# Patient Record
Sex: MALE | Race: WHITE | NOT HISPANIC OR LATINO | Employment: OTHER | URBAN - METROPOLITAN AREA
[De-identification: names, ages, dates, MRNs, and addresses within clinical notes are randomized per-mention and may not be internally consistent; named-entity substitution may affect disease eponyms.]

---

## 2017-01-11 ENCOUNTER — HOSPITAL ENCOUNTER (OUTPATIENT)
Dept: LAB | Facility: MEDICAL CENTER | Age: 47
End: 2017-01-11
Attending: INTERNAL MEDICINE
Payer: COMMERCIAL

## 2017-01-11 ENCOUNTER — HOSPITAL ENCOUNTER (OUTPATIENT)
Dept: RADIOLOGY | Facility: MEDICAL CENTER | Age: 47
End: 2017-01-11
Attending: FAMILY MEDICINE
Payer: COMMERCIAL

## 2017-01-11 DIAGNOSIS — Z79.4 TYPE 2 DIABETES MELLITUS WITH HYPERGLYCEMIA, WITH LONG-TERM CURRENT USE OF INSULIN (HCC): ICD-10-CM

## 2017-01-11 DIAGNOSIS — E78.2 MIXED HYPERLIPIDEMIA: ICD-10-CM

## 2017-01-11 DIAGNOSIS — E11.65 TYPE 2 DIABETES MELLITUS WITH HYPERGLYCEMIA, WITH LONG-TERM CURRENT USE OF INSULIN (HCC): ICD-10-CM

## 2017-01-11 DIAGNOSIS — M67.911 BILATERAL SHOULDER TENDINOPATHY: ICD-10-CM

## 2017-01-11 DIAGNOSIS — M67.912 BILATERAL SHOULDER TENDINOPATHY: ICD-10-CM

## 2017-01-11 LAB
CHOLEST SERPL-MCNC: 115 MG/DL (ref 100–199)
HDLC SERPL-MCNC: 26 MG/DL
LDLC SERPL CALC-MCNC: 56 MG/DL
TRIGL SERPL-MCNC: 164 MG/DL (ref 0–149)

## 2017-01-11 PROCEDURE — 73030 X-RAY EXAM OF SHOULDER: CPT | Mod: RT

## 2017-01-11 PROCEDURE — 36415 COLL VENOUS BLD VENIPUNCTURE: CPT

## 2017-01-11 PROCEDURE — 73030 X-RAY EXAM OF SHOULDER: CPT | Mod: LT

## 2017-01-11 PROCEDURE — 80061 LIPID PANEL: CPT

## 2017-01-12 DIAGNOSIS — M67.911 BILATERAL SHOULDER TENDINOPATHY: ICD-10-CM

## 2017-01-12 DIAGNOSIS — M67.912 BILATERAL SHOULDER TENDINOPATHY: ICD-10-CM

## 2017-01-12 PROBLEM — E55.9 VITAMIN D DEFICIENCY: Status: ACTIVE | Noted: 2017-01-12

## 2017-01-19 ENCOUNTER — APPOINTMENT (OUTPATIENT)
Dept: MEDICAL GROUP | Facility: MEDICAL CENTER | Age: 47
End: 2017-01-19
Payer: COMMERCIAL

## 2017-01-20 ENCOUNTER — OFFICE VISIT (OUTPATIENT)
Dept: MEDICAL GROUP | Facility: MEDICAL CENTER | Age: 47
End: 2017-01-20
Payer: COMMERCIAL

## 2017-01-20 VITALS
HEIGHT: 73 IN | SYSTOLIC BLOOD PRESSURE: 124 MMHG | HEART RATE: 64 BPM | DIASTOLIC BLOOD PRESSURE: 76 MMHG | WEIGHT: 229.72 LBS | OXYGEN SATURATION: 97 % | TEMPERATURE: 96.9 F | BODY MASS INDEX: 30.45 KG/M2 | RESPIRATION RATE: 14 BRPM

## 2017-01-20 DIAGNOSIS — E55.9 VITAMIN D DEFICIENCY: ICD-10-CM

## 2017-01-20 DIAGNOSIS — E29.1 HYPOGONADISM IN MALE: ICD-10-CM

## 2017-01-20 DIAGNOSIS — E78.2 MIXED HYPERLIPIDEMIA: ICD-10-CM

## 2017-01-20 DIAGNOSIS — M67.911 BILATERAL SHOULDER TENDINOPATHY: ICD-10-CM

## 2017-01-20 DIAGNOSIS — F32.1 MODERATE SINGLE CURRENT EPISODE OF MAJOR DEPRESSIVE DISORDER (HCC): ICD-10-CM

## 2017-01-20 DIAGNOSIS — M67.912 BILATERAL SHOULDER TENDINOPATHY: ICD-10-CM

## 2017-01-20 PROCEDURE — 99215 OFFICE O/P EST HI 40 MIN: CPT | Performed by: FAMILY MEDICINE

## 2017-01-20 RX ORDER — FLUOXETINE HYDROCHLORIDE 20 MG/1
20 CAPSULE ORAL DAILY
Qty: 90 CAP | Refills: 3 | Status: SHIPPED | OUTPATIENT
Start: 2017-01-20 | End: 2018-10-02

## 2017-01-20 NOTE — MR AVS SNAPSHOT
"        Brett Vicente   2017 7:20 AM   Office Visit   MRN: [id number]    Department:  [place] [address]   Dept Phone:  [phone]    Description:  Male : 1970   Provider:  Frida Baumann M.D.           Reason for Visit     Follow-Up DM, hyperlipidemia, labs       Allergies as of 2017     Allergen Noted Reactions    Fenofibrate 2015       Muscle weakness    Morphine 01/10/2011   Nausea    Pcn [Penicillins] 2014         You were diagnosed with     Moderate single current episode of major depressive disorder (CMS-HCC)   [1078327]       Uncontrolled type 2 diabetes mellitus with complication, with long-term current use of insulin (CMS-[facility])   [6688825]       Mixed hyperlipidemia   [272.2.ICD-9-CM]       Vitamin D deficiency   [8848203]       Bilateral shoulder tendinopathy   [1730320]       Hypogonadism in male   [9501794]         Vital Signs     Blood Pressure Pulse Temperature Respirations Height Weight    124/76 mmHg 64 36.1 °C (96.9 °F) 14 1.854 m (6' 0.99\") 104.2 kg (229 lb 11.5 oz)    Body Mass Index Oxygen Saturation Smoking Status             30.31 kg/m2 97% Never Smoker          Basic Information     Date Of Birth Sex Race Ethnicity Preferred Language    1970 Male White Non- English      Your appointments     2017  2:00 PM   New Patient with Paramjit Escamilla RD   [facility])    [address]  [address]  [facility] [id number] [phone]           It is the patient's responsibility to check with your Insurance for benefit coverage for visit / visits.  24 hours notice is required for all appointment changes or cancellation.  Please arrive 20 min. before your appointment time  Please bring the following with you: 1)Picture Id 2) Insurance card 3) Completed Forms if New Patient  If scheduled for DIABETES VISIT please also brin) Medications 2) Meter 3) Blood glucose logs 4) Any recent labs if you have them  If " scheduled for NUTRITION VISIT please also brin) 2-3 days of detailed food intake logs 2) Blood glucose monitor and blood glucose logs (if you have them)            2017  2:40 PM   Established Patient with Frida Baumann M.D.   [facility])    [address]  [address]  [place] [id number] [phone]           You will be receiving a confirmation call a few days before your appointment from our automated call confirmation system.            2017 10:20 AM   Diabetes Care Visit with Lorena Joy M.D., ENDOCRINOLOGY DIABETES RN   [facility] ([facility])    [address]  [place] [id number] [phone]           You will be receiving a confirmation call a few days before your appointment from our automated call confirmation system.              Problem List              ICD-10-CM Priority Class Noted - Resolved    Gout M10.9   2014 - Present    Hypogonadism in male E29.1   2015 - Present    Diabetes mellitus type 2, uncontrolled, with complications (CMS-HCC) E11.8, E11.65   2015 - Present    Essential hypertension I10   2015 - Present    Hypothyroidism E03.9   2015 - Present    Gastroesophageal reflux disease without esophagitis K21.9   2015 - Present    Hypercalcemia E83.52   2015 - Present    Bilateral shoulder tendinopathy M67.911, M67.912   2015 - Present    Mixed hyperlipidemia E78.2   2016 - Present    Vitamin D deficiency E55.9   2017 - Present    Moderate single current episode of major depressive disorder (CMS-HCC) F32.1   2017 - Present      Health Maintenance        Date Due Completion Dates    IMM HEP B VACCINE (1 of 3 - Primary Series) 1970 ---    IMM DTaP/Tdap/Td Vaccine (1 - Tdap) 1987 ---    DIABETES MONOFILAMENT / LE EXAM 2016, 2015 (Done)    Override on 2015: Done    A1C  SCREENING 6/8/2017 12/8/2016, 6/8/2016, 12/22/2015, 9/19/2015, 6/22/2015, 6/8/2015, 12/1/2014, 6/24/2014, 3/17/2014, 11/13/2013, 7/19/2013, 2/15/2013, 11/16/2012    RETINAL SCREENING 7/11/2017 7/11/2016, 7/11/2016, 6/2/2015, 6/18/2014    URINE ACR / MICROALBUMIN 12/8/2017 12/8/2016, 9/19/2015, 6/22/2015, 6/22/2015, 6/24/2014, 3/17/2014, 11/13/2013, 7/19/2013, 2/15/2013, 11/16/2012    SERUM CREATININE 12/8/2017 12/8/2016, 6/8/2016, 9/19/2015, 6/22/2015, 9/24/2014, 6/24/2014, 3/17/2014, 11/13/2013, 7/19/2013, 2/15/2013, 11/16/2012, 6/12/2009, 6/11/2009, 6/8/2009    FASTING LIPID PROFILE 1/11/2018 1/11/2017, 12/8/2016, 6/8/2016, 9/19/2015, 6/22/2015, 9/24/2014, 6/24/2014, 3/17/2014, 11/13/2013, 7/19/2013, 2/15/2013, 11/16/2012            Current Immunizations     Influenza Vaccine Quad Inj (Pf) 12/7/2016    Influenza Vaccine Quad Inj (Preserved) 12/22/2015 12:05 PM    Pneumococcal polysaccharide vaccine (PPSV-23) 12/7/2016      Below and/or attached are the medications your provider expects you to take. Review all of your home medications and newly ordered medications with your provider and/or pharmacist. Follow medication instructions as directed by your provider and/or pharmacist. Please keep your medication list with you and share with your provider. Update the information when medications are discontinued, doses are changed, or new medications (including over-the-counter products) are added; and carry medication information at all times in the event of emergency situations     Allergies:  FENOFIBRATE - (reactions not documented)     MORPHINE - Nausea     PCN - (reactions not documented)               Medications  Valid as of: January 20, 2017 -  8:01 AM    Generic Name Brand Name Tablet Size Instructions for use    Allopurinol (Tab) ZYLOPRIM 300 MG Take 1 Tab by mouth every day.        Aspirin (Tablet Delayed Response) ECOTRIN 81 MG Take 81 mg by mouth every day.        Atorvastatin Calcium (Tab) LIPITOR 40 MG Take  1 Tab by mouth every day.        Blood Glucose Monitoring Suppl (Misc) Blood Glucose Monitoring Suppl SUPPLIES Test strips order: Test strips for One Touch Ultra 2 meter. Sig: use QID and prn ssx high or low sugar        Blood Glucose Monitoring Suppl (Device) Blood Glucose Monitoring Suppl  Meter: Dispense One Touch Ultra 2 meter. Sig. Use as directed for blood sugar monitoring.        Carvedilol (Tab) COREG 25 MG Take 0.5 Tabs by mouth 2 times a day.        Dapagliflozin Propanediol (Tab) Dapagliflozin Propanediol 5 MG Take 1 Tab by mouth every day. Indications: Type 2 Diabetes        Exenatide (Pen-injector) Exenatide ER 2 MG Inject 2 mg as instructed every 7 days.        Fenofibrate (Tab) TRICOR 145 MG Take 1 Tab by mouth every day.        FLUoxetine HCl (Cap) PROZAC 20 MG Take 1 Cap by mouth every day.        Fluticasone Propionate (Suspension) FLONASE 50 MCG/ACT Spray 2 Sprays in nose every day.        HydroCHLOROthiazide (Cap) MICROZIDE 12.5 MG Take 1 Cap by mouth every day.        Insulin Glargine (Solution Pen-injector) LANTUS 100 UNIT/ML Inject 36-60 Units as instructed every evening.        Insulin Pen Needle (Misc) Insulin Pen Needle 32G X 4 MM For insulin shot once a day        Irbesartan (Tab) AVAPRO 300 MG TAKE 1 TABLET DAILY        Lancets (Misc) Lancets  Lancets order: Lancets for One Touch Ultra 2 meter. Sig: use QID and prn ssx high or low sugar.        Lansoprazole (CAPSULE DELAYED RELEASE) PREVACID 30 MG TAKE 1 CAPSULE TWICE DAILY        Levothyroxine Sodium (Tab) SYNTHROID 75 MCG Take 1 Tab by mouth every day.        Levothyroxine Sodium (Tab) SYNTHROID 75 MCG TAKE 1 TABLET DAILY        Meloxicam (Tab) MOBIC 7.5 MG Take 1 Tab by mouth 2 times a day as needed.        MetFORMIN HCl (TABLET SR 24 HR) GLUCOPHAGE  MG Take 2 Tabs by mouth 2 times a day.        Omega-3 Fatty Acids (Cap) Omega 3 1000 MG Take 4 a day        Omega-3-acid Ethyl Esters (Cap) LOVAZA 1 GM Take 1 Cap by mouth every day.         Spironolactone (Tab) ALDACTONE 25 MG Take 0.5 Tabs by mouth every day. TAKE 0.5 TABS BY MOUTH EVERY DAY.        Testosterone Cypionate (Solution) DEPO-TESTOSTERONE 200 MG/ML 1 mL by Intramuscular route every 14 days.        .                 Medicines prescribed today were sent to:     [facility][id number] - [place], [place] - [address]    [address] [place] [address]    Phone: [phone] Fax: [phone]    Open 24 Hours?: No    [facility] - [place], [place] - [address] AT PORTAL TO REGISTERED [facility] S[address]    [address] [facility] [address]    Phone: [phone] Fax: [phone]    Open 24 Hours?: No      Medication refill instructions:       If your prescription bottle indicates you have medication refills left, it is not necessary to call your provider’s office. Please contact your pharmacy and they will refill your medication.    If your prescription bottle indicates you do not have any refills left, you may request refills at any time through one of the following ways: The online [employer] system (except Urgent Care), by calling your provider’s office, or by asking your pharmacy to contact your provider’s office with a refill request. Medication refills are processed only during regular business hours and may not be available until the next business day. Your provider may request additional information or to have a follow-up visit with you prior to refilling your medication.   *Please Note: Medication refills are assigned a new Rx number when refilled electronically. Your pharmacy may indicate that no refills were authorized even though a new prescription for the same medication is available at the pharmacy. Please request the medicine by name with the pharmacy before contacting your provider for a refill.        Your To Do List     Future Labs/Procedures Complete By Expires    BASIC METABOLIC PANEL  As directed 1/21/2018    HEMOGLOBIN A1C  As directed 1/21/2018     LIPID PROFILE  As directed 1/21/2018    REFERENCE MISC.AMBIENT  As directed 1/21/2018    Comments:    Testo Free & Total by [facility] #[id number].    VITAMIN D,25 HYDROXY  As directed 1/21/2018         MyChart Access Code: Activation code not generated  Current Medicast Status: Active

## 2017-01-20 NOTE — PROGRESS NOTES
"Subjective:   Bravo Vicente is a 46 y.o. male here today for   Chief Complaint   Patient presents with   • Follow-Up     DM, hyperlipidemia, labs        1. Moderate single current episode of major depressive disorder (CMS-HCC)  This is chronic. He has been on Prozac 20 mg daily for many years. He did run out of these pills over the last 5 days has been without periods he states that he is been more \"grumpy\" than normal with his kids. He denies any suicidality, difficulty sleeping, weight changes.     2. Uncontrolled type 2 diabetes mellitus with complication, with long-term current use of insulin (CMS-HCC)  This is chronic. Patient is seeing endocrinology. He is currently on Dapaglifozin, Exenatide, lantus (38-40U qhs), metformin 1000mg BID. We went over his blood sugar Today. His fasting glucose has been mostly 90 to 1:30. There are some aberrant numbers up to 1/91 thing in the morning. His postprandial glucoses are as high as 250. He has an appointment with diabetic educator on 1/23/17    3. Mixed hyperlipidemia  This is chronic. Patient had repeat labs done. His triglycerides went from 1600 down to 164. He is currently on atorvastatin 40 mg daily, fenofibrate 145 mg daily, Lovaza 1 g daily  Results for BRAVO VICENTE (MRN [id number]) as of 1/20/2017 08:06   Ref. Range 1/11/2017 11:32   Cholesterol,Tot Latest Ref Range: 100-199 mg/dL 115   Triglycerides Latest Ref Range: 0-149 mg/dL 164 (H)   HDL Latest Ref Range: >=40 mg/dL 26 (A)   LDL Latest Ref Range: <100 mg/dL 56     4. Vitamin D deficiency  This is a new problem. Patient did start taking vitamin D supplementation 2000 units daily  Results for BRAVO VICENTE (MRN [id number]) as of 1/20/2017 08:06   Ref. Range 12/8/2016 10:02   25-Hydroxy   Vitamin D 25 Latest Ref Range:  ng/mL 11 (L)     5. Bilateral shoulder tendinopathy  This is chronic. X-rays reviewed as below. The pain is worse with external rotation of the shoulders and when he is in push up " position. He has not tried physical therapy nor shoulder injections. He would like to get his shoulder pain under control so he can start exercising regularly    Left shoulder x-ray:  Mild AC joint spurring/joint space narrowing    Right shoulder XRAY  Negative shoulder series.  6. Hypogonadism in male  This is chronic. His testosterone has been low for several years. He did take testosterone injections weekly for several months. He has not done this over the last few years. He is not sure if this helped him.  Testosterone Total, LC-MS/MS, Males   250.0 L     ng/dL 300.0-890.0   REFERENCE INTERVAL: Testosterone Total, LC-MS/MS, Males   Access complete set of age- and/or gender-specific reference   intervals for this test in the [employer] [facility] Directory   ([employer]).   INTERPRETIVE INFORMATION: Testosterone Total, LC-MS/MS, Males   Test developed and characteristics determined by [facility]. See Compliance Statement B: [employer]   Testos Free Adult Male ED/LC-MS/MS   49.7       pg/mL 47.0-244.0   Test developed and characteristics determined by [facility]. See Compliance Statement B: [employer]/CS   Performed by [facility],   [address] [place] [id number] [phone]   [email or web address], Zain Will MD - Lab. Director       Current medicines (including changes today)  Current Outpatient Prescriptions   Medication Sig Dispense Refill   • fluoxetine (PROZAC) 20 MG Cap Take 1 Cap by mouth every day. 90 Cap 3   • meloxicam (MOBIC) 7.5 MG Tab Take 1 Tab by mouth 2 times a day as needed. 60 Tab 1   • atorvastatin (LIPITOR) 40 MG Tab Take 1 Tab by mouth every day. 90 Tab 3   • insulin glargine (LANTUS SOLOSTAR) 100 UNIT/ML Solution Pen-injector injection Inject 36-60 Units as instructed every evening. 45 mL 3   • fenofibrate (TRICOR) 145 MG Tab Take 1 Tab by mouth every day. 90 Tab 3   • Insulin Pen Needle (BD PEN NEEDLE CARLY U/F) 32G X 4 MM Misc For insulin shot once a day 100  Each 3   • Blood Glucose Monitoring Suppl SUPPLIES Misc Test strips order: Test strips for One Touch Ultra 2 meter. Sig: use QID and prn ssx high or low sugar 100 Each 3   • Lancets Misc Lancets order: Lancets for One Touch Ultra 2 meter. Sig: use QID and prn ssx high or low sugar. 100 Each 3   • Blood Glucose Monitoring Suppl Device Meter: Dispense One Touch Ultra 2 meter. Sig. Use as directed for blood sugar monitoring. 1 Device 0   • Exenatide ER 2 MG Pen-injector Inject 2 mg as instructed every 7 days. 8 Each 12   • lansoprazole (PREVACID) 30 MG CAPSULE DELAYED RELEASE TAKE 1 CAPSULE TWICE DAILY 180 Cap 4   • irbesartan (AVAPRO) 300 MG Tab TAKE 1 TABLET DAILY 90 Tab 4   • SYNTHROID 75 MCG Tab TAKE 1 TABLET DAILY 90 Tab 4   • spironolactone (ALDACTONE) 25 MG Tab Take 0.5 Tabs by mouth every day. TAKE 0.5 TABS BY MOUTH EVERY DAY. 45 Tab 4   • Dapagliflozin Propanediol (FARXIGA) 5 MG Tab Take 1 Tab by mouth every day. Indications: Type 2 Diabetes 90 Tab 3   • levothyroxine (SYNTHROID) 75 MCG Tab Take 1 Tab by mouth every day. 90 Tab 3   • metformin ER (GLUCOPHAGE XR) 500 MG TABLET SR 24 HR Take 2 Tabs by mouth 2 times a day. 360 Tab 3   • allopurinol (ZYLOPRIM) 300 MG Tab Take 1 Tab by mouth every day. 90 Tab 1   • fluticasone (FLONASE) 50 MCG/ACT nasal spray Spray 2 Sprays in nose every day. 16 g 0   • hydrochlorothiazide (MICROZIDE) 12.5 MG capsule Take 1 Cap by mouth every day. 90 Cap 1   • carvedilol (COREG) 25 MG Tab Take 0.5 Tabs by mouth 2 times a day. 90 Tab 1   • Omega 3 1000 MG CAPS Take 4 a day 100 Cap 6   • aspirin EC (ECOTRIN) 81 MG TBEC Take 81 mg by mouth every day.     • omega-3 acid ethyl esters (LOVAZA) 1 GM capsule Take 1 Cap by mouth every day. 180 Cap 3   • testosterone cypionate (DEPO-TESTOSTERONE) 200 MG/ML Solution injection 1 mL by Intramuscular route every 14 days. 10 mL 4     No current facility-administered medications for this visit.     He  has a past medical history of Diabetes  "(CMS-HCC); Hypertension; Hyperlipidemia; GERD (gastroesophageal reflux disease); Thyroid disease; and Gout.    ROS   No chest pain, no shortness of breath, no abdominal pain       Objective:     Blood pressure 124/76, pulse 64, temperature 36.1 °C (96.9 °F), resp. rate 14, height 1.854 m (6' 0.99\"), weight 104.2 kg (229 lb 11.5 oz), SpO2 97 %. Body mass index is 30.31 kg/(m^2).   Physical Exam:  Constitutional: Alert, no distress.  Skin: Warm, dry, good turgor, no rashes in visible areas.  Eye: Equal, round and reactive, conjunctiva clear, lids normal.  ENMT: Lips without lesions, good dentition, oropharynx clear.  Neck: Trachea midline, no masses, no thyromegaly. No cervical or supraclavicular lymphadenopathy  Respiratory: Unlabored respiratory effort, lungs clear to auscultation, no wheezes, no ronchi.  Cardiovascular: Normal S1, S2, no murmur, no edema.  Abdomen: Soft, non-tender, no masses, no hepatosplenomegaly.  Psych: Alert and oriented x3, normal affect and mood.  MSK: Tenderness to palpation at the insertion of the biceps tendon. Pain with empty can test      Assessment and Plan:   The following treatment plan was discussed    1. Moderate single current episode of major depressive disorder (CMS-HCC)  Chronic, stable  Continue Prozac 20 mg daily  - fluoxetine (PROZAC) 20 MG Cap; Take 1 Cap by mouth every day.  Dispense: 90 Cap; Refill: 3    2. Uncontrolled type 2 diabetes mellitus with complication, with long-term current use of insulin (CMS-HCC)  Chronic, stable  Continue medication regimen as above  Continue blood sugar log  Follow-up with Dr. Joy   Repeat hemoglobin A1c in 2 months   Dietitian meeting on 1/23/17  - BASIC METABOLIC PANEL; Future  - HEMOGLOBIN A1C; Future    3. Mixed hyperlipidemia  Chronic, improved   Continue medications as above  If triglycerides remain controlled, we may be able to stop his fenofibrate  Nutrition counseling  Start daily exercise  - LIPID PROFILE; Future    4. Vitamin " D deficiency  This is a new problem and not controlled  Start vitamin D supplementation 2000 units daily   Recheck 3 months    - VITAMIN D,25 HYDROXY; Future    5. Bilateral shoulder tendinopathy  Chronic, uncontrolled x-rays reviewed  Start physical therapy  If no improvement, MRI will be ordered and possible meeting with orthopedics    6. Hypogonadism in male  Chronic, stable  Labs reviewed  Recheck labs with next blood draw  We did discuss risks of testosterone supplementation versus benefits  - REFERENCE MISC.AMBIENT; Future    Total 40 minutes face-to-face time spent with patient, with greater than 50% of the total time discussing patient's issues and symptoms as listed above in assessment and plan, as well as managing coordination of care for future evaluation and treatment.      Followup: Return in about 3 months (around 4/20/2017) for DM, Labs, shoulder, HLD, long.

## 2017-01-23 ENCOUNTER — NON-PROVIDER VISIT (OUTPATIENT)
Dept: HEALTH INFORMATION MANAGEMENT | Facility: MEDICAL CENTER | Age: 47
End: 2017-01-23
Payer: COMMERCIAL

## 2017-01-23 PROCEDURE — 97802 MEDICAL NUTRITION INDIV IN: CPT | Performed by: DIETITIAN, REGISTERED

## 2017-01-23 NOTE — MR AVS SNAPSHOT
Brett Vicente   2017 2:00 PM   Appointment   MRN: [id number]    Department:  [facility]   Dept Phone:  [phone]    Description:  Male : 1970   Provider:  Paramjit Escamilla RD           Allergies as of 2017     Allergen Noted Reactions    Fenofibrate 2015       Muscle weakness    Morphine 01/10/2011   Nausea    Pcn [Penicillins] 2014         Vital Signs     Smoking Status                   Never Smoker            Basic Information     Date Of Birth Sex Race Ethnicity Preferred Language    1970 Male White Non- English      Your appointments     Mar 06, 2017  4:00 PM   Follow Up Visit with Paramjit Escamilla RD   [facility])    [address]  [address]  [place] [id number] [phone]           It is the patient's responsibility to check with your Insurance for benefit coverage for visit / visits.  24 hours notice is required for all appointment changes or cancellation.  Please arrive 20 min. before your appointment time  Please bring the following with you: 1)Picture Id 2) Insurance card 3) Completed Forms if New Patient  If scheduled for DIABETES VISIT please also brin) Medications 2) Meter 3) Blood glucose logs 4) Any recent labs if you have them  If scheduled for NUTRITION VISIT please also brin) 2-3 days of detailed food intake logs 2) Blood glucose monitor and blood glucose logs (if you have them)            2017  2:40 PM   Established Patient with Frida Baumann M.D.   [facility])    [address]  [address]  [place] [id number] [phone]           You will be receiving a confirmation call a few days before your appointment from our automated call confirmation system.            2017 10:20 AM   Diabetes Care Visit with Lorena Joy M.D., ENDOCRINOLOGY DIABETES RN   [facility] ([facility])    [address], [address]  [place] [address] [phone]           You will be receiving a confirmation call a few days before your appointment from our automated call confirmation system.              Problem List              ICD-10-CM Priority Class Noted - Resolved    Gout M10.9   8/19/2014 - Present    Hypogonadism in male E29.1   6/25/2015 - Present    Diabetes mellitus type 2, uncontrolled, with complications (CMS-HCC) E11.8, E11.65   6/25/2015 - Present    Essential hypertension I10   6/25/2015 - Present    Hypothyroidism E03.9   6/25/2015 - Present    Gastroesophageal reflux disease without esophagitis K21.9   6/25/2015 - Present    Hypercalcemia E83.52   12/22/2015 - Present    Bilateral shoulder tendinopathy M67.911, M67.912   12/22/2015 - Present    Mixed hyperlipidemia E78.2   12/7/2016 - Present    Vitamin D deficiency E55.9   1/12/2017 - Present    Moderate single current episode of major depressive disorder (CMS-HCC) F32.1   1/20/2017 - Present      Health Maintenance        Date Due Completion Dates    IMM HEP B VACCINE (1 of 3 - Primary Series) 1970 ---    IMM DTaP/Tdap/Td Vaccine (1 - Tdap) 12/21/1987 ---    DIABETES MONOFILAMENT / LE EXAM 12/22/2016 12/22/2015, 6/8/2015 (Done)    Override on 6/8/2015: Done    A1C SCREENING 6/8/2017 12/8/2016, 6/8/2016, 12/22/2015, 9/19/2015, 6/22/2015, 6/8/2015, 12/1/2014, 6/24/2014, 3/17/2014, 11/13/2013, 7/19/2013, 2/15/2013, 11/16/2012    RETINAL SCREENING 7/11/2017 7/11/2016, 7/11/2016, 6/2/2015, 6/18/2014    URINE ACR / MICROALBUMIN 12/8/2017 12/8/2016, 9/19/2015, 6/22/2015, 6/22/2015, 6/24/2014, 3/17/2014, 11/13/2013, 7/19/2013, 2/15/2013, 11/16/2012    SERUM CREATININE 12/8/2017 12/8/2016, 6/8/2016, 9/19/2015, 6/22/2015, 9/24/2014, 6/24/2014, 3/17/2014, 11/13/2013, 7/19/2013, 2/15/2013, 11/16/2012, 6/12/2009, 6/11/2009, 6/8/2009    FASTING LIPID PROFILE 1/11/2018 1/11/2017, 12/8/2016, 6/8/2016, 9/19/2015, 6/22/2015, 9/24/2014, 6/24/2014,  3/17/2014, 11/13/2013, 7/19/2013, 2/15/2013, 11/16/2012            Current Immunizations     Influenza Vaccine Quad Inj (Pf) 12/7/2016    Influenza Vaccine Quad Inj (Preserved) 12/22/2015 12:05 PM    Pneumococcal polysaccharide vaccine (PPSV-23) 12/7/2016      Below and/or attached are the medications your provider expects you to take. Review all of your home medications and newly ordered medications with your provider and/or pharmacist. Follow medication instructions as directed by your provider and/or pharmacist. Please keep your medication list with you and share with your provider. Update the information when medications are discontinued, doses are changed, or new medications (including over-the-counter products) are added; and carry medication information at all times in the event of emergency situations     Allergies:  FENOFIBRATE - (reactions not documented)     MORPHINE - Nausea     PCN - (reactions not documented)               Medications  Valid as of: January 23, 2017 -  3:18 PM    Generic Name Brand Name Tablet Size Instructions for use    Allopurinol (Tab) ZYLOPRIM 300 MG Take 1 Tab by mouth every day.        Aspirin (Tablet Delayed Response) ECOTRIN 81 MG Take 81 mg by mouth every day.        Atorvastatin Calcium (Tab) LIPITOR 40 MG Take 1 Tab by mouth every day.        Blood Glucose Monitoring Suppl (Misc) Blood Glucose Monitoring Suppl SUPPLIES Test strips order: Test strips for One Touch Ultra 2 meter. Sig: use QID and prn ssx high or low sugar        Blood Glucose Monitoring Suppl (Device) Blood Glucose Monitoring Suppl  Meter: Dispense One Touch Ultra 2 meter. Sig. Use as directed for blood sugar monitoring.        Carvedilol (Tab) COREG 25 MG Take 0.5 Tabs by mouth 2 times a day.        Dapagliflozin Propanediol (Tab) Dapagliflozin Propanediol 5 MG Take 1 Tab by mouth every day. Indications: Type 2 Diabetes        Exenatide (Pen-injector) Exenatide ER 2 MG Inject 2 mg as instructed every 7 days.          Fenofibrate (Tab) TRICOR 145 MG Take 1 Tab by mouth every day.        FLUoxetine HCl (Cap) PROZAC 20 MG Take 1 Cap by mouth every day.        Fluticasone Propionate (Suspension) FLONASE 50 MCG/ACT Spray 2 Sprays in nose every day.        HydroCHLOROthiazide (Cap) MICROZIDE 12.5 MG Take 1 Cap by mouth every day.        Insulin Glargine (Solution Pen-injector) LANTUS 100 UNIT/ML Inject 36-60 Units as instructed every evening.        Insulin Pen Needle (Misc) Insulin Pen Needle 32G X 4 MM For insulin shot once a day        Irbesartan (Tab) AVAPRO 300 MG TAKE 1 TABLET DAILY        Lancets (Misc) Lancets  Lancets order: Lancets for One Touch Ultra 2 meter. Sig: use QID and prn ssx high or low sugar.        Lansoprazole (CAPSULE DELAYED RELEASE) PREVACID 30 MG TAKE 1 CAPSULE TWICE DAILY        Levothyroxine Sodium (Tab) SYNTHROID 75 MCG Take 1 Tab by mouth every day.        Levothyroxine Sodium (Tab) SYNTHROID 75 MCG TAKE 1 TABLET DAILY        Meloxicam (Tab) MOBIC 7.5 MG Take 1 Tab by mouth 2 times a day as needed.        MetFORMIN HCl (TABLET SR 24 HR) GLUCOPHAGE  MG Take 2 Tabs by mouth 2 times a day.        Omega-3 Fatty Acids (Cap) Omega 3 1000 MG Take 4 a day        Omega-3-acid Ethyl Esters (Cap) LOVAZA 1 GM Take 1 Cap by mouth every day.        Spironolactone (Tab) ALDACTONE 25 MG Take 0.5 Tabs by mouth every day. TAKE 0.5 TABS BY MOUTH EVERY DAY.        Testosterone Cypionate (Solution) DEPO-TESTOSTERONE 200 MG/ML 1 mL by Intramuscular route every 14 days.        .                 Medicines prescribed today were sent to:     [facility]/PHARMACY #[id number] - [place] - [address]    [address] [place] [address]    Phone: [phone] Fax: [phone]    Open 24 Hours?: No    [facility] - [place] - [address] AT PORTAL TO REGISTERED [facility] SITES    [address] [facility] [address]    Phone: [phone] Fax: [phone]    Open 24 Hours?: No      Medication  refill instructions:       If your prescription bottle indicates you have medication refills left, it is not necessary to call your provider’s office. Please contact your pharmacy and they will refill your medication.    If your prescription bottle indicates you do not have any refills left, you may request refills at any time through one of the following ways: The online [employer] system (except Urgent Care), by calling your provider’s office, or by asking your pharmacy to contact your provider’s office with a refill request. Medication refills are processed only during regular business hours and may not be available until the next business day. Your provider may request additional information or to have a follow-up visit with you prior to refilling your medication.   *Please Note: Medication refills are assigned a new Rx number when refilled electronically. Your pharmacy may indicate that no refills were authorized even though a new prescription for the same medication is available at the pharmacy. Please request the medicine by name with the pharmacy before contacting your provider for a refill.           [employer] Access Code: Activation code not generated  Current [employer] Status: Active

## 2017-01-23 NOTE — PROGRESS NOTES
1/23/2017    Frida Baumann M.D.  46 y.o.   Time in/out: 1412 - 1510     Subjective:  -Wants to control BG's better  -Taking his Lantus consistently now with fasting BG's  mg/dL in the last week  -Does not eat consistently - usually skips lunch d/t not remembering to eat  -No regular activity at this time    Nutrition Diagnosis (PES Statement)    Altered nutrition-related lab values related to endocrine dysfunction as evidenced by HbA1c of 11.5%.    Biochemical data, medical test and procedures  Lab Results   Component Value Date/Time    GLYCOHEMOGLOBIN 11.5* 12/08/2016 10:02 AM     Lab Results   Component Value Date/Time    CHOLESTEROL, 01/11/2017 11:32 AM    LDL 56 01/11/2017 11:32 AM    HDL 26* 01/11/2017 11:32 AM    TRIGLYCERIDES 164* 01/11/2017 11:32 AM         Nutrition Intervention  Meal and Snack  Recommend a general/healthful diet    Comprehensive Nutrition education Instruction or training leading to in-depth nutrition related knowledge about:  Combine carb, protein and fat at each meal, Meal timing and spacing, Menu Planning, Metabolism of carb, protein, fat, Physical activity/exercise, Portion control, Label Reading and Handouts provided regarding topics discussed    Monitoring & Evaluation Plan  Behavioral-Environmental:  Behavior:  Consistent CHO intake throughout the day; set an alarm to remind you to eat lunch daily  Physical activity:  Increase as tolerated    Food / Nutrient Intake:  Macronutrients intake:  Up to 60 grams CHO at meals and 15-20 grams at snacks    Physical Signs / Symptoms:  HbA1c profiles:  Within ADA guidelines      Assessment Notes:  I want to start Brett off with some small goals of being more consistent with his eating and exercise habits.  He has agreed to set an alarm as a reminder to eat something for lunch midday since he is skipping this meal more often than not.  This should help his BG's because he will stop going 8+ hours without eating, thus causing  his liver to breakdown liver glycogen and raising BG's.  We discussed examples of what he can eat and he is going to start doing this more consistently.  We also discussed exercising more consistently as well and to think of exercise as medicine for controlling BG's, which he has seen when he is more active during the spring/summer.  I showed him the plate method to help him focus on balance and portions at dinner, and we will discuss this more at our next appointment in 1 month.

## 2017-02-13 ENCOUNTER — APPOINTMENT (OUTPATIENT)
Dept: PHYSICAL THERAPY | Facility: REHABILITATION | Age: 47
End: 2017-02-13
Payer: COMMERCIAL

## 2017-03-01 ENCOUNTER — HOSPITAL ENCOUNTER (OUTPATIENT)
Dept: LAB | Facility: MEDICAL CENTER | Age: 47
End: 2017-03-01
Attending: FAMILY MEDICINE
Payer: COMMERCIAL

## 2017-03-01 ENCOUNTER — HOSPITAL ENCOUNTER (OUTPATIENT)
Dept: PHYSICAL THERAPY | Facility: MEDICAL CENTER | Age: 47
End: 2017-03-01
Attending: FAMILY MEDICINE
Payer: COMMERCIAL

## 2017-03-01 DIAGNOSIS — E29.1 HYPOGONADISM IN MALE: ICD-10-CM

## 2017-03-01 DIAGNOSIS — E55.9 VITAMIN D DEFICIENCY: ICD-10-CM

## 2017-03-01 DIAGNOSIS — E78.2 MIXED HYPERLIPIDEMIA: ICD-10-CM

## 2017-03-01 LAB
25(OH)D3 SERPL-MCNC: 33 NG/ML (ref 30–100)
ANION GAP SERPL CALC-SCNC: 6 MMOL/L (ref 0–11.9)
BUN SERPL-MCNC: 17 MG/DL (ref 8–22)
CALCIUM SERPL-MCNC: 10.6 MG/DL (ref 8.4–10.2)
CHLORIDE SERPL-SCNC: 107 MMOL/L (ref 96–112)
CHOLEST SERPL-MCNC: 110 MG/DL (ref 100–199)
CO2 SERPL-SCNC: 27 MMOL/L (ref 20–33)
CREAT SERPL-MCNC: 0.98 MG/DL (ref 0.5–1.4)
EST. AVERAGE GLUCOSE BLD GHB EST-MCNC: 151 MG/DL
GFR SERPL CREATININE-BSD FRML MDRD: >60 ML/MIN/1.73 M 2
GLUCOSE SERPL-MCNC: 105 MG/DL (ref 65–99)
HBA1C MFR BLD: 6.9 % (ref 0–5.6)
HDLC SERPL-MCNC: 28 MG/DL
LDLC SERPL CALC-MCNC: 49 MG/DL
POTASSIUM SERPL-SCNC: 3.9 MMOL/L (ref 3.6–5.5)
SODIUM SERPL-SCNC: 140 MMOL/L (ref 135–145)
TRIGL SERPL-MCNC: 163 MG/DL (ref 0–149)

## 2017-03-01 PROCEDURE — 82306 VITAMIN D 25 HYDROXY: CPT

## 2017-03-01 PROCEDURE — 97162 PT EVAL MOD COMPLEX 30 MIN: CPT

## 2017-03-01 PROCEDURE — 80061 LIPID PANEL: CPT

## 2017-03-01 PROCEDURE — 84403 ASSAY OF TOTAL TESTOSTERONE: CPT

## 2017-03-01 PROCEDURE — 97140 MANUAL THERAPY 1/> REGIONS: CPT

## 2017-03-01 PROCEDURE — 83036 HEMOGLOBIN GLYCOSYLATED A1C: CPT

## 2017-03-01 PROCEDURE — 80048 BASIC METABOLIC PNL TOTAL CA: CPT

## 2017-03-01 PROCEDURE — 84402 ASSAY OF FREE TESTOSTERONE: CPT

## 2017-03-01 PROCEDURE — 36415 COLL VENOUS BLD VENIPUNCTURE: CPT

## 2017-03-04 LAB — MISCELLANEOUS LAB RESULT MISCLAB: ABNORMAL

## 2017-03-06 ENCOUNTER — HOSPITAL ENCOUNTER (OUTPATIENT)
Dept: PHYSICAL THERAPY | Facility: MEDICAL CENTER | Age: 47
End: 2017-03-06
Attending: FAMILY MEDICINE
Payer: COMMERCIAL

## 2017-03-06 ENCOUNTER — NON-PROVIDER VISIT (OUTPATIENT)
Dept: HEALTH INFORMATION MANAGEMENT | Facility: MEDICAL CENTER | Age: 47
End: 2017-03-06
Payer: COMMERCIAL

## 2017-03-06 PROCEDURE — 97803 MED NUTRITION INDIV SUBSEQ: CPT | Performed by: DIETITIAN, REGISTERED

## 2017-03-06 PROCEDURE — 97140 MANUAL THERAPY 1/> REGIONS: CPT

## 2017-03-06 NOTE — MR AVS SNAPSHOT
Brett Vicente   3/6/2017 4:00 PM   Appointment   MRN: [id number]    Department:  [facility]   Dept Phone:  [phone]    Description:  Male : 1970   Provider:  Paramjit Escamilla RD           Allergies as of 3/6/2017     Allergen Noted Reactions    Fenofibrate 2015       Muscle weakness    Morphine 01/10/2011   Nausea    Pcn [Penicillins] 2014         Vital Signs     Smoking Status                   Never Smoker            Basic Information     Date Of Birth Sex Race Ethnicity Preferred Language    1970 Male White Non- English      Your appointments     Mar 08, 2017  1:30 PM   PT Follow Up 30 Minutes with Hudson Roe P.T.   [facility] ([facility])    [address]  [place] [address]   [phone]            Mar 13, 2017  1:30 PM   PT Follow Up 30 Minutes with Hudosn Roe P.T.   [facility] ([facility])    [address]  [place] [address]   [phone]            Mar 15, 2017  1:30 PM   PT Follow Up 30 Minutes with Hudson Roe P.T.   [facility] ([facility])    [address]  [place] [address]   [phone]            Mar 20, 2017  1:30 PM   PT Follow Up 30 Minutes with Hudson Roe P.T.   [facility] ([facility])    [address]  [place] [address]   [phone]            Mar 22, 2017  1:30 PM   PT Follow Up 30 Minutes with Hudson Roe P.T.   [facility] ([facility])    [address]  [place] [address]   [phone]            Mar 27, 2017  1:30 PM   PT Follow Up 30 Minutes with Hudson Roe P.T.   [facility] ([facility])    [address]  [place] [address]   [phone]            Mar 29, 2017  1:30 PM   PT  Follow Up 30 Minutes with Hudson Roe P.T.   [facility] ([facility])    [address]  [place] [address] [phone]            Apr 11, 2017  2:40 PM   Established Patient with Frida Baumann M.D.   [facility] (--)    [address]  [address]  [facility] [id number] [phone]           You will be receiving a confirmation call a few days before your appointment from our automated call confirmation system.            Apr 12, 2017 10:20 AM   Diabetes Care Visit with Lorena Joy M.D., ENDOCRINOLOGY DIABETES RN   [facility] ([facility])    [address]  [facility] [address] [phone]           You will be receiving a confirmation call a few days before your appointment from our automated call confirmation system.              Problem List              ICD-10-CM Priority Class Noted - Resolved    Gout M10.9   8/19/2014 - Present    Hypogonadism in male E29.1   6/25/2015 - Present    Diabetes mellitus type 2, uncontrolled, with complications (CMS-HCC) E11.8, E11.65   6/25/2015 - Present    Essential hypertension I10   6/25/2015 - Present    Hypothyroidism E03.9   6/25/2015 - Present    Gastroesophageal reflux disease without esophagitis K21.9   6/25/2015 - Present    Hypercalcemia E83.52   12/22/2015 - Present    Bilateral shoulder tendinopathy M67.911, M67.912   12/22/2015 - Present    Mixed hyperlipidemia E78.2   12/7/2016 - Present    Vitamin D deficiency E55.9   1/12/2017 - Present    Moderate single current episode of major depressive disorder (CMS-HCC) F32.1   1/20/2017 - Present      Health Maintenance        Date Due Completion Dates    IMM HEP B VACCINE (1 of 3 - Primary Series) 1970 ---    IMM DTaP/Tdap/Td Vaccine (1 - Tdap) 12/21/1987 ---    DIABETES MONOFILAMENT / LE EXAM 12/22/2016 12/22/2015, 6/8/2015 (Done)    Override on 6/8/2015: Done     RETINAL SCREENING 7/11/2017 7/11/2016, 7/11/2016, 6/2/2015, 6/18/2014    A1C SCREENING 9/1/2017 3/1/2017, 12/8/2016, 6/8/2016, 12/22/2015, 9/19/2015, 6/22/2015, 6/8/2015, 12/1/2014, 6/24/2014, 3/17/2014, 11/13/2013, 7/19/2013, 2/15/2013, 11/16/2012    URINE ACR / MICROALBUMIN 12/8/2017 12/8/2016, 9/19/2015, 6/22/2015, 6/22/2015, 6/24/2014, 3/17/2014, 11/13/2013, 7/19/2013, 2/15/2013, 11/16/2012    FASTING LIPID PROFILE 3/1/2018 3/1/2017, 1/11/2017, 12/8/2016, 6/8/2016, 9/19/2015, 6/22/2015, 9/24/2014, 6/24/2014, 3/17/2014, 11/13/2013, 7/19/2013, 2/15/2013, 11/16/2012    SERUM CREATININE 3/1/2018 3/1/2017, 12/8/2016, 6/8/2016, 9/19/2015, 6/22/2015, 9/24/2014, 6/24/2014, 3/17/2014, 11/13/2013, 7/19/2013, 2/15/2013, 11/16/2012, 6/12/2009, 6/11/2009, 6/8/2009            Current Immunizations     Influenza Vaccine Quad Inj (Pf) 12/7/2016    Influenza Vaccine Quad Inj (Preserved) 12/22/2015 12:05 PM    Pneumococcal polysaccharide vaccine (PPSV-23) 12/7/2016      Below and/or attached are the medications your provider expects you to take. Review all of your home medications and newly ordered medications with your provider and/or pharmacist. Follow medication instructions as directed by your provider and/or pharmacist. Please keep your medication list with you and share with your provider. Update the information when medications are discontinued, doses are changed, or new medications (including over-the-counter products) are added; and carry medication information at all times in the event of emergency situations     Allergies:  FENOFIBRATE - (reactions not documented)     MORPHINE - Nausea     PCN - (reactions not documented)               Medications  Valid as of: March 06, 2017 -  4:19 PM    Generic Name Brand Name Tablet Size Instructions for use    Allopurinol (Tab) ZYLOPRIM 300 MG Take 1 Tab by mouth every day.        Aspirin (Tablet Delayed Response) ECOTRIN 81 MG Take 81 mg by mouth every day.        Atorvastatin  Calcium (Tab) LIPITOR 40 MG Take 1 Tab by mouth every day.        Blood Glucose Monitoring Suppl (Misc) Blood Glucose Monitoring Suppl SUPPLIES Test strips order: Test strips for One Touch Ultra 2 meter. Sig: use QID and prn ssx high or low sugar        Blood Glucose Monitoring Suppl (Device) Blood Glucose Monitoring Suppl  Meter: Dispense One Touch Ultra 2 meter. Sig. Use as directed for blood sugar monitoring.        Carvedilol (Tab) COREG 25 MG Take 0.5 Tabs by mouth 2 times a day.        Dapagliflozin Propanediol (Tab) Dapagliflozin Propanediol 5 MG Take 1 Tab by mouth every day. Indications: Type 2 Diabetes        Exenatide (Pen-injector) Exenatide ER 2 MG Inject 2 mg as instructed every 7 days.        Fenofibrate (Tab) TRICOR 145 MG Take 1 Tab by mouth every day.        FLUoxetine HCl (Cap) PROZAC 20 MG Take 1 Cap by mouth every day.        Fluticasone Propionate (Suspension) FLONASE 50 MCG/ACT Spray 2 Sprays in nose every day.        HydroCHLOROthiazide (Cap) MICROZIDE 12.5 MG Take 1 Cap by mouth every day.        Insulin Glargine (Solution Pen-injector) LANTUS 100 UNIT/ML Inject 36-60 Units as instructed every evening.        Insulin Pen Needle (Misc) Insulin Pen Needle 32G X 4 MM For insulin shot once a day        Irbesartan (Tab) AVAPRO 300 MG TAKE 1 TABLET DAILY        Lancets (Misc) Lancets  Lancets order: Lancets for One Touch Ultra 2 meter. Sig: use QID and prn ssx high or low sugar.        Lansoprazole (CAPSULE DELAYED RELEASE) PREVACID 30 MG TAKE 1 CAPSULE TWICE DAILY        Levothyroxine Sodium (Tab) SYNTHROID 75 MCG Take 1 Tab by mouth every day.        Levothyroxine Sodium (Tab) SYNTHROID 75 MCG TAKE 1 TABLET DAILY        Meloxicam (Tab) MOBIC 7.5 MG Take 1 Tab by mouth 2 times a day as needed.        MetFORMIN HCl (TABLET SR 24 HR) GLUCOPHAGE  MG Take 2 Tabs by mouth 2 times a day.        Omega-3 Fatty Acids (Cap) Omega 3 1000 MG Take 4 a day        Omega-3-acid Ethyl Esters (Cap) LOVAZA 1  GM Take 1 Cap by mouth every day.        Spironolactone (Tab) ALDACTONE 25 MG Take 0.5 Tabs by mouth every day. TAKE 0.5 TABS BY MOUTH EVERY DAY.        Testosterone Cypionate (Solution) DEPO-TESTOSTERONE 200 MG/ML 1 mL by Intramuscular route every 14 days.        .                 Medicines prescribed today were sent to:     [facility]/PHARMACY #[id number] - [place], [place] - [address]    [address] [place] [address]    Phone: [phone] Fax: [phone]    Open 24 Hours?: No    [facility] [place] - [address] AT PORTAL TO REGISTERED [facility] SITES    [address] [facility] [address]    Phone: [phone] Fax: [phone]    Open 24 Hours?: No      Medication refill instructions:       If your prescription bottle indicates you have medication refills left, it is not necessary to call your provider’s office. Please contact your pharmacy and they will refill your medication.    If your prescription bottle indicates you do not have any refills left, you may request refills at any time through one of the following ways: The online [employer] system (except Urgent Care), by calling your provider’s office, or by asking your pharmacy to contact your provider’s office with a refill request. Medication refills are processed only during regular business hours and may not be available until the next business day. Your provider may request additional information or to have a follow-up visit with you prior to refilling your medication.   *Please Note: Medication refills are assigned a new Rx number when refilled electronically. Your pharmacy may indicate that no refills were authorized even though a new prescription for the same medication is available at the pharmacy. Please request the medicine by name with the pharmacy before contacting your provider for a refill.           [employer] Access Code: Activation code not generated  Current [employer] Status: Active

## 2017-03-07 NOTE — PROGRESS NOTES
"Nutrition Reassess    3/6/2017    Frida Baumann M.D.   46 y.o.   Time in/out:  1602 - 1618    Subjective:  -Was doing very well with mixing CHO and protein at his meals  -States he has \"fallen backward\" with this and has been eating CHO only more often  -Eating something midday now between 0608-0316 daily now  -No regular activity, just starting PT    ReAssesment/Notes:  Brett's most recent BG's of 116-178 mg/dL and most recent HbA1c of 6.9% are indicative of a shift in his habits and taking his Lantus as directed.  Brett is getting better with his eating habits and I want him to work back into eating protein with all of his meals, which he states he can do.  He also will set an alarm on his phone to remind him to eat something midday, since that is still a struggle for him at this time.  I believe that with improved exercise habits he will see further improvements in his BG control, which he states he understands and will try to do once he is through with PT and with warmer weather.  He has multiple MD appointments soon and is going to call me with any questions or if he needs a f/u appointment.    Follow-up: prn      "

## 2017-03-08 ENCOUNTER — HOSPITAL ENCOUNTER (OUTPATIENT)
Dept: PHYSICAL THERAPY | Facility: MEDICAL CENTER | Age: 47
End: 2017-03-08
Attending: FAMILY MEDICINE
Payer: COMMERCIAL

## 2017-03-08 PROCEDURE — 97140 MANUAL THERAPY 1/> REGIONS: CPT

## 2017-03-13 ENCOUNTER — HOSPITAL ENCOUNTER (OUTPATIENT)
Dept: PHYSICAL THERAPY | Facility: MEDICAL CENTER | Age: 47
End: 2017-03-13
Attending: FAMILY MEDICINE
Payer: COMMERCIAL

## 2017-03-13 PROCEDURE — 97014 ELECTRIC STIMULATION THERAPY: CPT

## 2017-03-13 PROCEDURE — 97110 THERAPEUTIC EXERCISES: CPT

## 2017-03-15 ENCOUNTER — HOSPITAL ENCOUNTER (OUTPATIENT)
Dept: PHYSICAL THERAPY | Facility: MEDICAL CENTER | Age: 47
End: 2017-03-15
Attending: FAMILY MEDICINE
Payer: COMMERCIAL

## 2017-03-15 PROCEDURE — 97110 THERAPEUTIC EXERCISES: CPT

## 2017-03-15 PROCEDURE — 97140 MANUAL THERAPY 1/> REGIONS: CPT

## 2017-03-20 ENCOUNTER — HOSPITAL ENCOUNTER (OUTPATIENT)
Dept: PHYSICAL THERAPY | Facility: MEDICAL CENTER | Age: 47
End: 2017-03-20
Attending: FAMILY MEDICINE
Payer: COMMERCIAL

## 2017-03-20 PROCEDURE — 97140 MANUAL THERAPY 1/> REGIONS: CPT

## 2017-03-20 PROCEDURE — 97110 THERAPEUTIC EXERCISES: CPT

## 2017-03-20 PROCEDURE — 97014 ELECTRIC STIMULATION THERAPY: CPT

## 2017-03-22 ENCOUNTER — APPOINTMENT (OUTPATIENT)
Dept: PHYSICAL THERAPY | Facility: MEDICAL CENTER | Age: 47
End: 2017-03-22
Attending: FAMILY MEDICINE
Payer: COMMERCIAL

## 2017-03-27 ENCOUNTER — APPOINTMENT (OUTPATIENT)
Dept: PHYSICAL THERAPY | Facility: MEDICAL CENTER | Age: 47
End: 2017-03-27
Attending: FAMILY MEDICINE
Payer: COMMERCIAL

## 2017-04-04 ENCOUNTER — TELEPHONE (OUTPATIENT)
Dept: ENDOCRINOLOGY | Facility: MEDICAL CENTER | Age: 47
End: 2017-04-04

## 2017-04-04 DIAGNOSIS — E11.65 TYPE 2 DIABETES MELLITUS WITH HYPERGLYCEMIA, WITH LONG-TERM CURRENT USE OF INSULIN (HCC): ICD-10-CM

## 2017-04-04 DIAGNOSIS — Z79.4 TYPE 2 DIABETES MELLITUS WITH HYPERGLYCEMIA, WITH LONG-TERM CURRENT USE OF INSULIN (HCC): ICD-10-CM

## 2017-04-04 NOTE — TELEPHONE ENCOUNTER
Patient stopped by the office he states that his insurance no longer covers Lantus. Per patient he insurance would like him to change to either basaglar or Levemir. Rx needs to be sent to [facility].

## 2017-04-11 ENCOUNTER — OFFICE VISIT (OUTPATIENT)
Dept: MEDICAL GROUP | Facility: LAB | Age: 47
End: 2017-04-11
Payer: COMMERCIAL

## 2017-04-11 VITALS
SYSTOLIC BLOOD PRESSURE: 124 MMHG | RESPIRATION RATE: 16 BRPM | BODY MASS INDEX: 32.22 KG/M2 | HEART RATE: 88 BPM | HEIGHT: 72 IN | DIASTOLIC BLOOD PRESSURE: 76 MMHG | OXYGEN SATURATION: 97 % | TEMPERATURE: 98 F | WEIGHT: 237.88 LBS

## 2017-04-11 DIAGNOSIS — E29.1 HYPOGONADISM IN MALE: ICD-10-CM

## 2017-04-11 DIAGNOSIS — M67.911 BILATERAL SHOULDER TENDINOPATHY: ICD-10-CM

## 2017-04-11 DIAGNOSIS — E78.2 MIXED HYPERLIPIDEMIA: ICD-10-CM

## 2017-04-11 DIAGNOSIS — E55.9 VITAMIN D DEFICIENCY: ICD-10-CM

## 2017-04-11 DIAGNOSIS — M67.912 BILATERAL SHOULDER TENDINOPATHY: ICD-10-CM

## 2017-04-11 PROCEDURE — 20610 DRAIN/INJ JOINT/BURSA W/O US: CPT | Performed by: FAMILY MEDICINE

## 2017-04-11 PROCEDURE — 99214 OFFICE O/P EST MOD 30 MIN: CPT | Mod: 25 | Performed by: FAMILY MEDICINE

## 2017-04-11 NOTE — MR AVS SNAPSHOT
"        Brett Vicente   2017 2:40 PM   Office Visit   MRN: [id number]    Department:  [facility]   Dept Phone:  [phone]    Description:  Male : 1970   Provider:  Frida Baumann M.D.           Reason for Visit     Follow-Up LAB    Diabetes MEDS REFILL      Allergies as of 2017     Allergen Noted Reactions    Fenofibrate 2015       Muscle weakness    Morphine 01/10/2011   Nausea    Pcn [Penicillins] 2014         You were diagnosed with     Uncontrolled type 2 diabetes mellitus with complication, with long-term current use of insulin (CMS-[facility])   [6233041]       Hypogonadism in male   [5864743]       Mixed hyperlipidemia   [272.2.ICD-9-CM]       Vitamin D deficiency   [7174580]         Vital Signs     Blood Pressure Pulse Temperature Respirations Height Weight    124/76 mmHg 88 36.7 °C (98 °F) 16 1.829 m (6' 0.01\") 107.9 kg (237 lb 14 oz)    Body Mass Index Oxygen Saturation Smoking Status             32.25 kg/m2 97% Never Smoker          Basic Information     Date Of Birth Sex Race Ethnicity Preferred Language    1970 Male White Non- English      Your appointments     2017  6:40 AM   Diabetes Care Visit with Lorena Joy M.D., ENDOCRINOLOGY DIABETES RN   [facility]    [address], [address]  [place] [address] [phone]           You will be receiving a confirmation call a few days before your appointment from our automated call confirmation system.            2017  4:00 PM   Established Patient with Frida Baumann M.D.   [facility] - [facility] (--)    [address]  [place] [address] [phone]           You will be receiving a confirmation call a few days before your appointment from our automated call confirmation system.              Problem List              ICD-10-CM Priority Class Noted - Resolved    Gout M10.9   2014 - " Present    Hypogonadism in male E29.1   6/25/2015 - Present    Diabetes mellitus type 2, uncontrolled, with complications (CMS-HCC) E11.8, E11.65   6/25/2015 - Present    Essential hypertension I10   6/25/2015 - Present    Hypothyroidism E03.9   6/25/2015 - Present    Gastroesophageal reflux disease without esophagitis K21.9   6/25/2015 - Present    Hypercalcemia E83.52   12/22/2015 - Present    Bilateral shoulder tendinopathy M67.911, M67.912   12/22/2015 - Present    Mixed hyperlipidemia E78.2   12/7/2016 - Present    Vitamin D deficiency E55.9   1/12/2017 - Present    Moderate single current episode of major depressive disorder (CMS-HCC) F32.1   1/20/2017 - Present      Health Maintenance        Date Due Completion Dates    IMM HEP B VACCINE (1 of 3 - Primary Series) 1970 ---    IMM DTaP/Tdap/Td Vaccine (1 - Tdap) 12/21/1987 ---    RETINAL SCREENING 7/11/2017 7/11/2016, 7/11/2016, 6/2/2015, 6/18/2014    A1C SCREENING 9/1/2017 3/1/2017, 12/8/2016, 6/8/2016, 12/22/2015, 9/19/2015, 6/22/2015, 6/8/2015, 12/1/2014, 6/24/2014, 3/17/2014, 11/13/2013, 7/19/2013, 2/15/2013, 11/16/2012    URINE ACR / MICROALBUMIN 12/8/2017 12/8/2016, 9/19/2015, 6/22/2015, 6/22/2015, 6/24/2014, 3/17/2014, 11/13/2013, 7/19/2013, 2/15/2013, 11/16/2012    FASTING LIPID PROFILE 3/1/2018 3/1/2017, 1/11/2017, 12/8/2016, 6/8/2016, 9/19/2015, 6/22/2015, 9/24/2014, 6/24/2014, 3/17/2014, 11/13/2013, 7/19/2013, 2/15/2013, 11/16/2012    SERUM CREATININE 3/1/2018 3/1/2017, 12/8/2016, 6/8/2016, 9/19/2015, 6/22/2015, 9/24/2014, 6/24/2014, 3/17/2014, 11/13/2013, 7/19/2013, 2/15/2013, 11/16/2012, 6/12/2009, 6/11/2009, 6/8/2009    DIABETES MONOFILAMENT / LE EXAM 4/11/2018 4/11/2017, 12/22/2015, 6/8/2015 (Done)    Override on 6/8/2015: Done            Current Immunizations     Influenza Vaccine Quad Inj (Pf) 12/7/2016    Influenza Vaccine Quad Inj (Preserved) 12/22/2015 12:05 PM    Pneumococcal polysaccharide vaccine (PPSV-23) 12/7/2016      Below  and/or attached are the medications your provider expects you to take. Review all of your home medications and newly ordered medications with your provider and/or pharmacist. Follow medication instructions as directed by your provider and/or pharmacist. Please keep your medication list with you and share with your provider. Update the information when medications are discontinued, doses are changed, or new medications (including over-the-counter products) are added; and carry medication information at all times in the event of emergency situations     Allergies:  FENOFIBRATE - (reactions not documented)     MORPHINE - Nausea     PCN - (reactions not documented)               Medications  Valid as of: April 11, 2017 -  3:30 PM    Generic Name Brand Name Tablet Size Instructions for use    Allopurinol (Tab) ZYLOPRIM 300 MG Take 1 Tab by mouth every day.        Aspirin (Tablet Delayed Response) ECOTRIN 81 MG Take 81 mg by mouth every day.        Atorvastatin Calcium (Tab) LIPITOR 40 MG Take 1 Tab by mouth every day.        Blood Glucose Monitoring Suppl (Misc) Blood Glucose Monitoring Suppl SUPPLIES Test strips order: Test strips for One Touch Ultra 2 meter. Sig: use QID and prn ssx high or low sugar        Blood Glucose Monitoring Suppl (Device) Blood Glucose Monitoring Suppl  Meter: Dispense One Touch Ultra 2 meter. Sig. Use as directed for blood sugar monitoring.        Carvedilol (Tab) COREG 25 MG Take 0.5 Tabs by mouth 2 times a day.        Dapagliflozin Propanediol (Tab) Dapagliflozin Propanediol 5 MG Take 1 Tab by mouth every day. Indications: Type 2 Diabetes        Exenatide (Pen-injector) Exenatide ER 2 MG Inject 2 mg as instructed every 7 days.        Fenofibrate (Tab) TRICOR 145 MG Take 1 Tab by mouth every day.        FLUoxetine HCl (Cap) PROZAC 20 MG Take 1 Cap by mouth every day.        Fluticasone Propionate (Suspension) FLONASE 50 MCG/ACT Spray 2 Sprays in nose every day.        HydroCHLOROthiazide  (Cap) MICROZIDE 12.5 MG Take 1 Cap by mouth every day.        Insulin Glargine (Solution Pen-injector) LANTUS 100 UNIT/ML Up to 60 units per day        Insulin Pen Needle (Misc) Insulin Pen Needle 32G X 4 MM For insulin shot once a day        Irbesartan (Tab) AVAPRO 300 MG TAKE 1 TABLET DAILY        Lancets (Misc) Lancets  Lancets order: Lancets for One Touch Ultra 2 meter. Sig: use QID and prn ssx high or low sugar.        Lansoprazole (CAPSULE DELAYED RELEASE) PREVACID 30 MG TAKE 1 CAPSULE TWICE DAILY        Levothyroxine Sodium (Tab) SYNTHROID 75 MCG Take 1 Tab by mouth every day.        Meloxicam (Tab) MOBIC 7.5 MG Take 1 Tab by mouth 2 times a day as needed.        MetFORMIN HCl (TABLET SR 24 HR) GLUCOPHAGE  MG Take 2 Tabs by mouth 2 times a day.        Omega-3 Fatty Acids (Cap) Omega 3 1000 MG Take 4 a day        Spironolactone (Tab) ALDACTONE 25 MG Take 0.5 Tabs by mouth every day. TAKE 0.5 TABS BY MOUTH EVERY DAY.        .                 Medicines prescribed today were sent to:     [facility] - [place], [place] - [address] AT [address] TO [address]    [address] [address]    Phone: [phone] Fax: [phone]    Open 24 Hours?: No    [id number] - [place], [place] - [address]    [address] [place] [address]    Phone: [phone] Fax: [phone]    Open 24 Hours?: No      Medication refill instructions:       If your prescription bottle indicates you have medication refills left, it is not necessary to call your provider’s office. Please contact your pharmacy and they will refill your medication.    If your prescription bottle indicates you do not have any refills left, you may request refills at any time through one of the following ways: The online [employer] system (except Urgent Care), by calling your provider’s office, or by asking your pharmacy to contact your provider’s office with a refill request. Medication refills are  processed only during regular business hours and may not be available until the next business day. Your provider may request additional information or to have a follow-up visit with you prior to refilling your medication.   *Please Note: Medication refills are assigned a new Rx number when refilled electronically. Your pharmacy may indicate that no refills were authorized even though a new prescription for the same medication is available at the pharmacy. Please request the medicine by name with the pharmacy before contacting your provider for a refill.           [employer] Access Code: Activation code not generated  Current [employer] Status: Active

## 2017-04-12 ENCOUNTER — OFFICE VISIT (OUTPATIENT)
Dept: ENDOCRINOLOGY | Facility: MEDICAL CENTER | Age: 47
End: 2017-04-12
Payer: COMMERCIAL

## 2017-04-12 VITALS
WEIGHT: 235 LBS | HEART RATE: 80 BPM | BODY MASS INDEX: 31.83 KG/M2 | DIASTOLIC BLOOD PRESSURE: 80 MMHG | OXYGEN SATURATION: 92 % | HEIGHT: 72 IN | SYSTOLIC BLOOD PRESSURE: 122 MMHG

## 2017-04-12 DIAGNOSIS — E78.2 MIXED HYPERLIPIDEMIA: ICD-10-CM

## 2017-04-12 DIAGNOSIS — E03.9 ACQUIRED HYPOTHYROIDISM: ICD-10-CM

## 2017-04-12 DIAGNOSIS — Z79.4 TYPE 2 DIABETES MELLITUS WITHOUT COMPLICATION, WITH LONG-TERM CURRENT USE OF INSULIN (HCC): ICD-10-CM

## 2017-04-12 DIAGNOSIS — I10 ESSENTIAL HYPERTENSION: ICD-10-CM

## 2017-04-12 DIAGNOSIS — E11.9 TYPE 2 DIABETES MELLITUS WITHOUT COMPLICATION, WITH LONG-TERM CURRENT USE OF INSULIN (HCC): ICD-10-CM

## 2017-04-12 PROCEDURE — 99214 OFFICE O/P EST MOD 30 MIN: CPT | Performed by: INTERNAL MEDICINE

## 2017-04-12 NOTE — PROGRESS NOTES
Patient with existing type diabetes:  Type 2 diabetes well controlled, here for follow up.      Patient's health status since last visit: patient states no changes in overall health  Issues with diabetes since last visit patient denies problems with his diabetes.   Current Diabetes Medications: Basaglar 40 units daily, Bydureon 2 mg weekly, Farxiga 5 mg daily and Metformin ER 1000 mg bid.     HbA1c: @hba1c@  Lab Results   Component Value Date/Time    GLYCOHEMOGLOBIN 6.9* 03/01/2017 09:34 AM        FSBS  Testing: checking most morning weekly and twice a week after meals.  Denies problems with his blood sugars.  States lowest blood sugar recorded about 106mg/d.     Hypoglycemia: denies  Exercise: sporadic exercise.  States he does walk his dog on occasion.  One of his goals for his health is to increase his activity.   Retinal Exam:current as of 7/2016    Daily Foot Exam: checks daily and denies problems.  Had monofilament exam completed 4/11/17 by PCP    Routine Dental Exams: current  Flu vaccine: current.   Pneumonia vaccine current.

## 2017-04-12 NOTE — PROGRESS NOTES
Endocrinology Clinic Progress Note  PCP: Frida Baumann M.D.    CC: Diabetes     HPI:  Brett Vicente is a 45 y.o. old patient who comes in today for follow up.     Type 2 diabetes: He is currently on Basaglar 40 units at bedtime, Bydureon 2 mg once a week, Farxiga 5 mg daily, metformin 1000 mg twice a day. He reports compliance with medications. He checks blood sugars 1-2 times a day. Fasting blood sugar readings are in the range of 80 to 130. Two-hour postprandial blood sugar readings are mostly below 180. No hypoglycemia. Last eye exam was done in July 2016.    Hyperlipidemia: He is currently on fenofibrate and Lipitor. Recent triglycerides are 163, down from 1622 last year.     Hypertension: Blood pressure is very well controlled. He is on ARB, in addition to other antihypertensive agents.    Acquired hypothyroidism: He is currently on levothyroxine 75 µg daily, recent labs showed normal TSH.    ROS:  Constitutional: Positive for weight gain  Neuro: Negative for numbness or tingling in feet    Past Medical History:  Patient Active Problem List    Diagnosis Date Noted   • Moderate single current episode of major depressive disorder (CMS-HCC) 01/20/2017   • Vitamin D deficiency 01/12/2017   • Mixed hyperlipidemia 12/07/2016   • Hypercalcemia 12/22/2015   • Bilateral shoulder tendinopathy 12/22/2015   • Hypogonadism in male 06/25/2015   • Diabetes mellitus type 2, uncontrolled, with complications (CMS-HCC) 06/25/2015   • Essential hypertension 06/25/2015   • Hypothyroidism 06/25/2015   • Gastroesophageal reflux disease without esophagitis 06/25/2015   • Gout 08/19/2014       Medications:    Current outpatient prescriptions:   •  insulin glargine (BASAGLAR KWIKPEN) 100 UNIT/ML Solution Pen-injector injection, Up to 60 units per day (Patient taking differently: 40 Units. Up to 60 units per day), Disp: 60 mL, Rfl: 3  •  fluoxetine (PROZAC) 20 MG Cap, Take 1 Cap by mouth every day., Disp: 90 Cap, Rfl: 3  •   atorvastatin (LIPITOR) 40 MG Tab, Take 1 Tab by mouth every day., Disp: 90 Tab, Rfl: 3  •  fenofibrate (TRICOR) 145 MG Tab, Take 1 Tab by mouth every day., Disp: 90 Tab, Rfl: 3  •  lansoprazole (PREVACID) 30 MG CAPSULE DELAYED RELEASE, TAKE 1 CAPSULE TWICE DAILY, Disp: 180 Cap, Rfl: 4  •  irbesartan (AVAPRO) 300 MG Tab, TAKE 1 TABLET DAILY, Disp: 90 Tab, Rfl: 4  •  spironolactone (ALDACTONE) 25 MG Tab, Take 0.5 Tabs by mouth every day. TAKE 0.5 TABS BY MOUTH EVERY DAY., Disp: 45 Tab, Rfl: 4  •  Dapagliflozin Propanediol (FARXIGA) 5 MG Tab, Take 1 Tab by mouth every day. Indications: Type 2 Diabetes, Disp: 90 Tab, Rfl: 3  •  levothyroxine (SYNTHROID) 75 MCG Tab, Take 1 Tab by mouth every day., Disp: 90 Tab, Rfl: 3  •  metformin ER (GLUCOPHAGE XR) 500 MG TABLET SR 24 HR, Take 2 Tabs by mouth 2 times a day., Disp: 360 Tab, Rfl: 3  •  allopurinol (ZYLOPRIM) 300 MG Tab, Take 1 Tab by mouth every day., Disp: 90 Tab, Rfl: 1  •  hydrochlorothiazide (MICROZIDE) 12.5 MG capsule, Take 1 Cap by mouth every day., Disp: 90 Cap, Rfl: 1  •  carvedilol (COREG) 25 MG Tab, Take 0.5 Tabs by mouth 2 times a day., Disp: 90 Tab, Rfl: 1  •  Omega 3 1000 MG CAPS, Take 4 a day, Disp: 100 Cap, Rfl: 6  •  aspirin EC (ECOTRIN) 81 MG TBEC, Take 81 mg by mouth every day., Disp: , Rfl:   •  meloxicam (MOBIC) 7.5 MG Tab, Take 1 Tab by mouth 2 times a day as needed., Disp: 60 Tab, Rfl: 1  •  Insulin Pen Needle (BD PEN NEEDLE CARLY U/F) 32G X 4 MM Misc, For insulin shot once a day, Disp: 100 Each, Rfl: 3  •  Blood Glucose Monitoring Suppl SUPPLIES Misc, Test strips order: Test strips for One Touch Ultra 2 meter. Sig: use QID and prn ssx high or low sugar, Disp: 100 Each, Rfl: 3  •  Lancets Misc, Lancets order: Lancets for One Touch Ultra 2 meter. Sig: use QID and prn ssx high or low sugar., Disp: 100 Each, Rfl: 3  •  Blood Glucose Monitoring Suppl Device, Meter: Dispense One Touch Ultra 2 meter. Sig. Use as directed for blood sugar monitoring., Disp:  1 Device, Rfl: 0  •  Exenatide ER 2 MG Pen-injector, Inject 2 mg as instructed every 7 days., Disp: 8 Each, Rfl: 12  •  fluticasone (FLONASE) 50 MCG/ACT nasal spray, Spray 2 Sprays in nose every day., Disp: 16 g, Rfl: 0    Labs:   Results for BRAVO WOLF (MRN [id number]) as of 4/12/2017 06:58   Ref. Range 12/8/2016 10:02 1/11/2017 11:32 3/1/2017 09:34   Sodium Latest Ref Range: 135-145 mmol/L 133 (L)  140   Potassium Latest Ref Range: 3.6-5.5 mmol/L 4.0  3.9   Chloride Latest Ref Range:  mmol/L 98  107   Co2 Latest Ref Range: 20-33 mmol/L 25  27   Anion Gap Latest Ref Range: 0.0-11.9  10.0  6.0   Glucose Latest Ref Range: 65-99 mg/dL 219 (H)  105 (H)   Bun Latest Ref Range: 8-22 mg/dL 19  17   Creatinine Latest Ref Range: 0.50-1.40 mg/dL 0.77  0.98   GFR If  Latest Ref Range: >60 mL/min/1.73 m 2 >60  >60   GFR If Non  Latest Ref Range: >60 mL/min/1.73 m 2 >60  >60   Calcium Latest Ref Range: 8.4-10.2 mg/dL 10.6 (H)  10.6 (H)   AST(SGOT) Latest Ref Range: 12-45 U/L 20     ALT(SGPT) Latest Ref Range: 2-50 U/L 29     Alkaline Phosphatase Latest Ref Range: 30-99 U/L 86     Total Bilirubin Latest Ref Range: 0.1-1.5 mg/dL 1.7 (H)     Albumin Latest Ref Range: 3.2-4.9 g/dL 4.7     Total Protein Latest Ref Range: 6.0-8.2 g/dL 8.0     Globulin Latest Ref Range: 1.9-3.5 g/dL 3.3     A-G Ratio Latest Units: g/dL 1.4     Glycohemoglobin Latest Ref Range: 0.0-5.6 % 11.5 (H)  6.9 (H)   Estim. Avg Glu Latest Units: mg/dL 283  151   Cholesterol,Tot Latest Ref Range: 100-199 mg/dL 355 (H) 115 110   Triglycerides Latest Ref Range: 0-149 mg/dL 1622 (H) 164 (H) 163 (H)   HDL Latest Ref Range: >=40 mg/dL see below 26 (A) 28 (A)   LDL Latest Ref Range: <100 mg/dL see below 56 49   Results for LUCIANO BRAVO MANRIQUE (MRN [id number]) as of 4/12/2017 06:58   Ref. Range 6/8/2016 10:50 12/8/2016 10:02   TSH Latest Ref Range: 0.350-5.500 uIU/mL 1.110 2.880   Free T-4 Latest Ref Range: 0.53-1.43 ng/dL 1.17       Physical Examination:  Vital signs: /80 mmHg  Pulse 80  Ht 1.829 m (6')  Wt 106.595 kg (235 lb)  BMI 31.86 kg/m2  SpO2 92%  General: No apparent distress, cooperative  Eyes: No scleral icterus, no discharge, normal eyelids  Neck: No abnormal masses on inspection   Resp: Normal effort, clear to auscultation bilaterally  CVS: Regular rate and rhythm, S1 S2 normal, no murmur  Extremities: No lower extremity edema  Musculoskeletal: Normal gait  Skin: No rash on visible skin  Psych: Alert and oriented, normal mood and affect    Assessment and Plan:    Type 2 diabetes mellitus with hyperglycemia, with long-term current use of insulin (HCC)  · Recent hemoglobin A1c is 6.9%, improved from 11.5% in December  · Goal hemoglobin A1c less than 7%  · Continue Basaglar, Bydureon, metformin, Farxiga; no changes to medications today  · Advised to check blood sugars at least 1-2 times a day    Mixed hyperlipidemia  · Triglycerides were severely elevated at 1622 in December 2016, recent labs show improvement of triglycerides down to 163  · Continue fenofibrate and Lipitor    Acquired hypothyroidism  · Recent TSH within normal range  · Continue levothyroxine 75 µg daily    Essential hypertension  · Blood pressure is well controlled  · Continue ARB, in addition to other anti-hypertensive agents    Return to clinic in 4-6 months.    Thank you for allowing me to participate in the care of this patient.    Lorena Joy M.D.    CC:   Frida Baumann M.D.    This note was created using voice recognition software (Dragon). The accuracy of the dictation is limited by the abilities of the software. I have reviewed the note prior to signing, however some errors in grammar and context are still possible. If you have any questions related to this note please do not hesitate to contact our office.

## 2017-04-12 NOTE — MR AVS SNAPSHOT
Brett Vicente   2017 6:40 AM   Office Visit   MRN: [id number]    Department:  Endocrinology Med [facility]   Dept Phone:  [phone]    Description:  Male : 1970   Provider:  Lorena Joy M.D.; ENDOCRINOLOGY DIABETES RN           Reason for Visit     Diabetes Mellitus           Allergies as of 2017     Allergen Noted Reactions    Fenofibrate 2015       Muscle weakness    Morphine 01/10/2011   Nausea    Pcn [Penicillins] 2014         You were diagnosed with     Type 2 diabetes mellitus without complication, with long-term current use of insulin (CMS-[facility])   [3932488]       Mixed hyperlipidemia   [272.2.ICD-9-CM]       Acquired hypothyroidism   [4653130]       Essential hypertension   [4217615]         Vital Signs     Blood Pressure Pulse Height Weight Body Mass Index Oxygen Saturation    122/80 mmHg 80 1.829 m (6') 106.595 kg (235 lb) 31.86 kg/m2 92%    Smoking Status                   Never Smoker            Basic Information     Date Of Birth Sex Race Ethnicity Preferred Language    1970 Male White Non- English      Your appointments     2017  4:00 PM   Established Patient with Frida Baumann M.D.   [facility] (--)    [address]  [facility] [address] [phone]           You will be receiving a confirmation call a few days before your appointment from our automated call confirmation system.            Sep 14, 2017  7:20 AM   Diabetes Care Visit with Lorena Joy M.D., ENDOCRINOLOGY DIABETES RN   [facility]    [address]  [place] [address] [phone]           You will be receiving a confirmation call a few days before your appointment from our automated call confirmation system.              Problem List              ICD-10-CM Priority Class Noted - Resolved    Gout M10.9   2014 - Present    Hypogonadism in male E29.1   2015  - Present    Diabetes mellitus type 2, uncontrolled, with complications (CMS-HCC) E11.8, E11.65   6/25/2015 - Present    Essential hypertension I10   6/25/2015 - Present    Hypothyroidism E03.9   6/25/2015 - Present    Gastroesophageal reflux disease without esophagitis K21.9   6/25/2015 - Present    Hypercalcemia E83.52   12/22/2015 - Present    Bilateral shoulder tendinopathy M67.911, M67.912   12/22/2015 - Present    Mixed hyperlipidemia E78.2   12/7/2016 - Present    Vitamin D deficiency E55.9   1/12/2017 - Present    Moderate single current episode of major depressive disorder (CMS-HCC) F32.1   1/20/2017 - Present      Health Maintenance        Date Due Completion Dates    IMM HEP B VACCINE (1 of 3 - Primary Series) 1970 ---    IMM DTaP/Tdap/Td Vaccine (1 - Tdap) 12/21/1987 ---    RETINAL SCREENING 7/11/2017 7/11/2016, 7/11/2016, 6/2/2015, 6/18/2014    A1C SCREENING 9/1/2017 3/1/2017, 12/8/2016, 6/8/2016, 12/22/2015, 9/19/2015, 6/22/2015, 6/8/2015, 12/1/2014, 6/24/2014, 3/17/2014, 11/13/2013, 7/19/2013, 2/15/2013, 11/16/2012    URINE ACR / MICROALBUMIN 12/8/2017 12/8/2016, 9/19/2015, 6/22/2015, 6/22/2015, 6/24/2014, 3/17/2014, 11/13/2013, 7/19/2013, 2/15/2013, 11/16/2012    FASTING LIPID PROFILE 3/1/2018 3/1/2017, 1/11/2017, 12/8/2016, 6/8/2016, 9/19/2015, 6/22/2015, 9/24/2014, 6/24/2014, 3/17/2014, 11/13/2013, 7/19/2013, 2/15/2013, 11/16/2012    SERUM CREATININE 3/1/2018 3/1/2017, 12/8/2016, 6/8/2016, 9/19/2015, 6/22/2015, 9/24/2014, 6/24/2014, 3/17/2014, 11/13/2013, 7/19/2013, 2/15/2013, 11/16/2012, 6/12/2009, 6/11/2009, 6/8/2009    DIABETES MONOFILAMENT / LE EXAM 4/11/2018 4/11/2017, 12/22/2015, 6/8/2015 (Done)    Override on 6/8/2015: Done            Current Immunizations     Influenza Vaccine Quad Inj (Pf) 12/7/2016    Influenza Vaccine Quad Inj (Preserved) 12/22/2015 12:05 PM    Pneumococcal polysaccharide vaccine (PPSV-23) 12/7/2016      Below and/or attached are the medications your provider  expects you to take. Review all of your home medications and newly ordered medications with your provider and/or pharmacist. Follow medication instructions as directed by your provider and/or pharmacist. Please keep your medication list with you and share with your provider. Update the information when medications are discontinued, doses are changed, or new medications (including over-the-counter products) are added; and carry medication information at all times in the event of emergency situations     Allergies:  FENOFIBRATE - (reactions not documented)     MORPHINE - Nausea     PCN - (reactions not documented)               Medications  Valid as of: April 12, 2017 -  6:56 AM    Generic Name Brand Name Tablet Size Instructions for use    Allopurinol (Tab) ZYLOPRIM 300 MG Take 1 Tab by mouth every day.        Aspirin (Tablet Delayed Response) ECOTRIN 81 MG Take 81 mg by mouth every day.        Atorvastatin Calcium (Tab) LIPITOR 40 MG Take 1 Tab by mouth every day.        Blood Glucose Monitoring Suppl (Misc) Blood Glucose Monitoring Suppl SUPPLIES Test strips order: Test strips for One Touch Ultra 2 meter. Sig: use QID and prn ssx high or low sugar        Blood Glucose Monitoring Suppl (Device) Blood Glucose Monitoring Suppl  Meter: Dispense One Touch Ultra 2 meter. Sig. Use as directed for blood sugar monitoring.        Carvedilol (Tab) COREG 25 MG Take 0.5 Tabs by mouth 2 times a day.        Dapagliflozin Propanediol (Tab) Dapagliflozin Propanediol 5 MG Take 1 Tab by mouth every day. Indications: Type 2 Diabetes        Exenatide (Pen-injector) Exenatide ER 2 MG Inject 2 mg as instructed every 7 days.        Fenofibrate (Tab) TRICOR 145 MG Take 1 Tab by mouth every day.        FLUoxetine HCl (Cap) PROZAC 20 MG Take 1 Cap by mouth every day.        Fluticasone Propionate (Suspension) FLONASE 50 MCG/ACT Spray 2 Sprays in nose every day.        HydroCHLOROthiazide (Cap) MICROZIDE 12.5 MG Take 1 Cap by mouth every day.         Insulin Glargine (Solution Pen-injector) LANTUS 100 UNIT/ML Up to 60 units per day        Insulin Pen Needle (Misc) Insulin Pen Needle 32G X 4 MM For insulin shot once a day        Irbesartan (Tab) AVAPRO 300 MG TAKE 1 TABLET DAILY        Lancets (Misc) Lancets  Lancets order: Lancets for One Touch Ultra 2 meter. Sig: use QID and prn ssx high or low sugar.        Lansoprazole (CAPSULE DELAYED RELEASE) PREVACID 30 MG TAKE 1 CAPSULE TWICE DAILY        Levothyroxine Sodium (Tab) SYNTHROID 75 MCG Take 1 Tab by mouth every day.        Meloxicam (Tab) MOBIC 7.5 MG Take 1 Tab by mouth 2 times a day as needed.        MetFORMIN HCl (TABLET SR 24 HR) GLUCOPHAGE  MG Take 2 Tabs by mouth 2 times a day.        Omega-3 Fatty Acids (Cap) Omega 3 1000 MG Take 4 a day        Spironolactone (Tab) ALDACTONE 25 MG Take 0.5 Tabs by mouth every day. TAKE 0.5 TABS BY MOUTH EVERY DAY.        .                 Medicines prescribed today were sent to:     [facility] - [place], [place] - [address] AT PORTAL TO REGISTERED [facility] SITES    [address] [facility] [address]    Phone: [phone] Fax: [phone]    Open 24 Hours?: No    [id number] - [place], [place] - [address]    [address] [place] [address]    Phone: [phone] Fax: [phone]    Open 24 Hours?: No      Medication refill instructions:       If your prescription bottle indicates you have medication refills left, it is not necessary to call your provider’s office. Please contact your pharmacy and they will refill your medication.    If your prescription bottle indicates you do not have any refills left, you may request refills at any time through one of the following ways: The online [employer] system (except Urgent Care), by calling your provider’s office, or by asking your pharmacy to contact your provider’s office with a refill request. Medication refills are processed only during regular business hours and may  not be available until the next business day. Your provider may request additional information or to have a follow-up visit with you prior to refilling your medication.   *Please Note: Medication refills are assigned a new Rx number when refilled electronically. Your pharmacy may indicate that no refills were authorized even though a new prescription for the same medication is available at the pharmacy. Please request the medicine by name with the pharmacy before contacting your provider for a refill.           [employer] Access Code: Activation code not generated  Current [employer] Your Campaign Status: Active

## 2017-04-12 NOTE — PROGRESS NOTES
Subjective:   Brett Vicente is a 46 y.o. male here today for   Chief Complaint   Patient presents with   • Follow-Up     LAB   • Diabetes     MEDS REFILL       1. Uncontrolled type 2 diabetes mellitus with complication, with long-term current use of insulin (CMS-[facility])  This is chronic. Stable. Currently taking Dapaglifozin, Exenatide, lantus (38-40U qhs), metformin 1000mg BID as directed. He is also taking a daily aspirin, statin, and ACE-I/ARB.   Denies side effects or hypoglycemic events from medications.  Last HbA1c is 6.9%  He is monitoring blood sugar regularly at home. Fasting blood sugars are approximately 100-130, patient brings in his office today that we went over  Reports diet is improving but still rich in carbohydrates  He is exercising regularly walking.  Last eye exam: July 2016  Doing regular foot exams and care.  Denies polyuria, polydipsia, blurred vision, early satiety, or neuropathies.    2. Hypogonadism in male  This is chronic. Patient does have a low testosterone level. He has been on testosterone injections in the past. We did discuss symptoms. Currently, he is having increased energy level and muscle strength just working out. His sex drive is high but his performance is low. We did discuss the risks of testosterone supplementation.    3. Mixed hyperlipidemia  This is chronic. Doing well.  Taking atorvastatin 40 mg daily, fenofibrate 145 mg daily, omega-3 daily as prescribed. No myalgias, GI upset, or other side effects from medication. Denies CP or stroke symptoms. Also taking a daily ASA. Last lipid panel 3/1/17 and was reviewed with the patient.     4. Vitamin D deficiency  This is chronic and improved significantly. He is taking 2000 units of vitamin D daily. His last vitamin D level was 33 which came up from 11    5. Bilateral shoulder tendinopathy  This is chronic. Patient sent to 3 weeks of physical therapy, and he does have more physical therapy to do. He states that the physical  therapy is helping. He would like to talk about injections today. He did do x-rays which does show significant arthritis.      Current medicines (including changes today)  Current Outpatient Prescriptions   Medication Sig Dispense Refill   • fluoxetine (PROZAC) 20 MG Cap Take 1 Cap by mouth every day. 90 Cap 3   • atorvastatin (LIPITOR) 40 MG Tab Take 1 Tab by mouth every day. 90 Tab 3   • Exenatide ER 2 MG Pen-injector Inject 2 mg as instructed every 7 days. 8 Each 12   • lansoprazole (PREVACID) 30 MG CAPSULE DELAYED RELEASE TAKE 1 CAPSULE TWICE DAILY 180 Cap 4   • irbesartan (AVAPRO) 300 MG Tab TAKE 1 TABLET DAILY 90 Tab 4   • spironolactone (ALDACTONE) 25 MG Tab Take 0.5 Tabs by mouth every day. TAKE 0.5 TABS BY MOUTH EVERY DAY. 45 Tab 4   • Dapagliflozin Propanediol (FARXIGA) 5 MG Tab Take 1 Tab by mouth every day. Indications: Type 2 Diabetes 90 Tab 3   • levothyroxine (SYNTHROID) 75 MCG Tab Take 1 Tab by mouth every day. 90 Tab 3   • metformin ER (GLUCOPHAGE XR) 500 MG TABLET SR 24 HR Take 2 Tabs by mouth 2 times a day. 360 Tab 3   • allopurinol (ZYLOPRIM) 300 MG Tab Take 1 Tab by mouth every day. 90 Tab 1   • fluticasone (FLONASE) 50 MCG/ACT nasal spray Spray 2 Sprays in nose every day. 16 g 0   • hydrochlorothiazide (MICROZIDE) 12.5 MG capsule Take 1 Cap by mouth every day. 90 Cap 1   • carvedilol (COREG) 25 MG Tab Take 0.5 Tabs by mouth 2 times a day. 90 Tab 1   • Omega 3 1000 MG CAPS Take 4 a day 100 Cap 6   • aspirin EC (ECOTRIN) 81 MG TBEC Take 81 mg by mouth every day.     • insulin glargine (BASAGLAR KWIKPEN) 100 UNIT/ML Solution Pen-injector injection Up to 60 units per day 60 mL 3   • meloxicam (MOBIC) 7.5 MG Tab Take 1 Tab by mouth 2 times a day as needed. 60 Tab 1   • fenofibrate (TRICOR) 145 MG Tab Take 1 Tab by mouth every day. 90 Tab 3   • Insulin Pen Needle (BD PEN NEEDLE CARLY U/F) 32G X 4 MM Misc For insulin shot once a day 100 Each 3   • Blood Glucose Monitoring Suppl SUPPLIES Misc Test  "strips order: Test strips for One Touch Ultra 2 meter. Sig: use QID and prn ssx high or low sugar 100 Each 3   • Lancets Misc Lancets order: Lancets for One Touch Ultra 2 meter. Sig: use QID and prn ssx high or low sugar. 100 Each 3   • Blood Glucose Monitoring Suppl Device Meter: Dispense One Touch Ultra 2 meter. Sig. Use as directed for blood sugar monitoring. 1 Device 0     No current facility-administered medications for this visit.     He  has a past medical history of Diabetes (CMS-HCC); Hypertension; Hyperlipidemia; GERD (gastroesophageal reflux disease); Thyroid disease; and Gout.    ROS   No fevers  No bowel changes  No LE edema       Objective:     Blood pressure 124/76, pulse 88, temperature 36.7 °C (98 °F), resp. rate 16, height 1.829 m (6' 0.01\"), weight 107.9 kg (237 lb 14 oz), SpO2 97 %. Body mass index is 32.25 kg/(m^2).   Physical Exam:  General: No acute distress, WN/WD  HEENT: NC/AT, lids normal without lesions, good dentition  Neck: Trachea midline  Cardiovascular: Regular Rate  Pulmonary: No respiratory distress  Skin: No rashes noted  Neurologic: CN II-XII grossly intact, normal gait  Psych: Alert and oriented x 3, normal insight and judgement  MSK: No shoulder tenderness, range of motion is full. There is crepitus in bilateral shoulder joints       Assessment and Plan:   The following treatment plan was discussed    1. Uncontrolled type 2 diabetes mellitus with complication, with long-term current use of insulin (CMS-HCC)  Chronic, improved significantly  Continue medications as above  We will do a fingerstick hemoglobin A1c at the next visit in 3 months  - Diabetic Monofilament Lower Extremity Exam    2. Hypogonadism in male  Chronic, uncontrolled  We did discuss the risks of testosterone supplementation. At this point, the patient's side effects from low testosterone are minimal and he does have a high risk for cardiovascular disease. I have advised him to hold off on testosterone " supplementation for now. He agrees.    3. Mixed hyperlipidemia  Chronic, stable  Continue medications as above  Will recheck in 6 months    4. Vitamin D deficiency  Chronic, stable continue 2000 units daily    5. Bilateral shoulder tendinopathy  Chronic, stable  Continue physical therapy  Risks and benefits were described of shoulder injections including fat necrosis, infection, pain, increasing blood sugar  PROCEDURE: The shoulder was prepped with chlorhexidine and the skin was anesthetized with topical anesthetic. Using a 22 guage needle the glenhumoral joint is injected with  1cc 1% xylocaine, 1 cc of 0.25 percent bupivacaine, and 2 cc of kenalog 40 under the posterior aspect of the acromion. The area is cleansed and dressed.      Followup: Return in about 3 months (around 7/11/2017) for shoulders, diabetes.       This note was created using voice recognition software. I have made every reasonable attempt to correct errors, however, I do anticipate some grammatical errors.

## 2017-07-03 DIAGNOSIS — E11.65 UNCONTROLLED TYPE 2 DIABETES MELLITUS WITH COMPLICATION, UNSPECIFIED LONG TERM INSULIN USE STATUS: ICD-10-CM

## 2017-07-03 DIAGNOSIS — E11.8 UNCONTROLLED TYPE 2 DIABETES MELLITUS WITH COMPLICATION, UNSPECIFIED LONG TERM INSULIN USE STATUS: ICD-10-CM

## 2017-07-03 DIAGNOSIS — I10 ESSENTIAL HYPERTENSION: ICD-10-CM

## 2017-07-03 DIAGNOSIS — M10.9 GOUT, UNSPECIFIED CAUSE, UNSPECIFIED CHRONICITY, UNSPECIFIED SITE: ICD-10-CM

## 2017-07-03 RX ORDER — CARVEDILOL 25 MG/1
12.5 TABLET ORAL 2 TIMES DAILY
Qty: 90 TAB | Refills: 3 | Status: SHIPPED | OUTPATIENT
Start: 2017-07-03 | End: 2018-10-02 | Stop reason: SDUPTHER

## 2017-07-03 RX ORDER — ALLOPURINOL 300 MG/1
300 TABLET ORAL DAILY
Qty: 90 TAB | Refills: 1 | Status: SHIPPED | OUTPATIENT
Start: 2017-07-03 | End: 2018-01-18 | Stop reason: SDUPTHER

## 2017-07-03 RX ORDER — METFORMIN HYDROCHLORIDE 500 MG/1
1000 TABLET, EXTENDED RELEASE ORAL 2 TIMES DAILY
Qty: 360 TAB | Refills: 3 | Status: SHIPPED | OUTPATIENT
Start: 2017-07-03 | End: 2018-10-02 | Stop reason: SDUPTHER

## 2017-07-03 NOTE — TELEPHONE ENCOUNTER
----- Message from Your Healthcare Team sent at 7/3/2017 10:42 AM PDT -----  Regarding: Prescription Question  Contact: [phone]  Hi Dr Baumann and team,  Please have the following prescriptions sent to [facility] on [address] [facility] (today if possible, as my family is leaving for vacation tommorrow early morning). All are same dosages as before and the previous scripts ..  1. Allpurinol 300mg (90 day supply x4)  2. Metformin 1000mg 2xdaily (90 day supply ×4)  3. Carvedilol 25 mg. 1/2 pill 2x daily.. (90 day supply x4)  Thank you

## 2017-09-14 ENCOUNTER — OFFICE VISIT (OUTPATIENT)
Dept: ENDOCRINOLOGY | Facility: MEDICAL CENTER | Age: 47
End: 2017-09-14
Payer: COMMERCIAL

## 2017-09-14 VITALS
OXYGEN SATURATION: 90 % | DIASTOLIC BLOOD PRESSURE: 76 MMHG | WEIGHT: 239.2 LBS | BODY MASS INDEX: 32.4 KG/M2 | SYSTOLIC BLOOD PRESSURE: 108 MMHG | HEIGHT: 72 IN | HEART RATE: 89 BPM

## 2017-09-14 DIAGNOSIS — I10 ESSENTIAL HYPERTENSION: ICD-10-CM

## 2017-09-14 DIAGNOSIS — E11.65 UNCONTROLLED TYPE 2 DIABETES MELLITUS WITH HYPERGLYCEMIA, WITHOUT LONG-TERM CURRENT USE OF INSULIN (HCC): ICD-10-CM

## 2017-09-14 DIAGNOSIS — E03.9 ACQUIRED HYPOTHYROIDISM: ICD-10-CM

## 2017-09-14 DIAGNOSIS — E78.2 MIXED HYPERLIPIDEMIA: ICD-10-CM

## 2017-09-14 LAB
HBA1C MFR BLD: 7 % (ref ?–5.8)
INT CON NEG: NORMAL
INT CON POS: NORMAL

## 2017-09-14 PROCEDURE — 99214 OFFICE O/P EST MOD 30 MIN: CPT | Performed by: INTERNAL MEDICINE

## 2017-09-14 PROCEDURE — 83036 HEMOGLOBIN GLYCOSYLATED A1C: CPT | Performed by: INTERNAL MEDICINE

## 2017-09-14 NOTE — PROGRESS NOTES
Endocrinology Clinic Progress Note  PCP: Frida Baumann M.D.    CC: Diabetes     HPI:  Brett Vicente is a 45 y.o. old patient who comes in today for follow up.     Type 2 diabetes: He is currently on Basaglar 42 units at bedtime, Bydureon 2 mg once a week, Farxiga 5 mg daily, metformin 1000 mg twice a day. He reports compliance with medications. He checks blood sugars 1-2 times a day. Fasting blood sugar readings are in the range of 80 to 140. Bedtime blood sugar readings are variable. No hypoglycemia. He is up-to-date with eye exam.    Hyperlipidemia: He is currently on fenofibrate and Lipitor.     Hypertension: Blood pressure is very well controlled. He is on ARB, in addition to other antihypertensive agents.    Acquired hypothyroidism: He is currently on levothyroxine 75 µg daily.    ROS:  Constitutional: Positive for weight gain  Neuro: Negative for numbness or tingling in feet    Past Medical History:  Patient Active Problem List    Diagnosis Date Noted   • Moderate single current episode of major depressive disorder (CMS-HCC) 01/20/2017   • Vitamin D deficiency 01/12/2017   • Mixed hyperlipidemia 12/07/2016   • Hypercalcemia 12/22/2015   • Bilateral shoulder tendinopathy 12/22/2015   • Hypogonadism in male 06/25/2015   • Diabetes mellitus type 2, uncontrolled, with complications (CMS-HCC) 06/25/2015   • Essential hypertension 06/25/2015   • Hypothyroidism 06/25/2015   • Gastroesophageal reflux disease without esophagitis 06/25/2015   • Gout 08/19/2014       Medications:    Current Outpatient Prescriptions:   •  Exenatide ER 2 MG Pen-injector, Inject 2 mg as instructed every 7 days., Disp: 8 Each, Rfl: 12  •  allopurinol (ZYLOPRIM) 300 MG Tab, Take 1 Tab by mouth every day., Disp: 90 Tab, Rfl: 1  •  metformin ER (GLUCOPHAGE XR) 500 MG TABLET SR 24 HR, Take 2 Tabs by mouth 2 times a day., Disp: 360 Tab, Rfl: 3  •  carvedilol (COREG) 25 MG Tab, Take 0.5 Tabs by mouth 2 times a day., Disp: 90 Tab, Rfl:  3  •  insulin glargine (BASAGLAR KWIKPEN) 100 UNIT/ML Solution Pen-injector injection, Up to 60 units per day (Patient taking differently: 42 Units. Up to 60 units per day), Disp: 60 mL, Rfl: 3  •  fluoxetine (PROZAC) 20 MG Cap, Take 1 Cap by mouth every day., Disp: 90 Cap, Rfl: 3  •  atorvastatin (LIPITOR) 40 MG Tab, Take 1 Tab by mouth every day., Disp: 90 Tab, Rfl: 3  •  fenofibrate (TRICOR) 145 MG Tab, Take 1 Tab by mouth every day., Disp: 90 Tab, Rfl: 3  •  lansoprazole (PREVACID) 30 MG CAPSULE DELAYED RELEASE, TAKE 1 CAPSULE TWICE DAILY, Disp: 180 Cap, Rfl: 4  •  irbesartan (AVAPRO) 300 MG Tab, TAKE 1 TABLET DAILY, Disp: 90 Tab, Rfl: 4  •  spironolactone (ALDACTONE) 25 MG Tab, Take 0.5 Tabs by mouth every day. TAKE 0.5 TABS BY MOUTH EVERY DAY., Disp: 45 Tab, Rfl: 4  •  Dapagliflozin Propanediol (FARXIGA) 5 MG Tab, Take 1 Tab by mouth every day. Indications: Type 2 Diabetes, Disp: 90 Tab, Rfl: 3  •  levothyroxine (SYNTHROID) 75 MCG Tab, Take 1 Tab by mouth every day., Disp: 90 Tab, Rfl: 3  •  hydrochlorothiazide (MICROZIDE) 12.5 MG capsule, Take 1 Cap by mouth every day., Disp: 90 Cap, Rfl: 1  •  Omega 3 1000 MG CAPS, Take 4 a day, Disp: 100 Cap, Rfl: 6  •  aspirin EC (ECOTRIN) 81 MG TBEC, Take 81 mg by mouth every day., Disp: , Rfl:   •  meloxicam (MOBIC) 7.5 MG Tab, Take 1 Tab by mouth 2 times a day as needed., Disp: 60 Tab, Rfl: 1  •  Insulin Pen Needle (BD PEN NEEDLE CARLY U/F) 32G X 4 MM Misc, For insulin shot once a day, Disp: 100 Each, Rfl: 3  •  Blood Glucose Monitoring Suppl SUPPLIES Misc, Test strips order: Test strips for One Touch Ultra 2 meter. Sig: use QID and prn ssx high or low sugar, Disp: 100 Each, Rfl: 3  •  Lancets Misc, Lancets order: Lancets for One Touch Ultra 2 meter. Sig: use QID and prn ssx high or low sugar., Disp: 100 Each, Rfl: 3  •  Blood Glucose Monitoring Suppl Device, Meter: Dispense One Touch Ultra 2 meter. Sig. Use as directed for blood sugar monitoring., Disp: 1 Device, Rfl:  0  •  fluticasone (FLONASE) 50 MCG/ACT nasal spray, Spray 2 Sprays in nose every day., Disp: 16 g, Rfl: 0    Physical Examination:  Vital signs: /76   Pulse 89   Ht 1.829 m (6')   Wt 108.5 kg (239 lb 3.2 oz)   SpO2 90%   BMI 32.44 kg/m²   General: No apparent distress, cooperative  Eyes: No scleral icterus, no discharge, normal eyelids  Neck: No abnormal masses on inspection   Resp: Normal effort, clear to auscultation bilaterally  CVS: Regular rate and rhythm, S1 S2 normal, no murmur  Extremities: No lower extremity edema  Musculoskeletal: Normal gait  Skin: No rash on visible skin  Psych: Alert and oriented, normal mood and affect    Assessment and Plan:    Type 2 diabetes mellitus with hyperglycemia, with long-term current use of insulin (HCC)  · Hemoglobin A1c today in the clinic is 7%  · Goal hemoglobin A1c less than 7%  · Continue Basaglar, Bydureon, metformin, Farxiga; no changes to medications today    Mixed hyperlipidemia  · LDL 49, triglycerides 163  · Continue fenofibrate and Lipitor    Acquired hypothyroidism  · Continue levothyroxine 75 µg daily    Essential hypertension  · Blood pressure is well controlled  · Continue ARB, in addition to other anti-hypertensive agents    Return to clinic in 4 months.    Thank you for allowing me to participate in the care of this patient.    Lorena Joy M.D.    CC:   Frida Baumann M.D.    This note was created using voice recognition software (Dragon). The accuracy of the dictation is limited by the abilities of the software. I have reviewed the note prior to signing, however some errors in grammar and context are still possible. If you have any questions related to this note please do not hesitate to contact our office.

## 2017-09-14 NOTE — PROGRESS NOTES
Patient with existing type diabetes:  Type 2 diabetes here for follow up     Patient's health status since last visit: good  Issues with diabetes since last visit unknown.   Current Diabetes Medications: Metformin XR 1000 mg bid, Bydureon 2mg weekly, Farxiga 5 mg daily and Basaglar 42 units at hs.     HbA1c: @hba1c@  Lab Results   Component Value Date/Time    HBA1C 7 2017 07:18 AM        FSBS  Testin-2 times per day    Hypoglycemia: rare.   Exercise: walking dog    Retinal Exam:current.     Daily Foot Exam: yes    Routine Dental Exams:   Flu vaccine: due.   Pneumonia vaccine current.

## 2017-10-03 RX ORDER — IRBESARTAN 300 MG/1
TABLET ORAL
Qty: 90 TAB | Refills: 2 | Status: SHIPPED | OUTPATIENT
Start: 2017-10-03 | End: 2018-01-18 | Stop reason: SDUPTHER

## 2017-12-20 DIAGNOSIS — Z79.4 TYPE 2 DIABETES MELLITUS WITH HYPERGLYCEMIA, WITH LONG-TERM CURRENT USE OF INSULIN (HCC): ICD-10-CM

## 2017-12-20 DIAGNOSIS — E11.65 TYPE 2 DIABETES MELLITUS WITH HYPERGLYCEMIA, WITH LONG-TERM CURRENT USE OF INSULIN (HCC): ICD-10-CM

## 2017-12-20 RX ORDER — DAPAGLIFLOZIN 5 MG/1
1 TABLET, FILM COATED ORAL DAILY
Qty: 90 TAB | Refills: 3 | Status: SHIPPED | OUTPATIENT
Start: 2017-12-20 | End: 2018-05-18

## 2018-01-02 DIAGNOSIS — E78.2 MIXED HYPERLIPIDEMIA: ICD-10-CM

## 2018-01-03 RX ORDER — ATORVASTATIN CALCIUM 40 MG/1
40 TABLET, FILM COATED ORAL DAILY
Qty: 90 TAB | Refills: 2 | OUTPATIENT
Start: 2018-01-03 | End: 2018-01-18 | Stop reason: SDUPTHER

## 2018-01-18 ENCOUNTER — OFFICE VISIT (OUTPATIENT)
Dept: ENDOCRINOLOGY | Facility: MEDICAL CENTER | Age: 48
End: 2018-01-18
Payer: COMMERCIAL

## 2018-01-18 VITALS
OXYGEN SATURATION: 96 % | HEIGHT: 72 IN | WEIGHT: 245.2 LBS | DIASTOLIC BLOOD PRESSURE: 82 MMHG | HEART RATE: 69 BPM | SYSTOLIC BLOOD PRESSURE: 118 MMHG | BODY MASS INDEX: 33.21 KG/M2

## 2018-01-18 DIAGNOSIS — I10 ESSENTIAL HYPERTENSION: ICD-10-CM

## 2018-01-18 DIAGNOSIS — E78.2 MIXED HYPERLIPIDEMIA: ICD-10-CM

## 2018-01-18 DIAGNOSIS — E11.9 TYPE 2 DIABETES MELLITUS WITHOUT COMPLICATION, WITH LONG-TERM CURRENT USE OF INSULIN (HCC): ICD-10-CM

## 2018-01-18 DIAGNOSIS — Z79.4 TYPE 2 DIABETES MELLITUS WITHOUT COMPLICATION, WITH LONG-TERM CURRENT USE OF INSULIN (HCC): ICD-10-CM

## 2018-01-18 DIAGNOSIS — E03.4 HYPOTHYROIDISM DUE TO ACQUIRED ATROPHY OF THYROID: ICD-10-CM

## 2018-01-18 DIAGNOSIS — E55.9 VITAMIN D DEFICIENCY: ICD-10-CM

## 2018-01-18 DIAGNOSIS — M10.9 GOUT, UNSPECIFIED CAUSE, UNSPECIFIED CHRONICITY, UNSPECIFIED SITE: ICD-10-CM

## 2018-01-18 DIAGNOSIS — E03.9 ACQUIRED HYPOTHYROIDISM: ICD-10-CM

## 2018-01-18 DIAGNOSIS — R79.89 LOW TESTOSTERONE IN MALE: ICD-10-CM

## 2018-01-18 DIAGNOSIS — R10.11 RUQ ABDOMINAL PAIN: ICD-10-CM

## 2018-01-18 LAB
HBA1C MFR BLD: 6.9 % (ref ?–5.8)
INT CON NEG: NORMAL
INT CON POS: NORMAL

## 2018-01-18 PROCEDURE — 83036 HEMOGLOBIN GLYCOSYLATED A1C: CPT | Performed by: INTERNAL MEDICINE

## 2018-01-18 PROCEDURE — 99214 OFFICE O/P EST MOD 30 MIN: CPT | Performed by: INTERNAL MEDICINE

## 2018-01-18 RX ORDER — IRBESARTAN 300 MG/1
TABLET ORAL
Qty: 90 TAB | Refills: 2 | Status: SHIPPED | OUTPATIENT
Start: 2018-01-18 | End: 2018-10-02 | Stop reason: SDUPTHER

## 2018-01-18 RX ORDER — LEVOTHYROXINE SODIUM 0.07 MG/1
75 TABLET ORAL DAILY
Qty: 90 TAB | Refills: 3 | Status: SHIPPED | OUTPATIENT
Start: 2018-01-18 | End: 2018-10-02 | Stop reason: SDUPTHER

## 2018-01-18 RX ORDER — ALLOPURINOL 300 MG/1
300 TABLET ORAL DAILY
Qty: 90 TAB | Refills: 3 | Status: SHIPPED | OUTPATIENT
Start: 2018-01-18 | End: 2019-04-05 | Stop reason: SDUPTHER

## 2018-01-18 RX ORDER — ATORVASTATIN CALCIUM 40 MG/1
40 TABLET, FILM COATED ORAL DAILY
Qty: 90 TAB | Refills: 2 | Status: SHIPPED | OUTPATIENT
Start: 2018-01-18 | End: 2018-10-02 | Stop reason: SDUPTHER

## 2018-01-18 NOTE — PROGRESS NOTES
Endocrinology Clinic Progress Note    CC: Diabetes    HPI:  Brett Vicente is a 47 y.o. old patient who comes in today for routine follow up.     Type 2 diabetes: He is currently on Basaglar 46 units at bedtime, metformin 1000 mg twice a day, bydureon 2 mg weekly, Farxiga 5 mg daily. Reports compliance with medications. Usually checks blood sugars once a day. 20 and a average blood sugar is 159. Hemoglobin A1c today is 6.9%. No hypoglycemia. No numbness or tingling in feet. He is up-to-date with eye exam.    Right upper quadrant discomfort: He has intermittent right upper quadrant and epigastric discomfort. Also has occasional nausea, has vomited 3-4 times in the past a few months. We will obtain right upper quadrant ultrasound to rule out gallstones. We also discussed that bydureon can cause some abdominal discomfort as well.    Hypothyroidism: He is currently off levothyroxine for 6 weeks, he ran out and did not request a refill. He feels more tired than usual.    Low testosterone: His testosterone level has been low over the past a few years, back in 2014 he was on testosterone replacement which she then discontinued due to risk of potential side effects.    ROS:  Constitutional: No unintentional weight loss  Endo: Denies excessive thirst or frequent urination    PMH:  Past Medical History:   Diagnosis Date   • Diabetes (CMS-[facility])    • GERD (gastroesophageal reflux disease)    • Gout    • Hyperlipidemia    • Hypertension    • Thyroid disease        EXAM:  Vital signs: /82   Pulse 69   Ht 1.829 m (6')   Wt 111.2 kg (245 lb 3.2 oz)   SpO2 96%   BMI 33.26 kg/m²   General: No apparent distress, cooperative  Eyes: No scleral icterus, no discharge  Neck: Normal on external inspection  Resp: Normal effort  Skin: No rash on visible skin  Psych: Alert and oriented, normal mood and affect    Assessment and Plan:    1. Type 2 diabetes mellitus without complication, with long-term current use of insulin  (CMS-[facility])  · Hemoglobin A1c today in the clinic is 6.9% next line goal A1c less than 7%  · No changes to medications today, continue Basaglar 46 units at bedtime, metformin, bydureon, Farxiga  · Repeat labs just before next appointment  - COMP METABOLIC PANEL; Future  - LIPID PROFILE; Future  - MICROALBUMIN CREAT RATIO URINE; Future    2. Acquired hypothyroidism  · Resume levothyroxine 75 µg daily  · Repeat labs for TSH and free T4 in 6 weeks, and then again in 3-4 months  - FREE THYROXINE; Future  - TSH; Future  - TSH; Future  - FREE THYROXINE; Future  - levothyroxine (SYNTHROID) 75 MCG Tab; Take 1 Tab by mouth every day.  Dispense: 90 Tab; Refill: 3    3. Mixed hyperlipidemia  - LIPID PROFILE; Future  - atorvastatin (LIPITOR) 40 MG Tab; Take 1 Tab by mouth every day.  Dispense: 90 Tab; Refill: 2    4. Vitamin D deficiency  - VITAMIN D,25 HYDROXY; Future    5. Essential hypertension  · Blood pressure is well controlled  - irbesartan (AVAPRO) 300 MG Tab; TAKE 1 TABLET DAILY  Dispense: 90 Tab; Refill: 2    6. RUQ abdominal pain  - US-ABDOMEN LIMITED; Future    7. Low testosterone in male  · 8AM labs  - TESTOSTERONE F&T MALE ADULT; Future  - FSH/LH; Future  - PROLACTIN; Future    8. Gout, unspecified cause, unspecified chronicity, unspecified site  - allopurinol (ZYLOPRIM) 300 MG Tab; Take 1 Tab by mouth every day.  Dispense: 90 Tab; Refill: 3    Return in about 4 months (around 5/18/2018).    Thank you for allowing me to participate in the care of this patient.    Lorena Joy M.D.    CC:   Frida Baumann M.D.    This note was created using voice recognition software (Dragon). The accuracy of the dictation is limited by the abilities of the software. I have reviewed the note prior to signing, however some errors in grammar and context are still possible. If you have any questions related to this note please do not hesitate to contact our office.

## 2018-01-29 ENCOUNTER — HOSPITAL ENCOUNTER (OUTPATIENT)
Dept: RADIOLOGY | Facility: MEDICAL CENTER | Age: 48
End: 2018-01-29
Attending: INTERNAL MEDICINE
Payer: COMMERCIAL

## 2018-01-29 DIAGNOSIS — R10.11 RUQ ABDOMINAL PAIN: ICD-10-CM

## 2018-01-29 PROCEDURE — 76705 ECHO EXAM OF ABDOMEN: CPT

## 2018-01-30 RX ORDER — LANSOPRAZOLE 30 MG/1
30 CAPSULE, DELAYED RELEASE ORAL DAILY
Qty: 90 CAP | Refills: 3 | Status: SHIPPED | OUTPATIENT
Start: 2018-01-30 | End: 2018-10-02 | Stop reason: SDUPTHER

## 2018-04-20 ENCOUNTER — HOSPITAL ENCOUNTER (OUTPATIENT)
Dept: LAB | Facility: MEDICAL CENTER | Age: 48
End: 2018-04-20
Attending: INTERNAL MEDICINE
Payer: COMMERCIAL

## 2018-04-20 DIAGNOSIS — E03.9 ACQUIRED HYPOTHYROIDISM: ICD-10-CM

## 2018-04-20 DIAGNOSIS — E11.9 TYPE 2 DIABETES MELLITUS WITHOUT COMPLICATION, WITH LONG-TERM CURRENT USE OF INSULIN (HCC): ICD-10-CM

## 2018-04-20 DIAGNOSIS — R79.89 LOW TESTOSTERONE IN MALE: ICD-10-CM

## 2018-04-20 DIAGNOSIS — Z79.4 TYPE 2 DIABETES MELLITUS WITHOUT COMPLICATION, WITH LONG-TERM CURRENT USE OF INSULIN (HCC): ICD-10-CM

## 2018-04-20 DIAGNOSIS — E78.2 MIXED HYPERLIPIDEMIA: ICD-10-CM

## 2018-04-20 DIAGNOSIS — E55.9 VITAMIN D DEFICIENCY: ICD-10-CM

## 2018-04-20 LAB
25(OH)D3 SERPL-MCNC: 36 NG/ML (ref 30–100)
ALBUMIN SERPL BCP-MCNC: 4.1 G/DL (ref 3.2–4.9)
ALBUMIN/GLOB SERPL: 1.6 G/DL
ALP SERPL-CCNC: 44 U/L (ref 30–99)
ALT SERPL-CCNC: 58 U/L (ref 2–50)
ANION GAP SERPL CALC-SCNC: 7 MMOL/L (ref 0–11.9)
AST SERPL-CCNC: 24 U/L (ref 12–45)
BILIRUB SERPL-MCNC: 0.5 MG/DL (ref 0.1–1.5)
BUN SERPL-MCNC: 17 MG/DL (ref 8–22)
CALCIUM SERPL-MCNC: 10.5 MG/DL (ref 8.5–10.5)
CHLORIDE SERPL-SCNC: 106 MMOL/L (ref 96–112)
CHOLEST SERPL-MCNC: 135 MG/DL (ref 100–199)
CO2 SERPL-SCNC: 30 MMOL/L (ref 20–33)
CREAT SERPL-MCNC: 0.99 MG/DL (ref 0.5–1.4)
CREAT UR-MCNC: 246 MG/DL
FSH SERPL-ACNC: 7.1 MIU/ML (ref 1.5–12.4)
GLOBULIN SER CALC-MCNC: 2.6 G/DL (ref 1.9–3.5)
GLUCOSE SERPL-MCNC: 150 MG/DL (ref 65–99)
HDLC SERPL-MCNC: 25 MG/DL
LDLC SERPL CALC-MCNC: 60 MG/DL
LH SERPL-ACNC: 5 IU/L (ref 1.7–8.6)
MICROALBUMIN UR-MCNC: 0.9 MG/DL
MICROALBUMIN/CREAT UR: 4 MG/G (ref 0–30)
POTASSIUM SERPL-SCNC: 4.6 MMOL/L (ref 3.6–5.5)
PROLACTIN SERPL-MCNC: 6.73 NG/ML (ref 2.1–17.7)
PROT SERPL-MCNC: 6.7 G/DL (ref 6–8.2)
SODIUM SERPL-SCNC: 143 MMOL/L (ref 135–145)
T4 FREE SERPL-MCNC: 1.09 NG/DL (ref 0.53–1.43)
TRIGL SERPL-MCNC: 249 MG/DL (ref 0–149)
TSH SERPL DL<=0.005 MIU/L-ACNC: 2.75 UIU/ML (ref 0.38–5.33)

## 2018-04-20 PROCEDURE — 80061 LIPID PANEL: CPT

## 2018-04-20 PROCEDURE — 84439 ASSAY OF FREE THYROXINE: CPT

## 2018-04-20 PROCEDURE — 83001 ASSAY OF GONADOTROPIN (FSH): CPT

## 2018-04-20 PROCEDURE — 84146 ASSAY OF PROLACTIN: CPT

## 2018-04-20 PROCEDURE — 84443 ASSAY THYROID STIM HORMONE: CPT

## 2018-04-20 PROCEDURE — 84270 ASSAY OF SEX HORMONE GLOBUL: CPT

## 2018-04-20 PROCEDURE — 84403 ASSAY OF TOTAL TESTOSTERONE: CPT

## 2018-04-20 PROCEDURE — 83002 ASSAY OF GONADOTROPIN (LH): CPT

## 2018-04-20 PROCEDURE — 80053 COMPREHEN METABOLIC PANEL: CPT

## 2018-04-20 PROCEDURE — 82043 UR ALBUMIN QUANTITATIVE: CPT

## 2018-04-20 PROCEDURE — 82570 ASSAY OF URINE CREATININE: CPT

## 2018-04-20 PROCEDURE — 36415 COLL VENOUS BLD VENIPUNCTURE: CPT

## 2018-04-20 PROCEDURE — 82306 VITAMIN D 25 HYDROXY: CPT

## 2018-04-21 LAB
SHBG SERPL-SCNC: 31 NMOL/L (ref 11–80)
TESTOST FREE MFR SERPL: 1.8 % (ref 1.6–2.9)
TESTOST FREE SERPL-MCNC: 47 PG/ML (ref 47–244)
TESTOST SERPL-MCNC: 254 NG/DL (ref 300–890)

## 2018-04-24 ENCOUNTER — OFFICE VISIT (OUTPATIENT)
Dept: MEDICAL GROUP | Facility: LAB | Age: 48
End: 2018-04-24
Payer: COMMERCIAL

## 2018-04-24 VITALS
HEART RATE: 60 BPM | DIASTOLIC BLOOD PRESSURE: 82 MMHG | OXYGEN SATURATION: 95 % | TEMPERATURE: 97 F | HEIGHT: 72 IN | RESPIRATION RATE: 12 BRPM | WEIGHT: 245 LBS | SYSTOLIC BLOOD PRESSURE: 124 MMHG | BODY MASS INDEX: 33.18 KG/M2

## 2018-04-24 DIAGNOSIS — G89.29 CHRONIC BILATERAL THORACIC BACK PAIN: ICD-10-CM

## 2018-04-24 DIAGNOSIS — K76.0 FATTY LIVER: ICD-10-CM

## 2018-04-24 DIAGNOSIS — M54.6 CHRONIC BILATERAL THORACIC BACK PAIN: ICD-10-CM

## 2018-04-24 DIAGNOSIS — Z00.00 HEALTH MAINTENANCE EXAMINATION: ICD-10-CM

## 2018-04-24 DIAGNOSIS — E66.9 OBESITY (BMI 30-39.9): ICD-10-CM

## 2018-04-24 PROCEDURE — 99396 PREV VISIT EST AGE 40-64: CPT | Performed by: FAMILY MEDICINE

## 2018-04-24 ASSESSMENT — PATIENT HEALTH QUESTIONNAIRE - PHQ9: CLINICAL INTERPRETATION OF PHQ2 SCORE: 0

## 2018-04-24 NOTE — PROGRESS NOTES
Subjective:   Bravo Vicente is a 47 y.o. male here today for annual     1. Health maintenance examination  Labs most recently done this month. These were reviewed with him today. Declines tetanus booster. He has had his pneumonia vaccine. He does get flu shots regularly. He is not exercising regularly due to back pain.  Results for BRAVO VIECNTE (MRN [id number]) as of 4/24/2018 09:40   Ref. Range 4/20/2018 09:13   Sodium Latest Ref Range: 135 - 145 mmol/L 143   Potassium Latest Ref Range: 3.6 - 5.5 mmol/L 4.6   Chloride Latest Ref Range: 96 - 112 mmol/L 106   Co2 Latest Ref Range: 20 - 33 mmol/L 30   Anion Gap Latest Ref Range: 0.0 - 11.9  7.0   Glucose Latest Ref Range: 65 - 99 mg/dL 150 (H)   Bun Latest Ref Range: 8 - 22 mg/dL 17   Creatinine Latest Ref Range: 0.50 - 1.40 mg/dL 0.99   GFR If  Latest Ref Range: >60 mL/min/1.73 m 2 >60   GFR If Non  Latest Ref Range: >60 mL/min/1.73 m 2 >60   Calcium Latest Ref Range: 8.5 - 10.5 mg/dL 10.5   AST(SGOT) Latest Ref Range: 12 - 45 U/L 24   ALT(SGPT) Latest Ref Range: 2 - 50 U/L 58 (H)   Alkaline Phosphatase Latest Ref Range: 30 - 99 U/L 44   Total Bilirubin Latest Ref Range: 0.1 - 1.5 mg/dL 0.5   Albumin Latest Ref Range: 3.2 - 4.9 g/dL 4.1   Total Protein Latest Ref Range: 6.0 - 8.2 g/dL 6.7   Globulin Latest Ref Range: 1.9 - 3.5 g/dL 2.6   A-G Ratio Latest Units: g/dL 1.6   Cholesterol,Tot Latest Ref Range: 100 - 199 mg/dL 135   Triglycerides Latest Ref Range: 0 - 149 mg/dL 249 (H)   HDL Latest Ref Range: >=40 mg/dL 25 (A)   LDL Latest Ref Range: <100 mg/dL 60   Micro Alb Creat Ratio Latest Ref Range: 0 - 30 mg/g 4   Creatinine, Urine Latest Units: mg/dL 246.00   Microalbumin, Urine Random Latest Units: mg/dL 0.9   25-Hydroxy   Vitamin D 25 Latest Ref Range: 30 - 100 ng/mL 36   TSH Latest Ref Range: 0.380 - 5.330 uIU/mL 2.750   Free T-4 Latest Ref Range: 0.53 - 1.43 ng/dL 1.09   Follicle Stimulating Hormone Latest Ref Range: 1.5 -  12.4 mIU/mL 7.1   Free Testosterone Latest Ref Range: 47 - 244 pg/mL 47   Luteinizing Hormone Latest Ref Range: 1.7 - 8.6 IU/L 5.0   Prolactin Latest Ref Range: 2.10 - 17.70 ng/mL 6.73   Sex Hormone Bind Globulin Latest Ref Range: 11 - 80 nmol/L 31   Testosterone % Free Latest Ref Range: 1.6 - 2.9 % 1.8   Testosterone,Total Latest Ref Range: 300 - 890 ng/dL 254 (L)     2. Obesity (BMI 30-39.9)  This is chronic. Patient's weight is stable. He is unable to exercise regularly due to back pain. He is trying to work on healthy lifestyle. He does have diabetes and fatty liver    3. Chronic bilateral thoracic back pain  This is a new problem to discuss. Patient has had lumbar fusion with Dr. Ulloa. He is now reporting maybe thoracic pain bilaterally without radiculopathy. It is worse after sleeping and with twisting. He feels he is unable to exercise regularly because of this pain. He has had repeat MRIs at [facility] which did not show any changes from his report. Vaginal allergies records. No bowel or bladder incontinence. He cannot find anything that makes it worse. The pain is not there constantly but is at least daily. He has not had physical therapy for this    4. Fatty liver  This is a new problem to discuss. Patient had an ultrasound which did show fatty liver. He also has elevated LFTs. He does have diabetes and elevated triglycerides and an increased abdominal circumference. His CBC does not show any thrombus cytopenia. He has not had an INR. He is not drinking alcohol excessively    Current medicines (including changes today)  Current Outpatient Prescriptions   Medication Sig Dispense Refill   • fenofibrate (TRICOR) 145 MG Tab TAKE 1 TABLET DAILY 90 Tab 3   • lansoprazole (PREVACID) 30 MG CAPSULE DELAYED RELEASE Take 1 Cap by mouth every day. 90 Cap 3   • atorvastatin (LIPITOR) 40 MG Tab Take 1 Tab by mouth every day. 90 Tab 2   • levothyroxine (SYNTHROID) 75 MCG Tab Take 1 Tab by mouth every day. 90 Tab 3   •  allopurinol (ZYLOPRIM) 300 MG Tab Take 1 Tab by mouth every day. 90 Tab 3   • irbesartan (AVAPRO) 300 MG Tab TAKE 1 TABLET DAILY 90 Tab 2   • Dapagliflozin Propanediol (FARXIGA) 5 MG Tab Take 1 Tab by mouth every day. 90 Tab 3   • metformin ER (GLUCOPHAGE XR) 500 MG TABLET SR 24 HR Take 2 Tabs by mouth 2 times a day. 360 Tab 3   • carvedilol (COREG) 25 MG Tab Take 0.5 Tabs by mouth 2 times a day. 90 Tab 3   • insulin glargine (BASAGLAR KWIKPEN) 100 UNIT/ML Solution Pen-injector injection Up to 60 units per day (Patient taking differently: 42 Units. Up to 60 units per day) 60 mL 3   • fluoxetine (PROZAC) 20 MG Cap Take 1 Cap by mouth every day. 90 Cap 3   • spironolactone (ALDACTONE) 25 MG Tab Take 0.5 Tabs by mouth every day. TAKE 0.5 TABS BY MOUTH EVERY DAY. 45 Tab 4   • hydrochlorothiazide (MICROZIDE) 12.5 MG capsule Take 1 Cap by mouth every day. 90 Cap 1   • Omega 3 1000 MG CAPS Take 4 a day 100 Cap 6   • aspirin EC (ECOTRIN) 81 MG TBEC Take 81 mg by mouth every day.     • Exenatide ER 2 MG Pen-injector Inject 2 mg as instructed every 7 days. 8 Each 12   • meloxicam (MOBIC) 7.5 MG Tab Take 1 Tab by mouth 2 times a day as needed. 60 Tab 1   • Insulin Pen Needle (BD PEN NEEDLE CARLY U/F) 32G X 4 MM Misc For insulin shot once a day 100 Each 3   • Blood Glucose Monitoring Suppl SUPPLIES Misc Test strips order: Test strips for One Touch Ultra 2 meter. Sig: use QID and prn ssx high or low sugar 100 Each 3   • Lancets Misc Lancets order: Lancets for One Touch Ultra 2 meter. Sig: use QID and prn ssx high or low sugar. 100 Each 3   • Blood Glucose Monitoring Suppl Device Meter: Dispense One Touch Ultra 2 meter. Sig. Use as directed for blood sugar monitoring. 1 Device 0   • fluticasone (FLONASE) 50 MCG/ACT nasal spray Spray 2 Sprays in nose every day. 16 g 0     No current facility-administered medications for this visit.      He  has a past medical history of Diabetes (CMS-HCC); Fatty liver (4/24/2018); GERD  (gastroesophageal reflux disease); Gout; Hyperlipidemia; Hypertension; and Thyroid disease.    ROS   No fevers  No bowel changes  No LE edema  Positive for back pain, knee pain, ankle pain, hand pain     Objective:     Blood pressure 124/82, pulse 60, temperature 36.1 °C (97 °F), resp. rate 12, height 1.829 m (6'), weight 111.1 kg (245 lb), SpO2 95 %. Body mass index is 33.23 kg/m².   Physical Exam:  Constitutional: Alert, no distress.  Skin: Warm, dry, good turgor, no rashes in visible areas.  Eye: Equal, round and reactive, conjunctiva clear, lids normal.  ENMT: Lips without lesions, good dentition, oropharynx clear.  Neck: Trachea midline, no masses, no thyromegaly. No cervical or supraclavicular lymphadenopathy  Respiratory: Unlabored respiratory effort, lungs clear to auscultation, no wheezes, no ronchi.  Cardiovascular: Normal S1, S2, RRR, no murmur, no edema.  Abdomen: Soft, non-tender, no masses, no hepatosplenomegaly.  Psych: Alert and oriented x3, normal affect and mood.  MSK: There is tenderness to palpation along the bilateral paraspinal muscles along the thoracic spine with some knots within the muscle spasm. 2+ patellar and biceps reflexes      Assessment and Plan:   The following treatment plan was discussed    1. Health maintenance examination  Age appropriate counseling given, labs reviewed, diet and exercise discussed.    2. Obesity (BMI 30-39.9)  This is chronic, weight is stable. We did discuss medical nutrition therapy. He may be interested in this and will discuss this with his wife. He was having a mitral message if they would like to proceed with this  - Patient identified as having weight management issue.  Appropriate orders and counseling given.    3. Chronic bilateral thoracic back pain  This is chronic  Not controlled  Discussed differential diagnosis with the patient including overcompensation after his thoracic back surgery, posture, strain. I would like for the patient to be evaluated  by physiatry. A referral has been placed. I have also placed a referral for physical therapy  - REFERRAL TO PHYSIATRY (PMR)  - REFERRAL TO PHYSICAL THERAPY Reason for Therapy: Eval/Treat/Report    4. Fatty liver  This is a new problem, not controlled. Discussed etiology of this with elevated sugars and weight. Discussed importance of dietary modifications, weight loss, exercise. We will recheck labs below in 6 months and ultrasound at least every 2 years  - COMP METABOLIC PANEL; Future  - CBC WITH DIFFERENTIAL; Future  - PROTHROMBIN TIME; Future      Followup: Return in about 6 months (around 10/24/2018) for fatty liver .       This note was created using voice recognition software. I have made every reasonable attempt to correct errors, however, I do anticipate some grammatical errors.

## 2018-05-18 ENCOUNTER — OFFICE VISIT (OUTPATIENT)
Dept: ENDOCRINOLOGY | Facility: MEDICAL CENTER | Age: 48
End: 2018-05-18
Payer: COMMERCIAL

## 2018-05-18 VITALS
HEIGHT: 72 IN | DIASTOLIC BLOOD PRESSURE: 72 MMHG | WEIGHT: 242 LBS | OXYGEN SATURATION: 95 % | SYSTOLIC BLOOD PRESSURE: 110 MMHG | BODY MASS INDEX: 32.78 KG/M2 | HEART RATE: 71 BPM

## 2018-05-18 DIAGNOSIS — Z79.4 UNCONTROLLED TYPE 2 DIABETES MELLITUS WITH HYPERGLYCEMIA, WITH LONG-TERM CURRENT USE OF INSULIN (HCC): ICD-10-CM

## 2018-05-18 DIAGNOSIS — E03.9 ACQUIRED HYPOTHYROIDISM: ICD-10-CM

## 2018-05-18 DIAGNOSIS — E78.2 MIXED HYPERLIPIDEMIA: ICD-10-CM

## 2018-05-18 DIAGNOSIS — I10 ESSENTIAL HYPERTENSION: ICD-10-CM

## 2018-05-18 DIAGNOSIS — E11.65 UNCONTROLLED TYPE 2 DIABETES MELLITUS WITH HYPERGLYCEMIA, WITH LONG-TERM CURRENT USE OF INSULIN (HCC): ICD-10-CM

## 2018-05-18 LAB
HBA1C MFR BLD: 8.5 % (ref ?–5.8)
INT CON NEG: NORMAL
INT CON POS: NORMAL

## 2018-05-18 PROCEDURE — 99214 OFFICE O/P EST MOD 30 MIN: CPT | Performed by: INTERNAL MEDICINE

## 2018-05-18 PROCEDURE — 83036 HEMOGLOBIN GLYCOSYLATED A1C: CPT | Performed by: INTERNAL MEDICINE

## 2018-05-18 RX ORDER — DAPAGLIFLOZIN 10 MG/1
10 TABLET, FILM COATED ORAL DAILY
Qty: 90 TAB | Refills: 1 | Status: SHIPPED | OUTPATIENT
Start: 2018-05-18 | End: 2018-10-02 | Stop reason: SDUPTHER

## 2018-05-18 NOTE — PROGRESS NOTES
RN-CDE Note    Subjective:     Health changes since last visit/interval Hx: None    Medications (including changes made today)  Current Outpatient Prescriptions   Medication Sig Dispense Refill   • fenofibrate (TRICOR) 145 MG Tab TAKE 1 TABLET DAILY 90 Tab 3   • lansoprazole (PREVACID) 30 MG CAPSULE DELAYED RELEASE Take 1 Cap by mouth every day. 90 Cap 3   • atorvastatin (LIPITOR) 40 MG Tab Take 1 Tab by mouth every day. 90 Tab 2   • levothyroxine (SYNTHROID) 75 MCG Tab Take 1 Tab by mouth every day. 90 Tab 3   • allopurinol (ZYLOPRIM) 300 MG Tab Take 1 Tab by mouth every day. 90 Tab 3   • irbesartan (AVAPRO) 300 MG Tab TAKE 1 TABLET DAILY 90 Tab 2   • Dapagliflozin Propanediol (FARXIGA) 5 MG Tab Take 1 Tab by mouth every day. 90 Tab 3   • metformin ER (GLUCOPHAGE XR) 500 MG TABLET SR 24 HR Take 2 Tabs by mouth 2 times a day. 360 Tab 3   • carvedilol (COREG) 25 MG Tab Take 0.5 Tabs by mouth 2 times a day. 90 Tab 3   • insulin glargine (BASAGLAR KWIKPEN) 100 UNIT/ML Solution Pen-injector injection Up to 60 units per day (Patient taking differently: 50 Units. Up to 60 units per day) 60 mL 3   • spironolactone (ALDACTONE) 25 MG Tab Take 0.5 Tabs by mouth every day. TAKE 0.5 TABS BY MOUTH EVERY DAY. 45 Tab 4   • hydrochlorothiazide (MICROZIDE) 12.5 MG capsule Take 1 Cap by mouth every day. 90 Cap 1   • Omega 3 1000 MG CAPS Take 4 a day 100 Cap 6   • aspirin EC (ECOTRIN) 81 MG TBEC Take 81 mg by mouth every day.     • fluoxetine (PROZAC) 20 MG Cap Take 1 Cap by mouth every day. 90 Cap 3   • Insulin Pen Needle (BD PEN NEEDLE CARLY U/F) 32G X 4 MM Misc For insulin shot once a day 100 Each 3   • Blood Glucose Monitoring Suppl SUPPLIES Misc Test strips order: Test strips for One Touch Ultra 2 meter. Sig: use QID and prn ssx high or low sugar 100 Each 3   • Lancets Misc Lancets order: Lancets for One Touch Ultra 2 meter. Sig: use QID and prn ssx high or low sugar. 100 Each 3   • Blood Glucose Monitoring Suppl Device Meter:  "Dispense One Touch Ultra 2 meter. Sig. Use as directed for blood sugar monitoring. 1 Device 0   • fluticasone (FLONASE) 50 MCG/ACT nasal spray Spray 2 Sprays in nose every day. 16 g 0     No current facility-administered medications for this visit.        Taking daily ASA: Yes  Taking above medications as prescribed: No  Patient Reports side effects of medication. Reports nausea and vomiting with Bydureon so quit taking.     Exercise: sporadic irregular exercise, <half hour walking weekly  Diet: \"healthy\" diet  in general  Patient's body mass index is 32.82 kg/m². Exercise and nutrition counseling were performed at this visit.      Health Maintenance:   Health Maintenance Topics with due status: Overdue       Topic Date Due    IMM HEP B VACCINE 1970    IMM DTaP/Tdap/Td Vaccine 12/21/1987    DIABETES MONOFILAMENT / LE EXAM 04/11/2018         DM:   Last A1c:   Lab Results   Component Value Date/Time    HBA1C 8.5 05/18/2018 07:34 AM      A1c goal: < 7    Glucose monitoring frequency: 1 time per day, states usually in the 140 range.       Hypoglycemic episodes: no     Last Retinal Exam: 7/27/17  Daily Foot Exam: yes   Routine Dental Exams: yes    Lab Results   Component Value Date/Time    MICROALBCALC 8.6 06/24/2014    MALBCRT 4 04/20/2018 09:13 AM    MICROALBUR 0.9 04/20/2018 09:13 AM        ACR Albumin/Creatinine Ratio goal <30       HTN:   Blood pressure goal <140/<80 .   Currently Rx ACE/ARB: Yes    Dyslipidemia:    Lab Results   Component Value Date/Time    CHOLSTRLTOT 135 04/20/2018 09:13 AM    LDL 60 04/20/2018 09:13 AM    HDL 25 (A) 04/20/2018 09:13 AM    TRIGLYCERIDE 249 (H) 04/20/2018 09:13 AM       Lab Results   Component Value Date/Time    SODIUM 143 04/20/2018 09:13 AM    POTASSIUM 4.6 04/20/2018 09:13 AM    CHLORIDE 106 04/20/2018 09:13 AM    CO2 30 04/20/2018 09:13 AM    GLUCOSE 150 (H) 04/20/2018 09:13 AM    BUN 17 04/20/2018 09:13 AM    CREATININE 0.99 04/20/2018 09:13 AM    CREATININE 0.81 " 09/24/2014     Lab Results   Component Value Date/Time    ALKPHOSPHAT 44 04/20/2018 09:13 AM    ASTSGOT 24 04/20/2018 09:13 AM    ALTSGPT 58 (H) 04/20/2018 09:13 AM    TBILIRUBIN 0.5 04/20/2018 09:13 AM        Currently Rx Statin: Yes      He  reports that he has never smoked. He has never used smokeless tobacco.    Objective:     Exam:  Monofilament: done  Monofilament testing with a 10 gram force: sensation intact: intact bilaterally  Visual Inspection: Feet without maceration, ulcers, fissures.  Pedal pulses: intact bilaterally      Plan:     Discussed All medications, side effects and compliance (discussed carefully)  Annual eye examinations at Ophthalmology  Diabetic diet discussed in detail, written exchange diet given  Foot care discussed and Podiatry visits  Glycohemoglobin and other lab monitoring  Home glucose monitoring emphasized.

## 2018-05-18 NOTE — PROGRESS NOTES
Endocrinology Clinic Progress Note    CC: Diabetes    HPI:  Brett Vicente is a 47 y.o. old patient who comes in today for routine follow up.     Type 2 diabetes: He is currently on Basaglar 50 units at bedtime, Farxiga 5 mg daily and metformin 1000 mg twice a day. He discontinued bydureon a few months ago due to nausea/vomiting. Nausea/vomiting resolved after discontinuing bydureon. He checks blood sugars once a day. Fasting blood sugars are mostly in the range of 140-160. Hemoglobin A1c today in the clinic is up at 8.5%, it was 6.9% in January.    Hypertension: Blood pressure is well controlled. He is on a RB.    Hyperlipidemia: LDL and total cholesterol well controlled. He is on Lipitor, tolerating well.    ROS:  Constitutional: Positive for weight gain  Endo: Denies excessive thirst or frequent urination    PMH:  Patient Active Problem List   Diagnosis   • Gout   • Hypogonadism in male   • Diabetes mellitus type 2, uncontrolled, with complications (HCC)   • Essential hypertension   • Hypothyroidism   • Gastroesophageal reflux disease without esophagitis   • Hypercalcemia   • Bilateral shoulder tendinopathy   • Mixed hyperlipidemia   • Vitamin D deficiency   • Moderate single current episode of major depressive disorder (HCC)   • Obesity (BMI 30-39.9)   • Fatty liver     EXAM:  Vital signs: /72   Pulse 71   Ht 1.829 m (6')   Wt 109.8 kg (242 lb)   SpO2 95%   BMI 32.82 kg/m²   General: No apparent distress, cooperative  Eyes: No scleral icterus, no discharge  Neck: Normal on external inspection  Resp: Normal effort, clear to auscultation bilaterally  CVS: Regular rate and rhythm, S1 S2 normal  Musculoskeletal: Normal gait  Extremities: No lower extremity edema  Skin: No rash on visible skin  Psych: Alert and oriented, normal mood and affect    Assessment and Plan:    1. Uncontrolled type 2 diabetes mellitus with hyperglycemia, with long-term current use of insulin (HCC)  · Hemoglobin A1c today in the  clinic is 8.5%  · Goal A1c less than 7%  · He did not tolerate GLP1 agonists due to GI side effects  · Increased Basaglar to 54 units at bedtime and we discussed treat to target recommendations  · Increased Farxiga to 10 mg daily  · Continue metformin 1000 mg twice a day  · Advised to check blood sugars at least 2 times a day, fasting in the morning and 2 hour postprandial blood sugar readings  · Advised to significantly limit carbohydrate intake  · We also discussed about increasing physical activity levels, which at this time he feels is limited due to back pain  - Diabetic Monofilament LE Exam  - POCT Hemoglobin A1C  - Dapagliflozin Propanediol (FARXIGA) 10 MG Tab; Take 10 mg by mouth every day.  Dispense: 90 Tab; Refill: 1    2. Acquired hypothyroidism  · Recent TSH is normal  · Continue levothyroxine 75 µg daily    3. Mixed hyperlipidemia  · Continue Lipitor    4. Essential hypertension  · Blood pressure is well controlled  · Continue ARB    Return in about 3 months (around 8/18/2018).    Thank you for allowing me to participate in the care of this patient.    Lorena Joy M.D.    CC:   Frida Baumann M.D.    This note was created using voice recognition software (Dragon). The accuracy of the dictation is limited by the abilities of the software. I have reviewed the note prior to signing, however some errors in grammar and context are still possible. If you have any questions related to this note please do not hesitate to contact our office.

## 2018-05-23 ENCOUNTER — PHYSICAL THERAPY (OUTPATIENT)
Dept: PHYSICAL THERAPY | Facility: MEDICAL CENTER | Age: 48
End: 2018-05-23
Attending: FAMILY MEDICINE
Payer: COMMERCIAL

## 2018-05-23 DIAGNOSIS — M54.6 PAIN IN THORACIC SPINE: ICD-10-CM

## 2018-05-23 DIAGNOSIS — G89.29 CHRONIC BILATERAL THORACIC BACK PAIN: ICD-10-CM

## 2018-05-23 DIAGNOSIS — M54.6 CHRONIC BILATERAL THORACIC BACK PAIN: ICD-10-CM

## 2018-05-23 PROCEDURE — 97162 PT EVAL MOD COMPLEX 30 MIN: CPT

## 2018-05-23 NOTE — OP THERAPY EVALUATION
Outpatient Physical Therapy  INITIAL EVALUATION    [facility]  [address]  [place] [address]  Phone:  [phone]  Fax:  [phone]    Date of Evaluation: 05/23/2018    Patient: Brett Vicente  YOB: 1970  MRN: [id number]     Referring Provider: Frida Baumann M.D.  [id number] [facility] [address] [address]  [place] [address]   Referring Diagnosis Chronic bilateral thoracic back pain [M54.6, G89.29]     Time Calculation  Start time: 1000  Stop time: 1100 Time Calculation (min): 60 minutes     Physical Therapy Occurrence Codes    Date of onset of impairment:  5/23/15   Date physical therapy care plan established or reviewed:  5/23/18   Date physical therapy treatment started:  5/23/18          Chief Complaint: Back Problem    Visit Diagnoses     ICD-10-CM   1. Chronic bilateral thoracic back pain M54.6    G89.29   2. 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  Past Medical History:   Diagnosis Date   • Diabetes (HCC)    • Fatty liver 4/24/2018   • GERD (gastroesophageal reflux disease)    • Gout    • Hyperlipidemia    • Hypertension    • Thyroid disease      Past Surgical History:   Procedure Laterality Date   • LUMBAR FUSION ANTERIOR  6/10/2009    Performed by LISA MONTERO at SUR[facility]   • LUMBAR FUSION ANTERIOR       Social History   Substance Use Topics   • Smoking status: Never Smoker   • Smokeless tobacco: Never Used   • Alcohol use 0.5 oz/week     1 Shots of liquor per week     Family and Occupational History     Social History   • Marital status:      Spouse name: N/A   • Number of children: N/A   • Years of education: N/A       Objective  Poor posture Fwd head increased thorasic  kyphosis  Increased BMI  5/5 strength in all groups   thorasic mobility is decreased and painfull  On palpation tender T 8-10 R > L  P/A mobility is restricted    Exercises/Treatment  Time-based treatments/modalities:  Manual therapy minutes (CPT 66654): 15 minutes   Posture correction  Stretches and mobilisation to thorasic spine  HEP  KT tape thorasic    Assessment, Response and Plan:   Prognosis: good    Goals:   Short Term Goals:   Return to exercise  Increase sitting and standing painfree > 1hr  Pain free movement   Short term goal time span:  2-4 weeks      Long Term Goals:    Same as STG  Long term goal time span:  2-4 weeks    Plan:   Planned therapy interventions:  Manual Therapy (CPT 97091), Therapeutic Activities (CPT 05358) and Therapeutic Exercise (CPT 46955)  Frequency:  2x week  Duration in weeks:  4  Duration in visits:  8      Functional Limitation G-Codes and Severity Modifiers      Current:     Goal:       Referring provider co-signature:  I have reviewed this plan of care and my co-signature certifies the need for services.  Certification Dates:   From 5/23/18    To 6/23/18    Physician Signature: ________________________________ Date: ______________

## 2018-05-29 ENCOUNTER — OFFICE VISIT (OUTPATIENT)
Dept: PHYSICAL MEDICINE AND REHAB | Facility: MEDICAL CENTER | Age: 48
End: 2018-05-29
Payer: COMMERCIAL

## 2018-05-29 VITALS
DIASTOLIC BLOOD PRESSURE: 76 MMHG | TEMPERATURE: 97.7 F | SYSTOLIC BLOOD PRESSURE: 122 MMHG | HEIGHT: 73 IN | OXYGEN SATURATION: 95 % | HEART RATE: 65 BPM | WEIGHT: 244 LBS | BODY MASS INDEX: 32.34 KG/M2

## 2018-05-29 DIAGNOSIS — M79.10 MYALGIA: ICD-10-CM

## 2018-05-29 DIAGNOSIS — M75.02 ADHESIVE CAPSULITIS OF BOTH SHOULDERS: ICD-10-CM

## 2018-05-29 DIAGNOSIS — R20.0 BILATERAL HAND NUMBNESS: ICD-10-CM

## 2018-05-29 DIAGNOSIS — G89.29 CHRONIC RIGHT-SIDED THORACIC BACK PAIN: ICD-10-CM

## 2018-05-29 DIAGNOSIS — M75.01 ADHESIVE CAPSULITIS OF BOTH SHOULDERS: ICD-10-CM

## 2018-05-29 DIAGNOSIS — M54.6 CHRONIC RIGHT-SIDED THORACIC BACK PAIN: ICD-10-CM

## 2018-05-29 PROCEDURE — 99214 OFFICE O/P EST MOD 30 MIN: CPT | Performed by: PHYSICAL MEDICINE & REHABILITATION

## 2018-05-29 ASSESSMENT — PAIN SCALES - GENERAL: PAINLEVEL: 5=MODERATE PAIN

## 2018-05-29 NOTE — Clinical Note
Dear Frida Baumann M.D. ,   Thank you for the referral of Brett Vicente.  I scheduled the patient for ultrasound-guided trigger point injections for the thoracic paraspinal muscles in the areas of pain.  I will also consider the patient for ultrasound-guided intra-articular glenohumeral joint injections and suprascapular nerve blocks versus an MRI of the cervical spine.  Please see my note for more details    Should you have any questions or concerns please do not hesitate to contact me.  Kali Najera M.D.

## 2018-05-29 NOTE — PROGRESS NOTES
New patient note    Physiatry (physical medicine and  Rehabilitation), interventional spine and sports medicine, Pain medicine    Date of Service: 5/29/2018    Chief complaint: mid right back pain    HISTORY    HPI: Brett Vicente 47 y.o. male who presents today with mid right-sided back pain worse with exercise worse with exertion, worse with throwing a baseball and worse with twisting, typically sharp in quality, positive for muscle spasm, moderate in intensity up to 7/10 in intensity, started in 2007 and got worse starting approximately 2012.     Patient has a history of an L5-S1 fusion in 2010 and has residual right-sided buttocks pain described as numbness and this is not the patient's main pain generator.  This is constant, nonradiating.  The surgery was done by Dr. Ulloa.  The surgery was originally done for lumbar radiculopathy with numbness in the foot which significantly improved after the surgery.    Chronic intermittent bilateral fourth and fifth finger numbness, right worse than left which is been present for many years, patient denies strength changes.  The numbness typically gets worse with neck movements.     Chronic bilateral adhesive capsulitis status post physical therapy with improvement of range in forward flexion abduction however internal rotation is still impaired.  Intermittent bilateral shoulder pain worse with laying on the shoulder, worse with baseball.    The patient was previously on chronic opioid therapy with hydrocodone however he stopped this approximately 3 years ago as there is no significant improvement analgesia and he noted cognitive fogging.    The patient previously enjoyed playing golf however he does not play anymore because of significant thoracic back pain.      Medical records review:  I reviewed the note from the referring provider Frida Baumann M.D. including the note from 4/24/2018.  Patient has fatty liver however this is not felt to be secondary to alcohol  and this is not thought to be contributing to patient's back pain.  Gallbladder disease is been worked up and there are no gallstones however there is biliary sludge and this is not thought to be the etiology of the patient's back pain.  Obesity, counseling done.  Referred to physiatry, referred to physical therapy for the back pain..     Previous treatments:    Physical Therapy: Yes multiple rounds of physical therapy within the last 2 years for greater than 10 weeks per session.     Medications the patient is tried: NSAIDs, Narcotics, tylenol, muscle relaxers and tramadol    Previous interventions: none    Previous surgeries to relieve the above pain: L5-S1 fusion by Dr. Ulloa but no surgery for the thoracic spine or cervical spine.      ROS:   Red Flags ROS:   Fever, Chills, Sweats: Denies  Involuntary Weight Loss: Denies  Bladder Incontinence: Denies  Bowel Incontinence: denies  Saddle Anesthesia: Denies    All other systems reviewed and negative.       PMHx:   Past Medical History:   Diagnosis Date   • Diabetes (HCC)    • Fatty liver 4/24/2018   • GERD (gastroesophageal reflux disease)    • Gout    • Hyperlipidemia    • Hypertension    • Thyroid disease        PSHx:   Past Surgical History:   Procedure Laterality Date   • LUMBAR FUSION ANTERIOR  6/10/2009    Performed by LISA ULLOA at SURGERY [facility] ORS   • LUMBAR FUSION ANTERIOR         Family history   Family History   Problem Relation Age of Onset   • Hyperlipidemia Mother    • Diabetes Father    • Heart Disease Father    • Diabetes Brother    • Heart Disease Paternal Grandfather    • Stroke Paternal Grandfather          Medications:   Current Outpatient Prescriptions   Medication   • Dapagliflozin Propanediol (FARXIGA) 10 MG Tab   • fenofibrate (TRICOR) 145 MG Tab   • lansoprazole (PREVACID) 30 MG CAPSULE DELAYED RELEASE   • atorvastatin (LIPITOR) 40 MG Tab   • levothyroxine (SYNTHROID) 75 MCG Tab   • allopurinol (ZYLOPRIM) 300 MG Tab   •  "irbesartan (AVAPRO) 300 MG Tab   • metformin ER (GLUCOPHAGE XR) 500 MG TABLET SR 24 HR   • carvedilol (COREG) 25 MG Tab   • insulin glargine (BASAGLAR KWIKPEN) 100 UNIT/ML Solution Pen-injector injection   • Insulin Pen Needle (BD PEN NEEDLE CARLY U/F) 32G X 4 MM Misc   • Blood Glucose Monitoring Suppl SUPPLIES Misc   • Lancets Misc   • Blood Glucose Monitoring Suppl Device   • spironolactone (ALDACTONE) 25 MG Tab   • fluticasone (FLONASE) 50 MCG/ACT nasal spray   • hydrochlorothiazide (MICROZIDE) 12.5 MG capsule   • Omega 3 1000 MG CAPS   • aspirin EC (ECOTRIN) 81 MG TBEC   • fluoxetine (PROZAC) 20 MG Cap     No current facility-administered medications for this visit.        Allergies:   Allergies   Allergen Reactions   • Morphine Nausea   • Pcn [Penicillins]        Social Hx:   Social History     Social History   • Marital status:      Spouse name: N/A   • Number of children: N/A   • Years of education: N/A     Occupational History   • Not on file.     Social History Main Topics   • Smoking status: Never Smoker   • Smokeless tobacco: Never Used   • Alcohol use 0.5 oz/week     1 Shots of liquor per week   • Drug use: No   • Sexual activity: Yes     Partners: Female     Other Topics Concern   • Not on file     Social History Narrative   • No narrative on file         EXAMINATION     Physical Exam:   Vitals: Blood pressure 122/76, pulse 65, temperature 36.5 °C (97.7 °F), height 1.854 m (6' 1\"), weight 110.7 kg (244 lb), SpO2 95 %.    Constitutional:   Body Habitus: Body mass index is 32.19 kg/m².  Cooperation: Fully cooperates with exam  Appearance: Well-groomed, well-nourished, not disheveled     Eyes: No scleral icterus to suggest severe liver disease, no proptosis to suggest severe hyperthyroid    ENT -no obvious auditory deficits, no obvious tongue lesions, tongue midline, no facial droop     Skin -no rashes or lesions noted     Respiratory-  breathing comfortable on room air, no audible " wheezing    Cardiovascular- capillary refills less than 2 seconds. No lower extremity edema is noted.     Gastrointestinal - no obvious abdominal masses, No tenderness to palpation in the abdomen    Psychiatric- alert and oriented ×3. Normal affect.     Gait - normal gait, no use of ambulatory device, nonantalgic. the patient can toe walk with ease. the patient can heel walk with ease. the patient can tandem walk with ease. balance is appropriate..     Musculoskeletal -   right and left Shoulder  Inspection: No rashes are noted on the bilateral shoulders. Humerus is not grossly high riding when comparing the right and left sides.  Palpation: No tenderness to palpation over the biceps tendon, acromioclavicular joint, scapular spine, supraspinous, infraspinatus, greater tuberosity, lesser tuberosity, trapezius.  Range of motion: Decreased range of motion bilaterally which is symmetric with forward flexion to 135°, abduction to 80°, internal rotation to the level of L5 bilaterally.  The patient is able to lower his arm slowly with no drop arm.  Special tests:  Neer's, Nielsen, empty can, Sanders's are positive bilaterally.  Speeds: Negative  Jürgensen signs: Negative  Crossarm test: Negative  Resisted internal rotation: Negative  Resisted external rotation: neagtive  Resisted elbow extension: Negative       Cervical spine   Inspection: Head forward posture, shoulder protrusion consistent with upper cross.  No deformities of the skin over the cervical spine. No rashes or lesions.    full  A/P ROM in all directions, with  pain      Spurling’s sign: negative bilaterally    No signs of muscular atrophy in bilateral upper extremities     positive tenderness to palpation of the cervical facets    Thoracic/Lumbar Spine/Sacral Spine/Hips   Inspection: No evidence of atrophy in bilateral lower extremities throughout no rashes on the thoracic or lumbar spine,     ROM: full  AROM with flexion, extension, lateral flexion, and  rotation bilaterally, without pain     Palpation:   No tenderness to palpation in midline at T1-T12 levels.  In the mid lower thoracic region on the right there is tenderness to palpation of the paraspinous muscles.  No tenderness palpation of the bony structures.  No tenderness to palpation in the left and right of the midline L1-L5, NEGATIVE for tenderness to palpation to the para-midline region in the lower lumbar levels.  palpation over SI joint: negative bilaterally    palpation over buttock: negative bilaterally    palpation in hip or over the greater trochanters: negative bilaterally      Lumbar spine Special tests  Neuro tension  Straight leg test negative bilaterally    Slump test negative bilaterally      HIP  FAIR test negative bilaterally    Range of motion in the hips is within normal limits in flexion, extension, abduction, internal rotation, external rotation.        Neuro       Key points for the international standards for neurological classification of spinal cord injury (ISNCSCI) to light touch.     Dermatome R L   C4 2 2   C5 2 2   C6 2 2   C7 2 2   C8 2 2   T1 2 2   T2 2 2   L2 2 2   L3 2 2   L4 2 2   L5 2 2   S1 2 2   S2 2 2           Motor Exam Upper Extremities   ? Myotome R L   Shoulder flexion C5 5 5   Elbow flexion C5 5 5   Wrist extension C6 5 5   Elbow extension C7 5 5   Finger flexion C8 5 5   Finger abduction T1 5 5         Motor Exam Lower Extremities    ? Myotome R L   Hip flexion L2 5 5   Knee extension L3 5 5   Ankle dorsiflexion L4 5 5   Toe extension L5 5 5   Ankle plantarflexion S1 5 5           Tone on Modified Esequiel Scale    R L  R L   Shoulder Adduction Negative  Negative  Hip Flexion Negative  Negative    Elbow Flexion Negative  Negative  Hip Extension Negative  Negative    Elbow Extension Negative  Negative  Hip Adduction Negative  Negative    Wrist Flexion Negative  Negative  Knee Extension Negative  Negative    Finger Flexion Negative  Negative  Knee Flexion Negative   Negative       Dorsiflexion Negative  Negative       Plantar Flexion Negative  Negative      Munguia’s sign negative bilaterally   Babinski sign negative bilaterally   Clonus of the ankle negative bilaterally     Reflexes  ?  R L   Biceps  2+ 2+   Brachioradialis  2+ 2+   Patella  2+ 2+   Achilles   2+ 2+           MEDICAL DECISION MAKING    Medical records review: see under HPI section.     DATA    Labs:   Lab Results   Component Value Date/Time    SODIUM 143 04/20/2018 09:13 AM    POTASSIUM 4.6 04/20/2018 09:13 AM    CHLORIDE 106 04/20/2018 09:13 AM    CO2 30 04/20/2018 09:13 AM    ANION 7.0 04/20/2018 09:13 AM    GLUCOSE 150 (H) 04/20/2018 09:13 AM    BUN 17 04/20/2018 09:13 AM    CREATININE 0.99 04/20/2018 09:13 AM    CREATININE 0.81 09/24/2014    CALCIUM 10.5 04/20/2018 09:13 AM    ASTSGOT 24 04/20/2018 09:13 AM    ALTSGPT 58 (H) 04/20/2018 09:13 AM    TBILIRUBIN 0.5 04/20/2018 09:13 AM    ALBUMIN 4.1 04/20/2018 09:13 AM    TOTPROTEIN 6.7 04/20/2018 09:13 AM    GLOBULIN 2.6 04/20/2018 09:13 AM    AGRATIO 1.6 04/20/2018 09:13 AM   ]    Lab Results   Component Value Date/Time    PROTHROMBTM 11.0 06/08/2009 10:10 AM    INR 0.95 06/08/2009 10:10 AM        Lab Results   Component Value Date/Time    WBC 4.9 06/08/2016 10:50 AM    RBC 4.98 06/08/2016 10:50 AM    HEMOGLOBIN 13.8 (L) 06/08/2016 10:50 AM    HEMATOCRIT 42.1 06/08/2016 10:50 AM    MCV 84.5 06/08/2016 10:50 AM    MCH 27.7 06/08/2016 10:50 AM    MCHC 32.8 (L) 06/08/2016 10:50 AM    MPV 10.9 06/08/2016 10:50 AM    NEUTSPOLYS 53.30 06/08/2016 10:50 AM    LYMPHOCYTES 34.40 06/08/2016 10:50 AM    MONOCYTES 6.80 06/08/2016 10:50 AM    EOSINOPHILS 4.50 06/08/2016 10:50 AM    BASOPHILS 0.60 06/08/2016 10:50 AM    HYPOCHROMIA 1+ 03/17/2014 09:47 AM        Lab Results   Component Value Date/Time    HBA1C 8.5 05/18/2018 07:34 AM        Imaging: I personally reviewed following images, these are my reads  I reviewed the diagnostic x-rays of the bilateral shoulders in  1/11/2017  Mild osteoarthritis the bilateral acromial clavicular joints with joint space narrowing and osteophyte formation.    IMAGING radiology reads. I reviewed the following radiology reads                                          Results for orders placed during the hospital encounter of 10/08/07   MR-LUMBAR SPINE-W/O    Impression IMPRESSION:    ESSENTIALLY NEGATIVE THORACIC SPINE MRI.        CBG/map    ADDENDUM DICTATED BY DR. ALMEIDA, 11/07/2007:  PLEASE NOTE THAT THE MRI THORACIC SPINE STUDY WAS DICTATED AS THE LUMBAR   SPINE STUDY OF SAME DAY AND VICE [place].      CBG/map       Read By RIKKI ALMEIDA MD on Oct  8 2007  3:20PM  : MAP Transcription Date: Oct  9 2007  5:17AM  THIS DOCUMENT HAS BEEN ELECTRONICALLY SIGNED BY: RIKKI ALMEIDA MD on Nov   15 2007  8:26AM            Results for orders placed during the hospital encounter of 10/08/07   MR-THORACIC SPINE-W/O    Impression IMPRESSION:    1. DEGENERATIVE DISK DISEASE AT L5-S1 WITH BROAD-BASED DISK BULGE   EXTENDING TO THE LEFT MIDLINE WITH MILD LEFT-SIDED NEURAL FORAMINAL   NARROWING.  FACET DEGENERATIVE CHANGE IS ALSO NOTED BILATERALLY OF MILD   DEGREE (LEFT GREATER THAN RIGHT).    2. MINIMAL DISK BULGING AT L4-5.    3. OTHERWISE ESSENTIALLY NEGATIVE LUMBAR SPINE MRI.                  Results for orders placed during the hospital encounter of 10/08/07   MR-LUMBAR SPINE-W/O    Impression IMPRESSION:    ESSENTIALLY NEGATIVE THORACIC SPINE MRI.        CBG/map    ADDENDUM DICTATED BY DR. ALMEIDA, 11/07/2007:  PLEASE NOTE THAT THE MRI THORACIC SPINE STUDY WAS DICTATED AS THE LUMBAR   SPINE STUDY OF SAME DAY AND VICE [place].      CBG/map       Read By RIKKI ALMEIDA MD on Oct  8 2007  3:20PM  : MAP Transcription Date: Oct  9 2007  5:17AM  THIS DOCUMENT HAS BEEN ELECTRONICALLY SIGNED BY: RIKKI ALMEIDA MD on Nov   15 2007  8:26AM                                                                         Results for orders placed in visit on  07/02/12   DX-CHEST-2 VIEWS    Impression 1. Unremarkable two view chest.                                            Results for orders placed during the hospital encounter of 07/20/09   DX-LUMBAR SPINE-2 OR 3 VIEWS    Impression IMPRESSION:     STATUS POST ANTERIOR FIXATION AT L5-S1.  OTHERWISE UNREMARKABLE THREE   VIEWS OF THE LUMBAR SPINE.      [facility]/bht     Read By VALENTINE ALTAMIRANO MD on Jul 20 2009 11:09AM  : BHT Transcription Date: Jul 21 2009  8:57AM  THIS DOCUMENT HAS BEEN ELECTRONICALLY SIGNED BY: VALENTINE ALTAMIRANO MD on Jul 22 2009  8:22AM         Results for orders placed during the hospital encounter of 08/31/09   DX-LUMBAR SPINE-4+ VIEWS    Impression IMPRESSION:     POSTOPERATIVE CHANGES AT L5-S1, WHICH APPEAR STABLE.  NO ALTERATION OF   ALIGNMENT WITH FLEXION OR EXTENSION.    [facility]:caf      Read By ALEX CURRAN MD on Aug 31 2009  3:35PM  : CAF Transcription Date: Aug 31 2009  9:11PM  THIS DOCUMENT HAS BEEN ELECTRONICALLY SIGNED BY: ALEX CURRAN MD on Sep  1   2009  8:25AM                           Results for orders placed during the hospital encounter of 01/11/17   DX-SHOULDER 2+ RIGHT    Impression Negative shoulder series.                     Diagnosis   Visit Diagnoses     ICD-10-CM   1. Chronic right-sided thoracic back pain M54.6    G89.29   2. Adhesive capsulitis of both shoulders M75.01    M75.02   3. Bilateral hand numbness 4th and 5th digits.  R20.0   4. Myalgia M79.1   5. Uncontrolled type 2 diabetes mellitus with complication, with long-term current use of insulin ([facility]) E11.8    E11.65    Z79.4           ASSESSMENT AND PLAN :  Brett Kenneth Vicente 47 y.o. male with a chief complaint primarily of chronic right-sided periscapular pain and thoracic paraspinal pain which has not responded to multiple rounds of physical therapy for approximately 10 weeks for each session with the last 2 years and the patient started physical therapy again.  MRI of the thoracic spine is been  negative.  The patient is seen his spine surgeon for evaluation of the lumbar spine which is felt to be intact and appropriate the patient has no physical exam findings of pathology with the lumbar spine.  regards to the right periscapular pain this may be secondary to shoulder pain itself and the patient does have adhesive capsulitis of the bilateral shoulders with multiple exam maneuvers positive of the bilateral shoulders.  Also on the differential is cervical spine pathology given the abnormal posture with upper cross and head forward posture, shoulder protrusion bilaterally it is certainly possible the patient has early arthritis of the cervical spine with a facet radicular pattern to the medial scapula.  Given the tenderness palpation in the thoracic paraspinal region we will start with trigger point injections in the areas of pain.  If the patient does not have significant pain relief with continued physical therapy, stretching, postural changes, trigger point injections and I would consider him for an MRI of the cervical spine for evaluation of cervical spondylosis.  I will also consider the patient for ultrasound-guided intra-articular glenohumeral joint and suprascapular nerve blocks for treatment of adhesive capsulitis if he does not get significant improvement with physical therapy.     Brett was seen today for new patient.    Diagnoses and all orders for this visit:    Chronic right-sided thoracic back pain    Adhesive capsulitis of both shoulders  Comments:  Mostly improved, impaired internal rotation bilaterally.  Failed physical therapy and medication management. I will consider ultrasound guided injection.     Bilateral hand numbness 4th and 5th digits.   Comments:  Not bothering the patient and often, consider MRI cervical spine versus EMG if this becomes an issue.  No weakness.    Myalgia  -     REFERRAL TO PHYSIATRY (PMR)    Uncontrolled type 2 diabetes mellitus with complication, with long-term  current use of insulin (HCC)    Last A1c was 8.5.  The patient should tolerate a steroid injection.  I counseled the patient on temporarily elevated glucose levels following steroid injections.      Outside records requested:  The patient signed outside records request form for his outside records including outside images.      Follow-up: 6/14/2018         Please note that this dictation was created using voice recognition software. I have made every reasonable attempt to correct obvious errors but there may be errors of grammar and content that I may have overlooked prior to finalization of this note.      Kali Najera MD  Physical Medicine and Rehabilitation  Interventional Spine and Sports Physiatry  [facility]               CC Frida Baumann M.D. .

## 2018-05-30 ENCOUNTER — APPOINTMENT (OUTPATIENT)
Dept: PHYSICAL THERAPY | Facility: MEDICAL CENTER | Age: 48
End: 2018-05-30
Attending: FAMILY MEDICINE
Payer: COMMERCIAL

## 2018-06-13 ENCOUNTER — APPOINTMENT (RX ONLY)
Dept: URBAN - METROPOLITAN AREA CLINIC 20 | Facility: CLINIC | Age: 48
Setting detail: DERMATOLOGY
End: 2018-06-13

## 2018-06-13 DIAGNOSIS — L71.8 OTHER ROSACEA: ICD-10-CM

## 2018-06-13 DIAGNOSIS — D18.0 HEMANGIOMA: ICD-10-CM

## 2018-06-13 DIAGNOSIS — L81.4 OTHER MELANIN HYPERPIGMENTATION: ICD-10-CM

## 2018-06-13 DIAGNOSIS — L82.1 OTHER SEBORRHEIC KERATOSIS: ICD-10-CM

## 2018-06-13 DIAGNOSIS — L57.8 OTHER SKIN CHANGES DUE TO CHRONIC EXPOSURE TO NONIONIZING RADIATION: ICD-10-CM

## 2018-06-13 DIAGNOSIS — D22 MELANOCYTIC NEVI: ICD-10-CM

## 2018-06-13 PROBLEM — I10 ESSENTIAL (PRIMARY) HYPERTENSION: Status: ACTIVE | Noted: 2018-06-13

## 2018-06-13 PROBLEM — D18.01 HEMANGIOMA OF SKIN AND SUBCUTANEOUS TISSUE: Status: ACTIVE | Noted: 2018-06-13

## 2018-06-13 PROBLEM — D22.61 MELANOCYTIC NEVI OF RIGHT UPPER LIMB, INCLUDING SHOULDER: Status: ACTIVE | Noted: 2018-06-13

## 2018-06-13 PROBLEM — D48.5 NEOPLASM OF UNCERTAIN BEHAVIOR OF SKIN: Status: ACTIVE | Noted: 2018-06-13

## 2018-06-13 PROBLEM — D22.62 MELANOCYTIC NEVI OF LEFT UPPER LIMB, INCLUDING SHOULDER: Status: ACTIVE | Noted: 2018-06-13

## 2018-06-13 PROBLEM — D22.5 MELANOCYTIC NEVI OF TRUNK: Status: ACTIVE | Noted: 2018-06-13

## 2018-06-13 PROCEDURE — ? COUNSELING

## 2018-06-13 PROCEDURE — 11101: CPT

## 2018-06-13 PROCEDURE — 99203 OFFICE O/P NEW LOW 30 MIN: CPT | Mod: 25

## 2018-06-13 PROCEDURE — ? OBSERVATION AND MEASURE

## 2018-06-13 PROCEDURE — ? BIOPSY BY SHAVE METHOD

## 2018-06-13 PROCEDURE — ? PRESCRIPTION

## 2018-06-13 PROCEDURE — 11100: CPT

## 2018-06-13 RX ORDER — AZELAIC ACID 0.15 G/G
AEROSOL, FOAM TOPICAL QD
Qty: 1 | Refills: 3 | Status: ERX | COMMUNITY
Start: 2018-06-13

## 2018-06-13 RX ADMIN — AZELAIC ACID: 0.15 AEROSOL, FOAM TOPICAL at 00:00

## 2018-06-13 ASSESSMENT — LOCATION DETAILED DESCRIPTION DERM
LOCATION DETAILED: LEFT MEDIAL SUPERIOR CHEST
LOCATION DETAILED: RIGHT PROXIMAL POSTERIOR UPPER ARM
LOCATION DETAILED: RIGHT SUPERIOR MEDIAL UPPER BACK
LOCATION DETAILED: EPIGASTRIC SKIN
LOCATION DETAILED: RIGHT INFERIOR UPPER BACK
LOCATION DETAILED: LEFT DISTAL POSTERIOR UPPER ARM
LOCATION DETAILED: LEFT ANTERIOR PROXIMAL UPPER ARM
LOCATION DETAILED: RIGHT ANTERIOR PROXIMAL UPPER ARM
LOCATION DETAILED: RIGHT POSTERIOR SHOULDER
LOCATION DETAILED: LEFT CENTRAL MALAR CHEEK
LOCATION DETAILED: RIGHT ANTERIOR DISTAL THIGH
LOCATION DETAILED: LEFT PROXIMAL DORSAL FOREARM
LOCATION DETAILED: RIGHT ANTERIOR DISTAL UPPER ARM
LOCATION DETAILED: LEFT PROXIMAL POSTERIOR UPPER ARM
LOCATION DETAILED: RIGHT PROXIMAL DORSAL FOREARM
LOCATION DETAILED: RIGHT INFERIOR CENTRAL MALAR CHEEK
LOCATION DETAILED: RIGHT MID-UPPER BACK
LOCATION DETAILED: LEFT ANTERIOR DISTAL THIGH
LOCATION DETAILED: LEFT INFERIOR CENTRAL MALAR CHEEK
LOCATION DETAILED: PERIUMBILICAL SKIN
LOCATION DETAILED: RIGHT DISTAL POSTERIOR UPPER ARM
LOCATION DETAILED: RIGHT CENTRAL MALAR CHEEK

## 2018-06-13 ASSESSMENT — LOCATION SIMPLE DESCRIPTION DERM
LOCATION SIMPLE: RIGHT FOREARM
LOCATION SIMPLE: LEFT CHEEK
LOCATION SIMPLE: RIGHT UPPER ARM
LOCATION SIMPLE: RIGHT SHOULDER
LOCATION SIMPLE: LEFT UPPER ARM
LOCATION SIMPLE: RIGHT CHEEK
LOCATION SIMPLE: CHEST
LOCATION SIMPLE: RIGHT UPPER BACK
LOCATION SIMPLE: LEFT FOREARM
LOCATION SIMPLE: ABDOMEN
LOCATION SIMPLE: LEFT THIGH
LOCATION SIMPLE: RIGHT THIGH

## 2018-06-13 ASSESSMENT — LOCATION ZONE DERM
LOCATION ZONE: ARM
LOCATION ZONE: LEG
LOCATION ZONE: FACE
LOCATION ZONE: TRUNK

## 2018-06-13 NOTE — PROCEDURE: OBSERVATION
Size Of Lesion: 3mm x 6mm
Detail Level: Detailed
Body Location Override (Optional - Billing Will Still Be Based On Selected Body Map Location If Applicable): Left Mid Abdomen
Size Of Lesion: 3mm x 8mm
Size Of Lesion: 4mm x 6mm
Body Location Override (Optional - Billing Will Still Be Based On Selected Body Map Location If Applicable): Mid Superior abdomen
Body Location Override (Optional - Billing Will Still Be Based On Selected Body Map Location If Applicable): Mid inferior abdomen
Size Of Lesion: 6mm x 1cm

## 2018-06-13 NOTE — HPI: MOLE CHECK
What Is The Reason For Today's Visit?: Mole Check
Additional History: Oddly shaped moles, no drastic changes noted per patient.

## 2018-06-13 NOTE — PROCEDURE: BIOPSY BY SHAVE METHOD
Anesthesia Type: 1% lidocaine with 1:100,000 epinephrine and a 1:6 solution of 8.4% sodium bicarbonate
Billing Type: Third-Party Bill
Was A Bandage Applied: Yes
Post-Care Instructions: I reviewed with the patient in detail post-care instructions. Patient is to keep the biopsy site dry overnight, and then apply bacitracin twice daily until healed. Patient may apply hydrogen peroxide soaks to remove any crusting.
Notification Instructions: Patient will be notified of biopsy results. However, patient instructed to call the office if not contacted within 2 weeks.
Lab Facility: 
Bill [id number] For Specimen Handling/Conveyance To Laboratory?: no
Detail Level: Detailed
Silver Nitrate Text: The wound bed was treated with silver nitrate after the biopsy was performed.
Dressing: Band-Aid
Hemostasis: Drysol and Electrocautery
Wound Care: Aquaphor
Lab: [id number]
Consent: Written consent was obtained and risks were reviewed including but not limited to scarring, infection, bleeding, scabbing, incomplete removal, nerve damage and allergy to anesthesia.
Electrodesiccation Text: The wound bed was treated with electrodesiccation after the biopsy was performed.
X Size Of Lesion In Cm: 0.6
Biopsy Type: H and E
Electrodesiccation And Curettage Text: The wound bed was treated with electrodesiccation and curettage after the biopsy was performed.
Cryotherapy Text: The wound bed was treated with cryotherapy after the biopsy was performed.
Additional Anesthesia Volume In Cc (Will Not Render If 0): 0
Curettage Text: The wound bed was treated with curettage after the biopsy was performed.
Anesthesia Volume In Cc: 0.2
Biopsy Method: Personna blade
Type Of Destruction Used: Curettage
X Size Of Lesion In Cm: 0.3

## 2018-06-14 ENCOUNTER — OFFICE VISIT (OUTPATIENT)
Dept: PHYSICAL MEDICINE AND REHAB | Facility: MEDICAL CENTER | Age: 48
End: 2018-06-14
Payer: COMMERCIAL

## 2018-06-14 VITALS
DIASTOLIC BLOOD PRESSURE: 66 MMHG | OXYGEN SATURATION: 95 % | TEMPERATURE: 97.9 F | HEART RATE: 70 BPM | SYSTOLIC BLOOD PRESSURE: 106 MMHG | BODY MASS INDEX: 32.2 KG/M2 | HEIGHT: 73 IN | WEIGHT: 243 LBS

## 2018-06-14 DIAGNOSIS — M79.10 MYALGIA: ICD-10-CM

## 2018-06-14 DIAGNOSIS — M75.42 SHOULDER IMPINGEMENT SYNDROME, LEFT: ICD-10-CM

## 2018-06-14 DIAGNOSIS — M75.41 SHOULDER IMPINGEMENT SYNDROME, RIGHT: ICD-10-CM

## 2018-06-14 DIAGNOSIS — M54.6 CHRONIC RIGHT-SIDED THORACIC BACK PAIN: ICD-10-CM

## 2018-06-14 DIAGNOSIS — M75.02 ADHESIVE CAPSULITIS OF BOTH SHOULDERS: ICD-10-CM

## 2018-06-14 DIAGNOSIS — M75.01 ADHESIVE CAPSULITIS OF BOTH SHOULDERS: ICD-10-CM

## 2018-06-14 DIAGNOSIS — R20.0 BILATERAL HAND NUMBNESS: ICD-10-CM

## 2018-06-14 DIAGNOSIS — G89.29 CHRONIC RIGHT-SIDED THORACIC BACK PAIN: ICD-10-CM

## 2018-06-14 RX ORDER — DEXAMETHASONE SODIUM PHOSPHATE 4 MG/ML
4 INJECTION, SOLUTION INTRA-ARTICULAR; INTRALESIONAL; INTRAMUSCULAR; INTRAVENOUS; SOFT TISSUE ONCE
Status: COMPLETED | OUTPATIENT
Start: 2018-06-14 | End: 2018-06-14

## 2018-06-14 RX ADMIN — DEXAMETHASONE SODIUM PHOSPHATE 4 MG: 4 INJECTION, SOLUTION INTRA-ARTICULAR; INTRALESIONAL; INTRAMUSCULAR; INTRAVENOUS; SOFT TISSUE at 12:07

## 2018-06-14 ASSESSMENT — PAIN SCALES - GENERAL: PAINLEVEL: 4=SLIGHT-MODERATE PAIN

## 2018-06-14 NOTE — Clinical Note
41068-84  Trigger point injection, 3 or more muscles 96023 Ultrasonic guidance for needle placement: 70022

## 2018-06-14 NOTE — PROCEDURES
Date of Service: 1/24/2018    Physician/s: Kali Najera MD    Pre-operative Diagnosis: Myalgia (M79.1)    Post-operative Diagnosis: Myalgia (M79.1)    Procedure: T6 paraspinous muscle, T7 paraspinous muscle, T8 paraspinous muscle trigger point injections    Description of procedure:    The risks, benefits, and alternatives of the procedure were reviewed and discussed with the patient.  Written informed consent was freely obtained. A pre-procedural time-out was conducted by the physician verifying patient’s identity, procedure to be performed, procedure site and side, and allergy verification. Appropriate equipment was determined to be in place for the procedure.     In the office suite exam room the patient was placed in a prone position and the skin areas for injection over the T6 paraspinous muscle, T7 paraspinous muscle, T8 paraspinous muscle  was marked. A solution was prepared with 1 mL of 4 mg/mL of dexamethasone, 3 mL of 1% lidocaine and 2 mL of sterile saline. The areas of pain was then prepped and draped in the usual sterile fashion. Ultrasound was confirmed to view the adjacent structures for blood vessels and nerves and to confirm the needle path was not within the structures nor was it too deep to be within the lung. A 27g needle was placed into each of the markings at the areas above under ultrasound guidance with an in plane approach.. After negative aspiration, approximately a 1.5 mL of the above solution was injected. The needle was removed intact after each trigger point injection, and the patient's back was covered with a 4x4 gauze, the area was cleansed with sterile normal saline, and a dressing was applied. There were no complications noted. The images were uploaded to our media tab for permanent storage.    The patient had 100% pain relief in his thoracic spine in the areas of pain immediately post procedure.    Kali Najera MD  Physical Medicine and Rehabilitation  Interventional Spine and  Sports Physiatry  [facility]

## 2018-06-14 NOTE — PROGRESS NOTES
Follow up patient note  Interventional spine and sports physiatry, Physical medicine rehabilitation      Chief complaint: Bilateral shoulder pain      HISTORY    Please see new patient note dated 5/29/2018  by Dr Najera,  for more details.     HPI  Patient identification: Brett Vicente 47 y.o. male with chronic bilateral adhesive capsulitis and thoracic back pain    Interval history:  see separate procedure note for Myalgia for thoracic back pain    In regards to the bilateral shoulders these are symmetric and there is been no significant improvement.  The patient continues to have significant loss of internal rotation in the bilateral shoulders with pain with all movements.   these are aching in quality, moderate in intensity and intermittent.  Chronic.  The patient is a  and does a significant amount of throwing.     Hand numbness stable bilaterally    Diabetes type 2 patient reports stable    ROS Red Flags :   Fever, Chills, Sweats: Denies  Involuntary Weight Loss: Denies  Bowel/Bladder Incontinence: Denies  Saddle Anesthesia: Denies        PMHx:   Past Medical History:   Diagnosis Date   • Diabetes (HCC)    • Fatty liver 4/24/2018   • GERD (gastroesophageal reflux disease)    • Gout    • Hyperlipidemia    • Hypertension    • Thyroid disease        PSHx:   Past Surgical History:   Procedure Laterality Date   • LUMBAR FUSION ANTERIOR  6/10/2009    Performed by LISA MONTERO at SURGERY [facility] ORS   • LUMBAR FUSION ANTERIOR         Family history   Denies neuromuscular disease  Family History   Problem Relation Age of Onset   • Hyperlipidemia Mother    • Diabetes Father    • Heart Disease Father    • Diabetes Brother    • Heart Disease Paternal Grandfather    • Stroke Paternal Grandfather          Medications:   Current Outpatient Prescriptions   Medication   • Dapagliflozin Propanediol (FARXIGA) 10 MG Tab   • fenofibrate (TRICOR) 145 MG Tab   • lansoprazole (PREVACID) 30 MG CAPSULE DELAYED  "RELEASE   • atorvastatin (LIPITOR) 40 MG Tab   • levothyroxine (SYNTHROID) 75 MCG Tab   • allopurinol (ZYLOPRIM) 300 MG Tab   • irbesartan (AVAPRO) 300 MG Tab   • metformin ER (GLUCOPHAGE XR) 500 MG TABLET SR 24 HR   • insulin glargine (BASAGLAR KWIKPEN) 100 UNIT/ML Solution Pen-injector injection   • fluoxetine (PROZAC) 20 MG Cap   • Insulin Pen Needle (BD PEN NEEDLE CARLY U/F) 32G X 4 MM Misc   • Blood Glucose Monitoring Suppl SUPPLIES Misc   • Lancets Misc   • Blood Glucose Monitoring Suppl Device   • spironolactone (ALDACTONE) 25 MG Tab   • fluticasone (FLONASE) 50 MCG/ACT nasal spray   • hydrochlorothiazide (MICROZIDE) 12.5 MG capsule   • Omega 3 1000 MG CAPS   • aspirin EC (ECOTRIN) 81 MG TBEC   • carvedilol (COREG) 25 MG Tab     No current facility-administered medications for this visit.        Allergies:   Allergies   Allergen Reactions   • Morphine Nausea   • Pcn [Penicillins]        Social Hx:   Social History     Social History   • Marital status:      Spouse name: N/A   • Number of children: N/A   • Years of education: N/A     Occupational History   • Not on file.     Social History Main Topics   • Smoking status: Never Smoker   • Smokeless tobacco: Never Used   • Alcohol use 0.5 oz/week     1 Shots of liquor per week   • Drug use: No   • Sexual activity: Yes     Partners: Female     Other Topics Concern   • Not on file     Social History Narrative   • No narrative on file         EXAMINATION     Physical Exam:   Vitals: Blood pressure 106/66, pulse 70, temperature 36.6 °C (97.9 °F), height 1.854 m (6' 1\"), weight 110.2 kg (243 lb), SpO2 95 %.    Constitutional:   Body Habitus: Body mass index is 32.06 kg/m².  Cooperation: Fully cooperates with exam  Appearance: Well-groomed no disheveled     Respiratory-  breathing comfortable on room air, no audible wheezing  Cardiovascular- capillary refills less than 2 seconds. No lower extremity edema is noted.   Psychiatric- alert and oriented ×3. Normal " affect.   Bilateral shoulders: Internal rotation is limited bilaterally with internal rotation to the level of the L4 vertebral body and this is symmetric.  Nielsen and Neer signs are positive bilaterally.  Range of motion is within normal limits in forward flexion and abduction.          MEDICAL DECISION MAKING    DATA    Labs:   Lab Results   Component Value Date/Time    SODIUM 143 04/20/2018 09:13 AM    POTASSIUM 4.6 04/20/2018 09:13 AM    CHLORIDE 106 04/20/2018 09:13 AM    CO2 30 04/20/2018 09:13 AM    GLUCOSE 150 (H) 04/20/2018 09:13 AM    BUN 17 04/20/2018 09:13 AM    CREATININE 0.99 04/20/2018 09:13 AM    CREATININE 0.81 09/24/2014        Lab Results   Component Value Date/Time    PROTHROMBTM 11.0 06/08/2009 10:10 AM    INR 0.95 06/08/2009 10:10 AM        Lab Results   Component Value Date/Time    WBC 4.9 06/08/2016 10:50 AM    RBC 4.98 06/08/2016 10:50 AM    HEMOGLOBIN 13.8 (L) 06/08/2016 10:50 AM    HEMATOCRIT 42.1 06/08/2016 10:50 AM    MCV 84.5 06/08/2016 10:50 AM    MCH 27.7 06/08/2016 10:50 AM    MCHC 32.8 (L) 06/08/2016 10:50 AM    MPV 10.9 06/08/2016 10:50 AM    NEUTSPOLYS 53.30 06/08/2016 10:50 AM    LYMPHOCYTES 34.40 06/08/2016 10:50 AM    MONOCYTES 6.80 06/08/2016 10:50 AM    EOSINOPHILS 4.50 06/08/2016 10:50 AM    BASOPHILS 0.60 06/08/2016 10:50 AM    HYPOCHROMIA 1+ 03/17/2014 09:47 AM        Lab Results   Component Value Date/Time    HBA1C 8.5 05/18/2018 07:34 AM                          DIAGNOSIS   Visit Diagnoses     ICD-10-CM   1. Adhesive capsulitis of both shoulders M75.01    M75.02   2. Chronic right-sided thoracic back pain M54.6    G89.29   3. Myalgia M79.1   4. Bilateral hand numbness 4th and 5th digits.  R20.0   5. Uncontrolled type 2 diabetes mellitus with complication, with long-term current use of insulin (HCC) E11.8    E11.65    Z79.4   6. Shoulder impingement syndrome, left M75.42   7. Shoulder impingement syndrome, right M75.41         ASSESSMENT and PLAN:     Brett Vicente 47  y.o. male who has failed medical management and physical therapy.  See separate procedure note for thoracic trigger point injections.  In regard to the bilateral shoulders it is certainly possible that a combination of subacromial impingement syndrome adhesive capsulitis of the bilateral shoulders preventing internal rotation is causing biomechanical changes for the patient's thoracic back pain.  We discussed potential suprascapular nerve block and intra-articular injection versus subdeltoid bursa injection if the patient has no long-lasting pain relief following the trigger point injections today.  We discussed forward while walks, lateral wall walks and sleeper stretch  as well as rotator cuff exercises.    Brett was seen today for follow-up.    Diagnoses and all orders for this visit:    Adhesive capsulitis of both shoulders    Chronic right-sided thoracic back pain    Myalgia    Bilateral hand numbness 4th and 5th digits.     Uncontrolled type 2 diabetes mellitus with complication, with long-term current use of insulin (HCC)    Shoulder impingement syndrome, left    Shoulder impingement syndrome, right          Follow up: 7/12/2018     Thank you for allowing me to participate in the care of this patient. If you have any questions please not hesitate to contact me.        Please note that this dictation was created using voice recognition software. I have made every reasonable attempt to correct obvious errors but there may be errors of grammar and content that I may have overlooked prior to finalization of this note.      Kali Najera MD  Interventional Spine and Sports Physiatry  Physical Medicine and Rehabilitation  [facility]  6/14/2018 12:08 PM

## 2018-07-12 ENCOUNTER — OFFICE VISIT (OUTPATIENT)
Dept: PHYSICAL MEDICINE AND REHAB | Facility: MEDICAL CENTER | Age: 48
End: 2018-07-12
Payer: COMMERCIAL

## 2018-07-12 VITALS
DIASTOLIC BLOOD PRESSURE: 70 MMHG | WEIGHT: 241 LBS | OXYGEN SATURATION: 91 % | BODY MASS INDEX: 31.94 KG/M2 | HEIGHT: 73 IN | SYSTOLIC BLOOD PRESSURE: 112 MMHG | TEMPERATURE: 97.5 F | HEART RATE: 68 BPM

## 2018-07-12 DIAGNOSIS — G89.29 CHRONIC RIGHT-SIDED THORACIC BACK PAIN: ICD-10-CM

## 2018-07-12 DIAGNOSIS — M54.6 CHRONIC RIGHT-SIDED THORACIC BACK PAIN: ICD-10-CM

## 2018-07-12 DIAGNOSIS — M75.02 ADHESIVE CAPSULITIS OF BOTH SHOULDERS: ICD-10-CM

## 2018-07-12 DIAGNOSIS — M75.41 SHOULDER IMPINGEMENT SYNDROME, RIGHT: ICD-10-CM

## 2018-07-12 DIAGNOSIS — M75.42 SHOULDER IMPINGEMENT SYNDROME, LEFT: ICD-10-CM

## 2018-07-12 DIAGNOSIS — M75.01 ADHESIVE CAPSULITIS OF BOTH SHOULDERS: ICD-10-CM

## 2018-07-12 DIAGNOSIS — R20.0 BILATERAL HAND NUMBNESS: ICD-10-CM

## 2018-07-12 PROCEDURE — 99214 OFFICE O/P EST MOD 30 MIN: CPT | Performed by: PHYSICAL MEDICINE & REHABILITATION

## 2018-07-12 ASSESSMENT — PAIN SCALES - GENERAL: PAINLEVEL: 4=SLIGHT-MODERATE PAIN

## 2018-07-12 NOTE — PROGRESS NOTES
"Follow up patient note  Interventional spine and sports physiatry, Physical medicine rehabilitation      Chief complaint: Bilateral shoulder pain      HISTORY    Please see new patient note dated 5/29/2018  by Dr Najera,  for more details.     HPI  Patient identification: Brett Vicente 47 y.o. male with chronic bilateral adhesive capsulitis and thoracic back pain    Interval history:  Patient describes back pain that has been progressively worse for 20 years. Hx of L5-S1 lumbar fusion 2010. Mid back and shoulder pain have become specifically worse in the last 3 years. Pain is characterized as \"achy\", aggravated by sitting in the same place for too long (back), reaching behind back or external rotation (shoulders), also worsening pain in the shoulders and mid back with reaching across and internal rotation and mildly alleviated by OTC Excedrin. Patient also describes pain in the low back that intermittently radiates into his hips. The pain in his mid back is worse around mid afternoon.  Subjective rating of the pain is 7/10 in the back and 4/10 in the shoulders. Patient's primary concern is his mid back pain. Patient has continued to perform stretches, exercises and postural tips he learned from physical therapy 3 months ago. Reports that they have been effective. Patient is able to walk 2 miles and does so 2 times/week. Patient has also found temporary relief from massage which he gets 2 time/year.  The patient had only temporary relief for approximately 2-3 days following ultrasound-guided trigger point injections.    Hand numbness stable bilaterally    Diabetes type 2 patient reports stable    ROS Red Flags :   Fever, Chills, Sweats: Denies  Involuntary Weight Loss: Denies  Bowel/Bladder Incontinence: Denies  Saddle Anesthesia: Denies        PMHx:   Past Medical History:   Diagnosis Date   • Diabetes (HCC)    • Fatty liver 4/24/2018   • GERD (gastroesophageal reflux disease)    • Gout    • Hyperlipidemia    • " Hypertension    • Thyroid disease        PSHx:   Past Surgical History:   Procedure Laterality Date   • LUMBAR FUSION ANTERIOR  6/10/2009    Performed by LISA MONTERO at SURGERY [facility] ORS   • LUMBAR FUSION ANTERIOR         Family history   Denies neuromuscular disease  Family History   Problem Relation Age of Onset   • Hyperlipidemia Mother    • Diabetes Father    • Heart Disease Father    • Diabetes Brother    • Heart Disease Paternal Grandfather    • Stroke Paternal Grandfather          Medications:   Current Outpatient Prescriptions   Medication   • Dapagliflozin Propanediol (FARXIGA) 10 MG Tab   • fenofibrate (TRICOR) 145 MG Tab   • lansoprazole (PREVACID) 30 MG CAPSULE DELAYED RELEASE   • atorvastatin (LIPITOR) 40 MG Tab   • levothyroxine (SYNTHROID) 75 MCG Tab   • allopurinol (ZYLOPRIM) 300 MG Tab   • irbesartan (AVAPRO) 300 MG Tab   • metformin ER (GLUCOPHAGE XR) 500 MG TABLET SR 24 HR   • carvedilol (COREG) 25 MG Tab   • insulin glargine (BASAGLAR KWIKPEN) 100 UNIT/ML Solution Pen-injector injection   • fluoxetine (PROZAC) 20 MG Cap   • Insulin Pen Needle (BD PEN NEEDLE CARLY U/F) 32G X 4 MM Misc   • Blood Glucose Monitoring Suppl SUPPLIES Misc   • Lancets Misc   • Blood Glucose Monitoring Suppl Device   • spironolactone (ALDACTONE) 25 MG Tab   • fluticasone (FLONASE) 50 MCG/ACT nasal spray   • hydrochlorothiazide (MICROZIDE) 12.5 MG capsule   • Omega 3 1000 MG CAPS   • aspirin EC (ECOTRIN) 81 MG TBEC     No current facility-administered medications for this visit.        Allergies:   Allergies   Allergen Reactions   • Morphine Nausea   • Pcn [Penicillins]        Social Hx:   Social History     Social History   • Marital status:      Spouse name: N/A   • Number of children: N/A   • Years of education: N/A     Occupational History   • Not on file.     Social History Main Topics   • Smoking status: Never Smoker   • Smokeless tobacco: Never Used   • Alcohol use 0.5 oz/week     1 Shots of liquor  "per week   • Drug use: No   • Sexual activity: Yes     Partners: Female     Other Topics Concern   •  Service No   • Blood Transfusions No   • Caffeine Concern No   • Occupational Exposure No   • Hobby Hazards No   • Sleep Concern No   • Stress Concern No   • Weight Concern Yes   • Special Diet No   • Back Care No   • Exercise Yes   • Bike Helmet Yes   • Seat Belt Yes   • Self-Exams No     Social History Narrative   • No narrative on file         EXAMINATION     Physical Exam:   Vitals: Blood pressure 112/70, pulse 68, temperature 36.4 °C (97.5 °F), height 1.854 m (6' 1\"), weight 109.3 kg (241 lb), SpO2 91 %.    Constitutional:   Body Habitus: Body mass index is 31.8 kg/m².  Cooperation: Fully cooperates with exam  Appearance: Well-groomed no disheveled head forward posture,.  Shoulder protrusion bilaterally.    Respiratory-  breathing comfortable on room air, no audible wheezing  Cardiovascular- capillary refills less than 2 seconds. No lower extremity edema is noted.   Psychiatric- alert and oriented ×3. Normal affect.   Bilateral shoulders: Internal rotation is limited bilaterally with internal rotation to the level of the L4 vertebral body and this is symmetric.  Nielsen and Neer signs are positive bilaterally.  Internal rotation is impaired bilaterally and symmetrical.  Range of motion is within normal limits in forward flexion and abduction.  Empty can, O'Briens, crossarm are negative.  No tenderness to palpation throughout the shoulder and thoracic spine.    Patient has 5/5 strength in all 4 extremities, sensation intact in all 4 extremities, reflexes 2+ bilaterally biceps, triceps, brachial, patellar, and Achilles. Patient experiences pain when reaching behind the back bilaterally, lumbar extension, and lumbar rotation. - Munguia, - Clonus, - Babinski. + Nielsen, + Speed's. Normal gait, able to walk on heels and toes, normal tandem gait, and can stand on one leg w/o listing to one side.       MEDICAL " DECISION MAKING    DATA    Labs:   Lab Results   Component Value Date/Time    SODIUM 143 04/20/2018 09:13 AM    POTASSIUM 4.6 04/20/2018 09:13 AM    CHLORIDE 106 04/20/2018 09:13 AM    CO2 30 04/20/2018 09:13 AM    GLUCOSE 150 (H) 04/20/2018 09:13 AM    BUN 17 04/20/2018 09:13 AM    CREATININE 0.99 04/20/2018 09:13 AM    CREATININE 0.81 09/24/2014        Lab Results   Component Value Date/Time    PROTHROMBTM 11.0 06/08/2009 10:10 AM    INR 0.95 06/08/2009 10:10 AM        Lab Results   Component Value Date/Time    WBC 4.9 06/08/2016 10:50 AM    RBC 4.98 06/08/2016 10:50 AM    HEMOGLOBIN 13.8 (L) 06/08/2016 10:50 AM    HEMATOCRIT 42.1 06/08/2016 10:50 AM    MCV 84.5 06/08/2016 10:50 AM    MCH 27.7 06/08/2016 10:50 AM    MCHC 32.8 (L) 06/08/2016 10:50 AM    MPV 10.9 06/08/2016 10:50 AM    NEUTSPOLYS 53.30 06/08/2016 10:50 AM    LYMPHOCYTES 34.40 06/08/2016 10:50 AM    MONOCYTES 6.80 06/08/2016 10:50 AM    EOSINOPHILS 4.50 06/08/2016 10:50 AM    BASOPHILS 0.60 06/08/2016 10:50 AM    HYPOCHROMIA 1+ 03/17/2014 09:47 AM        Lab Results   Component Value Date/Time    HBA1C 8.5 05/18/2018 07:34 AM                          DIAGNOSIS   Visit Diagnoses     ICD-10-CM   1. Adhesive capsulitis of both shoulders M75.01    M75.02   2. Shoulder impingement syndrome, left M75.42   3. Shoulder impingement syndrome, right M75.41   4. Bilateral hand numbness 4th and 5th digits.  R20.0   5. Chronic right-sided thoracic back pain M54.6    G89.29   6. Uncontrolled type 2 diabetes mellitus with complication, with long-term current use of insulin (HCC) E11.8    E11.65    Z79.4         ASSESSMENT and PLAN:     Brett Vicente 47 y.o. male who has failed medical management and physical therapy.  he had only temporary relief from ultrasound-guided trigger point injections in the thoracic spine.  It is certainly possible that the thoracic pain is radiating either from the shoulder from a combination of impingement syndrome and adhesive  capsulitis versus radicular pattern from the cervical spine.  I have ordered an ultrasound-guided right subdeltoid bursa injection as well as an x-ray of the cervical spine.    The risks benefits and alternatives to this procedure were discussed and the patient wishes to proceed with the procedure. Risks include but are not limited to damage to surrounding structures, infection, bleeding, worsening of pain which can be permanent, weakness which can be permanent. Benefits include pain relief, improved function. Alternatives includes not doing the procedure.        Hand numbness candace West was seen today for follow-up.    Diagnoses and all orders for this visit:    Adhesive capsulitis of both shoulders    Shoulder impingement syndrome, left    Shoulder impingement syndrome, right  -     REFERRAL TO PHYSIATRY (PMR)  -     DX-CERVICAL SPINE-2 OR 3 VIEWS; Future    Bilateral hand numbness 4th and 5th digits.     Chronic right-sided thoracic back pain  -     DX-CERVICAL SPINE-2 OR 3 VIEWS; Future    Uncontrolled type 2 diabetes mellitus with complication, with long-term current use of insulin (HCC)          Follow up: 7/24/2018     Thank you for allowing me to participate in the care of this patient. If you have any questions please not hesitate to contact me.        Please note that this dictation was created using voice recognition software. I have made every reasonable attempt to correct obvious errors but there may be errors of grammar and content that I may have overlooked prior to finalization of this note.      Kali Najera MD  Interventional Spine and Sports Physiatry  Physical Medicine and Rehabilitation  [facility]  7/12/2018 10:04 AM

## 2018-07-18 ENCOUNTER — TELEPHONE (OUTPATIENT)
Dept: PHYSICAL THERAPY | Facility: MEDICAL CENTER | Age: 48
End: 2018-07-18

## 2018-07-18 NOTE — OP THERAPY DISCHARGE SUMMARY
Outpatient Physical Therapy  DISCHARGE SUMMARY NOTE      [facility] Outpatient Physical Therapy  [address]  [place] [address]  Phone:  [phone]  Fax:  [phone]    Date of Visit: 07/18/2018    Patient: Brett Vicente  YOB: 1970  MRN: [id number]     Referring Provider: No referring provider defined for this encounter.   Referring Diagnosis No admission diagnoses are documented for this encounter.     Physical Therapy Occurrence Codes    Date of onset of impairment:  5/23/15   Date physical therapy care plan established or reviewed:  5/23/18   Date physical therapy treatment started:  5/23/18          Functional Limitation G-Codes and Severity Modifiers      Goal:     Discharge:         Your patient is being discharged from Physical Therapy with the following comments:   · Patient has failed to schedule or reschedule follow-up visits    Comments:  Pt had initial evaluation but made no follow up appointments    Limitations Remaining:      Recommendations:      Hudson Roe, PT    Date: 7/18/2018

## 2018-07-24 ENCOUNTER — APPOINTMENT (OUTPATIENT)
Dept: PHYSICAL MEDICINE AND REHAB | Facility: MEDICAL CENTER | Age: 48
End: 2018-07-24
Payer: COMMERCIAL

## 2018-08-20 ENCOUNTER — APPOINTMENT (OUTPATIENT)
Dept: ENDOCRINOLOGY | Facility: MEDICAL CENTER | Age: 48
End: 2018-08-20
Payer: COMMERCIAL

## 2018-10-02 ENCOUNTER — OFFICE VISIT (OUTPATIENT)
Dept: ENDOCRINOLOGY | Facility: MEDICAL CENTER | Age: 48
End: 2018-10-02
Payer: COMMERCIAL

## 2018-10-02 VITALS
DIASTOLIC BLOOD PRESSURE: 80 MMHG | HEIGHT: 73 IN | SYSTOLIC BLOOD PRESSURE: 122 MMHG | BODY MASS INDEX: 32.87 KG/M2 | RESPIRATION RATE: 16 BRPM | OXYGEN SATURATION: 95 % | HEART RATE: 74 BPM | WEIGHT: 248 LBS

## 2018-10-02 DIAGNOSIS — E03.9 ACQUIRED HYPOTHYROIDISM: ICD-10-CM

## 2018-10-02 DIAGNOSIS — I10 ESSENTIAL HYPERTENSION: ICD-10-CM

## 2018-10-02 DIAGNOSIS — E78.2 MIXED HYPERLIPIDEMIA: ICD-10-CM

## 2018-10-02 DIAGNOSIS — Z79.4 TYPE 2 DIABETES MELLITUS WITH HYPERGLYCEMIA, WITH LONG-TERM CURRENT USE OF INSULIN (HCC): ICD-10-CM

## 2018-10-02 DIAGNOSIS — E11.65 TYPE 2 DIABETES MELLITUS WITH HYPERGLYCEMIA, WITH LONG-TERM CURRENT USE OF INSULIN (HCC): ICD-10-CM

## 2018-10-02 PROCEDURE — 99214 OFFICE O/P EST MOD 30 MIN: CPT | Performed by: INTERNAL MEDICINE

## 2018-10-02 RX ORDER — DAPAGLIFLOZIN 10 MG/1
10 TABLET, FILM COATED ORAL DAILY
Qty: 90 TAB | Refills: 2 | Status: SHIPPED | OUTPATIENT
Start: 2018-10-02 | End: 2019-06-26 | Stop reason: SDUPTHER

## 2018-10-02 RX ORDER — LANSOPRAZOLE 30 MG/1
30 CAPSULE, DELAYED RELEASE ORAL 2 TIMES DAILY
Qty: 180 CAP | Refills: 1 | Status: SHIPPED | OUTPATIENT
Start: 2018-10-02 | End: 2019-03-07 | Stop reason: SDUPTHER

## 2018-10-02 RX ORDER — LEVOTHYROXINE SODIUM 0.07 MG/1
75 TABLET ORAL DAILY
Qty: 90 TAB | Refills: 2 | Status: SHIPPED | OUTPATIENT
Start: 2018-10-02 | End: 2019-02-06 | Stop reason: SDUPTHER

## 2018-10-02 RX ORDER — ATORVASTATIN CALCIUM 40 MG/1
40 TABLET, FILM COATED ORAL DAILY
Qty: 90 TAB | Refills: 2 | Status: SHIPPED | OUTPATIENT
Start: 2018-10-02 | End: 2019-06-26 | Stop reason: SDUPTHER

## 2018-10-02 RX ORDER — SPIRONOLACTONE 25 MG/1
12.5 TABLET ORAL
Qty: 45 TAB | Refills: 2 | Status: SHIPPED | OUTPATIENT
Start: 2018-10-02 | End: 2019-06-26 | Stop reason: SDUPTHER

## 2018-10-02 RX ORDER — CARVEDILOL 25 MG/1
12.5 TABLET ORAL 2 TIMES DAILY
Qty: 90 TAB | Refills: 2 | Status: SHIPPED | OUTPATIENT
Start: 2018-10-02 | End: 2019-06-26 | Stop reason: SDUPTHER

## 2018-10-02 RX ORDER — METFORMIN HYDROCHLORIDE 500 MG/1
1000 TABLET, EXTENDED RELEASE ORAL 2 TIMES DAILY
Qty: 360 TAB | Refills: 2 | Status: SHIPPED | OUTPATIENT
Start: 2018-10-02 | End: 2019-07-03 | Stop reason: SDUPTHER

## 2018-10-02 RX ORDER — IRBESARTAN 300 MG/1
TABLET ORAL
Qty: 90 TAB | Refills: 2 | Status: SHIPPED | OUTPATIENT
Start: 2018-10-02 | End: 2019-07-24 | Stop reason: SDUPTHER

## 2018-10-02 RX ORDER — FENOFIBRATE 145 MG/1
TABLET, COATED ORAL
Qty: 90 TAB | Refills: 2 | Status: SHIPPED | OUTPATIENT
Start: 2018-10-02 | End: 2019-06-26 | Stop reason: SDUPTHER

## 2018-10-02 NOTE — PROGRESS NOTES
"Endocrinology Clinic Progress Note    CC: Diabetes    HPI:  Brett Vicente is a 47 y.o. old patient who comes in today for routine follow up.     Type 2 diabetes: He is currently on Basaglar 58 units at bedtime, Farxiga 10 mg daily and metformin 1000 mg twice a day.  In the past he did not tolerate GLP-1 agonist due to nausea/vomiting.  His glucometer has not been working so he has not checked his blood sugars in the past month.  Denies hypoglycemic episodes.  He has not been watching his carbohydrate intake.    Hypertension: Blood pressure is well controlled. He is on a RB.    Hyperlipidemia: LDL and total cholesterol well controlled, triglycerides elevated. He is on Lipitor, tolerating well.    ROS:  Constitutional: Positive for weight gain  Endo: Denies excessive thirst or frequent urination    PMH:  Patient Active Problem List   Diagnosis   • Gout   • Hypogonadism in male   • Diabetes mellitus type 2, uncontrolled, with complications (HCC)   • Essential hypertension   • Hypothyroidism   • Gastroesophageal reflux disease without esophagitis   • Hypercalcemia   • Bilateral shoulder tendinopathy   • Mixed hyperlipidemia   • Vitamin D deficiency   • Moderate single current episode of major depressive disorder (HCC)   • Obesity (BMI 30-39.9)   • Fatty liver     EXAM:  Vital signs: /80 (BP Location: Right arm, Patient Position: Sitting, BP Cuff Size: Adult)   Pulse 74   Resp 16   Ht 1.854 m (6' 1\")   Wt 112.5 kg (248 lb)   SpO2 95%   BMI 32.72 kg/m²   General: No apparent distress, cooperative  Eyes: No scleral icterus, no discharge  Neck: Normal on external inspection  Resp: Normal effort  Extremities: No lower extremity edema  Skin: No rash on visible skin  Psych: Alert and oriented, normal mood and affect    Assessment and Plan:    1. Type 2 diabetes mellitus with hyperglycemia, with long-term current use of insulin (HCC)  · Repeat labs now: BMP, hemoglobin A1c  · Goal A1c less than 7%  · He did not " tolerate GLP1 agonists due to GI side effects  · Continue Basaglar 58 units at bedtime, Farxiga 10 mg daily, and metformin 1000 mg twice a day  · Advised to start checking blood sugars at least twice a day  · Advised to limit carbohydrate intake  · We discussed about benefits of weight loss    2. Acquired hypothyroidism  · Continue levothyroxine 75 µg daily  · Repeat TSH and free T4    3. Mixed hyperlipidemia  · Continue Lipitor  · Repeat lipid profile    4. Essential hypertension  · Blood pressure is well controlled  · Continue ARB    Return in about 6 weeks (around 11/13/2018).    Thank you for allowing me to participate in the care of this patient.    Lorena Joy M.D.    CC:   Frida Baumann M.D.    This note was created using voice recognition software (Dragon). The accuracy of the dictation is limited by the abilities of the software. I have reviewed the note prior to signing, however some errors in grammar and context are still possible. If you have any questions related to this note please do not hesitate to contact our office.

## 2018-10-04 DIAGNOSIS — E11.65 TYPE 2 DIABETES MELLITUS WITH HYPERGLYCEMIA, WITH LONG-TERM CURRENT USE OF INSULIN (HCC): ICD-10-CM

## 2018-10-04 DIAGNOSIS — Z79.4 TYPE 2 DIABETES MELLITUS WITH HYPERGLYCEMIA, WITH LONG-TERM CURRENT USE OF INSULIN (HCC): ICD-10-CM

## 2018-10-04 RX ORDER — INSULIN GLARGINE 100 [IU]/ML
INJECTION, SOLUTION SUBCUTANEOUS
Qty: 60 ML | Refills: 3 | Status: SHIPPED | OUTPATIENT
Start: 2018-10-04 | End: 2019-02-06 | Stop reason: SDUPTHER

## 2018-10-17 ENCOUNTER — APPOINTMENT (RX ONLY)
Dept: URBAN - METROPOLITAN AREA CLINIC 20 | Facility: CLINIC | Age: 48
Setting detail: DERMATOLOGY
End: 2018-10-17

## 2018-10-17 DIAGNOSIS — D22 MELANOCYTIC NEVI: ICD-10-CM

## 2018-10-17 PROBLEM — D22.61 MELANOCYTIC NEVI OF RIGHT UPPER LIMB, INCLUDING SHOULDER: Status: ACTIVE | Noted: 2018-10-17

## 2018-10-17 PROCEDURE — 11401 EXC TR-EXT B9+MARG 0.6-1 CM: CPT

## 2018-10-17 PROCEDURE — ? EXCISION

## 2018-10-17 PROCEDURE — 12032 INTMD RPR S/A/T/EXT 2.6-7.5: CPT

## 2018-10-17 ASSESSMENT — LOCATION DETAILED DESCRIPTION DERM: LOCATION DETAILED: RIGHT POSTERIOR SHOULDER

## 2018-10-17 ASSESSMENT — LOCATION SIMPLE DESCRIPTION DERM: LOCATION SIMPLE: RIGHT SHOULDER

## 2018-10-17 ASSESSMENT — LOCATION ZONE DERM: LOCATION ZONE: ARM

## 2018-10-17 NOTE — PROCEDURE: EXCISION
Posterior Auricular Interpolation Flap Text: A decision was made to reconstruct the defect utilizing an interpolation axial flap and a staged reconstruction.  A telfa template was made of the defect.  This telfa template was then used to outline the posterior auricular interpolation flap.  The donor area for the pedicle flap was then injected with anesthesia.  The flap was excised through the skin and subcutaneous tissue down to the layer of the underlying musculature.  The pedicle flap was carefully excised within this deep plane to maintain its blood supply.  The edges of the donor site were undermined.   The donor site was closed in a primary fashion.  The pedicle was then rotated into position and sutured.  Once the tube was sutured into place, adequate blood supply was confirmed with blanching and refill.  The pedicle was then wrapped with xeroform gauze and dressed appropriately with a telfa and gauze bandage to ensure continued blood supply and protect the attached pedicle.
Suture Removal: 12 days
Primary Defect Length (In Cm): 0
Wound Care: Petrolatum
Advancement Flap (Double) Text: The defect edges were debeveled with a #15 scalpel blade.  Given the location of the defect and the proximity to free margins a double advancement flap was deemed most appropriate.  Using a sterile surgical marker, the appropriate advancement flaps were drawn incorporating the defect and placing the expected incisions within the relaxed skin tension lines where possible.    The area thus outlined was incised deep to adipose tissue with a #15 scalpel blade.  The skin margins were undermined to an appropriate distance in all directions utilizing iris scissors.
Detail Level: Detailed
Complex Repair And Rhombic Flap Text: The defect edges were debeveled with a #15 scalpel blade.  The primary defect was closed partially with a complex linear closure.  Given the location of the remaining defect, shape of the defect and the proximity to free margins a rhombic flap was deemed most appropriate for complete closure of the defect.  Using a sterile surgical marker, an appropriate advancement flap was drawn incorporating the defect and placing the expected incisions within the relaxed skin tension lines where possible.    The area thus outlined was incised deep to adipose tissue with a #15 scalpel blade.  The skin margins were undermined to an appropriate distance in all directions utilizing iris scissors.
Star Wedge Flap Text: The defect edges were debeveled with a #15 scalpel blade.  Given the location of the defect, shape of the defect and the proximity to free margins a star wedge flap was deemed most appropriate.  Using a sterile surgical marker, an appropriate rotation flap was drawn incorporating the defect and placing the expected incisions within the relaxed skin tension lines where possible. The area thus outlined was incised deep to adipose tissue with a #15 scalpel blade.  The skin margins were undermined to an appropriate distance in all directions utilizing iris scissors.
Muscle Hinge Flap Text: The defect edges were debeveled with a #15 scalpel blade.  Given the size, depth and location of the defect and the proximity to free margins a muscle hinge flap was deemed most appropriate.  Using a sterile surgical marker, an appropriate hinge flap was drawn incorporating the defect. The area thus outlined was incised with a #15 scalpel blade.  The skin margins were undermined to an appropriate distance in all directions utilizing iris scissors.
Complex Repair And Single Advancement Flap Text: The defect edges were debeveled with a #15 scalpel blade.  The primary defect was closed partially with a complex linear closure.  Given the location of the remaining defect, shape of the defect and the proximity to free margins a single advancement flap was deemed most appropriate for complete closure of the defect.  Using a sterile surgical marker, an appropriate advancement flap was drawn incorporating the defect and placing the expected incisions within the relaxed skin tension lines where possible.    The area thus outlined was incised deep to adipose tissue with a #15 scalpel blade.  The skin margins were undermined to an appropriate distance in all directions utilizing iris scissors.
Show Repair Surgeon Variable: Yes
Intermediate / Complex Repair - Final Wound Length In Cm: 2.7
Home Suture Removal Text: Patient was provided a home suture removal kit and will remove their sutures at home.  If they have any questions or difficulties they will call the office.
Cartilage Graft Text: The defect edges were debeveled with a #15 scalpel blade.  Given the location of the defect, shape of the defect, the fact the defect involved a full thickness cartilage defect a cartilage graft was deemed most appropriate.  An appropriate donor site was identified, cleansed, and anesthetized. The cartilage graft was then harvested and transferred to the recipient site, oriented appropriately and then sutured into place.  The secondary defect was then repaired using a primary closure.
Complex Repair And Dorsal Nasal Flap Text: The defect edges were debeveled with a #15 scalpel blade.  The primary defect was closed partially with a complex linear closure.  Given the location of the remaining defect, shape of the defect and the proximity to free margins a dorsal nasal flap was deemed most appropriate for complete closure of the defect.  Using a sterile surgical marker, an appropriate flap was drawn incorporating the defect and placing the expected incisions within the relaxed skin tension lines where possible.    The area thus outlined was incised deep to adipose tissue with a #15 scalpel blade.  The skin margins were undermined to an appropriate distance in all directions utilizing iris scissors.
Helical Rim Advancement Flap Text: The defect edges were debeveled with a #15 blade scalpel.  Given the location of the defect and the proximity to free margins (helical rim) a double helical rim advancement flap was deemed most appropriate.  Using a sterile surgical marker, the appropriate advancement flaps were drawn incorporating the defect and placing the expected incisions between the helical rim and antihelix where possible.  The area thus outlined was incised through and through with a #15 scalpel blade.  With a skin hook and iris scissors, the flaps were gently and sharply undermined and freed up.
Cheek-To-Nose Interpolation Flap Text: A decision was made to reconstruct the defect utilizing an interpolation axial flap and a staged reconstruction.  A telfa template was made of the defect.  This telfa template was then used to outline the Cheek-To-Nose Interpolation flap.  The donor area for the pedicle flap was then injected with anesthesia.  The flap was excised through the skin and subcutaneous tissue down to the layer of the underlying musculature.  The interpolation flap was carefully excised within this deep plane to maintain its blood supply.  The edges of the donor site were undermined.   The donor site was closed in a primary fashion.  The pedicle was then rotated into position and sutured.  Once the tube was sutured into place, adequate blood supply was confirmed with blanching and refill.  The pedicle was then wrapped with xeroform gauze and dressed appropriately with a telfa and gauze bandage to ensure continued blood supply and protect the attached pedicle.
O-T Advancement Flap Text: The defect edges were debeveled with a #15 scalpel blade.  Given the location of the defect, shape of the defect and the proximity to free margins an O-T advancement flap was deemed most appropriate.  Using a sterile surgical marker, an appropriate advancement flap was drawn incorporating the defect and placing the expected incisions within the relaxed skin tension lines where possible.    The area thus outlined was incised deep to adipose tissue with a #15 scalpel blade.  The skin margins were undermined to an appropriate distance in all directions utilizing iris scissors.
Complex Repair And Skin Substitute Graft Text: The defect edges were debeveled with a #15 scalpel blade.  The primary defect was closed partially with a complex linear closure.  Given the location of the remaining defect, shape of the defect and the proximity to free margins a skin substitute graft was deemed most appropriate to repair the remaining defect.  The graft was trimmed to fit the size of the remaining defect.  The graft was then placed in the primary defect, oriented appropriately, and sutured into place.
Transposition Flap Text: The defect edges were debeveled with a #15 scalpel blade.  Given the location of the defect and the proximity to free margins a transposition flap was deemed most appropriate.  Using a sterile surgical marker, an appropriate transposition flap was drawn incorporating the defect.    The area thus outlined was incised deep to adipose tissue with a #15 scalpel blade.  The skin margins were undermined to an appropriate distance in all directions utilizing iris scissors.
Keystone Flap Text: The defect edges were debeveled with a #15 scalpel blade.  Given the location of the defect, shape of the defect a keystone flap was deemed most appropriate.  Using a sterile surgical marker, an appropriate keystone flap was drawn incorporating the defect, outlining the appropriate donor tissue and placing the expected incisions within the relaxed skin tension lines where possible. The area thus outlined was incised deep to adipose tissue with a #15 scalpel blade.  The skin margins were undermined to an appropriate distance in all directions around the primary defect and laterally outward around the flap utilizing iris scissors.
Complex Repair And Rotation Flap Text: The defect edges were debeveled with a #15 scalpel blade.  The primary defect was closed partially with a complex linear closure.  Given the location of the remaining defect, shape of the defect and the proximity to free margins a rotation flap was deemed most appropriate for complete closure of the defect.  Using a sterile surgical marker, an appropriate advancement flap was drawn incorporating the defect and placing the expected incisions within the relaxed skin tension lines where possible.    The area thus outlined was incised deep to adipose tissue with a #15 scalpel blade.  The skin margins were undermined to an appropriate distance in all directions utilizing iris scissors.
Complex Repair And Transposition Flap Text: The defect edges were debeveled with a #15 scalpel blade.  The primary defect was closed partially with a complex linear closure.  Given the location of the remaining defect, shape of the defect and the proximity to free margins a transposition flap was deemed most appropriate for complete closure of the defect.  Using a sterile surgical marker, an appropriate advancement flap was drawn incorporating the defect and placing the expected incisions within the relaxed skin tension lines where possible.    The area thus outlined was incised deep to adipose tissue with a #15 scalpel blade.  The skin margins were undermined to an appropriate distance in all directions utilizing iris scissors.
Purse String (Intermediate) Text: Given the location of the defect and the characteristics of the surrounding skin a purse string intermediate closure was deemed most appropriate.  Undermining was performed circumferentially around the surgical defect.  A purse string suture was then placed and tightened.
Mercedes Flap Text: The defect edges were debeveled with a #15 scalpel blade.  Given the location of the defect, shape of the defect and the proximity to free margins a Mercedes flap was deemed most appropriate.  Using a sterile surgical marker, an appropriate advancement flap was drawn incorporating the defect and placing the expected incisions within the relaxed skin tension lines where possible. The area thus outlined was incised deep to adipose tissue with a #15 scalpel blade.  The skin margins were undermined to an appropriate distance in all directions utilizing iris scissors.
Bilobed Flap Text: The defect edges were debeveled with a #15 scalpel blade.  Given the location of the defect and the proximity to free margins a bilobe flap was deemed most appropriate.  Using a sterile surgical marker, an appropriate bilobe flap drawn around the defect.    The area thus outlined was incised deep to adipose tissue with a #15 scalpel blade.  The skin margins were undermined to an appropriate distance in all directions utilizing iris scissors.
O-L Flap Text: The defect edges were debeveled with a #15 scalpel blade.  Given the location of the defect, shape of the defect and the proximity to free margins an O-L flap was deemed most appropriate.  Using a sterile surgical marker, an appropriate advancement flap was drawn incorporating the defect and placing the expected incisions within the relaxed skin tension lines where possible.    The area thus outlined was incised deep to adipose tissue with a #15 scalpel blade.  The skin margins were undermined to an appropriate distance in all directions utilizing iris scissors.
Banner Transposition Flap Text: The defect edges were debeveled with a #15 scalpel blade.  Given the location of the defect and the proximity to free margins a Banner transposition flap was deemed most appropriate.  Using a sterile surgical marker, an appropriate flap drawn around the defect. The area thus outlined was incised deep to adipose tissue with a #15 scalpel blade.  The skin margins were undermined to an appropriate distance in all directions utilizing iris scissors.
Billing Type: Third-Party Bill
H Plasty Text: Given the location of the defect, shape of the defect and the proximity to free margins a H-plasty was deemed most appropriate for repair.  Using a sterile surgical marker, the appropriate advancement arms of the H-plasty were drawn incorporating the defect and placing the expected incisions within the relaxed skin tension lines where possible. The area thus outlined was incised deep to adipose tissue with a #15 scalpel blade. The skin margins were undermined to an appropriate distance in all directions utilizing iris scissors.  The opposing advancement arms were then advanced into place in opposite direction and anchored with interrupted buried subcutaneous sutures.
Bill [id number] For Specimen Handling/Conveyance To Laboratory?: no
Anesthesia Type: 1% lidocaine with epinephrine and a 1:10 solution of 8.4% sodium bicarbonate
M-Plasty Complex Repair Preamble Text (Leave Blank If You Do Not Want): Extensive wide undermining was performed.
Complex Repair And O-T Advancement Flap Text: The defect edges were debeveled with a #15 scalpel blade.  The primary defect was closed partially with a complex linear closure.  Given the location of the remaining defect, shape of the defect and the proximity to free margins an O-T advancement flap was deemed most appropriate for complete closure of the defect.  Using a sterile surgical marker, an appropriate advancement flap was drawn incorporating the defect and placing the expected incisions within the relaxed skin tension lines where possible.    The area thus outlined was incised deep to adipose tissue with a #15 scalpel blade.  The skin margins were undermined to an appropriate distance in all directions utilizing iris scissors.
Lab Facility: 
O-T Plasty Text: The defect edges were debeveled with a #15 scalpel blade.  Given the location of the defect, shape of the defect and the proximity to free margins an O-T plasty was deemed most appropriate.  Using a sterile surgical marker, an appropriate O-T plasty was drawn incorporating the defect and placing the expected incisions within the relaxed skin tension lines where possible.    The area thus outlined was incised deep to adipose tissue with a #15 scalpel blade.  The skin margins were undermined to an appropriate distance in all directions utilizing iris scissors.
Paramedian Forehead Flap Text: A decision was made to reconstruct the defect utilizing an interpolation axial flap and a staged reconstruction.  A telfa template was made of the defect.  This telfa template was then used to outline the paramedian forehead pedicle flap.  The donor area for the pedicle flap was then injected with anesthesia.  The flap was excised through the skin and subcutaneous tissue down to the layer of the underlying musculature.  The pedicle flap was carefully excised within this deep plane to maintain its blood supply.  The edges of the donor site were undermined.   The donor site was closed in a primary fashion.  The pedicle was then rotated into position and sutured.  Once the tube was sutured into place, adequate blood supply was confirmed with blanching and refill.  The pedicle was then wrapped with xeroform gauze and dressed appropriately with a telfa and gauze bandage to ensure continued blood supply and protect the attached pedicle.
Estimated Blood Loss (Cc): minimal
Medical Necessity Information: It is in your best interest to select a reason for this procedure from the list below. All of these items fulfill various CMS LCD requirements except lesion extends to a margin.
Complex Repair And V-Y Plasty Text: The defect edges were debeveled with a #15 scalpel blade.  The primary defect was closed partially with a complex linear closure.  Given the location of the remaining defect, shape of the defect and the proximity to free margins a V-Y plasty was deemed most appropriate for complete closure of the defect.  Using a sterile surgical marker, an appropriate advancement flap was drawn incorporating the defect and placing the expected incisions within the relaxed skin tension lines where possible.    The area thus outlined was incised deep to adipose tissue with a #15 scalpel blade.  The skin margins were undermined to an appropriate distance in all directions utilizing iris scissors.
Ftsg Text: The defect edges were debeveled with a #15 scalpel blade.  Given the location of the defect, shape of the defect and the proximity to free margins a full thickness skin graft was deemed most appropriate.  Using a sterile surgical marker, the primary defect shape was transferred to the donor site. The area thus outlined was incised deep to adipose tissue with a #15 scalpel blade.  The harvested graft was then trimmed of adipose tissue until only dermis and epidermis was left.  The skin margins of the secondary defect were undermined to an appropriate distance in all directions utilizing iris scissors.  The secondary defect was closed with interrupted buried subcutaneous sutures.  The skin edges were then re-apposed with running  sutures.  The skin graft was then placed in the primary defect and oriented appropriately.
Excision Method: Elliptical
Z Plasty Text: The lesion was extirpated to the level of the fat with a #15 scalpel blade.  Given the location of the defect, shape of the defect and the proximity to free margins a Z-plasty was deemed most appropriate for repair.  Using a sterile surgical marker, the appropriate transposition arms of the Z-plasty were drawn incorporating the defect and placing the expected incisions within the relaxed skin tension lines where possible.    The area thus outlined was incised deep to adipose tissue with a #15 scalpel blade.  The skin margins were undermined to an appropriate distance in all directions utilizing iris scissors.  The opposing transposition arms were then transposed into place in opposite direction and anchored with interrupted buried subcutaneous sutures.
Deep Sutures: 3-0 Vicryl
Epidermal Autograft Text: The defect edges were debeveled with a #15 scalpel blade.  Given the location of the defect, shape of the defect and the proximity to free margins an epidermal autograft was deemed most appropriate.  Using a sterile surgical marker, the primary defect shape was transferred to the donor site. The epidermal graft was then harvested.  The skin graft was then placed in the primary defect and oriented appropriately.
A-T Advancement Flap Text: The defect edges were debeveled with a #15 scalpel blade.  Given the location of the defect, shape of the defect and the proximity to free margins an A-T advancement flap was deemed most appropriate.  Using a sterile surgical marker, an appropriate advancement flap was drawn incorporating the defect and placing the expected incisions within the relaxed skin tension lines where possible.    The area thus outlined was incised deep to adipose tissue with a #15 scalpel blade.  The skin margins were undermined to an appropriate distance in all directions utilizing iris scissors.
Excision Depth: adipose tissue
Complex Repair And Z Plasty Text: The defect edges were debeveled with a #15 scalpel blade.  The primary defect was closed partially with a complex linear closure.  Given the location of the remaining defect, shape of the defect and the proximity to free margins a Z plasty was deemed most appropriate for complete closure of the defect.  Using a sterile surgical marker, an appropriate advancement flap was drawn incorporating the defect and placing the expected incisions within the relaxed skin tension lines where possible.    The area thus outlined was incised deep to adipose tissue with a #15 scalpel blade.  The skin margins were undermined to an appropriate distance in all directions utilizing iris scissors.
Complex Repair And Epidermal Autograft Text: The defect edges were debeveled with a #15 scalpel blade.  The primary defect was closed partially with a complex linear closure.  Given the location of the defect, shape of the defect and the proximity to free margins an epidermal autograft was deemed most appropriate to repair the remaining defect.  The graft was trimmed to fit the size of the remaining defect.  The graft was then placed in the primary defect, oriented appropriately, and sutured into place.
Tissue Cultured Epidermal Autograft Text: The defect edges were debeveled with a #15 scalpel blade.  Given the location of the defect, shape of the defect and the proximity to free margins a tissue cultured epidermal autograft was deemed most appropriate.  The graft was then trimmed to fit the size of the defect.  The graft was then placed in the primary defect and oriented appropriately.
Repair Performed By Another Provider Text (Leave Blank If You Do Not Want): After the tissue was excised the defect was repaired by another provider.
Complex Repair And Xenograft Text: The defect edges were debeveled with a #15 scalpel blade.  The primary defect was closed partially with a complex linear closure.  Given the location of the defect, shape of the defect and the proximity to free margins a xenograft was deemed most appropriate to repair the remaining defect.  The graft was trimmed to fit the size of the remaining defect.  The graft was then placed in the primary defect, oriented appropriately, and sutured into place.
Xenograft Text: The defect edges were debeveled with a #15 scalpel blade.  Given the location of the defect, shape of the defect and the proximity to free margins a xenograft was deemed most appropriate.  The graft was then trimmed to fit the size of the defect.  The graft was then placed in the primary defect and oriented appropriately.
Lazy S Intermediate Repair Preamble Text (Leave Blank If You Do Not Want): Undermining was performed with blunt dissection.
Epidermal Closure: simple interrupted
Purse String (Simple) Text: Given the location of the defect and the characteristics of the surrounding skin a purse string simple closure was deemed most appropriate.  Undermining was performed circumferentially around the surgical defect.  A purse string suture was then placed and tightened.
V-Y Flap Text: The defect edges were debeveled with a #15 scalpel blade.  Given the location of the defect, shape of the defect and the proximity to free margins a V-Y flap was deemed most appropriate.  Using a sterile surgical marker, an appropriate advancement flap was drawn incorporating the defect and placing the expected incisions within the relaxed skin tension lines where possible.    The area thus outlined was incised deep to adipose tissue with a #15 scalpel blade.  The skin margins were undermined to an appropriate distance in all directions utilizing iris scissors.
Saucerization Excision Additional Text (Leave Blank If You Do Not Want): The margin was drawn around the clinically apparent lesion.  Incisions were then made along these lines, in a tangential fashion, to the appropriate tissue plane and the lesion was extirpated.
Cheek Interpolation Flap Text: A decision was made to reconstruct the defect utilizing an interpolation axial flap and a staged reconstruction.  A telfa template was made of the defect.  This telfa template was then used to outline the Cheek Interpolation flap.  The donor area for the pedicle flap was then injected with anesthesia.  The flap was excised through the skin and subcutaneous tissue down to the layer of the underlying musculature.  The interpolation flap was carefully excised within this deep plane to maintain its blood supply.  The edges of the donor site were undermined.   The donor site was closed in a primary fashion.  The pedicle was then rotated into position and sutured.  Once the tube was sutured into place, adequate blood supply was confirmed with blanching and refill.  The pedicle was then wrapped with xeroform gauze and dressed appropriately with a telfa and gauze bandage to ensure continued blood supply and protect the attached pedicle.
Size Of Lesion In Cm: 0.3
Lip Wedge Excision Repair Text: Given the location of the defect and the proximity to free margins a full thickness wedge repair was deemed most appropriate.  Using a sterile surgical marker, the appropriate repair was drawn incorporating the defect and placing the expected incisions perpendicular to the vermilion border.  The vermilion border was also meticulously outlined to ensure appropriate reapproximation during the repair.  The area thus outlined was incised through and through with a #15 scalpel blade.  The muscularis and dermis were reaproximated with deep sutures following hemostasis. Care was taken to realign the vermilion border before proceeding with the superficial closure.  Once the vermilion was realigned the superfical and mucosal closure was finished.
Complex Repair And O-L Flap Text: The defect edges were debeveled with a #15 scalpel blade.  The primary defect was closed partially with a complex linear closure.  Given the location of the remaining defect, shape of the defect and the proximity to free margins an O-L flap was deemed most appropriate for complete closure of the defect.  Using a sterile surgical marker, an appropriate flap was drawn incorporating the defect and placing the expected incisions within the relaxed skin tension lines where possible.    The area thus outlined was incised deep to adipose tissue with a #15 scalpel blade.  The skin margins were undermined to an appropriate distance in all directions utilizing iris scissors.
Composite Graft Text: The defect edges were debeveled with a #15 scalpel blade.  Given the location of the defect, shape of the defect, the proximity to free margins and the fact the defect was full thickness a composite graft was deemed most appropriate.  The defect was outline and then transferred to the donor site.  A full thickness graft was then excised from the donor site. The graft was then placed in the primary defect, oriented appropriately and then sutured into place.  The secondary defect was then repaired using a primary closure.
W Plasty Text: The lesion was extirpated to the level of the fat with a #15 scalpel blade.  Given the location of the defect, shape of the defect and the proximity to free margins a W-plasty was deemed most appropriate for repair.  Using a sterile surgical marker, the appropriate transposition arms of the W-plasty were drawn incorporating the defect and placing the expected incisions within the relaxed skin tension lines where possible.    The area thus outlined was incised deep to adipose tissue with a #15 scalpel blade.  The skin margins were undermined to an appropriate distance in all directions utilizing iris scissors.  The opposing transposition arms were then transposed into place in opposite direction and anchored with interrupted buried subcutaneous sutures.
Ear Star Wedge Flap Text: The defect edges were debeveled with a #15 blade scalpel.  Given the location of the defect and the proximity to free margins (helical rim) an ear star wedge flap was deemed most appropriate.  Using a sterile surgical marker, the appropriate flap was drawn incorporating the defect and placing the expected incisions between the helical rim and antihelix where possible.  The area thus outlined was incised through and through with a #15 scalpel blade.
Dermal Closure: buried vertical mattress
Additional Anesthesia Volume In Cc: 6
Alar Island Pedicle Flap Text: The defect edges were debeveled with a #15 scalpel blade.  Given the location of the defect, shape of the defect and the proximity to the alar rim an island pedicle advancement flap was deemed most appropriate.  Using a sterile surgical marker, an appropriate advancement flap was drawn incorporating the defect, outlining the appropriate donor tissue and placing the expected incisions within the nasal ala running parallel to the alar rim. The area thus outlined was incised with a #15 scalpel blade.  The skin margins were undermined minimally to an appropriate distance in all directions around the primary defect and laterally outward around the island pedicle utilizing iris scissors.  There was minimal undermining beneath the pedicle flap.
Eliptical Excision Additional Text (Leave Blank If You Do Not Want): The margin was drawn around the clinically apparent lesion.  An elliptical shape was then drawn on the skin incorporating the lesion and margins.  Incisions were then made along these lines to the appropriate tissue plane and the lesion was extirpated.
Post-Care Instructions: I reviewed with the patient in detail post-care instructions. Patient is not to engage in any heavy lifting, exercise, or swimming for the next 14 days. Should the patient develop any fevers, chills, bleeding, severe pain patient will contact the office immediately.
Complex Repair And Split-Thickness Skin Graft Text: The defect edges were debeveled with a #15 scalpel blade.  The primary defect was closed partially with a complex linear closure.  Given the location of the defect, shape of the defect and the proximity to free margins a split thickness skin graft was deemed most appropriate to repair the remaining defect.  The graft was trimmed to fit the size of the remaining defect.  The graft was then placed in the primary defect, oriented appropriately, and sutured into place.
Hatchet Flap Text: The defect edges were debeveled with a #15 scalpel blade.  Given the location of the defect, shape of the defect and the proximity to free margins a hatchet flap was deemed most appropriate.  Using a sterile surgical marker, an appropriate hatchet flap was drawn incorporating the defect and placing the expected incisions within the relaxed skin tension lines where possible.    The area thus outlined was incised deep to adipose tissue with a #15 scalpel blade.  The skin margins were undermined to an appropriate distance in all directions utilizing iris scissors.
Complex Repair And Bilobe Flap Text: The defect edges were debeveled with a #15 scalpel blade.  The primary defect was closed partially with a complex linear closure.  Given the location of the remaining defect, shape of the defect and the proximity to free margins a bilobe flap was deemed most appropriate for complete closure of the defect.  Using a sterile surgical marker, an appropriate advancement flap was drawn incorporating the defect and placing the expected incisions within the relaxed skin tension lines where possible.    The area thus outlined was incised deep to adipose tissue with a #15 scalpel blade.  The skin margins were undermined to an appropriate distance in all directions utilizing iris scissors.
Complex Repair And A-T Advancement Flap Text: The defect edges were debeveled with a #15 scalpel blade.  The primary defect was closed partially with a complex linear closure.  Given the location of the remaining defect, shape of the defect and the proximity to free margins an A-T advancement flap was deemed most appropriate for complete closure of the defect.  Using a sterile surgical marker, an appropriate advancement flap was drawn incorporating the defect and placing the expected incisions within the relaxed skin tension lines where possible.    The area thus outlined was incised deep to adipose tissue with a #15 scalpel blade.  The skin margins were undermined to an appropriate distance in all directions utilizing iris scissors.
Island Pedicle Flap With Canthal Suspension Text: The defect edges were debeveled with a #15 scalpel blade.  Given the location of the defect, shape of the defect and the proximity to free margins an island pedicle advancement flap was deemed most appropriate.  Using a sterile surgical marker, an appropriate advancement flap was drawn incorporating the defect, outlining the appropriate donor tissue and placing the expected incisions within the relaxed skin tension lines where possible. The area thus outlined was incised deep to adipose tissue with a #15 scalpel blade.  The skin margins were undermined to an appropriate distance in all directions around the primary defect and laterally outward around the island pedicle utilizing iris scissors.  There was minimal undermining beneath the pedicle flap. A suspension suture was placed in the canthal tendon to prevent tension and prevent ectropion.
Path Notes (To The Dermatopathologist): Please check margins.
Melolabial Transposition Flap Text: The defect edges were debeveled with a #15 scalpel blade.  Given the location of the defect and the proximity to free margins a melolabial flap was deemed most appropriate.  Using a sterile surgical marker, an appropriate melolabial transposition flap was drawn incorporating the defect.    The area thus outlined was incised deep to adipose tissue with a #15 scalpel blade.  The skin margins were undermined to an appropriate distance in all directions utilizing iris scissors.
V-Y Plasty Text: The defect edges were debeveled with a #15 scalpel blade.  Given the location of the defect, shape of the defect and the proximity to free margins an V-Y advancement flap was deemed most appropriate.  Using a sterile surgical marker, an appropriate advancement flap was drawn incorporating the defect and placing the expected incisions within the relaxed skin tension lines where possible.    The area thus outlined was incised deep to adipose tissue with a #15 scalpel blade.  The skin margins were undermined to an appropriate distance in all directions utilizing iris scissors.
Scalpel Size: 15 blade
Crescentic Advancement Flap Text: The defect edges were debeveled with a #15 scalpel blade.  Given the location of the defect and the proximity to free margins a crescentic advancement flap was deemed most appropriate.  Using a sterile surgical marker, the appropriate advancement flap was drawn incorporating the defect and placing the expected incisions within the relaxed skin tension lines where possible.    The area thus outlined was incised deep to adipose tissue with a #15 scalpel blade.  The skin margins were undermined to an appropriate distance in all directions utilizing iris scissors.
Melolabial Interpolation Flap Text: A decision was made to reconstruct the defect utilizing an interpolation axial flap and a staged reconstruction.  A telfa template was made of the defect.  This telfa template was then used to outline the melolabial interpolation flap.  The donor area for the pedicle flap was then injected with anesthesia.  The flap was excised through the skin and subcutaneous tissue down to the layer of the underlying musculature.  The pedicle flap was carefully excised within this deep plane to maintain its blood supply.  The edges of the donor site were undermined.   The donor site was closed in a primary fashion.  The pedicle was then rotated into position and sutured.  Once the tube was sutured into place, adequate blood supply was confirmed with blanching and refill.  The pedicle was then wrapped with xeroform gauze and dressed appropriately with a telfa and gauze bandage to ensure continued blood supply and protect the attached pedicle.
Rotation Flap Text: The defect edges were debeveled with a #15 scalpel blade.  Given the location of the defect, shape of the defect and the proximity to free margins a rotation flap was deemed most appropriate.  Using a sterile surgical marker, an appropriate rotation flap was drawn incorporating the defect and placing the expected incisions within the relaxed skin tension lines where possible.    The area thus outlined was incised deep to adipose tissue with a #15 scalpel blade.  The skin margins were undermined to an appropriate distance in all directions utilizing iris scissors.
Complex Repair And Double M Plasty Text: The defect edges were debeveled with a #15 scalpel blade.  The primary defect was closed partially with a complex linear closure.  Given the location of the remaining defect, shape of the defect and the proximity to free margins a double M plasty was deemed most appropriate for complete closure of the defect.  Using a sterile surgical marker, an appropriate advancement flap was drawn incorporating the defect and placing the expected incisions within the relaxed skin tension lines where possible.    The area thus outlined was incised deep to adipose tissue with a #15 scalpel blade.  The skin margins were undermined to an appropriate distance in all directions utilizing iris scissors.
Lab: [id number]
Complex Repair And W Plasty Text: The defect edges were debeveled with a #15 scalpel blade.  The primary defect was closed partially with a complex linear closure.  Given the location of the remaining defect, shape of the defect and the proximity to free margins a W plasty was deemed most appropriate for complete closure of the defect.  Using a sterile surgical marker, an appropriate advancement flap was drawn incorporating the defect and placing the expected incisions within the relaxed skin tension lines where possible.    The area thus outlined was incised deep to adipose tissue with a #15 scalpel blade.  The skin margins were undermined to an appropriate distance in all directions utilizing iris scissors.
Excisional Biopsy Additional Text (Leave Blank If You Do Not Want): The margin was drawn around the clinically apparent lesion. An elliptical shape was then drawn on the skin incorporating the lesion and margins.  Incisions were then made along these lines to the appropriate tissue plane and the lesion was extirpated.
Bilateral Helical Rim Advancement Flap Text: The defect edges were debeveled with a #15 blade scalpel.  Given the location of the defect and the proximity to free margins (helical rim) a bilateral helical rim advancement flap was deemed most appropriate.  Using a sterile surgical marker, the appropriate advancement flaps were drawn incorporating the defect and placing the expected incisions between the helical rim and antihelix where possible.  The area thus outlined was incised through and through with a #15 scalpel blade.  With a skin hook and iris scissors, the flaps were gently and sharply undermined and freed up.
Complex Repair And Melolabial Flap Text: The defect edges were debeveled with a #15 scalpel blade.  The primary defect was closed partially with a complex linear closure.  Given the location of the remaining defect, shape of the defect and the proximity to free margins a melolabial flap was deemed most appropriate for complete closure of the defect.  Using a sterile surgical marker, an appropriate advancement flap was drawn incorporating the defect and placing the expected incisions within the relaxed skin tension lines where possible.    The area thus outlined was incised deep to adipose tissue with a #15 scalpel blade.  The skin margins were undermined to an appropriate distance in all directions utilizing iris scissors.
Complex Repair And Tissue Cultured Epidermal Autograft Text: The defect edges were debeveled with a #15 scalpel blade.  The primary defect was closed partially with a complex linear closure.  Given the location of the defect, shape of the defect and the proximity to free margins an tissue cultured epidermal autograft was deemed most appropriate to repair the remaining defect.  The graft was trimmed to fit the size of the remaining defect.  The graft was then placed in the primary defect, oriented appropriately, and sutured into place.
Slit Excision Additional Text (Leave Blank If You Do Not Want): A linear line was drawn on the skin overlying the lesion. An incision was made slowly until the lesion was visualized.  Once visualized, the lesion was removed with blunt dissection.
Complex Repair And M Plasty Text: The defect edges were debeveled with a #15 scalpel blade.  The primary defect was closed partially with a complex linear closure.  Given the location of the remaining defect, shape of the defect and the proximity to free margins an M plasty was deemed most appropriate for complete closure of the defect.  Using a sterile surgical marker, an appropriate advancement flap was drawn incorporating the defect and placing the expected incisions within the relaxed skin tension lines where possible.    The area thus outlined was incised deep to adipose tissue with a #15 scalpel blade.  The skin margins were undermined to an appropriate distance in all directions utilizing iris scissors.
Mucosal Advancement Flap Text: Given the location of the defect, shape of the defect and the proximity to free margins a mucosal advancement flap was deemed most appropriate. Incisions were made with a 15 blade scalpel in the appropriate fashion along the cutaneous vermillion border and the mucosal lip. The remaining actinically damaged mucosal tissue was excised.  The mucosal advancement flap was then elevated to the gingival sulcus with care taken to preserve the neurovascular structures and advanced into the primary defect. Care was taken to ensure that precise realignment of the vermilion border was achieved.
Burow's Advancement Flap Text: The defect edges were debeveled with a #15 scalpel blade.  Given the location of the defect and the proximity to free margins a Burow's advancement flap was deemed most appropriate.  Using a sterile surgical marker, the appropriate advancement flap was drawn incorporating the defect and placing the expected incisions within the relaxed skin tension lines where possible.    The area thus outlined was incised deep to adipose tissue with a #15 scalpel blade.  The skin margins were undermined to an appropriate distance in all directions utilizing iris scissors.
Previous Accession (Optional): [id number]
Dressing: pressure dressing
Skin Substitute Text: The defect edges were debeveled with a #15 scalpel blade.  Given the location of the defect, shape of the defect and the proximity to free margins a skin substitute graft was deemed most appropriate.  The graft material was trimmed to fit the size of the defect. The graft was then placed in the primary defect and oriented appropriately.
No Repair - Repaired With Adjacent Surgical Defect Text (Leave Blank If You Do Not Want): After the excision the defect was repaired concurrently with another surgical defect which was in close approximation.
Bi-Rhombic Flap Text: The defect edges were debeveled with a #15 scalpel blade.  Given the location of the defect and the proximity to free margins a bi-rhombic flap was deemed most appropriate.  Using a sterile surgical marker, an appropriate rhombic flap was drawn incorporating the defect. The area thus outlined was incised deep to adipose tissue with a #15 scalpel blade.  The skin margins were undermined to an appropriate distance in all directions utilizing iris scissors.
Rhombic Flap Text: The defect edges were debeveled with a #15 scalpel blade.  Given the location of the defect and the proximity to free margins a rhombic flap was deemed most appropriate.  Using a sterile surgical marker, an appropriate rhombic flap was drawn incorporating the defect.    The area thus outlined was incised deep to adipose tissue with a #15 scalpel blade.  The skin margins were undermined to an appropriate distance in all directions utilizing iris scissors.
Repair Type: Intermediate
Complex Repair And Ftsg Text: The defect edges were debeveled with a #15 scalpel blade.  The primary defect was closed partially with a complex linear closure.  Given the location of the defect, shape of the defect and the proximity to free margins a full thickness skin graft was deemed most appropriate to repair the remaining defect.  The graft was trimmed to fit the size of the remaining defect.  The graft was then placed in the primary defect, oriented appropriately, and sutured into place.
Dermal Autograft Text: The defect edges were debeveled with a #15 scalpel blade.  Given the location of the defect, shape of the defect and the proximity to free margins a dermal autograft was deemed most appropriate.  Using a sterile surgical marker, the primary defect shape was transferred to the donor site. The area thus outlined was incised deep to adipose tissue with a #15 scalpel blade.  The harvested graft was then trimmed of adipose and epidermal tissue until only dermis was left.  The skin graft was then placed in the primary defect and oriented appropriately.
Date Of Previous Biopsy (Optional): 6/13/18
Complex Repair And Dermal Autograft Text: The defect edges were debeveled with a #15 scalpel blade.  The primary defect was closed partially with a complex linear closure.  Given the location of the defect, shape of the defect and the proximity to free margins an dermal autograft was deemed most appropriate to repair the remaining defect.  The graft was trimmed to fit the size of the remaining defect.  The graft was then placed in the primary defect, oriented appropriately, and sutured into place.
Anesthesia Volume In Cc: 3
Epidermal Sutures: 3-0 Nylon
Dorsal Nasal Flap Text: The defect edges were debeveled with a #15 scalpel blade.  Given the location of the defect and the proximity to free margins a dorsal nasal flap was deemed most appropriate.  Using a sterile surgical marker, an appropriate dorsal nasal flap was drawn around the defect.    The area thus outlined was incised deep to adipose tissue with a #15 scalpel blade.  The skin margins were undermined to an appropriate distance in all directions utilizing iris scissors.
Island Pedicle Flap Text: The defect edges were debeveled with a #15 scalpel blade.  Given the location of the defect, shape of the defect and the proximity to free margins an island pedicle advancement flap was deemed most appropriate.  Using a sterile surgical marker, an appropriate advancement flap was drawn incorporating the defect, outlining the appropriate donor tissue and placing the expected incisions within the relaxed skin tension lines where possible.    The area thus outlined was incised deep to adipose tissue with a #15 scalpel blade.  The skin margins were undermined to an appropriate distance in all directions around the primary defect and laterally outward around the island pedicle utilizing iris scissors.  There was minimal undermining beneath the pedicle flap.
Mastoid Interpolation Flap Text: A decision was made to reconstruct the defect utilizing an interpolation axial flap and a staged reconstruction.  A telfa template was made of the defect.  This telfa template was then used to outline the mastoid interpolation flap.  The donor area for the pedicle flap was then injected with anesthesia.  The flap was excised through the skin and subcutaneous tissue down to the layer of the underlying musculature.  The pedicle flap was carefully excised within this deep plane to maintain its blood supply.  The edges of the donor site were undermined.   The donor site was closed in a primary fashion.  The pedicle was then rotated into position and sutured.  Once the tube was sutured into place, adequate blood supply was confirmed with blanching and refill.  The pedicle was then wrapped with xeroform gauze and dressed appropriately with a telfa and gauze bandage to ensure continued blood supply and protect the attached pedicle.
Complex Repair And Modified Advancement Flap Text: The defect edges were debeveled with a #15 scalpel blade.  The primary defect was closed partially with a complex linear closure.  Given the location of the remaining defect, shape of the defect and the proximity to free margins a modified advancement flap was deemed most appropriate for complete closure of the defect.  Using a sterile surgical marker, an appropriate advancement flap was drawn incorporating the defect and placing the expected incisions within the relaxed skin tension lines where possible.    The area thus outlined was incised deep to adipose tissue with a #15 scalpel blade.  The skin margins were undermined to an appropriate distance in all directions utilizing iris scissors.
Interpolation Flap Text: A decision was made to reconstruct the defect utilizing an interpolation axial flap and a staged reconstruction.  A telfa template was made of the defect.  This telfa template was then used to outline the interpolation flap.  The donor area for the pedicle flap was then injected with anesthesia.  The flap was excised through the skin and subcutaneous tissue down to the layer of the underlying musculature.  The interpolation flap was carefully excised within this deep plane to maintain its blood supply.  The edges of the donor site were undermined.   The donor site was closed in a primary fashion.  The pedicle was then rotated into position and sutured.  Once the tube was sutured into place, adequate blood supply was confirmed with blanching and refill.  The pedicle was then wrapped with xeroform gauze and dressed appropriately with a telfa and gauze bandage to ensure continued blood supply and protect the attached pedicle.
Double Island Pedicle Flap Text: The defect edges were debeveled with a #15 scalpel blade.  Given the location of the defect, shape of the defect and the proximity to free margins a double island pedicle advancement flap was deemed most appropriate.  Using a sterile surgical marker, an appropriate advancement flap was drawn incorporating the defect, outlining the appropriate donor tissue and placing the expected incisions within the relaxed skin tension lines where possible.    The area thus outlined was incised deep to adipose tissue with a #15 scalpel blade.  The skin margins were undermined to an appropriate distance in all directions around the primary defect and laterally outward around the island pedicle utilizing iris scissors.  There was minimal undermining beneath the pedicle flap.
Fusiform Excision Additional Text (Leave Blank If You Do Not Want): The margin was drawn around the clinically apparent lesion.  A fusiform shape was then drawn on the skin incorporating the lesion and margins.  Incisions were then made along these lines to the appropriate tissue plane and the lesion was extirpated.
Hemostasis: Electrocautery
S Plasty Text: Given the location and shape of the defect, and the orientation of relaxed skin tension lines, an S-plasty was deemed most appropriate for repair.  Using a sterile surgical marker, the appropriate outline of the S-plasty was drawn, incorporating the defect and placing the expected incisions within the relaxed skin tension lines where possible.  The area thus outlined was incised deep to adipose tissue with a #15 scalpel blade.  The skin margins were undermined to an appropriate distance in all directions utilizing iris scissors. The skin flaps were advanced over the defect.  The opposing margins were then approximated with interrupted buried subcutaneous sutures.
Perilesional Excision Additional Text (Leave Blank If You Do Not Want): The margin was drawn around the clinically apparent lesion. Incisions were then made along these lines to the appropriate tissue plane and the lesion was extirpated.
Split-Thickness Skin Graft Text: The defect edges were debeveled with a #15 scalpel blade.  Given the location of the defect, shape of the defect and the proximity to free margins a split thickness skin graft was deemed most appropriate.  Using a sterile surgical marker, the primary defect shape was transferred to the donor site. The split thickness graft was then harvested.  The skin graft was then placed in the primary defect and oriented appropriately.
Spiral Flap Text: The defect edges were debeveled with a #15 scalpel blade.  Given the location of the defect, shape of the defect and the proximity to free margins a spiral flap was deemed most appropriate.  Using a sterile surgical marker, an appropriate rotation flap was drawn incorporating the defect and placing the expected incisions within the relaxed skin tension lines where possible. The area thus outlined was incised deep to adipose tissue with a #15 scalpel blade.  The skin margins were undermined to an appropriate distance in all directions utilizing iris scissors.
Complex Repair And Double Advancement Flap Text: The defect edges were debeveled with a #15 scalpel blade.  The primary defect was closed partially with a complex linear closure.  Given the location of the remaining defect, shape of the defect and the proximity to free margins a double advancement flap was deemed most appropriate for complete closure of the defect.  Using a sterile surgical marker, an appropriate advancement flap was drawn incorporating the defect and placing the expected incisions within the relaxed skin tension lines where possible.    The area thus outlined was incised deep to adipose tissue with a #15 scalpel blade.  The skin margins were undermined to an appropriate distance in all directions utilizing iris scissors.
Trilobed Flap Text: The defect edges were debeveled with a #15 scalpel blade.  Given the location of the defect and the proximity to free margins a trilobed flap was deemed most appropriate.  Using a sterile surgical marker, an appropriate trilobed flap drawn around the defect.    The area thus outlined was incised deep to adipose tissue with a #15 scalpel blade.  The skin margins were undermined to an appropriate distance in all directions utilizing iris scissors.
Advancement Flap (Single) Text: The defect edges were debeveled with a #15 scalpel blade.  Given the location of the defect and the proximity to free margins a single advancement flap was deemed most appropriate.  Using a sterile surgical marker, an appropriate advancement flap was drawn incorporating the defect and placing the expected incisions within the relaxed skin tension lines where possible.    The area thus outlined was incised deep to adipose tissue with a #15 scalpel blade.  The skin margins were undermined to an appropriate distance in all directions utilizing iris scissors.
Medical Necessity Clause: This procedure was medically necessary because the lesion that was treated was:
Graft Donor Site Bandage (Optional-Leave Blank If You Don't Want In Note): Steri-strips and a pressure bandage were applied to the donor site.
Consent was obtained from the patient. The risks and benefits to therapy were discussed in detail. Specifically, the risks of infection, scarring, bleeding, prolonged wound healing, incomplete removal, allergy to anesthesia, nerve injury and recurrence were addressed. Prior to the procedure, the treatment site was clearly identified and confirmed by the patient. All components of Universal Protocol/PAUSE Rule completed.
Bilobed Transposition Flap Text: The defect edges were debeveled with a #15 scalpel blade.  Given the location of the defect and the proximity to free margins a bilobed transposition flap was deemed most appropriate.  Using a sterile surgical marker, an appropriate bilobe flap drawn around the defect.    The area thus outlined was incised deep to adipose tissue with a #15 scalpel blade.  The skin margins were undermined to an appropriate distance in all directions utilizing iris scissors.
Modified Advancement Flap Text: The defect edges were debeveled with a #15 scalpel blade.  Given the location of the defect, shape of the defect and the proximity to free margins a modified advancement flap was deemed most appropriate.  Using a sterile surgical marker, an appropriate advancement flap was drawn incorporating the defect and placing the expected incisions within the relaxed skin tension lines where possible.    The area thus outlined was incised deep to adipose tissue with a #15 scalpel blade.  The skin margins were undermined to an appropriate distance in all directions utilizing iris scissors.
O-Z Plasty Text: The defect edges were debeveled with a #15 scalpel blade.  Given the location of the defect, shape of the defect and the proximity to free margins an O-Z plasty (double transposition flap) was deemed most appropriate.  Using a sterile surgical marker, the appropriate transposition flaps were drawn incorporating the defect and placing the expected incisions within the relaxed skin tension lines where possible.    The area thus outlined was incised deep to adipose tissue with a #15 scalpel blade.  The skin margins were undermined to an appropriate distance in all directions utilizing iris scissors.  Hemostasis was achieved with electrocautery.  The flaps were then transposed into place, one clockwise and the other counterclockwise, and anchored with interrupted buried subcutaneous sutures.
Island Pedicle Flap-Requiring Vessel Identification Text: The defect edges were debeveled with a #15 scalpel blade.  Given the location of the defect, shape of the defect and the proximity to free margins an island pedicle advancement flap was deemed most appropriate.  Using a sterile surgical marker, an appropriate advancement flap was drawn, based on the axial vessel mentioned above, incorporating the defect, outlining the appropriate donor tissue and placing the expected incisions within the relaxed skin tension lines where possible.    The area thus outlined was incised deep to adipose tissue with a #15 scalpel blade.  The skin margins were undermined to an appropriate distance in all directions around the primary defect and laterally outward around the island pedicle utilizing iris scissors.  There was minimal undermining beneath the pedicle flap.

## 2018-10-29 ENCOUNTER — APPOINTMENT (RX ONLY)
Dept: URBAN - METROPOLITAN AREA CLINIC 20 | Facility: CLINIC | Age: 48
Setting detail: DERMATOLOGY
End: 2018-10-29

## 2018-10-29 DIAGNOSIS — Z48.02 ENCOUNTER FOR REMOVAL OF SUTURES: ICD-10-CM

## 2018-10-29 PROCEDURE — ? SUTURE REMOVAL (GLOBAL PERIOD)

## 2018-10-29 ASSESSMENT — LOCATION SIMPLE DESCRIPTION DERM: LOCATION SIMPLE: RIGHT SHOULDER

## 2018-10-29 ASSESSMENT — LOCATION DETAILED DESCRIPTION DERM: LOCATION DETAILED: RIGHT POSTERIOR SHOULDER

## 2018-10-29 ASSESSMENT — LOCATION ZONE DERM: LOCATION ZONE: ARM

## 2018-10-29 NOTE — PROCEDURE: SUTURE REMOVAL (GLOBAL PERIOD)
Detail Level: Detailed
Add 38937 Cpt? (Important Note: In 2017 The Use Of 76805 Is Being Tracked By Cms To Determine Future Global Period Reimbursement For Global Periods): no

## 2018-11-02 ENCOUNTER — HOSPITAL ENCOUNTER (OUTPATIENT)
Dept: LAB | Facility: MEDICAL CENTER | Age: 48
End: 2018-11-02
Attending: INTERNAL MEDICINE
Payer: COMMERCIAL

## 2018-11-02 ENCOUNTER — HOSPITAL ENCOUNTER (OUTPATIENT)
Dept: LAB | Facility: MEDICAL CENTER | Age: 48
End: 2018-11-02
Attending: FAMILY MEDICINE
Payer: COMMERCIAL

## 2018-11-02 DIAGNOSIS — Z79.4 TYPE 2 DIABETES MELLITUS WITH HYPERGLYCEMIA, WITH LONG-TERM CURRENT USE OF INSULIN (HCC): ICD-10-CM

## 2018-11-02 DIAGNOSIS — K76.0 FATTY LIVER: ICD-10-CM

## 2018-11-02 DIAGNOSIS — E03.9 ACQUIRED HYPOTHYROIDISM: ICD-10-CM

## 2018-11-02 DIAGNOSIS — E11.65 TYPE 2 DIABETES MELLITUS WITH HYPERGLYCEMIA, WITH LONG-TERM CURRENT USE OF INSULIN (HCC): ICD-10-CM

## 2018-11-02 DIAGNOSIS — E78.2 MIXED HYPERLIPIDEMIA: ICD-10-CM

## 2018-11-02 LAB
ALBUMIN SERPL BCP-MCNC: 4.4 G/DL (ref 3.2–4.9)
ALBUMIN/GLOB SERPL: 1.7 G/DL
ALP SERPL-CCNC: 61 U/L (ref 30–99)
ALT SERPL-CCNC: 22 U/L (ref 2–50)
ANION GAP SERPL CALC-SCNC: 7 MMOL/L (ref 0–11.9)
ANION GAP SERPL CALC-SCNC: 7 MMOL/L (ref 0–11.9)
AST SERPL-CCNC: 17 U/L (ref 12–45)
BASOPHILS # BLD AUTO: 0.9 % (ref 0–1.8)
BASOPHILS # BLD: 0.05 K/UL (ref 0–0.12)
BILIRUB SERPL-MCNC: 0.9 MG/DL (ref 0.1–1.5)
BUN SERPL-MCNC: 17 MG/DL (ref 8–22)
BUN SERPL-MCNC: 17 MG/DL (ref 8–22)
CALCIUM SERPL-MCNC: 10.5 MG/DL (ref 8.5–10.5)
CALCIUM SERPL-MCNC: 10.6 MG/DL (ref 8.5–10.5)
CHLORIDE SERPL-SCNC: 104 MMOL/L (ref 96–112)
CHLORIDE SERPL-SCNC: 105 MMOL/L (ref 96–112)
CHOLEST SERPL-MCNC: 145 MG/DL (ref 100–199)
CO2 SERPL-SCNC: 28 MMOL/L (ref 20–33)
CO2 SERPL-SCNC: 28 MMOL/L (ref 20–33)
CREAT SERPL-MCNC: 0.92 MG/DL (ref 0.5–1.4)
CREAT SERPL-MCNC: 0.94 MG/DL (ref 0.5–1.4)
EOSINOPHIL # BLD AUTO: 0.18 K/UL (ref 0–0.51)
EOSINOPHIL NFR BLD: 3.3 % (ref 0–6.9)
ERYTHROCYTE [DISTWIDTH] IN BLOOD BY AUTOMATED COUNT: 38.5 FL (ref 35.9–50)
EST. AVERAGE GLUCOSE BLD GHB EST-MCNC: 203 MG/DL
FASTING STATUS PATIENT QL REPORTED: NORMAL
GLOBULIN SER CALC-MCNC: 2.6 G/DL (ref 1.9–3.5)
GLUCOSE SERPL-MCNC: 156 MG/DL (ref 65–99)
GLUCOSE SERPL-MCNC: 160 MG/DL (ref 65–99)
HBA1C MFR BLD: 8.7 % (ref 0–5.6)
HCT VFR BLD AUTO: 45.4 % (ref 42–52)
HDLC SERPL-MCNC: 21 MG/DL
HGB BLD-MCNC: 15.7 G/DL (ref 14–18)
IMM GRANULOCYTES # BLD AUTO: 0.03 K/UL (ref 0–0.11)
IMM GRANULOCYTES NFR BLD AUTO: 0.6 % (ref 0–0.9)
LDLC SERPL CALC-MCNC: 61 MG/DL
LYMPHOCYTES # BLD AUTO: 1.75 K/UL (ref 1–4.8)
LYMPHOCYTES NFR BLD: 32.4 % (ref 22–41)
MCH RBC QN AUTO: 27.9 PG (ref 27–33)
MCHC RBC AUTO-ENTMCNC: 34.6 G/DL (ref 33.7–35.3)
MCV RBC AUTO: 80.6 FL (ref 81.4–97.8)
MONOCYTES # BLD AUTO: 0.43 K/UL (ref 0–0.85)
MONOCYTES NFR BLD AUTO: 8 % (ref 0–13.4)
NEUTROPHILS # BLD AUTO: 2.96 K/UL (ref 1.82–7.42)
NEUTROPHILS NFR BLD: 54.8 % (ref 44–72)
NRBC # BLD AUTO: 0 K/UL
NRBC BLD-RTO: 0 /100 WBC
PLATELET # BLD AUTO: 222 K/UL (ref 164–446)
PMV BLD AUTO: 11.9 FL (ref 9–12.9)
POTASSIUM SERPL-SCNC: 4.1 MMOL/L (ref 3.6–5.5)
POTASSIUM SERPL-SCNC: 4.2 MMOL/L (ref 3.6–5.5)
PROT SERPL-MCNC: 7 G/DL (ref 6–8.2)
RBC # BLD AUTO: 5.63 M/UL (ref 4.7–6.1)
SODIUM SERPL-SCNC: 139 MMOL/L (ref 135–145)
SODIUM SERPL-SCNC: 140 MMOL/L (ref 135–145)
T4 FREE SERPL-MCNC: 1.12 NG/DL (ref 0.53–1.43)
TRIGL SERPL-MCNC: 316 MG/DL (ref 0–149)
TSH SERPL DL<=0.005 MIU/L-ACNC: 2.48 UIU/ML (ref 0.38–5.33)
WBC # BLD AUTO: 5.4 K/UL (ref 4.8–10.8)

## 2018-11-02 PROCEDURE — 36415 COLL VENOUS BLD VENIPUNCTURE: CPT

## 2018-11-02 PROCEDURE — 85025 COMPLETE CBC W/AUTO DIFF WBC: CPT

## 2018-11-02 PROCEDURE — 80053 COMPREHEN METABOLIC PANEL: CPT

## 2018-11-02 PROCEDURE — 80048 BASIC METABOLIC PNL TOTAL CA: CPT

## 2018-11-02 PROCEDURE — 85610 PROTHROMBIN TIME: CPT

## 2018-11-02 PROCEDURE — 83036 HEMOGLOBIN GLYCOSYLATED A1C: CPT

## 2018-11-02 PROCEDURE — 80061 LIPID PANEL: CPT

## 2018-11-02 PROCEDURE — 84439 ASSAY OF FREE THYROXINE: CPT

## 2018-11-02 PROCEDURE — 84443 ASSAY THYROID STIM HORMONE: CPT

## 2018-11-05 LAB
INR PPP: 1.06 (ref 0.87–1.13)
PROTHROMBIN TIME: 14 SEC (ref 12–14.6)

## 2018-11-20 ENCOUNTER — OFFICE VISIT (OUTPATIENT)
Dept: ENDOCRINOLOGY | Facility: MEDICAL CENTER | Age: 48
End: 2018-11-20
Payer: COMMERCIAL

## 2018-11-20 VITALS
OXYGEN SATURATION: 95 % | DIASTOLIC BLOOD PRESSURE: 78 MMHG | RESPIRATION RATE: 20 BRPM | HEART RATE: 77 BPM | WEIGHT: 252.2 LBS | SYSTOLIC BLOOD PRESSURE: 118 MMHG | HEIGHT: 73 IN | BODY MASS INDEX: 33.43 KG/M2

## 2018-11-20 DIAGNOSIS — E11.65 TYPE 2 DIABETES MELLITUS WITH HYPERGLYCEMIA, WITH LONG-TERM CURRENT USE OF INSULIN (HCC): ICD-10-CM

## 2018-11-20 DIAGNOSIS — E78.2 MIXED HYPERLIPIDEMIA: ICD-10-CM

## 2018-11-20 DIAGNOSIS — E03.9 ACQUIRED HYPOTHYROIDISM: ICD-10-CM

## 2018-11-20 DIAGNOSIS — I10 ESSENTIAL HYPERTENSION: ICD-10-CM

## 2018-11-20 DIAGNOSIS — Z79.4 TYPE 2 DIABETES MELLITUS WITH HYPERGLYCEMIA, WITH LONG-TERM CURRENT USE OF INSULIN (HCC): ICD-10-CM

## 2018-11-20 PROCEDURE — 99214 OFFICE O/P EST MOD 30 MIN: CPT | Performed by: INTERNAL MEDICINE

## 2018-11-20 NOTE — PROGRESS NOTES
"Endocrinology Clinic Progress Note    CC: Diabetes    HPI:  Brett Vicente is a 47 y.o. old patient who comes in today for routine follow up.     Type 2 diabetes: He is currently on Basaglar 58 units at bedtime, Farxiga 10 mg daily and metformin 1000 mg twice a day.  In the past he did not tolerate Bydureon due to nausea/vomiting.  He checks blood sugars once a day.  Fasting blood sugar in the past a few weeks have been in the range of 160-220.  No hypoglycemia.    Hypertension: Blood pressure is well controlled. He is on a RB.    Hyperlipidemia: LDL and total cholesterol well controlled, triglycerides elevated. He is on Lipitor, tolerating well.    ROS:  Constitutional: Positive for weight gain  Endo: Denies excessive thirst or frequent urination    PMH:  Patient Active Problem List   Diagnosis   • Gout   • Hypogonadism in male   • Diabetes mellitus type 2, uncontrolled, with complications ([facility])   • Essential hypertension   • Hypothyroidism   • Gastroesophageal reflux disease without esophagitis   • Hypercalcemia   • Bilateral shoulder tendinopathy   • Mixed hyperlipidemia   • Vitamin D deficiency   • Moderate single current episode of major depressive disorder (HCC)   • Obesity (BMI 30-39.9)   • Fatty liver     EXAM:  Vital signs: /78 (BP Location: Right arm, Patient Position: Sitting, BP Cuff Size: Adult)   Pulse 77   Resp 20   Ht 1.854 m (6' 1\")   Wt 114.4 kg (252 lb 3.2 oz)   SpO2 95%   BMI 33.27 kg/m²   General: No apparent distress, cooperative  Eyes: No scleral icterus, no discharge  Neck: Normal on external inspection  Resp: Normal effort  Extremities: No lower extremity edema  Psych: Alert and oriented, normal mood and affect    Assessment and Plan:    1. Type 2 diabetes mellitus with hyperglycemia, with long-term current use of insulin ([facility])  · Recent hemoglobin A1c is 8.7%  · Goal A1c less than 7%  · We will try Victoza at a lower dose of 0.6 mg daily  · Increased Basaglar to 64 units at " bedtime, and we discussed treat to target recommendations  · Continue Farxiga and metformin  · Advised to check blood sugars twice a day  · We discussed about limiting carbohydrate intake    2. Acquired hypothyroidism  · Recent TSH is 2.4  · Continue levothyroxine 75 µg daily    3. Mixed hyperlipidemia  · LDL 61, triglycerides 316  · Continue Lipitor    4. Essential hypertension  · Blood pressure is well controlled  · Continue ARB    Return in about 3 months (around 2/20/2019).    Thank you for allowing me to participate in the care of this patient.    Lorena Joy M.D.    CC:   Frida Baumann M.D.    This note was created using voice recognition software (Dragon). The accuracy of the dictation is limited by the abilities of the software. I have reviewed the note prior to signing, however some errors in grammar and context are still possible. If you have any questions related to this note please do not hesitate to contact our office.

## 2019-02-06 ENCOUNTER — OFFICE VISIT (OUTPATIENT)
Dept: ENDOCRINOLOGY | Facility: MEDICAL CENTER | Age: 49
End: 2019-02-06
Payer: COMMERCIAL

## 2019-02-06 VITALS
HEART RATE: 77 BPM | OXYGEN SATURATION: 95 % | BODY MASS INDEX: 34.01 KG/M2 | WEIGHT: 256.6 LBS | SYSTOLIC BLOOD PRESSURE: 144 MMHG | HEIGHT: 73 IN | DIASTOLIC BLOOD PRESSURE: 78 MMHG

## 2019-02-06 DIAGNOSIS — E03.9 ACQUIRED HYPOTHYROIDISM: ICD-10-CM

## 2019-02-06 DIAGNOSIS — E66.9 OBESITY (BMI 30-39.9): ICD-10-CM

## 2019-02-06 DIAGNOSIS — Z79.4 TYPE 2 DIABETES MELLITUS WITH HYPERGLYCEMIA, WITH LONG-TERM CURRENT USE OF INSULIN (HCC): ICD-10-CM

## 2019-02-06 DIAGNOSIS — E79.0 HYPERURICEMIA: ICD-10-CM

## 2019-02-06 DIAGNOSIS — E11.65 TYPE 2 DIABETES MELLITUS WITH HYPERGLYCEMIA, WITH LONG-TERM CURRENT USE OF INSULIN (HCC): ICD-10-CM

## 2019-02-06 DIAGNOSIS — E29.1 HYPOGONADISM IN MALE: ICD-10-CM

## 2019-02-06 DIAGNOSIS — E53.8 VITAMIN B 12 DEFICIENCY: ICD-10-CM

## 2019-02-06 DIAGNOSIS — I10 ESSENTIAL HYPERTENSION: ICD-10-CM

## 2019-02-06 DIAGNOSIS — E55.9 VITAMIN D DEFICIENCY: ICD-10-CM

## 2019-02-06 DIAGNOSIS — Z79.899 HIGH RISK MEDICATION USE: ICD-10-CM

## 2019-02-06 LAB
HBA1C MFR BLD: 7.2 % (ref ?–5.8)
INT CON NEG: NORMAL
INT CON POS: NORMAL

## 2019-02-06 PROCEDURE — 83036 HEMOGLOBIN GLYCOSYLATED A1C: CPT | Performed by: INTERNAL MEDICINE

## 2019-02-06 PROCEDURE — 92250 FUNDUS PHOTOGRAPHY W/I&R: CPT | Mod: TC | Performed by: INTERNAL MEDICINE

## 2019-02-06 PROCEDURE — 99215 OFFICE O/P EST HI 40 MIN: CPT | Mod: 25 | Performed by: INTERNAL MEDICINE

## 2019-02-06 RX ORDER — TESTOSTERONE CYPIONATE 200 MG/ML
250 INJECTION, SOLUTION INTRAMUSCULAR
Status: DISCONTINUED | OUTPATIENT
Start: 2019-02-06 | End: 2019-04-17

## 2019-02-06 RX ORDER — INSULIN GLARGINE 100 [IU]/ML
70 INJECTION, SOLUTION SUBCUTANEOUS DAILY
Qty: 63 ML | Refills: 3 | Status: SHIPPED | OUTPATIENT
Start: 2019-02-06 | End: 2020-04-20 | Stop reason: SDUPTHER

## 2019-02-06 RX ORDER — SYRINGE W-NEEDLE,DISPOSAB,3 ML 25GX5/8"
SYRINGE, EMPTY DISPOSABLE MISCELLANEOUS
Qty: 12 EACH | Refills: 1 | Status: SHIPPED | OUTPATIENT
Start: 2019-02-06 | End: 2019-02-06 | Stop reason: SDUPTHER

## 2019-02-06 RX ORDER — SYRINGE W-NEEDLE,DISPOSAB,3 ML 25GX5/8"
SYRINGE, EMPTY DISPOSABLE MISCELLANEOUS
Qty: 12 EACH | Refills: 1 | Status: SHIPPED | OUTPATIENT
Start: 2019-02-06 | End: 2019-04-17 | Stop reason: SDUPTHER

## 2019-02-06 RX ORDER — TESTOSTERONE CYPIONATE 200 MG/ML
250 INJECTION, SOLUTION INTRAMUSCULAR
Qty: 10 ML | Refills: 0 | Status: SHIPPED | OUTPATIENT
Start: 2019-02-06 | End: 2019-04-17 | Stop reason: SDUPTHER

## 2019-02-06 RX ORDER — LEVOTHYROXINE SODIUM 0.1 MG/1
100 TABLET ORAL DAILY
Qty: 90 TAB | Refills: 3 | Status: SHIPPED | OUTPATIENT
Start: 2019-02-06 | End: 2019-10-02 | Stop reason: SDUPTHER

## 2019-02-06 RX ORDER — HYDROCHLOROTHIAZIDE 12.5 MG/1
12.5 CAPSULE, GELATIN COATED ORAL DAILY
Qty: 90 CAP | Refills: 1 | Status: SHIPPED | OUTPATIENT
Start: 2019-02-06 | End: 2019-07-03 | Stop reason: SDUPTHER

## 2019-02-06 NOTE — PROGRESS NOTES
Endocrinology Clinic Progress Note  PCP: Frida Baumann M.D.    HPI:  Brett Vicente is a 48 y.o. old patient who comes in today for review of multiple endocrine problems    Hypogonadism in male: not yet on testosterone Replacement.  He does state that in the past he has been on it and benefited from taking it.    Hypothyroidism: on Levothyroxine 75 mcg per day    He also has vitamin D deficiency in the past.     Type 2 diabetes: currently Basaglar insulin, Farxiga, Metformin and Victoza.  Complains of nausea with higher doses of Victoza    Most Recent HbA1c:   Lab Results   Component Value Date/Time    HBA1C 7.2 02/06/2019 10:08 AM    Previous A1c on 11/2/18 was 8.7    Current Diabetes Regimen:  GLP-1 Agent: Victoza 0.6 mg per day  SGLT-2 Inhibitor:  Dapagliflozin 10 mg once daily   Metformin XR 1000 mg bid  Basal Insulin: Basaglar 64 units in the evening.      Testing fasting 2-3 times per week.  Most blood sugars less than 140  Hypoglycemia:  None    ROS:  Constitutional: Fatigue, no weight loss  GI: low libido, generalized muscle weakness  Cardiac: No palpitations or racing heart  Resp: No shortness of breath  Neuro: No numbness or tinging in feet  Endo: No heat or cold intolerance, no polyuria or polydipsia  All other systems were reviewed and were negative.    Past Medical History:  Patient Active Problem List    Diagnosis Date Noted   • Obesity (BMI 30-39.9) 04/24/2018   • Fatty liver 04/24/2018   • Moderate single current episode of major depressive disorder (HCC) 01/20/2017   • Vitamin D deficiency 01/12/2017   • Mixed hyperlipidemia 12/07/2016   • Hypercalcemia 12/22/2015   • Bilateral shoulder tendinopathy 12/22/2015   • Hypogonadism in male 06/25/2015   • Diabetes mellitus type 2, uncontrolled, with complications (HCC) 06/25/2015   • Essential hypertension 06/25/2015   • Hypothyroidism 06/25/2015   • Gastroesophageal reflux disease without esophagitis 06/25/2015   • Gout 08/19/2014       Past  "Surgical History:  Past Surgical History:   Procedure Laterality Date   • LUMBAR FUSION ANTERIOR  6/10/2009    [facility]   • LUMBAR FUSION ANTERIOR         Allergies:  Morphine and Pcn [penicillins]    Social History:  Social History     Social History   • Marital status:      Spouse name: N/A   • Number of children: N/A   • Years of education: N/A     Occupational History   • Not on file.     Social History Main Topics   • Smoking status: Never Smoker   • Smokeless tobacco: Never Used   • Alcohol use 0.5 oz/week     1 Shots of liquor per week   • Drug use: No   • Sexual activity: Yes     Partners: Female     Other Topics Concern   •  Service No   • Blood Transfusions No   • Caffeine Concern No   • Occupational Exposure No   • Hobby Hazards No   • Sleep Concern No   • Stress Concern No   • Weight Concern Yes   • Special Diet No   • Back Care No   • Exercise Yes   • Bike Helmet Yes   • Seat Belt Yes   • Self-Exams No     Social History Narrative   • No narrative on file       Family History:  Family History   Problem Relation Age of Onset   • Hyperlipidemia Mother    • Diabetes Father    • Heart Disease Father    • Diabetes Brother    • Heart Disease Paternal Grandfather    • Stroke Paternal Grandfather        Medications:    Current Outpatient Prescriptions:   •  Insulin Glargine (BASAGLAR KWIKPEN) 100 UNIT/ML Solution Pen-injector, Inject 70 Units as instructed every day., Disp: 63 mL, Rfl: 3  •  hydrochlorothiazide (MICROZIDE) 12.5 MG capsule, Take 1 Cap by mouth every day., Disp: 90 Cap, Rfl: 1  •  levothyroxine (SYNTHROID) 100 MCG Tab, Take 1 Tab by mouth every day., Disp: 90 Tab, Rfl: 3  •  testosterone cypionate (DEPO-TESTOSTERONE) 200 MG/ML Solution injection, 1.25 mL by Intramuscular route every 14 days for 112 days., Disp: 10 mL, Rfl: 0  •  Syringe/Needle, Disp, (SYRINGE 3CC/16WO7-4/2\") 22G X 1-1/2\" 3 ML Misc, For use with Intra-muscular " testosterone  injection every 2 weeks., Disp: 12 Each, Rfl: 1  •  liraglutide (VICTOZA) 18 MG/3ML Solution Pen-injector injection, Inject 0.3 mL as instructed every day. (Patient taking differently: Inject 0.6 mg as instructed every day.), Disp: 9 PEN, Rfl: 1  •  Dapagliflozin Propanediol (FARXIGA) 10 MG Tab, Take 10 mg by mouth every day., Disp: 90 Tab, Rfl: 2  •  atorvastatin (LIPITOR) 40 MG Tab, Take 1 Tab by mouth every day., Disp: 90 Tab, Rfl: 2  •  fenofibrate (TRICOR) 145 MG Tab, TAKE 1 TABLET DAILY, Disp: 90 Tab, Rfl: 2  •  metFORMIN ER (GLUCOPHAGE XR) 500 MG TABLET SR 24 HR, Take 2 Tabs by mouth 2 times a day., Disp: 360 Tab, Rfl: 2  •  irbesartan (AVAPRO) 300 MG Tab, TAKE 1 TABLET DAILY, Disp: 90 Tab, Rfl: 2  •  spironolactone (ALDACTONE) 25 MG Tab, Take 0.5 Tabs by mouth every day. TAKE 0.5 TABS BY MOUTH EVERY DAY., Disp: 45 Tab, Rfl: 2  •  carvedilol (COREG) 25 MG Tab, Take 0.5 Tabs by mouth 2 times a day., Disp: 90 Tab, Rfl: 2  •  lansoprazole (PREVACID) 30 MG CAPSULE DELAYED RELEASE, Take 1 Cap by mouth 2 Times a Day., Disp: 180 Cap, Rfl: 1  •  allopurinol (ZYLOPRIM) 300 MG Tab, Take 1 Tab by mouth every day., Disp: 90 Tab, Rfl: 3  •  Omega 3 1000 MG CAPS, Take 4 a day, Disp: 100 Cap, Rfl: 6  •  aspirin EC (ECOTRIN) 81 MG TBEC, Take 81 mg by mouth every day., Disp: , Rfl:   •  Blood Glucose Monitoring Suppl Device, Meter: Dispense One Touch Ultra 2 meter. Sig. Use as directed for blood sugar monitoring., Disp: 1 Device, Rfl: 0  •  Blood Glucose Monitoring Suppl Supplies Misc, Test strips order: Test strips for One Touch Ultra 2 meter. Sig: use QID and prn ssx high or low sugar, Disp: 100 Each, Rfl: 2  •  Insulin Pen Needle (BD PEN NEEDLE CARLY U/F) 32G X 4 MM Misc, For insulin shot once a day, Disp: 100 Each, Rfl: 3  •  Lancets Misc, Lancets order: Lancets for One Touch Ultra 2 meter. Sig: use QID and prn ssx high or low sugar., Disp: 100 Each, Rfl: 3  •  fluticasone (FLONASE) 50 MCG/ACT nasal spray,  "Spray 2 Sprays in nose every day., Disp: 16 g, Rfl: 0    Current Facility-Administered Medications:   •  testosterone cypionate (DEPO-TESTOSTERONE) injection 250 mg, 250 mg, Intramuscular, Q14 DAYS, Be Licona M.D.    Labs: Reviewed    Physical Examination:  Vital signs: /78   Pulse 77   Ht 1.854 m (6' 1\")   Wt 116.4 kg (256 lb 9.6 oz)   SpO2 95%   BMI 33.85 kg/m²  Body mass index is 33.85 kg/m².  General: No apparent distress, cooperative  Eyes: No scleral icterus or discharge  ENMT: Normal on external inspection of nose, lips, normal thyroid exam  Neck: No abnormal masses on inspection  Resp: Normal effort, clear to auscultation bilaterally   CVS: Regular rate and rhythm, S1 S2 normal, no murmur   Extremities: No edema  Abdomen: abdominal obesity present  Neuro: Alert and oriented  Skin: No rash  Psych: Normal mood and affect, intact memory and able to make informed decisions    The following topics were discussed    1. All medications, side effects and compliance (discussed carefully)  2. Annual eye examinations at Ophthalmology due, will order Retinal scan in office today  3. Diabetic diet discussed in detail trying to eat healthier.   4. Foot care discussed and Podiatry visits : checks feet daily, states he has some numbness on the 5th toe of his left foot, not related to diabetes.   5. Glycohemoglobin and other lab monitoring : better control, A1c down to 7.2 from 8.7, discussed goal is less than 7%  6. Home glucose monitoring emphasized  7. Weight control and daily exercise : occasionally will walk his dog, otherwise he is pretty sedentarly    Assessment and Plan:    1. Hypogonadism in male  Resume testosterone replacement.  Start testosterone cypionate 250 mg intramuscular every 2 weeks    2. Diabetes mellitus type 2, uncontrolled, with complications (HCC)  A1c decreased from 8.7 to 7.2, will increase his Victoza by 1 click every few days until he can tolerate 1.2mg per day.  Goal for A1c " is less than 7%.     3. Vitamin D deficiency  We will recheck his vitamin D levels    4. Hypothyroidism     Will increase Levothyroxine to 100 mcg per day    5. HTN:   His blood pressure today is uncontrolled.  He states that he has run out of his hydrochlorothiazide prescription.  He will now be resuming it again.    If we can optimize on his dose of GLP-1 and the blood pressure continues to be in a good range we will consider titrating down his diuretics and her other antihypertensive medications.  The SGL T2 inhibition also helps reduce uric acid levels.  Will monitor closely and if the uric acid levels are in a good range consider titrating down the dose of allopurinol and potentially stopping it    Return in about 2 months (around 4/6/2019).    Total face to face time spent with patient equals 40 minutes. 32/40 minutes were spent on counseling the patient about the mechanism of action, side effects and benefits of GLP-1 therapy, SGLT-Inhibitor therapy. I also counseled the patient on hypoglycemia recognition and management.  Also counseled the patient on the side effects and benefits of testosterone treatment.     Thank you for allowing me to participate in the care of this patient.    Be Licona M.D.  02/06/19    CC:   Frida Baumann M.D.    This note was created using voice recognition software (Dragon). The accuracy of the dictation is limited by the abilities of the software. I have reviewed the note prior to signing, however some errors in grammar and context are still possible. If you have any questions related to this note please do not hesitate to contact our office.   This note was scribed by Mitra Wiley RN, ELGINE

## 2019-02-07 LAB — RETINAL SCREEN: NEGATIVE

## 2019-02-22 ENCOUNTER — APPOINTMENT (RX ONLY)
Dept: URBAN - METROPOLITAN AREA CLINIC 20 | Facility: CLINIC | Age: 49
Setting detail: DERMATOLOGY
End: 2019-02-22

## 2019-02-22 DIAGNOSIS — D22 MELANOCYTIC NEVI: ICD-10-CM

## 2019-02-22 DIAGNOSIS — L81.4 OTHER MELANIN HYPERPIGMENTATION: ICD-10-CM

## 2019-02-22 DIAGNOSIS — D18.0 HEMANGIOMA: ICD-10-CM

## 2019-02-22 DIAGNOSIS — L57.8 OTHER SKIN CHANGES DUE TO CHRONIC EXPOSURE TO NONIONIZING RADIATION: ICD-10-CM

## 2019-02-22 DIAGNOSIS — Z87.2 PERSONAL HISTORY OF DISEASES OF THE SKIN AND SUBCUTANEOUS TISSUE: ICD-10-CM | Status: STABLE

## 2019-02-22 DIAGNOSIS — L82.1 OTHER SEBORRHEIC KERATOSIS: ICD-10-CM

## 2019-02-22 PROBLEM — D22.5 MELANOCYTIC NEVI OF TRUNK: Status: ACTIVE | Noted: 2019-02-22

## 2019-02-22 PROBLEM — D22.61 MELANOCYTIC NEVI OF RIGHT UPPER LIMB, INCLUDING SHOULDER: Status: ACTIVE | Noted: 2019-02-22

## 2019-02-22 PROBLEM — D22.62 MELANOCYTIC NEVI OF LEFT UPPER LIMB, INCLUDING SHOULDER: Status: ACTIVE | Noted: 2019-02-22

## 2019-02-22 PROBLEM — D18.01 HEMANGIOMA OF SKIN AND SUBCUTANEOUS TISSUE: Status: ACTIVE | Noted: 2019-02-22

## 2019-02-22 PROCEDURE — ? OBSERVATION AND MEASURE

## 2019-02-22 PROCEDURE — ? COUNSELING

## 2019-02-22 PROCEDURE — 99214 OFFICE O/P EST MOD 30 MIN: CPT

## 2019-02-22 ASSESSMENT — LOCATION DETAILED DESCRIPTION DERM
LOCATION DETAILED: RIGHT POSTERIOR SHOULDER
LOCATION DETAILED: PERIUMBILICAL SKIN
LOCATION DETAILED: LEFT PROXIMAL POSTERIOR UPPER ARM
LOCATION DETAILED: LEFT PROXIMAL DORSAL FOREARM
LOCATION DETAILED: RIGHT CENTRAL MALAR CHEEK
LOCATION DETAILED: LEFT ANTERIOR DISTAL THIGH
LOCATION DETAILED: RIGHT LATERAL ABDOMEN
LOCATION DETAILED: RIGHT INFERIOR UPPER BACK
LOCATION DETAILED: RIGHT ANTERIOR PROXIMAL UPPER ARM
LOCATION DETAILED: LEFT ANTERIOR PROXIMAL UPPER ARM
LOCATION DETAILED: LEFT INFERIOR CENTRAL MALAR CHEEK
LOCATION DETAILED: EPIGASTRIC SKIN
LOCATION DETAILED: RIGHT PROXIMAL DORSAL FOREARM
LOCATION DETAILED: RIGHT MID-UPPER BACK
LOCATION DETAILED: RIGHT SUPERIOR MEDIAL UPPER BACK
LOCATION DETAILED: LEFT DISTAL POSTERIOR UPPER ARM
LOCATION DETAILED: LEFT MEDIAL SUPERIOR CHEST
LOCATION DETAILED: RIGHT DISTAL POSTERIOR UPPER ARM
LOCATION DETAILED: RIGHT ANTERIOR DISTAL UPPER ARM
LOCATION DETAILED: RIGHT ANTERIOR DISTAL THIGH
LOCATION DETAILED: RIGHT PROXIMAL POSTERIOR UPPER ARM

## 2019-02-22 ASSESSMENT — LOCATION SIMPLE DESCRIPTION DERM
LOCATION SIMPLE: RIGHT CHEEK
LOCATION SIMPLE: RIGHT THIGH
LOCATION SIMPLE: CHEST
LOCATION SIMPLE: LEFT UPPER ARM
LOCATION SIMPLE: LEFT THIGH
LOCATION SIMPLE: RIGHT UPPER ARM
LOCATION SIMPLE: RIGHT SHOULDER
LOCATION SIMPLE: LEFT FOREARM
LOCATION SIMPLE: LEFT CHEEK
LOCATION SIMPLE: RIGHT FOREARM
LOCATION SIMPLE: RIGHT UPPER BACK
LOCATION SIMPLE: ABDOMEN

## 2019-02-22 ASSESSMENT — LOCATION ZONE DERM
LOCATION ZONE: LEG
LOCATION ZONE: TRUNK
LOCATION ZONE: ARM
LOCATION ZONE: FACE

## 2019-02-22 NOTE — HPI: FULL BODY SKIN EXAMINATION
How Severe Are Your Spot(S)?: mild
What Type Of Note Output Would You Prefer (Optional)?: Bullet Format
What Is The Reason For Today's Visit?: Full Body Skin Examination
What Is The Reason For Today's Visit? (Being Monitored For X): surveillance against the recurrence of atypical nevi

## 2019-02-22 NOTE — PROCEDURE: OBSERVATION
Size Of Lesion: 3mm x 6mm
Detail Level: Detailed
Size Of Lesion: 3mm x 8mm
Body Location Override (Optional - Billing Will Still Be Based On Selected Body Map Location If Applicable): Left Mid Abdomen
Size Of Lesion: 4mm x 6mm
Body Location Override (Optional - Billing Will Still Be Based On Selected Body Map Location If Applicable): Mid Superior abdomen
Body Location Override (Optional - Billing Will Still Be Based On Selected Body Map Location If Applicable): Mid inferior abdomen
Size Of Lesion: 6mm x 1cm
Size Of Lesion: 4mm x 5mm

## 2019-03-07 RX ORDER — LANSOPRAZOLE 30 MG/1
30 CAPSULE, DELAYED RELEASE ORAL 2 TIMES DAILY
Qty: 180 CAP | Refills: 1 | Status: SHIPPED | OUTPATIENT
Start: 2019-03-07 | End: 2019-08-12 | Stop reason: SDUPTHER

## 2019-03-07 NOTE — TELEPHONE ENCOUNTER
Was the patient seen in the last year in this department? Yes  **LOV 2/6/19**    Does patient have an active prescription for medications requested? Yes    Received Request Via: Pharmacy **Mail order 90 days**

## 2019-04-05 ENCOUNTER — PATIENT MESSAGE (OUTPATIENT)
Dept: ENDOCRINOLOGY | Facility: MEDICAL CENTER | Age: 49
End: 2019-04-05

## 2019-04-05 ENCOUNTER — TELEPHONE (OUTPATIENT)
Dept: ENDOCRINOLOGY | Facility: MEDICAL CENTER | Age: 49
End: 2019-04-05

## 2019-04-05 DIAGNOSIS — M10.9 GOUT, UNSPECIFIED CAUSE, UNSPECIFIED CHRONICITY, UNSPECIFIED SITE: ICD-10-CM

## 2019-04-05 NOTE — TELEPHONE ENCOUNTER
DOCUMENTATION OF PAR STATUS:    1. Name of Medication & Dose: (DEPO-TESTOSTERONE) 200 MG/ML      2. Name of Prescription Coverage Company & phone #: [facility] PH: [phone]    3. Date Prior Auth Submitted: 4/5/19    4. What information was given to obtain insurance decision? Clinical notes and labs    5. Prior Auth Status? Pending    6. Action Taken: Patient Notified: yes

## 2019-04-08 RX ORDER — ALLOPURINOL 300 MG/1
300 TABLET ORAL DAILY
Qty: 90 TAB | Refills: 3 | Status: SHIPPED | OUTPATIENT
Start: 2019-04-08 | End: 2020-04-20 | Stop reason: SDUPTHER

## 2019-04-09 NOTE — TELEPHONE ENCOUNTER
FINAL PRIOR AUTHORIZATION STATUS:    1.  Name of Medication & Dose: Depo-Testosterone 200mg     2. Prior Auth Status (if approved--length of approval):  Approved 3/6/19-4/4/20.    3. Action Taken: Pharmacy Notified: yes    Documentation was scanned into media and attached to this telephone encounter.

## 2019-04-17 ENCOUNTER — OFFICE VISIT (OUTPATIENT)
Dept: ENDOCRINOLOGY | Facility: MEDICAL CENTER | Age: 49
End: 2019-04-17
Payer: COMMERCIAL

## 2019-04-17 VITALS
DIASTOLIC BLOOD PRESSURE: 72 MMHG | SYSTOLIC BLOOD PRESSURE: 115 MMHG | HEIGHT: 73 IN | HEART RATE: 87 BPM | OXYGEN SATURATION: 92 % | BODY MASS INDEX: 33.27 KG/M2 | WEIGHT: 251 LBS

## 2019-04-17 DIAGNOSIS — Z79.4 UNCONTROLLED TYPE 2 DIABETES MELLITUS WITH HYPERGLYCEMIA, WITH LONG-TERM CURRENT USE OF INSULIN (HCC): ICD-10-CM

## 2019-04-17 DIAGNOSIS — Z79.899 HIGH RISK MEDICATION USE: ICD-10-CM

## 2019-04-17 DIAGNOSIS — I10 ESSENTIAL HYPERTENSION: ICD-10-CM

## 2019-04-17 DIAGNOSIS — E29.1 HYPOGONADISM IN MALE: ICD-10-CM

## 2019-04-17 DIAGNOSIS — E03.9 ACQUIRED HYPOTHYROIDISM: ICD-10-CM

## 2019-04-17 DIAGNOSIS — E11.65 UNCONTROLLED TYPE 2 DIABETES MELLITUS WITH HYPERGLYCEMIA, WITH LONG-TERM CURRENT USE OF INSULIN (HCC): ICD-10-CM

## 2019-04-17 DIAGNOSIS — R79.89 LOW TESTOSTERONE IN MALE: ICD-10-CM

## 2019-04-17 PROCEDURE — 99214 OFFICE O/P EST MOD 30 MIN: CPT | Performed by: INTERNAL MEDICINE

## 2019-04-17 RX ORDER — TESTOSTERONE CYPIONATE 200 MG/ML
250 INJECTION, SOLUTION INTRAMUSCULAR
Qty: 10 ML | Refills: 0 | Status: SHIPPED | OUTPATIENT
Start: 2019-04-17 | End: 2019-06-26 | Stop reason: SDUPTHER

## 2019-04-17 RX ORDER — SYRINGE W-NEEDLE,DISPOSAB,3 ML 25GX5/8"
SYRINGE, EMPTY DISPOSABLE MISCELLANEOUS
Qty: 12 EACH | Refills: 1 | Status: SHIPPED | OUTPATIENT
Start: 2019-04-17 | End: 2020-04-20 | Stop reason: SDUPTHER

## 2019-06-24 NOTE — PROGRESS NOTES
"Endocrinology Clinic Progress Note  PCP: Frida Baumann M.D.    HPI:  Brett Vicente is a 48 y.o. old patient who comes in today for review of multiple endocrine problems.   Hypothyroidism, currently on Levothyroxine 100 mcg per day  Hypogonadism in male, on Depo-Testosterone 250 mg IM every 2 weeks  Type 2 diabetes, uncontrolled.       Most Recent HbA1c:   Lab Results   Component Value Date/Time    HBA1C 6.9 (A) 06/26/2019 05:01 PM   Previous A1c is 7.2 on 2/6/19     Current Diabetes Regimen:  Basaglar insulin 70 units at hs  Ozempic 0.25 mg per week  Farxiga 10 mg daily  Metformin XR 1000 mg bid.         Testing blood sugars 1 per day  Before Breakfast:  range  Hypoglycemia:  None    Exercise: sporadic irregular exercise, <half hour walking weekly  Diet: \"healthy\" diet  in general  Last Retinal Exam: 2/6/19, report on chart.   Daily Foot Exam: yes does have some numbness on his right foot, associated with spinal surgery.  Complaining of some pain in his heel (left)     Foot Exam:  Monofilament: done  Monofilament testing with a 10 gram force: sensation intact: decreased on right  Visual Inspection: Feet without maceration, ulcers, fissures.  Pedal pulses: intact bilaterally    Routine Dental Exams: yes    Ambulatory Vitals  Encounter Vitals  Blood Pressure: 134/84  Pulse: 83  Pulse Oximetry: 96 %  Weight: 113.9 kg (251 lb)  Height: 185.4 cm (6' 1\")  BMI (Calculated): 33.12  ROS:  Constitutional: No weight loss  Cardiac: No palpitations or racing heart  Resp: No shortness of breath  Neuro: No numbness or tinging in feet  Endo: No heat or cold intolerance, no polyuria or polydipsia  All other systems were reviewed and were negative.    Past Medical History:  Patient Active Problem List    Diagnosis Date Noted   • Obesity (BMI 30-39.9) 04/24/2018   • Fatty liver 04/24/2018   • Moderate single current episode of major depressive disorder (HCC) 01/20/2017   • Vitamin D deficiency 01/12/2017   • Mixed " hyperlipidemia 12/07/2016   • Hypercalcemia 12/22/2015   • Bilateral shoulder tendinopathy 12/22/2015   • Hypogonadism in male 06/25/2015   • Diabetes mellitus type 2, uncontrolled, with complications (HCC) 06/25/2015   • Essential hypertension 06/25/2015   • Hypothyroidism 06/25/2015   • Gastroesophageal reflux disease without esophagitis 06/25/2015   • Gout 08/19/2014       Past Surgical History:  Past Surgical History:   Procedure Laterality Date   • LUMBAR FUSION ANTERIOR  6/10/2009    Performed by LISA MONTERO at SURGERY [facility] ORS   • LUMBAR FUSION ANTERIOR         Allergies:  Morphine and Pcn [penicillins]    Social History:  Social History     Social History   • Marital status:      Spouse name: N/A   • Number of children: N/A   • Years of education: N/A     Occupational History   • Not on file.     Social History Main Topics   • Smoking status: Never Smoker   • Smokeless tobacco: Never Used   • Alcohol use 0.5 oz/week     1 Shots of liquor per week   • Drug use: No   • Sexual activity: Yes     Partners: Female     Other Topics Concern   •  Service No   • Blood Transfusions No   • Caffeine Concern No   • Occupational Exposure No   • Hobby Hazards No   • Sleep Concern No   • Stress Concern No   • Weight Concern Yes   • Special Diet No   • Back Care No   • Exercise Yes   • Bike Helmet Yes   • Seat Belt Yes   • Self-Exams No     Social History Narrative   • No narrative on file       Family History:  Family History   Problem Relation Age of Onset   • Hyperlipidemia Mother    • Diabetes Father    • Heart Disease Father    • Diabetes Brother    • Heart Disease Paternal Grandfather    • Stroke Paternal Grandfather        Medications:    Current Outpatient Prescriptions:   •  Dapagliflozin Propanediol (FARXIGA) 10 MG Tab, Take 10 mg by mouth every day., Disp: 90 Tab, Rfl: 2  •  fenofibrate (TRICOR) 145 MG Tab, TAKE 1 TABLET DAILY, Disp: 90 Tab, Rfl: 2  •  spironolactone (ALDACTONE) 25 MG Tab,  "Take 0.5 Tabs by mouth every day. TAKE 0.5 TABS BY MOUTH EVERY DAY., Disp: 45 Tab, Rfl: 2  •  carvedilol (COREG) 25 MG Tab, Take 0.5 Tabs by mouth 2 times a day., Disp: 90 Tab, Rfl: 2  •  atorvastatin (LIPITOR) 40 MG Tab, Take 1 Tab by mouth every day., Disp: 90 Tab, Rfl: 2  •  Semaglutide (OZEMPIC) 0.25 or 0.5 MG/DOSE Solution Pen-injector, Inject 0.25 mg as instructed every 7 days., Disp: 3 PEN, Rfl: 3  •  testosterone cypionate (DEPO-TESTOSTERONE) 200 MG/ML Solution injection, 1.25 mL by Intramuscular route every 14 days for 112 days., Disp: 10 mL, Rfl: 0  •  allopurinol (ZYLOPRIM) 300 MG Tab, Take 1 Tab by mouth every day., Disp: 90 Tab, Rfl: 3  •  lansoprazole (PREVACID) 30 MG CAPSULE DELAYED RELEASE, Take 1 Cap by mouth 2 Times a Day., Disp: 180 Cap, Rfl: 1  •  Insulin Glargine (BASAGLAR KWIKPEN) 100 UNIT/ML Solution Pen-injector, Inject 70 Units as instructed every day., Disp: 63 mL, Rfl: 3  •  hydrochlorothiazide (MICROZIDE) 12.5 MG capsule, Take 1 Cap by mouth every day., Disp: 90 Cap, Rfl: 1  •  levothyroxine (SYNTHROID) 100 MCG Tab, Take 1 Tab by mouth every day., Disp: 90 Tab, Rfl: 3  •  metFORMIN ER (GLUCOPHAGE XR) 500 MG TABLET SR 24 HR, Take 2 Tabs by mouth 2 times a day., Disp: 360 Tab, Rfl: 2  •  irbesartan (AVAPRO) 300 MG Tab, TAKE 1 TABLET DAILY, Disp: 90 Tab, Rfl: 2  •  fluticasone (FLONASE) 50 MCG/ACT nasal spray, Spray 2 Sprays in nose every day., Disp: 16 g, Rfl: 0  •  Omega 3 1000 MG CAPS, Take 4 a day (Patient taking differently: 5,000 mg every day. Take 4 a day), Disp: 100 Cap, Rfl: 6  •  aspirin EC (ECOTRIN) 81 MG TBEC, Take 81 mg by mouth every day., Disp: , Rfl:   •  Syringe/Needle, Disp, (SYRINGE 3CC/88XN3-0/2\") 22G X 1-1/2\" 3 ML Misc, For use with Intra-muscular testosterone  injection every 2 weeks., Disp: 12 Each, Rfl: 1  •  Blood Glucose Monitoring Suppl Device, Meter: Dispense One Touch Ultra 2 meter. Sig. Use as directed for blood sugar monitoring., Disp: 1 Device, Rfl: 0  •  " "Blood Glucose Monitoring Suppl Supplies Misc, Test strips order: Test strips for One Touch Ultra 2 meter. Sig: use QID and prn ssx high or low sugar, Disp: 100 Each, Rfl: 2  •  Insulin Pen Needle (BD PEN NEEDLE CARLY U/F) 32G X 4 MM Misc, For insulin shot once a day, Disp: 100 Each, Rfl: 3  •  Lancets Misc, Lancets order: Lancets for One Touch Ultra 2 meter. Sig: use QID and prn ssx high or low sugar., Disp: 100 Each, Rfl: 3    Labs: Reviewed    Physical Examination:  Vital signs: /84   Pulse 83   Ht 1.854 m (6' 1\")   Wt 113.9 kg (251 lb)   SpO2 96%   BMI 33.12 kg/m²  Body mass index is 33.12 kg/m².  General: No apparent distress, cooperative  Eyes: No scleral icterus or discharge  ENMT: Normal on external inspection of nose, lips, normal thyroid exam  Neck: No abnormal masses on inspection  Resp: Normal effort, clear to auscultation bilaterally   CVS: Regular rate and rhythm, S1 S2 normal, no murmur   Extremities: No edema  Abdomen: abdominal obesity present  Neuro: Alert and oriented  Skin: No rash  Psych: Normal mood and affect, intact memory and able to make informed decisions    Assessment and Plan:  Patient educated on:  - Exercise  - Foot Care: what to look for when checking feet every day and when to contact HCP  - HbA1C: target  1. Hypogonadism in male  Continue testosterone treatment    2. Vitamin D deficiency  continue vitamin D supplementation    3. Acquired hypothyroidism  Continue levothyroxine    4. Diabetes mellitus type 2, uncontrolled, with complications (HCC)  Relatively well controlled.  If he notices blood sugars less than 60 more than once then advised to reduce the Basaglar by 4 to 5 units.  Increase Ozempic 2.5 mg weekly as tolerated      Return in about 3 months (around 9/26/2019).    Thank you for allowing me to participate in the care of this patient.    Be Licona  06/24/19    CC:   Frida Baumann M.D.    This note was created using voice recognition software " (Delia). The accuracy of the dictation is limited by the abilities of the software. I have reviewed the note prior to signing, however some errors in grammar and context are still possible. If you have any questions related to this note please do not hesitate to contact our office.   This note was scribed by Mitra Wiley RN, CDE

## 2019-06-26 ENCOUNTER — OFFICE VISIT (OUTPATIENT)
Dept: ENDOCRINOLOGY | Facility: MEDICAL CENTER | Age: 49
End: 2019-06-26
Payer: COMMERCIAL

## 2019-06-26 VITALS
DIASTOLIC BLOOD PRESSURE: 84 MMHG | HEIGHT: 73 IN | SYSTOLIC BLOOD PRESSURE: 134 MMHG | BODY MASS INDEX: 33.27 KG/M2 | OXYGEN SATURATION: 96 % | HEART RATE: 83 BPM | WEIGHT: 251 LBS

## 2019-06-26 DIAGNOSIS — E55.9 VITAMIN D DEFICIENCY: ICD-10-CM

## 2019-06-26 DIAGNOSIS — I10 ESSENTIAL HYPERTENSION: ICD-10-CM

## 2019-06-26 DIAGNOSIS — Z79.4 TYPE 2 DIABETES MELLITUS WITH HYPERGLYCEMIA, WITH LONG-TERM CURRENT USE OF INSULIN (HCC): ICD-10-CM

## 2019-06-26 DIAGNOSIS — E11.65 TYPE 2 DIABETES MELLITUS WITH HYPERGLYCEMIA, WITH LONG-TERM CURRENT USE OF INSULIN (HCC): ICD-10-CM

## 2019-06-26 DIAGNOSIS — E78.5 DYSLIPIDEMIA: ICD-10-CM

## 2019-06-26 DIAGNOSIS — E29.1 HYPOGONADISM IN MALE: ICD-10-CM

## 2019-06-26 DIAGNOSIS — E78.2 MIXED HYPERLIPIDEMIA: ICD-10-CM

## 2019-06-26 DIAGNOSIS — E53.8 VITAMIN B 12 DEFICIENCY: ICD-10-CM

## 2019-06-26 DIAGNOSIS — E03.9 ACQUIRED HYPOTHYROIDISM: ICD-10-CM

## 2019-06-26 LAB
HBA1C MFR BLD: 6.9 % (ref 0–5.6)
INT CON NEG: ABNORMAL
INT CON POS: ABNORMAL

## 2019-06-26 PROCEDURE — 99214 OFFICE O/P EST MOD 30 MIN: CPT | Performed by: INTERNAL MEDICINE

## 2019-06-26 PROCEDURE — 83036 HEMOGLOBIN GLYCOSYLATED A1C: CPT | Performed by: INTERNAL MEDICINE

## 2019-06-26 RX ORDER — CARVEDILOL 25 MG/1
12.5 TABLET ORAL 2 TIMES DAILY
Qty: 90 TAB | Refills: 2 | Status: SHIPPED | OUTPATIENT
Start: 2019-06-26 | End: 2020-04-20 | Stop reason: SDUPTHER

## 2019-06-26 RX ORDER — SPIRONOLACTONE 25 MG/1
12.5 TABLET ORAL
Qty: 45 TAB | Refills: 2 | Status: SHIPPED | OUTPATIENT
Start: 2019-06-26 | End: 2020-04-20 | Stop reason: SDUPTHER

## 2019-06-26 RX ORDER — FENOFIBRATE 145 MG/1
TABLET, COATED ORAL
Qty: 90 TAB | Refills: 2 | Status: SHIPPED | OUTPATIENT
Start: 2019-06-26 | End: 2020-04-20 | Stop reason: SDUPTHER

## 2019-06-26 RX ORDER — ATORVASTATIN CALCIUM 40 MG/1
40 TABLET, FILM COATED ORAL DAILY
Qty: 90 TAB | Refills: 2 | Status: SHIPPED | OUTPATIENT
Start: 2019-06-26 | End: 2020-04-20 | Stop reason: SDUPTHER

## 2019-06-26 RX ORDER — TESTOSTERONE CYPIONATE 200 MG/ML
250 INJECTION, SOLUTION INTRAMUSCULAR
Qty: 10 ML | Refills: 0 | Status: SHIPPED | OUTPATIENT
Start: 2019-06-26 | End: 2019-10-02 | Stop reason: SDUPTHER

## 2019-06-26 RX ORDER — DAPAGLIFLOZIN 10 MG/1
10 TABLET, FILM COATED ORAL DAILY
Qty: 90 TAB | Refills: 2 | Status: SHIPPED | OUTPATIENT
Start: 2019-06-26 | End: 2020-04-20 | Stop reason: SDUPTHER

## 2019-06-26 NOTE — TELEPHONE ENCOUNTER
Was the patient seen in the last year in this department? Yes    Does patient have an active prescription for medications requested? Yes    Received Request Via: Pharmacy     Day supply: 90 days    **Last office visit 4/17/19**

## 2019-07-02 ENCOUNTER — TELEPHONE (OUTPATIENT)
Dept: ENDOCRINOLOGY | Facility: MEDICAL CENTER | Age: 49
End: 2019-07-02

## 2019-07-02 NOTE — TELEPHONE ENCOUNTER
VOICEMAIL  1. Caller Name: [facility]                       Call Back Number: [phone]  2. Message: Pharmacy called to make sure he was only supposed to get 0.25mg  Ref #[id number]  3. Patient approves office to leave a detailed voicemail/MyChart message: no      Phone Number Called: [phone] (home)   Call outcome: left message for patient to call back regarding message below  Message: lm for patient to call us back to see if he is still taking the 0.25mg.

## 2019-07-03 DIAGNOSIS — I10 ESSENTIAL HYPERTENSION: ICD-10-CM

## 2019-07-03 DIAGNOSIS — Z79.4 TYPE 2 DIABETES MELLITUS WITH HYPERGLYCEMIA, WITH LONG-TERM CURRENT USE OF INSULIN (HCC): ICD-10-CM

## 2019-07-03 DIAGNOSIS — E11.65 TYPE 2 DIABETES MELLITUS WITH HYPERGLYCEMIA, WITH LONG-TERM CURRENT USE OF INSULIN (HCC): ICD-10-CM

## 2019-07-03 RX ORDER — METFORMIN HYDROCHLORIDE 500 MG/1
1000 TABLET, EXTENDED RELEASE ORAL 2 TIMES DAILY
Qty: 360 TAB | Refills: 2 | Status: SHIPPED | OUTPATIENT
Start: 2019-07-03 | End: 2020-04-20 | Stop reason: SDUPTHER

## 2019-07-03 RX ORDER — HYDROCHLOROTHIAZIDE 12.5 MG/1
12.5 CAPSULE, GELATIN COATED ORAL DAILY
Qty: 90 CAP | Refills: 1 | Status: SHIPPED | OUTPATIENT
Start: 2019-07-03 | End: 2020-04-20 | Stop reason: SDUPTHER

## 2019-07-03 NOTE — TELEPHONE ENCOUNTER
Was the patient seen in the last year in this department? Yes    Does patient have an active prescription for medications requested? Yes    Received Request Via: Pharmacy     **Last office visit 6/26/19**

## 2019-07-24 DIAGNOSIS — I10 ESSENTIAL HYPERTENSION: ICD-10-CM

## 2019-07-24 RX ORDER — IRBESARTAN 300 MG/1
TABLET ORAL
Qty: 90 TAB | Refills: 2 | Status: SHIPPED | OUTPATIENT
Start: 2019-07-24 | End: 2020-04-20 | Stop reason: SDUPTHER

## 2019-07-24 RX ORDER — LANSOPRAZOLE 30 MG/1
CAPSULE, DELAYED RELEASE ORAL
Refills: 1 | OUTPATIENT
Start: 2019-07-24

## 2019-07-29 DIAGNOSIS — E11.65 TYPE 2 DIABETES MELLITUS WITH HYPERGLYCEMIA, WITH LONG-TERM CURRENT USE OF INSULIN (HCC): ICD-10-CM

## 2019-07-29 DIAGNOSIS — Z79.4 TYPE 2 DIABETES MELLITUS WITH HYPERGLYCEMIA, WITH LONG-TERM CURRENT USE OF INSULIN (HCC): ICD-10-CM

## 2019-08-12 ENCOUNTER — OFFICE VISIT (OUTPATIENT)
Dept: URGENT CARE | Facility: MEDICAL CENTER | Age: 49
End: 2019-08-12
Payer: COMMERCIAL

## 2019-08-12 VITALS
BODY MASS INDEX: 33.27 KG/M2 | SYSTOLIC BLOOD PRESSURE: 148 MMHG | WEIGHT: 251 LBS | DIASTOLIC BLOOD PRESSURE: 76 MMHG | OXYGEN SATURATION: 98 % | TEMPERATURE: 98.1 F | HEART RATE: 79 BPM | HEIGHT: 73 IN | RESPIRATION RATE: 16 BRPM

## 2019-08-12 DIAGNOSIS — M10.9 ACUTE GOUT INVOLVING TOE OF LEFT FOOT, UNSPECIFIED CAUSE: ICD-10-CM

## 2019-08-12 PROCEDURE — 99214 OFFICE O/P EST MOD 30 MIN: CPT | Performed by: PHYSICIAN ASSISTANT

## 2019-08-12 RX ORDER — INDOMETHACIN 50 MG/1
50 CAPSULE ORAL 3 TIMES DAILY PRN
Qty: 30 CAP | Refills: 0 | Status: SHIPPED | OUTPATIENT
Start: 2019-08-12 | End: 2019-09-25

## 2019-08-12 RX ORDER — LANSOPRAZOLE 30 MG/1
CAPSULE, DELAYED RELEASE ORAL
Qty: 180 CAP | Refills: 1 | Status: SHIPPED | OUTPATIENT
Start: 2019-08-12 | End: 2020-01-21

## 2019-08-12 RX ORDER — COLCHICINE 0.6 MG/1
TABLET ORAL
Qty: 3 TAB | Refills: 0 | Status: SHIPPED | OUTPATIENT
Start: 2019-08-12 | End: 2019-09-25

## 2019-08-12 ASSESSMENT — ENCOUNTER SYMPTOMS
CHILLS: 0
TINGLING: 0
FEVER: 0
WEAKNESS: 0

## 2019-08-12 NOTE — PROGRESS NOTES
Subjective:   Brett Vicente is a 48 y.o. male who presents today with   Chief Complaint   Patient presents with   • Gout     (L) foot x 3 days,        HPI  Patient presents for new problem occurring within the last 3 days.  Patient has a history of gout typically in his right foot but is experiencing the symptoms in his left great toe this time.  Patient states he has been on allopurinol for 5 to 6 years which is kept his gout at bay.  He presents today with a flare of gout in his left foot of the great toe.  He denies any recent injury or trauma to the area.  He states his diet has been very reasonable trying to control the diabetes.  Patient denies any other associated symptoms at this time.  PMH:  has a past medical history of Diabetes (HCC), Fatty liver (4/24/2018), GERD (gastroesophageal reflux disease), Gout, Hyperlipidemia, Hypertension, and Thyroid disease.  MEDS:   Current Outpatient Medications:   •  lansoprazole (PREVACID) 30 MG CAPSULE DELAYED RELEASE, TAKE 1 CAPSULE TWICE DAILY, Disp: 180 Cap, Rfl: 1  •  colchicine (COLCRYS) 0.6 MG Tab, Take 2 tablets immediately and then 1 more tablet an hour later., Disp: 3 Tab, Rfl: 0  •  indomethacin (INDOCIN) 50 MG Cap, Take 1 Cap by mouth 3 times a day as needed., Disp: 30 Cap, Rfl: 0  •  Blood Glucose Test Strips, Test strips order: Test strips for One Touch Ultra 2 meter. Sig: use QID and prn ssx high or low sugar, Disp: 100 Each, Rfl: 2  •  irbesartan (AVAPRO) 300 MG Tab, TAKE 1 TABLET DAILY, Disp: 90 Tab, Rfl: 2  •  metFORMIN ER (GLUCOPHAGE XR) 500 MG TABLET SR 24 HR, Take 2 Tabs by mouth 2 times a day., Disp: 360 Tab, Rfl: 2  •  hydrochlorothiazide (MICROZIDE) 12.5 MG capsule, Take 1 Cap by mouth every day., Disp: 90 Cap, Rfl: 1  •  Dapagliflozin Propanediol (FARXIGA) 10 MG Tab, Take 10 mg by mouth every day., Disp: 90 Tab, Rfl: 2  •  fenofibrate (TRICOR) 145 MG Tab, TAKE 1 TABLET DAILY, Disp: 90 Tab, Rfl: 2  •  spironolactone (ALDACTONE) 25 MG Tab, Take  "0.5 Tabs by mouth every day. TAKE 0.5 TABS BY MOUTH EVERY DAY., Disp: 45 Tab, Rfl: 2  •  carvedilol (COREG) 25 MG Tab, Take 0.5 Tabs by mouth 2 times a day., Disp: 90 Tab, Rfl: 2  •  atorvastatin (LIPITOR) 40 MG Tab, Take 1 Tab by mouth every day., Disp: 90 Tab, Rfl: 2  •  allopurinol (ZYLOPRIM) 300 MG Tab, Take 1 Tab by mouth every day., Disp: 90 Tab, Rfl: 3  •  levothyroxine (SYNTHROID) 100 MCG Tab, Take 1 Tab by mouth every day., Disp: 90 Tab, Rfl: 3  •  aspirin EC (ECOTRIN) 81 MG TBEC, Take 81 mg by mouth every day., Disp: , Rfl:   •  Semaglutide (OZEMPIC) 0.25 or 0.5 MG/DOSE Solution Pen-injector, Inject 0.25 mg as instructed every 7 days., Disp: 3 PEN, Rfl: 3  •  testosterone cypionate (DEPO-TESTOSTERONE) 200 MG/ML Solution injection, 1.25 mL by Intramuscular route every 14 days for 112 days., Disp: 10 mL, Rfl: 0  •  Syringe/Needle, Disp, (SYRINGE 3CC/67OJ3-7/2\") 22G X 1-1/2\" 3 ML Misc, For use with Intra-muscular testosterone  injection every 2 weeks., Disp: 12 Each, Rfl: 1  •  Insulin Glargine (BASAGLAR KWIKPEN) 100 UNIT/ML Solution Pen-injector, Inject 70 Units as instructed every day., Disp: 63 mL, Rfl: 3  •  Blood Glucose Monitoring Suppl Device, Meter: Dispense One Touch Ultra 2 meter. Sig. Use as directed for blood sugar monitoring., Disp: 1 Device, Rfl: 0  •  Insulin Pen Needle (BD PEN NEEDLE CARLY U/F) 32G X 4 MM Misc, For insulin shot once a day, Disp: 100 Each, Rfl: 3  •  Lancets Misc, Lancets order: Lancets for One Touch Ultra 2 meter. Sig: use QID and prn ssx high or low sugar., Disp: 100 Each, Rfl: 3  •  fluticasone (FLONASE) 50 MCG/ACT nasal spray, Spray 2 Sprays in nose every day., Disp: 16 g, Rfl: 0  •  Omega 3 1000 MG CAPS, Take 4 a day (Patient taking differently: 5,000 mg every day. Take 4 a day), Disp: 100 Cap, Rfl: 6  ALLERGIES:   Allergies   Allergen Reactions   • Morphine Nausea   • Pcn [Penicillins]      SURGHX:   Past Surgical History:   Procedure Laterality Date   • LUMBAR FUSION " "ANTERIOR  6/10/2009    Performed by LISA MONTERO at SURGERY [facility] ORS   • LUMBAR FUSION ANTERIOR       SOCHX:  reports that he has never smoked. He has never used smokeless tobacco. He reports that he drinks about 0.5 oz of alcohol per week. He reports that he does not use drugs.  FH: Reviewed with patient, not pertinent to this visit.       Review of Systems   Constitutional: Negative for chills and fever.   Musculoskeletal:        Left great toe pain   Skin: Negative for rash.   Neurological: Negative for tingling and weakness.   All other systems reviewed and are negative.       Objective:   /76   Pulse 79   Temp 36.7 °C (98.1 °F)   Resp 16   Ht 1.854 m (6' 1\")   Wt 113.9 kg (251 lb)   SpO2 98%   BMI 33.12 kg/m²   Physical Exam   Constitutional: Vital signs are normal. He appears well-developed and well-nourished. No distress.   HENT:   Head: Normocephalic and atraumatic.   Right Ear: Hearing normal.   Left Ear: Hearing normal.   Eyes: Pupils are equal, round, and reactive to light.   Cardiovascular: Normal rate.   Pulmonary/Chest: Effort normal.   Musculoskeletal:        Feet:    Erythema, warmth and TTP to medial aspect of left foot great toe.  Normal sensation in digits of left lower extremity and neurovascularly intact.   Neurological: He is alert. Coordination normal.   Skin: Skin is warm and dry.   Psychiatric: He has a normal mood and affect.   Nursing note and vitals reviewed.        Assessment/Plan:   Assessment    1. Acute gout involving toe of left foot, unspecified cause  - URIC ACID, SERUM  - colchicine (COLCRYS) 0.6 MG Tab; Take 2 tablets immediately and then 1 more tablet an hour later.  Dispense: 3 Tab; Refill: 0  - indomethacin (INDOCIN) 50 MG Cap; Take 1 Cap by mouth 3 times a day as needed.  Dispense: 30 Cap; Refill: 0  Extensively discussed with patient to not take aspirin or other NSAIDs while taking indomethacin due to increased risk of GI bleed patient is " understanding.  Uric acid blood test ordered to have done with his annual blood work that is coming up next month.  Differential diagnosis, natural history, supportive care, and indications for immediate follow-up discussed.   Patient given instructions and understanding of medications and treatment.    If not improving in 3-5 days, F/U with PCP or return to UC if symptoms worsen.    Patient agreeable to plan.      Please note that this dictation was created using voice recognition software. I have made every reasonable attempt to correct obvious errors, but I expect that there are errors of grammar and possibly content that I did not discover before finalizing the note.    Andrew Baeza PA-C

## 2019-09-25 ENCOUNTER — APPOINTMENT (OUTPATIENT)
Dept: RADIOLOGY | Facility: IMAGING CENTER | Age: 49
End: 2019-09-25
Attending: PHYSICIAN ASSISTANT
Payer: COMMERCIAL

## 2019-09-25 ENCOUNTER — OFFICE VISIT (OUTPATIENT)
Dept: URGENT CARE | Facility: CLINIC | Age: 49
End: 2019-09-25
Payer: COMMERCIAL

## 2019-09-25 VITALS
HEIGHT: 73 IN | SYSTOLIC BLOOD PRESSURE: 142 MMHG | RESPIRATION RATE: 18 BRPM | OXYGEN SATURATION: 96 % | HEART RATE: 77 BPM | TEMPERATURE: 98.2 F | DIASTOLIC BLOOD PRESSURE: 84 MMHG | BODY MASS INDEX: 33.8 KG/M2 | WEIGHT: 255 LBS

## 2019-09-25 DIAGNOSIS — M62.830 SPASM OF THORACIC BACK MUSCLE: Primary | ICD-10-CM

## 2019-09-25 DIAGNOSIS — M62.830 SPASM OF THORACIC BACK MUSCLE: ICD-10-CM

## 2019-09-25 PROCEDURE — 72072 X-RAY EXAM THORAC SPINE 3VWS: CPT | Mod: TC | Performed by: PHYSICIAN ASSISTANT

## 2019-09-25 PROCEDURE — 99214 OFFICE O/P EST MOD 30 MIN: CPT | Performed by: PHYSICIAN ASSISTANT

## 2019-09-25 RX ORDER — METAXALONE 800 MG/1
800 TABLET ORAL 3 TIMES DAILY
Qty: 90 TAB | Refills: 0 | Status: SHIPPED | OUTPATIENT
Start: 2019-09-25 | End: 2019-10-25

## 2019-09-25 ASSESSMENT — PAIN SCALES - GENERAL: PAINLEVEL: 5=MODERATE PAIN

## 2019-09-25 NOTE — PROGRESS NOTES
Subjective:      Pt is a 48 y.o. male who presents with Back Pain (Mid Back, Pt notes hx of L5-S1 fusion in 2010, ongoing issue, worsening over past month)            HPI  This is a recurrent issue worsening over the last month. Mid Back pain on the right side: Pt notes hx of L5-S1 fusion in 2010. Pt has not taken any Rx medications for this condition. Pt states the pain is a 7-8/10, aching in nature and worse at night. Pt denies CP, SOB, NVD, paresthesias, headaches, dizziness, change in vision, hives, or other joint pain. The pt's medication list, problem list, and allergies have been evaluated and reviewed during today's visit.    PMH:  Past Medical History:   Diagnosis Date   • Diabetes (HCC)    • Fatty liver 4/24/2018   • GERD (gastroesophageal reflux disease)    • Gout    • Hyperlipidemia    • Hypertension    • Thyroid disease        PSH:  Past Surgical History:   Procedure Laterality Date   • LUMBAR FUSION ANTERIOR  6/10/2009    Performed by LISA MONTERO at [facility] ORS   • LUMBAR FUSION ANTERIOR         Fam Hx:    family history includes Diabetes in his brother and father; Heart Disease in his father and paternal grandfather; Hyperlipidemia in his mother; Stroke in his paternal grandfather.  Family Status   Relation Name Status   • Mo  Alive   • Fa  Alive   • Bro  Alive   • Bro  Alive   • Son  Alive   • Son  Alive   • PGFa  (Not Specified)       Soc HX:  Social History     Socioeconomic History   • Marital status:      Spouse name: Not on file   • Number of children: Not on file   • Years of education: Not on file   • Highest education level: Not on file   Occupational History   • Not on file   Social Needs   • Financial resource strain: Not on file   • Food insecurity:     Worry: Not on file     Inability: Not on file   • Transportation needs:     Medical: Not on file     Non-medical: Not on file   Tobacco Use   • Smoking status: Never Smoker   • Smokeless tobacco: Never Used   Substance  and Sexual Activity   • Alcohol use: Yes     Alcohol/week: 0.5 oz     Types: 1 Shots of liquor per week   • Drug use: No   • Sexual activity: Yes     Partners: Female   Lifestyle   • Physical activity:     Days per week: Not on file     Minutes per session: Not on file   • Stress: Not on file   Relationships   • Social connections:     Talks on phone: Not on file     Gets together: Not on file     Attends Protestant service: Not on file     Active member of club or organization: Not on file     Attends meetings of clubs or organizations: Not on file     Relationship status: Not on file   • Intimate partner violence:     Fear of current or ex partner: Not on file     Emotionally abused: Not on file     Physically abused: Not on file     Forced sexual activity: Not on file   Other Topics Concern   •  Service No   • Blood Transfusions No   • Caffeine Concern No   • Occupational Exposure No   • Hobby Hazards No   • Sleep Concern No   • Stress Concern No   • Weight Concern Yes   • Special Diet No   • Back Care No   • Exercise Yes   • Bike Helmet Yes   • Seat Belt Yes   • Self-Exams No   Social History Narrative   • Not on file         Medications:    Current Outpatient Medications:   •  metaxalone (SKELAXIN) 800 MG Tab, Take 1 Tab by mouth 3 times a day for 30 days., Disp: 90 Tab, Rfl: 0  •  Diclofenac Sodium (VOLTAREN) 1 % Gel, Apply 5 g to skin as directed 3 times a day., Disp: 1 Tube, Rfl: 0  •  lansoprazole (PREVACID) 30 MG CAPSULE DELAYED RELEASE, TAKE 1 CAPSULE TWICE DAILY, Disp: 180 Cap, Rfl: 1  •  Blood Glucose Test Strips, Test strips order: Test strips for One Touch Ultra 2 meter. Sig: use QID and prn ssx high or low sugar, Disp: 100 Each, Rfl: 2  •  irbesartan (AVAPRO) 300 MG Tab, TAKE 1 TABLET DAILY, Disp: 90 Tab, Rfl: 2  •  metFORMIN ER (GLUCOPHAGE XR) 500 MG TABLET SR 24 HR, Take 2 Tabs by mouth 2 times a day., Disp: 360 Tab, Rfl: 2  •  hydrochlorothiazide (MICROZIDE) 12.5 MG capsule, Take 1 Cap by  "mouth every day., Disp: 90 Cap, Rfl: 1  •  Dapagliflozin Propanediol (FARXIGA) 10 MG Tab, Take 10 mg by mouth every day., Disp: 90 Tab, Rfl: 2  •  fenofibrate (TRICOR) 145 MG Tab, TAKE 1 TABLET DAILY, Disp: 90 Tab, Rfl: 2  •  spironolactone (ALDACTONE) 25 MG Tab, Take 0.5 Tabs by mouth every day. TAKE 0.5 TABS BY MOUTH EVERY DAY., Disp: 45 Tab, Rfl: 2  •  carvedilol (COREG) 25 MG Tab, Take 0.5 Tabs by mouth 2 times a day., Disp: 90 Tab, Rfl: 2  •  atorvastatin (LIPITOR) 40 MG Tab, Take 1 Tab by mouth every day., Disp: 90 Tab, Rfl: 2  •  Semaglutide (OZEMPIC) 0.25 or 0.5 MG/DOSE Solution Pen-injector, Inject 0.25 mg as instructed every 7 days., Disp: 3 PEN, Rfl: 3  •  testosterone cypionate (DEPO-TESTOSTERONE) 200 MG/ML Solution injection, 1.25 mL by Intramuscular route every 14 days for 112 days., Disp: 10 mL, Rfl: 0  •  Syringe/Needle, Disp, (SYRINGE 3CC/70LO7-0/2\") 22G X 1-1/2\" 3 ML Misc, For use with Intra-muscular testosterone  injection every 2 weeks., Disp: 12 Each, Rfl: 1  •  allopurinol (ZYLOPRIM) 300 MG Tab, Take 1 Tab by mouth every day., Disp: 90 Tab, Rfl: 3  •  Insulin Glargine (BASAGLAR KWIKPEN) 100 UNIT/ML Solution Pen-injector, Inject 70 Units as instructed every day., Disp: 63 mL, Rfl: 3  •  levothyroxine (SYNTHROID) 100 MCG Tab, Take 1 Tab by mouth every day., Disp: 90 Tab, Rfl: 3  •  Blood Glucose Monitoring Suppl Device, Meter: Dispense One Touch Ultra 2 meter. Sig. Use as directed for blood sugar monitoring., Disp: 1 Device, Rfl: 0  •  Insulin Pen Needle (BD PEN NEEDLE CARLY U/F) 32G X 4 MM Misc, For insulin shot once a day, Disp: 100 Each, Rfl: 3  •  Lancets Misc, Lancets order: Lancets for One Touch Ultra 2 meter. Sig: use QID and prn ssx high or low sugar., Disp: 100 Each, Rfl: 3  •  fluticasone (FLONASE) 50 MCG/ACT nasal spray, Spray 2 Sprays in nose every day., Disp: 16 g, Rfl: 0  •  Omega 3 1000 MG CAPS, Take 4 a day (Patient taking differently: 5,000 mg every day. Take 4 a day), Disp: 100 " "Cap, Rfl: 6  •  aspirin EC (ECOTRIN) 81 MG TBEC, Take 81 mg by mouth every day., Disp: , Rfl:       Allergies:  Morphine and Pcn [penicillins]    ROS  Constitutional: Negative for fever, chills and malaise/fatigue.   HENT: Negative for congestion and sore throat.    Eyes: Negative for blurred vision, double vision and photophobia.   Respiratory: Negative for cough and shortness of breath.  Cardiovascular: Negative for chest pain and palpitations.   Gastrointestinal: Negative for heartburn, nausea, vomiting, abdominal pain, diarrhea and constipation.   Genitourinary: Negative for dysuria and flank pain.   Musculoskeletal: POS for thoracic joint pain and myalgias.   Skin: Negative for itching and rash.   Neurological: Negative for dizziness, tingling and headaches.   Endo/Heme/Allergies: Does not bruise/bleed easily.   Psychiatric/Behavioral: Negative for depression. The patient is not nervous/anxious.           Objective:     /84 (BP Location: Left arm, Patient Position: Sitting, BP Cuff Size: Adult long)   Pulse 77   Temp 36.8 °C (98.2 °F) (Temporal)   Resp 18   Ht 1.854 m (6' 1\")   Wt 115.7 kg (255 lb)   SpO2 96%   BMI 33.64 kg/m²      Physical Exam   Musculoskeletal:        Thoracic back: He exhibits decreased range of motion, tenderness, pain and spasm. He exhibits no bony tenderness, no swelling, no edema, no deformity, no laceration and normal pulse.        Back:           Constitutional: PT is oriented to person, place, and time. PT appears well-developed and well-nourished. No distress.   HENT:   Head: Normocephalic and atraumatic.   Mouth/Throat: Oropharynx is clear and moist. No oropharyngeal exudate.   Eyes: Conjunctivae normal and EOM are normal. Pupils are equal, round, and reactive to light.   Neck: Normal range of motion. Neck supple. No thyromegaly present.   Cardiovascular: Normal rate, regular rhythm, normal heart sounds and intact distal pulses.  Exam reveals no gallop and no friction " rub.    No murmur heard.  Pulmonary/Chest: Effort normal and breath sounds normal. No respiratory distress. PT has no wheezes. PT has no rales. Pt exhibits no tenderness.   Abdominal: Soft. Bowel sounds are normal. PT exhibits no distension and no mass. There is no tenderness. There is no rebound and no guarding.   Neurological: PT is alert and oriented to person, place, and time. PT has normal reflexes. No cranial nerve deficit.   Skin: Skin is warm and dry. No rash noted. PT is not diaphoretic. No erythema.       Psychiatric: PT has a normal mood and affect. PT behavior is normal. Judgment and thought content normal.      RADS:  DX-THORACIC SPINE-WITH SWIMMERS VIEW   Order: [id number]   Status:  Final result   Visible to patient:  No (Not Released)   Next appt:  10/02/2019 at 04:40 PM in Endocrinology (Be Licona M.D.)   Dx:  Spasm of thoracic back muscle   Details     Reading Physician Reading Date Result Priority   Senia Beckman M.D. 9/25/2019 Urgent Care      Narrative       9/25/2019 3:53 PM    HISTORY/REASON FOR EXAM:  Atraumatic Pain.      TECHNIQUE/EXAM DESCRIPTION AND NUMBER OF VIEWS:  Thoracic spine, 3 views.    COMPARISON: None.    FINDINGS:  Vertebral alignment is maintained. No compression fracture is identified. Visualized lung fields are clear. There is mild endplate spurring.          Impression       No compression fracture is identified.            Last Resulted: 09/25/19  4:26 PM               Assessment/Plan:     1. Spasm of thoracic back muscle    - DX-THORACIC SPINE-2 VIEWS; Future  - metaxalone (SKELAXIN) 800 MG Tab; Take 1 Tab by mouth 3 times a day for 30 days.  Dispense: 90 Tab; Refill: 0  - Diclofenac Sodium (VOLTAREN) 1 % Gel; Apply 5 g to skin as directed 3 times a day.  Dispense: 1 Tube; Refill: 0  - REFERRAL TO SPORTS MEDICINE      RICE therapy discussed  Gentle ROM exercises discussed  WBAT BUE/BLE  Ice/heat therapy discussed  OTC ibuprofen for pain control  Rest, fluids  encouraged.  AVS with medical info given.  Pt was in full understanding and agreement with the plan.  Follow-up as needed if symptoms worsen or fail to improve.

## 2019-09-30 ENCOUNTER — HOSPITAL ENCOUNTER (OUTPATIENT)
Dept: LAB | Facility: MEDICAL CENTER | Age: 49
End: 2019-09-30
Attending: INTERNAL MEDICINE
Payer: COMMERCIAL

## 2019-09-30 DIAGNOSIS — E03.9 ACQUIRED HYPOTHYROIDISM: ICD-10-CM

## 2019-09-30 DIAGNOSIS — E78.5 DYSLIPIDEMIA: ICD-10-CM

## 2019-09-30 DIAGNOSIS — E29.1 HYPOGONADISM IN MALE: ICD-10-CM

## 2019-09-30 DIAGNOSIS — E53.8 VITAMIN B 12 DEFICIENCY: ICD-10-CM

## 2019-09-30 DIAGNOSIS — E55.9 VITAMIN D DEFICIENCY: ICD-10-CM

## 2019-09-30 LAB
25(OH)D3 SERPL-MCNC: 23 NG/ML (ref 30–100)
ANION GAP SERPL CALC-SCNC: 7 MMOL/L (ref 0–11.9)
BUN SERPL-MCNC: 22 MG/DL (ref 8–22)
CALCIUM SERPL-MCNC: 10.4 MG/DL (ref 8.5–10.5)
CHLORIDE SERPL-SCNC: 109 MMOL/L (ref 96–112)
CHOLEST SERPL-MCNC: 131 MG/DL (ref 100–199)
CO2 SERPL-SCNC: 28 MMOL/L (ref 20–33)
CREAT SERPL-MCNC: 1.17 MG/DL (ref 0.5–1.4)
CREAT UR-MCNC: 297.8 MG/DL
ERYTHROCYTE [DISTWIDTH] IN BLOOD BY AUTOMATED COUNT: 42.1 FL (ref 35.9–50)
FASTING STATUS PATIENT QL REPORTED: NORMAL
GLUCOSE SERPL-MCNC: 108 MG/DL (ref 65–99)
HCT VFR BLD AUTO: 47.9 % (ref 42–52)
HDLC SERPL-MCNC: 19 MG/DL
HGB BLD-MCNC: 15.8 G/DL (ref 14–18)
LDLC SERPL CALC-MCNC: 44 MG/DL
MCH RBC QN AUTO: 28.1 PG (ref 27–33)
MCHC RBC AUTO-ENTMCNC: 33 G/DL (ref 33.7–35.3)
MCV RBC AUTO: 85.1 FL (ref 81.4–97.8)
MICROALBUMIN UR-MCNC: 1.2 MG/DL
MICROALBUMIN/CREAT UR: 4 MG/G (ref 0–30)
PLATELET # BLD AUTO: 238 K/UL (ref 164–446)
PMV BLD AUTO: 11.6 FL (ref 9–12.9)
POTASSIUM SERPL-SCNC: 4 MMOL/L (ref 3.6–5.5)
RBC # BLD AUTO: 5.63 M/UL (ref 4.7–6.1)
SODIUM SERPL-SCNC: 144 MMOL/L (ref 135–145)
T3 SERPL-MCNC: 89.8 NG/DL (ref 60–181)
T3FREE SERPL-MCNC: 2.78 PG/ML (ref 2.4–4.2)
T4 FREE SERPL-MCNC: 1.05 NG/DL (ref 0.53–1.43)
TESTOST SERPL-MCNC: 313 NG/DL (ref 175–781)
TRIGL SERPL-MCNC: 342 MG/DL (ref 0–149)
TSH SERPL DL<=0.005 MIU/L-ACNC: 2.8 UIU/ML (ref 0.38–5.33)
VIT B12 SERPL-MCNC: 315 PG/ML (ref 211–911)
WBC # BLD AUTO: 4.6 K/UL (ref 4.8–10.8)

## 2019-09-30 PROCEDURE — 84403 ASSAY OF TOTAL TESTOSTERONE: CPT

## 2019-09-30 PROCEDURE — 80048 BASIC METABOLIC PNL TOTAL CA: CPT

## 2019-09-30 PROCEDURE — 84481 FREE ASSAY (FT-3): CPT

## 2019-09-30 PROCEDURE — 84439 ASSAY OF FREE THYROXINE: CPT

## 2019-09-30 PROCEDURE — 82043 UR ALBUMIN QUANTITATIVE: CPT

## 2019-09-30 PROCEDURE — 85027 COMPLETE CBC AUTOMATED: CPT

## 2019-09-30 PROCEDURE — 82306 VITAMIN D 25 HYDROXY: CPT

## 2019-09-30 PROCEDURE — 36415 COLL VENOUS BLD VENIPUNCTURE: CPT

## 2019-09-30 PROCEDURE — 84443 ASSAY THYROID STIM HORMONE: CPT

## 2019-09-30 PROCEDURE — 84270 ASSAY OF SEX HORMONE GLOBUL: CPT

## 2019-09-30 PROCEDURE — 80061 LIPID PANEL: CPT

## 2019-09-30 PROCEDURE — 82570 ASSAY OF URINE CREATININE: CPT

## 2019-09-30 PROCEDURE — 84480 ASSAY TRIIODOTHYRONINE (T3): CPT

## 2019-09-30 PROCEDURE — 82607 VITAMIN B-12: CPT

## 2019-10-02 ENCOUNTER — APPOINTMENT (OUTPATIENT)
Dept: ENDOCRINOLOGY | Facility: MEDICAL CENTER | Age: 49
End: 2019-10-02
Payer: COMMERCIAL

## 2019-10-02 ENCOUNTER — OFFICE VISIT (OUTPATIENT)
Dept: ENDOCRINOLOGY | Facility: MEDICAL CENTER | Age: 49
End: 2019-10-02
Payer: COMMERCIAL

## 2019-10-02 VITALS
BODY MASS INDEX: 32.92 KG/M2 | DIASTOLIC BLOOD PRESSURE: 78 MMHG | SYSTOLIC BLOOD PRESSURE: 132 MMHG | WEIGHT: 248.4 LBS | HEIGHT: 73 IN

## 2019-10-02 DIAGNOSIS — E03.9 ACQUIRED HYPOTHYROIDISM: ICD-10-CM

## 2019-10-02 DIAGNOSIS — E29.1 HYPOGONADISM IN MALE: ICD-10-CM

## 2019-10-02 DIAGNOSIS — E55.9 VITAMIN D DEFICIENCY: ICD-10-CM

## 2019-10-02 LAB
HBA1C MFR BLD: 7.3 % (ref 0–5.6)
INT CON NEG: ABNORMAL
INT CON POS: ABNORMAL
SHBG SERPL-SCNC: 20 NMOL/L (ref 11–80)

## 2019-10-02 PROCEDURE — 99214 OFFICE O/P EST MOD 30 MIN: CPT | Performed by: INTERNAL MEDICINE

## 2019-10-02 PROCEDURE — 83036 HEMOGLOBIN GLYCOSYLATED A1C: CPT | Performed by: INTERNAL MEDICINE

## 2019-10-02 RX ORDER — TESTOSTERONE CYPIONATE 200 MG/ML
250 INJECTION, SOLUTION INTRAMUSCULAR
Qty: 10 ML | Refills: 0 | Status: SHIPPED | OUTPATIENT
Start: 2019-10-02 | End: 2020-04-20 | Stop reason: SDUPTHER

## 2019-10-02 RX ORDER — LEVOTHYROXINE SODIUM 112 UG/1
112 TABLET ORAL DAILY
Qty: 90 TAB | Refills: 3 | Status: SHIPPED | OUTPATIENT
Start: 2019-10-02 | End: 2019-10-07 | Stop reason: SDUPTHER

## 2019-10-02 RX ORDER — ERGOCALCIFEROL 1.25 MG/1
50000 CAPSULE ORAL
Qty: 12 CAP | Refills: 3 | Status: SHIPPED | OUTPATIENT
Start: 2019-10-02 | End: 2020-09-11

## 2019-10-02 NOTE — PROGRESS NOTES
"Endocrinology Clinic Progress Note  PCP: Frida Baumann M.D.    HPI:  Brett Vicente is a 48 y.o. old patient who comes in today for review of his multiple endocrine problems.  1. Vitamin D deficiency   Currently  taking Vitamin D supplementation 1000 iu per day     Ref. Range 9/30/2019 09:26   25-Hydroxy   Vitamin D 25 Latest Ref Range: 30 - 100 ng/mL 23 (L)      2. Hypothyroid   Currently on Levothyroxine 100 mcg per day   States compliance with taking on empty stomach at least 30 minutes prior to eating or taking other medication.     Ref. Range 9/30/2019 09:26   TSH Latest Ref Range: 0.380 - 5.330 uIU/mL 2.800   Free T-4 Latest Ref Range: 0.53 - 1.43 ng/dL 1.05   T3 Latest Ref Range: 60.0 - 181.0 ng/dL 89.8   T3,Free   Latest Ref Range: 2.40 - 4.20 pg/mL 2.78     3. Hypogonadism in male   Currently on Depo Testosterone 250 mg IM every 14 days   Last dose on 10/1/19       Ref. Range 9/30/2019 09:26   Testosterone,Total Latest Ref Range: 175 - 781 ng/dL 313     4. Type 2 diabetes  Most Recent HbA1c:   Lab Results   Component Value Date/Time    HBA1C 7.3 (A) 10/02/2019 04:35 PM    Previous A1c was 6.9 on 6/26/19    Current Diabetes Regimen:  Basaglar 70 units at hs  Metformin 1000 mg bid  Ozempic 0.25 mg weekly  Farxiga 10 mg per day      Patient has been testing blood sugars 1 times perday.     Hypoglycemia:  None    Exercise: minimal exercise, one hour walking weekly  Diet: \"healthy\" diet  in general  Last Retinal Exam: current and on the chart   Daily Foot Exam: yes denies problems.         ROS:  Constitutional: No weight loss  Cardiac: No palpitations or racing heart  Resp: No shortness of breath  Neuro: No numbness or tinging in feet  Endo: No heat or cold intolerance, no polyuria or polydipsia  All other systems were reviewed and were negative.    Past Medical History:  Patient Active Problem List    Diagnosis Date Noted   • Obesity (BMI 30-39.9) 04/24/2018   • Fatty liver 04/24/2018   • Moderate " single current episode of major depressive disorder (HCC) 01/20/2017   • Vitamin D deficiency 01/12/2017   • Mixed hyperlipidemia 12/07/2016   • Hypercalcemia 12/22/2015   • Bilateral shoulder tendinopathy 12/22/2015   • Hypogonadism in male 06/25/2015   • Diabetes mellitus type 2, uncontrolled, with complications ([facility]) 06/25/2015   • Essential hypertension 06/25/2015   • Hypothyroidism 06/25/2015   • Gastroesophageal reflux disease without esophagitis 06/25/2015   • Gout 08/19/2014       Past Surgical History:  Past Surgical History:   Procedure Laterality Date   • LUMBAR FUSION ANTERIOR  6/10/2009    Performed by LISA MONTERO at [facility] ORS   • LUMBAR FUSION ANTERIOR         Allergies:  Morphine and Pcn [penicillins]    Social History:  Social History     Socioeconomic History   • Marital status:      Spouse name: Not on file   • Number of children: Not on file   • Years of education: Not on file   • Highest education level: Not on file   Occupational History   • Not on file   Social Needs   • Financial resource strain: Not on file   • Food insecurity:     Worry: Not on file     Inability: Not on file   • Transportation needs:     Medical: Not on file     Non-medical: Not on file   Tobacco Use   • Smoking status: Never Smoker   • Smokeless tobacco: Never Used   Substance and Sexual Activity   • Alcohol use: Yes     Alcohol/week: 0.5 oz     Types: 1 Shots of liquor per week   • Drug use: No   • Sexual activity: Yes     Partners: Female   Lifestyle   • Physical activity:     Days per week: Not on file     Minutes per session: Not on file   • Stress: Not on file   Relationships   • Social connections:     Talks on phone: Not on file     Gets together: Not on file     Attends Jainism service: Not on file     Active member of club or organization: Not on file     Attends meetings of clubs or organizations: Not on file     Relationship status: Not on file   • Intimate partner violence:     Fear of  current or ex partner: Not on file     Emotionally abused: Not on file     Physically abused: Not on file     Forced sexual activity: Not on file   Other Topics Concern   •  Service No   • Blood Transfusions No   • Caffeine Concern No   • Occupational Exposure No   • Hobby Hazards No   • Sleep Concern No   • Stress Concern No   • Weight Concern Yes   • Special Diet No   • Back Care No   • Exercise Yes   • Bike Helmet Yes   • Seat Belt Yes   • Self-Exams No   Social History Narrative   • Not on file       Family History:  Family History   Problem Relation Age of Onset   • Hyperlipidemia Mother    • Diabetes Father    • Heart Disease Father    • Diabetes Brother    • Heart Disease Paternal Grandfather    • Stroke Paternal Grandfather        Medications:    Current Outpatient Medications:   •  vitamin D (CHOLECALCIFEROL) 1000 UNIT Tab, Take 1,000 Units by mouth every day., Disp: , Rfl:   •  Semaglutide (OZEMPIC) 0.25 or 0.5 MG/DOSE Solution Pen-injector, Inject 0.25 mg as instructed every 7 days., Disp: 3 PEN, Rfl: 6  •  vitamin D, Ergocalciferol, (DRISDOL) 16378 units Cap capsule, Take 1 Cap by mouth every 7 days., Disp: 12 Cap, Rfl: 3  •  testosterone cypionate (DEPO-TESTOSTERONE) 200 MG/ML Solution injection, 1.25 mL by Intramuscular route every 14 days for 112 days., Disp: 10 mL, Rfl: 0  •  metaxalone (SKELAXIN) 800 MG Tab, Take 1 Tab by mouth 3 times a day for 30 days., Disp: 90 Tab, Rfl: 0  •  Diclofenac Sodium (VOLTAREN) 1 % Gel, Apply 5 g to skin as directed 3 times a day., Disp: 1 Tube, Rfl: 0  •  lansoprazole (PREVACID) 30 MG CAPSULE DELAYED RELEASE, TAKE 1 CAPSULE TWICE DAILY, Disp: 180 Cap, Rfl: 1  •  irbesartan (AVAPRO) 300 MG Tab, TAKE 1 TABLET DAILY, Disp: 90 Tab, Rfl: 2  •  metFORMIN ER (GLUCOPHAGE XR) 500 MG TABLET SR 24 HR, Take 2 Tabs by mouth 2 times a day., Disp: 360 Tab, Rfl: 2  •  hydrochlorothiazide (MICROZIDE) 12.5 MG capsule, Take 1 Cap by mouth every day., Disp: 90 Cap, Rfl: 1  •   "Dapagliflozin Propanediol (FARXIGA) 10 MG Tab, Take 10 mg by mouth every day., Disp: 90 Tab, Rfl: 2  •  fenofibrate (TRICOR) 145 MG Tab, TAKE 1 TABLET DAILY, Disp: 90 Tab, Rfl: 2  •  spironolactone (ALDACTONE) 25 MG Tab, Take 0.5 Tabs by mouth every day. TAKE 0.5 TABS BY MOUTH EVERY DAY., Disp: 45 Tab, Rfl: 2  •  carvedilol (COREG) 25 MG Tab, Take 0.5 Tabs by mouth 2 times a day., Disp: 90 Tab, Rfl: 2  •  atorvastatin (LIPITOR) 40 MG Tab, Take 1 Tab by mouth every day., Disp: 90 Tab, Rfl: 2  •  allopurinol (ZYLOPRIM) 300 MG Tab, Take 1 Tab by mouth every day., Disp: 90 Tab, Rfl: 3  •  Insulin Glargine (BASAGLAR KWIKPEN) 100 UNIT/ML Solution Pen-injector, Inject 70 Units as instructed every day., Disp: 63 mL, Rfl: 3  •  fluticasone (FLONASE) 50 MCG/ACT nasal spray, Spray 2 Sprays in nose every day., Disp: 16 g, Rfl: 0  •  Omega 3 1000 MG CAPS, Take 4 a day (Patient taking differently: 5,000 mg every day. Take 4 a day), Disp: 100 Cap, Rfl: 6  •  aspirin EC (ECOTRIN) 81 MG TBEC, Take 81 mg by mouth every day., Disp: , Rfl:   •  levothyroxine (SYNTHROID) 112 MCG Tab, Take 1 Tab by mouth every day., Disp: 90 Tab, Rfl: 3  •  Blood Glucose Test Strips, Test strips order: Test strips for One Touch Ultra 2 meter. Sig: use QID and prn ssx high or low sugar, Disp: 100 Each, Rfl: 2  •  Syringe/Needle, Disp, (SYRINGE 3CC/08WP2-0/2\") 22G X 1-1/2\" 3 ML Misc, For use with Intra-muscular testosterone  injection every 2 weeks., Disp: 12 Each, Rfl: 1  •  Blood Glucose Monitoring Suppl Device, Meter: Dispense One Touch Ultra 2 meter. Sig. Use as directed for blood sugar monitoring., Disp: 1 Device, Rfl: 0  •  Insulin Pen Needle (BD PEN NEEDLE CARLY U/F) 32G X 4 MM Misc, For insulin shot once a day, Disp: 100 Each, Rfl: 3  •  Lancets Misc, Lancets order: Lancets for One Touch Ultra 2 meter. Sig: use QID and prn ssx high or low sugar., Disp: 100 Each, Rfl: 3    Labs: Reviewed    Physical Examination:  Vital signs: /78 (BP Location: " "Left arm, Patient Position: Sitting, BP Cuff Size: Large adult)   Ht 1.854 m (6' 1\")   Wt 112.7 kg (248 lb 6.4 oz)   BMI 32.77 kg/m²  Body mass index is 32.77 kg/m².  General: No apparent distress, cooperative  Eyes: No scleral icterus or discharge  ENMT: Normal on external inspection of nose, lips, normal thyroid exam  Neck: No abnormal masses on inspection  Resp: Normal effort, clear to auscultation bilaterally   CVS: Regular rate and rhythm, S1 S2 normal, no murmur   Extremities: No edema  Abdomen: abdominal obesity present  Neuro: Alert and oriented  Skin: No rash  Psych: Normal mood and affect, intact memory and able to make informed decisions    Assessment and Plan:  1. Vitamin D deficiency  Recommend vit d as per med list.     2. Acquired hypothyroidism  Increase LT4 to 112 mcg daily.     3. Hypogonadism in male  Start testosterone IM every 2 weeks; side effects and benefits discussed    4. Diabetes mellitus type 2, uncontrolled, with complications (HCC)  Continue current regimen; advised to check often.   - Discussed diabetic diet, encouraged portion control.   - Discussed importance of testing blood sugars and keeping logs.   - Discussed importance of daily exercise, recommended 30 minutes per day  - Reviewed medications and advised how to take.  - Discussed importance of immunizations and yearly eye exams.   - Advised daily foot  exams. Educated on signs of infection.   - Educated on need to stay well hydrated with water.  - Educated to call with any questions or problems.      Return in about 4 months (around 2/2/2020).    Thank you for allowing me to participate in the care of this patient.    eB Licona M.D.  10/02/19    CC:   Frida Baumann M.D.    This note was created using voice recognition software (Dragon). The accuracy of the dictation is limited by the abilities of the software. I have reviewed the note prior to signing, however some errors in grammar and context are still " possible. If you have any questions related to this note please do not hesitate to contact our office.   This note was scribed by Mitra Wiley RN, ELGINE

## 2019-10-07 DIAGNOSIS — E03.9 ACQUIRED HYPOTHYROIDISM: ICD-10-CM

## 2019-10-07 RX ORDER — LEVOTHYROXINE SODIUM 112 UG/1
112 TABLET ORAL DAILY
Qty: 90 TAB | Refills: 3 | Status: SHIPPED | OUTPATIENT
Start: 2019-10-07 | End: 2020-02-03

## 2019-11-01 NOTE — PROGRESS NOTES
A but confused. Oriented to self only. On comfort care. Purewick in place. Absence of nonverbal indicators of pain this shift. Turned and repoed Q 2hrs. Pt discharged to [place] with [facility]. All meds sent to facility.    Endocrinology Clinic Progress Note  PCP: Frida Baumann M.D.    HPI:  Brett Vicente is a 48 y.o. old patient who comes in today for review of Management of Uncontrolled Type 2 Diabetes    HPI:  Brett Vicente is a 48 y.o. old patient who comes in today for evaluation of above stated problem.    Most Recent HbA1c:   Lab Results   Component Value Date/Time    HBA1C 7.2 02/06/2019 10:08 AM        Current Diabetes Regimen:  GLP-1 Agent: Liraglutide 0.9 mg sc once daily ( 5 clicks above 0.6) and not able to increase it to further to therapeutic does of either 1.2 mg or 1.8 mg and therefore not able to obtain full benefits.   SGLT-2 Inhibitor:  Dapagliflozin 10 mg once daily   Basal Insulin:(Insulin glargine)  basalglar 70 units sc once daily   Other: 2 grams per day.     Before Breakfast:96,137,157  ROS:  Constitutional: No weight loss  Cardiac: No palpitations or racing heart  Resp: No shortness of breath  Neuro: No numbness or tinging in feet  Endo: No heat or cold intolerance, no polyuria or polydipsia  All other systems were reviewed and were negative.    Past Medical History:  Patient Active Problem List    Diagnosis Date Noted   • Obesity (BMI 30-39.9) 04/24/2018   • Fatty liver 04/24/2018   • Moderate single current episode of major depressive disorder (HCC) 01/20/2017   • Vitamin D deficiency 01/12/2017   • Mixed hyperlipidemia 12/07/2016   • Hypercalcemia 12/22/2015   • Bilateral shoulder tendinopathy 12/22/2015   • Hypogonadism in male 06/25/2015   • Diabetes mellitus type 2, uncontrolled, with complications (HCC) 06/25/2015   • Essential hypertension 06/25/2015   • Hypothyroidism 06/25/2015   • Gastroesophageal reflux disease without esophagitis 06/25/2015   • Gout 08/19/2014       Past Surgical History:  Past Surgical History:   Procedure Laterality Date   • LUMBAR FUSION ANTERIOR  6/10/2009    Performed by LISA MONTERO at SURGERY [facility]   • LUMBAR FUSION ANTERIOR    "      Allergies:  Morphine and Pcn [penicillins]    Social History:  Social History     Social History   • Marital status:      Spouse name: N/A   • Number of children: N/A   • Years of education: N/A     Occupational History   • Not on file.     Social History Main Topics   • Smoking status: Never Smoker   • Smokeless tobacco: Never Used   • Alcohol use 0.5 oz/week     1 Shots of liquor per week   • Drug use: No   • Sexual activity: Yes     Partners: Female     Other Topics Concern   •  Service No   • Blood Transfusions No   • Caffeine Concern No   • Occupational Exposure No   • Hobby Hazards No   • Sleep Concern No   • Stress Concern No   • Weight Concern Yes   • Special Diet No   • Back Care No   • Exercise Yes   • Bike Helmet Yes   • Seat Belt Yes   • Self-Exams No     Social History Narrative   • No narrative on file       Family History:  Family History   Problem Relation Age of Onset   • Hyperlipidemia Mother    • Diabetes Father    • Heart Disease Father    • Diabetes Brother    • Heart Disease Paternal Grandfather    • Stroke Paternal Grandfather        Medications:    Current Outpatient Prescriptions:   •  testosterone cypionate (DEPO-TESTOSTERONE) 200 MG/ML Solution injection, 1.25 mL by Intramuscular route every 14 days for 112 days., Disp: 10 mL, Rfl: 0  •  Syringe/Needle, Disp, (SYRINGE 3CC/17GR4-3/2\") 22G X 1-1/2\" 3 ML Misc, For use with Intra-muscular testosterone  injection every 2 weeks., Disp: 12 Each, Rfl: 1  •  allopurinol (ZYLOPRIM) 300 MG Tab, Take 1 Tab by mouth every day., Disp: 90 Tab, Rfl: 3  •  lansoprazole (PREVACID) 30 MG CAPSULE DELAYED RELEASE, Take 1 Cap by mouth 2 Times a Day., Disp: 180 Cap, Rfl: 1  •  Insulin Glargine (BASAGLAR KWIKPEN) 100 UNIT/ML Solution Pen-injector, Inject 70 Units as instructed every day., Disp: 63 mL, Rfl: 3  •  hydrochlorothiazide (MICROZIDE) 12.5 MG capsule, Take 1 Cap by mouth every day., Disp: 90 Cap, Rfl: 1  •  levothyroxine (SYNTHROID) " "100 MCG Tab, Take 1 Tab by mouth every day., Disp: 90 Tab, Rfl: 3  •  liraglutide (VICTOZA) 18 MG/3ML Solution Pen-injector injection, Inject 0.3 mL as instructed every day. (Patient taking differently: Inject 0.6 mg as instructed every day.), Disp: 9 PEN, Rfl: 1  •  Dapagliflozin Propanediol (FARXIGA) 10 MG Tab, Take 10 mg by mouth every day., Disp: 90 Tab, Rfl: 2  •  atorvastatin (LIPITOR) 40 MG Tab, Take 1 Tab by mouth every day., Disp: 90 Tab, Rfl: 2  •  fenofibrate (TRICOR) 145 MG Tab, TAKE 1 TABLET DAILY, Disp: 90 Tab, Rfl: 2  •  Blood Glucose Monitoring Suppl Device, Meter: Dispense One Touch Ultra 2 meter. Sig. Use as directed for blood sugar monitoring., Disp: 1 Device, Rfl: 0  •  Blood Glucose Monitoring Suppl Supplies Misc, Test strips order: Test strips for One Touch Ultra 2 meter. Sig: use QID and prn ssx high or low sugar, Disp: 100 Each, Rfl: 2  •  metFORMIN ER (GLUCOPHAGE XR) 500 MG TABLET SR 24 HR, Take 2 Tabs by mouth 2 times a day., Disp: 360 Tab, Rfl: 2  •  irbesartan (AVAPRO) 300 MG Tab, TAKE 1 TABLET DAILY, Disp: 90 Tab, Rfl: 2  •  spironolactone (ALDACTONE) 25 MG Tab, Take 0.5 Tabs by mouth every day. TAKE 0.5 TABS BY MOUTH EVERY DAY., Disp: 45 Tab, Rfl: 2  •  carvedilol (COREG) 25 MG Tab, Take 0.5 Tabs by mouth 2 times a day., Disp: 90 Tab, Rfl: 2  •  Insulin Pen Needle (BD PEN NEEDLE CARLY U/F) 32G X 4 MM Misc, For insulin shot once a day, Disp: 100 Each, Rfl: 3  •  Lancets Misc, Lancets order: Lancets for One Touch Ultra 2 meter. Sig: use QID and prn ssx high or low sugar., Disp: 100 Each, Rfl: 3  •  Omega 3 1000 MG CAPS, Take 4 a day, Disp: 100 Cap, Rfl: 6  •  aspirin EC (ECOTRIN) 81 MG TBEC, Take 81 mg by mouth every day., Disp: , Rfl:   •  fluticasone (FLONASE) 50 MCG/ACT nasal spray, Spray 2 Sprays in nose every day., Disp: 16 g, Rfl: 0    Labs: Reviewed    Physical Examination:  Vital signs: /72 (BP Location: Right arm, Patient Position: Sitting)   Pulse 87   Ht 1.854 m (6' 1\")  "  Wt 113.9 kg (251 lb)   SpO2 92%   BMI 33.12 kg/m²  Body mass index is 33.12 kg/m².  General: No apparent distress, cooperative  Eyes: No scleral icterus or discharge  ENMT: Normal on external inspection of nose, lips, normal thyroid exam  Neck: No abnormal masses on inspection  Resp: Normal effort, clear to auscultation bilaterally   CVS: Regular rate and rhythm, S1 S2 normal, no murmur   Extremities: No edema  Abdomen: abdominal obesity present  Neuro: Alert and oriented  Skin: No rash  Psych: Normal mood and affect, intact memory and able to make informed decisions    Assessment and Plan:    1. Uncontrolled type 2 diabetes:  Having still trouble with tolerabiilty.only able to tolerate denis. 0.8-0.9 mg of daily victoza; samples of ozempic given; stop victoza and after 3 days of stopping victoza start ozempic 0.25 mg weekly dose.     2. HTN: controlled.     3. Hypogonadism: will start testosterone cypionate 200 mg IM every 2 weeks.     Return in about 2 months (around 6/17/2019).    Thank you for allowing me to participate in the care of this patient.    Be Licona  04/17/19    CC:   Frida Baumann M.D.    This note was created using voice recognition software (Dragon). The accuracy of the dictation is limited by the abilities of the software. I have reviewed the note prior to signing, however some errors in grammar and context are still possible. If you have any questions related to this note please do not hesitate to contact our office.

## 2019-12-06 ENCOUNTER — TELEPHONE (OUTPATIENT)
Dept: ENDOCRINOLOGY | Facility: MEDICAL CENTER | Age: 49
End: 2019-12-06

## 2019-12-06 NOTE — TELEPHONE ENCOUNTER
VOICEMAIL    1. Caller Name: [facility]                          Call Back Number: [phone]    2. Message: VM left asking what the dose for the Synthroid is. They have a 100mcg and a 112mcg. Reference # [id number].    3. Patient approves office to leave a detailed voicemail/MyChart message: N\A    FOLLOW UP    I called [facility] back and spoke with Bela. I let her know per the last office visit note on 10/2/19 Dr. SLADE increased the dose for the Synthroid to 112mcg. She transferred me to a pharmacist to give this verbal clarification.

## 2020-01-12 NOTE — PROGRESS NOTES
Endocrinology Clinic Progress Note  PCP: Frida Baumann M.D.    HPI:  Bravo Vicente is a 49 y.o. old patient who comes in today for routine follow up of Management of Uncontrolled Type 2 Diabetes, Hypothyroidism, Vitamin D Deficiency, Hypogonadism, Hyperlipidemia, and Hypertenstion.    Hypothyroidism:  Currently taking Levothyroxine 112 mcg daily.  Results for BRAVO VICENTE (MRN [id number]) as of 1/11/2020 16:28   Ref. Range 9/30/2019 09:26   TSH Latest Ref Range: 0.380 - 5.330 uIU/mL 2.800   Free T-4 Latest Ref Range: 0.53 - 1.43 ng/dL 1.05   T3 Latest Ref Range: 60.0 - 181.0 ng/dL 89.8   T3,Free Latest Ref Range: 2.40 - 4.20 pg/mL 2.78     Vitamin D Deficiency:  Currently taking Vitamin D 50,000 units weekly.  Results for BRAVO VICENTE (MRN [id number]) as of 1/11/2020 16:28   Ref. Range 9/30/2019 09:26   25-Hydroxy   Vitamin D 25 Latest Ref Range: 30 - 100 ng/mL 23 (L)     Hypogonadism:  Currently taking Testosterone Cypionate 250 mg every 14 days.  Results for BRAVO VICENTE (MRN [id number]) as of 1/11/2020 16:28   Ref. Range 9/30/2019 09:26   Hemoglobin Latest Ref Range: 14.0 - 18.0 g/dL 15.8   Hematocrit Latest Ref Range: 42.0 - 52.0 % 47.9   Results for BRAVO VICENTE (MRN [id number]) as of 1/11/2020 16:28   Ref. Range 9/30/2019 09:26   Sex Hormone Bind Globulin Latest Ref Range: 11 - 80 nmol/L 20   Testosterone,Total Latest Ref Range: 175 - 781 ng/dL 313       Hyperlipidemia:  Currently taking Lipitor 40 mg daily.  Results for BRAVO VICENTE (MRN [id number]) as of 1/11/2020 16:28   Ref. Range 9/30/2019 09:26   Cholesterol,Tot Latest Ref Range: 100 - 199 mg/dL 131   Triglycerides Latest Ref Range: 0 - 149 mg/dL 342 (H)   HDL Latest Ref Range: >=40 mg/dL 19 (A)   LDL Latest Ref Range: <100 mg/dL 44       Hypertension:  Currently taking Avapro 300 mg daily.    HPI:  Bravo Vicente is a 49 y.o. old patient who comes in today for evaluation of above stated problem.    Most Recent HbA1c:   Lab Results    Component Value Date/Time    HBA1C 7.3 (A) 10/02/2019 04:35 PM        Current Diabetes Regimen:  GLP-1 Agent: Ozempic .25 mg weekly  SGLT-2 Inhibitor:  Farxiga 10 mg daily.   Basal Insulin: Basaglar 70 units daily   Other: Metformin  mg 2 BID    Before Breakfast:  Before Lunch:  Before Dinner:  Before Bedtime:  Other times:  Hypoglycemia:  {NDKHYPOGLYCEMIA:81691}    ROS:  Constitutional: No weight loss  Cardiac: No palpitations or racing heart  Resp: No shortness of breath  Neuro: No numbness or tinging in feet  Endo: No heat or cold intolerance, no polyuria or polydipsia  All other systems were reviewed and were negative.    Past Medical History:  Patient Active Problem List    Diagnosis Date Noted   • Obesity (BMI 30-39.9) 04/24/2018   • Fatty liver 04/24/2018   • Moderate single current episode of major depressive disorder (HCC) 01/20/2017   • Vitamin D deficiency 01/12/2017   • Mixed hyperlipidemia 12/07/2016   • Hypercalcemia 12/22/2015   • Bilateral shoulder tendinopathy 12/22/2015   • Hypogonadism in male 06/25/2015   • Diabetes mellitus type 2, uncontrolled, with complications ([facility]) 06/25/2015   • Essential hypertension 06/25/2015   • Hypothyroidism 06/25/2015   • Gastroesophageal reflux disease without esophagitis 06/25/2015   • Gout 08/19/2014       Past Surgical History:  Past Surgical History:   Procedure Laterality Date   • LUMBAR FUSION ANTERIOR  6/10/2009    Performed by LISA MONTERO at SURGERY [facility]   • LUMBAR FUSION ANTERIOR         Allergies:  Morphine and Pcn [penicillins]    Social History:  Social History     Socioeconomic History   • Marital status:      Spouse name: Not on file   • Number of children: Not on file   • Years of education: Not on file   • Highest education level: Not on file   Occupational History   • Not on file   Social Needs   • Financial resource strain: Not on file   • Food insecurity:     Worry: Not on file     Inability: Not on file   •  Transportation needs:     Medical: Not on file     Non-medical: Not on file   Tobacco Use   • Smoking status: Never Smoker   • Smokeless tobacco: Never Used   Substance and Sexual Activity   • Alcohol use: Yes     Alcohol/week: 0.5 oz     Types: 1 Shots of liquor per week   • Drug use: No   • Sexual activity: Yes     Partners: Female   Lifestyle   • Physical activity:     Days per week: Not on file     Minutes per session: Not on file   • Stress: Not on file   Relationships   • Social connections:     Talks on phone: Not on file     Gets together: Not on file     Attends Christian service: Not on file     Active member of club or organization: Not on file     Attends meetings of clubs or organizations: Not on file     Relationship status: Not on file   • Intimate partner violence:     Fear of current or ex partner: Not on file     Emotionally abused: Not on file     Physically abused: Not on file     Forced sexual activity: Not on file   Other Topics Concern   •  Service No   • Blood Transfusions No   • Caffeine Concern No   • Occupational Exposure No   • Hobby Hazards No   • Sleep Concern No   • Stress Concern No   • Weight Concern Yes   • Special Diet No   • Back Care No   • Exercise Yes   • Bike Helmet Yes   • Seat Belt Yes   • Self-Exams No   Social History Narrative   • Not on file       Family History:  Family History   Problem Relation Age of Onset   • Hyperlipidemia Mother    • Diabetes Father    • Heart Disease Father    • Diabetes Brother    • Heart Disease Paternal Grandfather    • Stroke Paternal Grandfather        Medications:    Current Outpatient Medications:   •  levothyroxine (SYNTHROID) 112 MCG Tab, Take 1 Tab by mouth every day., Disp: 90 Tab, Rfl: 3  •  vitamin D (CHOLECALCIFEROL) 1000 UNIT Tab, Take 1,000 Units by mouth every day., Disp: , Rfl:   •  Semaglutide (OZEMPIC) 0.25 or 0.5 MG/DOSE Solution Pen-injector, Inject 0.25 mg as instructed every 7 days., Disp: 3 PEN, Rfl: 6  •  vitamin  "D, Ergocalciferol, (DRISDOL) 72074 units Cap capsule, Take 1 Cap by mouth every 7 days., Disp: 12 Cap, Rfl: 3  •  testosterone cypionate (DEPO-TESTOSTERONE) 200 MG/ML Solution injection, 1.25 mL by Intramuscular route every 14 days for 112 days., Disp: 10 mL, Rfl: 0  •  Diclofenac Sodium (VOLTAREN) 1 % Gel, Apply 5 g to skin as directed 3 times a day., Disp: 1 Tube, Rfl: 0  •  lansoprazole (PREVACID) 30 MG CAPSULE DELAYED RELEASE, TAKE 1 CAPSULE TWICE DAILY, Disp: 180 Cap, Rfl: 1  •  Blood Glucose Test Strips, Test strips order: Test strips for One Touch Ultra 2 meter. Sig: use QID and prn ssx high or low sugar, Disp: 100 Each, Rfl: 2  •  irbesartan (AVAPRO) 300 MG Tab, TAKE 1 TABLET DAILY, Disp: 90 Tab, Rfl: 2  •  metFORMIN ER (GLUCOPHAGE XR) 500 MG TABLET SR 24 HR, Take 2 Tabs by mouth 2 times a day., Disp: 360 Tab, Rfl: 2  •  hydrochlorothiazide (MICROZIDE) 12.5 MG capsule, Take 1 Cap by mouth every day., Disp: 90 Cap, Rfl: 1  •  Dapagliflozin Propanediol (FARXIGA) 10 MG Tab, Take 10 mg by mouth every day., Disp: 90 Tab, Rfl: 2  •  fenofibrate (TRICOR) 145 MG Tab, TAKE 1 TABLET DAILY, Disp: 90 Tab, Rfl: 2  •  spironolactone (ALDACTONE) 25 MG Tab, Take 0.5 Tabs by mouth every day. TAKE 0.5 TABS BY MOUTH EVERY DAY., Disp: 45 Tab, Rfl: 2  •  carvedilol (COREG) 25 MG Tab, Take 0.5 Tabs by mouth 2 times a day., Disp: 90 Tab, Rfl: 2  •  atorvastatin (LIPITOR) 40 MG Tab, Take 1 Tab by mouth every day., Disp: 90 Tab, Rfl: 2  •  Syringe/Needle, Disp, (SYRINGE 3CC/06DV5-0/2\") 22G X 1-1/2\" 3 ML Misc, For use with Intra-muscular testosterone  injection every 2 weeks., Disp: 12 Each, Rfl: 1  •  allopurinol (ZYLOPRIM) 300 MG Tab, Take 1 Tab by mouth every day., Disp: 90 Tab, Rfl: 3  •  Insulin Glargine (BASAGLAR KWIKPEN) 100 UNIT/ML Solution Pen-injector, Inject 70 Units as instructed every day., Disp: 63 mL, Rfl: 3  •  Blood Glucose Monitoring Suppl Device, Meter: Dispense One Touch Ultra 2 meter. Sig. Use as directed for " blood sugar monitoring., Disp: 1 Device, Rfl: 0  •  Insulin Pen Needle (BD PEN NEEDLE CARLY U/F) 32G X 4 MM Misc, For insulin shot once a day, Disp: 100 Each, Rfl: 3  •  Lancets Misc, Lancets order: Lancets for One Touch Ultra 2 meter. Sig: use QID and prn ssx high or low sugar., Disp: 100 Each, Rfl: 3  •  fluticasone (FLONASE) 50 MCG/ACT nasal spray, Spray 2 Sprays in nose every day., Disp: 16 g, Rfl: 0  •  Omega 3 1000 MG CAPS, Take 4 a day (Patient taking differently: 5,000 mg every day. Take 4 a day), Disp: 100 Cap, Rfl: 6  •  aspirin EC (ECOTRIN) 81 MG TBEC, Take 81 mg by mouth every day., Disp: , Rfl:     Labs: Reviewed    Physical Examination:  Vital signs: There were no vitals taken for this visit. There is no height or weight on file to calculate BMI.  General: No apparent distress, cooperative  Eyes: No scleral icterus or discharge  ENMT: Normal on external inspection of nose, lips, normal thyroid exam  Neck: No abnormal masses on inspection  Resp: Normal effort, clear to auscultation bilaterally   CVS: Regular rate and rhythm, S1 S2 normal, no murmur   Extremities: No edema  Abdomen: abdominal obesity present  Neuro: Alert and oriented  Skin: No rash  Psych: Normal mood and affect, intact memory and able to make informed decisions    Foot Exam:  Monofilament: {DONE:44130}  Monofilament testing with a 10 gram force: sensation intact: {Sensation:37295718}  Visual Inspection: Feet {w-w/o:5700} maceration, ulcers, fissures.  Pedal pulses: {Pedal Pulses:72771142}    Assessment and Plan:    1. Uncontrolled type 2 diabetes mellitus with hyperglycemia, with long-term current use of insulin (HCC)  ***    2. Acquired hypothyroidism  ***    3. Vitamin D deficiency  ***    4. Hypogonadism in male  ***    5. Essential hypertension  ***    6. Mixed hyperlipidemia  ***      No follow-ups on file.    - Discussed diabetic diet discussed in detail-plate method.  - He will test before meals and log.  - He will walk for 20-30  minutes daily.  - Reviewed medications and advised how to take.  - Discussed importance of immunizations and yearly eye exams. He had an eye exam in the office on 2/6/19.  - Advised daily foot exams. Educated on signs of infection.   - Educated on need to stay well hydrated with water.  - Educated to call with any questions or problems.    Total face to face time spent with patient equals 60 minutes. 35/60 minutes were spent on counseling the patient about the mechanism of action, side effects and benefits of GLP-1 therapy, SGLT-Inhibitor therapy. I also counseled the patient on hypoglycemia recognition and management. First dose of GLP-1 agent was given in office.    Thank you for allowing me to participate in the care of this patient.    Dr. Be Licona  This note was scribed by Kelly Snell RN, CDE  01/13/20    CC:   Frida Baumann M.D.    This note was created using voice recognition software (Dragon). The accuracy of the dictation is limited by the abilities of the software. I have reviewed the note prior to signing, however some errors in grammar and context are still possible. If you have any questions related to this note please do not hesitate to contact our office.

## 2020-01-13 ENCOUNTER — APPOINTMENT (OUTPATIENT)
Dept: ENDOCRINOLOGY | Facility: MEDICAL CENTER | Age: 50
End: 2020-01-13
Payer: COMMERCIAL

## 2020-01-21 RX ORDER — LANSOPRAZOLE 30 MG/1
CAPSULE, DELAYED RELEASE ORAL
Qty: 180 CAP | Refills: 1 | Status: SHIPPED | OUTPATIENT
Start: 2020-01-21 | End: 2020-06-29

## 2020-01-21 NOTE — TELEPHONE ENCOUNTER
Was the patient seen in the last year in this department? Yes    Does patient have an active prescription for medications requested? Yes    Received Request Via: Pharmacy     lansoprazole (PREVACID) 30 MG CAPSULE DELAYED RELEASE        Sig: TAKE 1 CAPSULE TWICE DAILY

## 2020-01-30 ENCOUNTER — HOSPITAL ENCOUNTER (OUTPATIENT)
Dept: LAB | Facility: MEDICAL CENTER | Age: 50
End: 2020-01-30
Attending: INTERNAL MEDICINE
Payer: COMMERCIAL

## 2020-01-30 DIAGNOSIS — E03.9 ACQUIRED HYPOTHYROIDISM: ICD-10-CM

## 2020-01-30 DIAGNOSIS — E53.8 VITAMIN B 12 DEFICIENCY: ICD-10-CM

## 2020-01-30 DIAGNOSIS — E29.1 HYPOGONADISM IN MALE: ICD-10-CM

## 2020-01-30 DIAGNOSIS — E55.9 VITAMIN D DEFICIENCY: ICD-10-CM

## 2020-01-30 LAB
25(OH)D3 SERPL-MCNC: 62 NG/ML (ref 30–100)
ANION GAP SERPL CALC-SCNC: 10 MMOL/L (ref 0–11.9)
BUN SERPL-MCNC: 18 MG/DL (ref 8–22)
CALCIUM SERPL-MCNC: 11 MG/DL (ref 8.5–10.5)
CHLORIDE SERPL-SCNC: 107 MMOL/L (ref 96–112)
CO2 SERPL-SCNC: 26 MMOL/L (ref 20–33)
CREAT SERPL-MCNC: 1.25 MG/DL (ref 0.5–1.4)
FASTING STATUS PATIENT QL REPORTED: NORMAL
GLUCOSE SERPL-MCNC: 128 MG/DL (ref 65–99)
POTASSIUM SERPL-SCNC: 3.6 MMOL/L (ref 3.6–5.5)
SODIUM SERPL-SCNC: 143 MMOL/L (ref 135–145)
T3 SERPL-MCNC: 90 NG/DL (ref 60–181)
T3FREE SERPL-MCNC: 3.1 PG/ML (ref 2.4–4.2)
T4 FREE SERPL-MCNC: 1.18 NG/DL (ref 0.53–1.43)
TSH SERPL DL<=0.005 MIU/L-ACNC: 2.83 UIU/ML (ref 0.38–5.33)
URATE SERPL-MCNC: 6.1 MG/DL (ref 2.5–8.3)
VIT B12 SERPL-MCNC: 426 PG/ML (ref 211–911)

## 2020-01-30 PROCEDURE — 82607 VITAMIN B-12: CPT

## 2020-01-30 PROCEDURE — 80048 BASIC METABOLIC PNL TOTAL CA: CPT

## 2020-01-30 PROCEDURE — 82306 VITAMIN D 25 HYDROXY: CPT

## 2020-01-30 PROCEDURE — 84481 FREE ASSAY (FT-3): CPT

## 2020-01-30 PROCEDURE — 84550 ASSAY OF BLOOD/URIC ACID: CPT

## 2020-01-30 PROCEDURE — 84439 ASSAY OF FREE THYROXINE: CPT

## 2020-01-30 PROCEDURE — 36415 COLL VENOUS BLD VENIPUNCTURE: CPT

## 2020-01-30 PROCEDURE — 84480 ASSAY TRIIODOTHYRONINE (T3): CPT

## 2020-01-30 PROCEDURE — 84443 ASSAY THYROID STIM HORMONE: CPT

## 2020-01-30 NOTE — PROGRESS NOTES
Endocrinology Clinic Progress Note  PCP: Frida Baumann M.D.    HPI:  Brett Vicente is a 49 y.o. old patient who comes in today for review of his endocrine problems.   He is currently on Levothyroxine 112 mcg daily on empty stomach for hypothyroid.      Ref. Range 1/30/2020 09:20   TSH Latest Ref Range: 0.380 - 5.330 uIU/mL 2.830   Free T-4 Latest Ref Range: 0.53 - 1.43 ng/dL 1.18   T3 Latest Ref Range: 60.0 - 181.0 ng/dL 90.0   T3,Free Latest Ref Range: 2.40 - 4.20 pg/mL 3.10     He is taking Testosterone Cypionate 250 mg IM every 14 days for his hypogonadism.  His last dose was on 01/21/2020   His Testosterone level on 9/30/19 was 313, SHBG was 20 and calculated free testosterone was 8.03  He is currently utilizing Vitamin D supplementation of 29881 iu per week plus additional Vitamin D 2000 iu per day.  His most recent Vitamin D level was 62 on 1/30/2020  Type 2 diabetes, uncontrolled.  Complaining of additional stress over the past several months, taking care of father in law who had a stroke, states father in law passed away a couple of weeks ago.  Eating out more.    Most Recent HbA1c:   Lab Results   Component Value Date/Time    HBA1C 8.4 (A) 02/03/2020 11:07 AM    Previous A1c was 7.3 on 10/02/2019    Current Diabetes Regimen:  Ozempic 0.25 mg weekly  Metformin 1000 mg bid  Farxiga 10 mg daily  Basaglar insulin 70 units per day  He is testing his blood sugars 0-1 times per month.   Hypoglycemia:  None    Exercise: occasional walking, complains of back pain making it difficult to exercise.   Diet: states he has been eating out more than usual.   Last Retinal Exam: current and on the chart.    Daily Foot Exam: yes denies problems.    All the labs and imaging studies results (if applicable) relevant to today's appointment and my expertise were discussed with patient in great detail.    ROS:  Constitutional: fatigue,No weight loss  Cardiac: No palpitations or racing heart  Resp: No shortness of  breath  Neuro: No numbness or tinging in feet  Endo: No heat or cold intolerance, no polyuria or polydipsia  All other systems were reviewed and were negative.    Past Medical History:  Patient Active Problem List    Diagnosis Date Noted   • Obesity (BMI 30-39.9) 04/24/2018   • Fatty liver 04/24/2018   • Moderate single current episode of major depressive disorder (HCC) 01/20/2017   • Vitamin D deficiency 01/12/2017   • Mixed hyperlipidemia 12/07/2016   • Hypercalcemia 12/22/2015   • Bilateral shoulder tendinopathy 12/22/2015   • Hypogonadism in male 06/25/2015   • Diabetes mellitus type 2, uncontrolled, with complications (HCC) 06/25/2015   • Essential hypertension 06/25/2015   • Hypothyroidism 06/25/2015   • Gastroesophageal reflux disease without esophagitis 06/25/2015   • Gout 08/19/2014       Past Surgical History:  Past Surgical History:   Procedure Laterality Date   • LUMBAR FUSION ANTERIOR  6/10/2009    Performed by LISA MONTERO at SURGERY [facility]   • LUMBAR FUSION ANTERIOR         Allergies:  Morphine and Pcn [penicillins]    Social History:  Social History     Socioeconomic History   • Marital status:      Spouse name: Not on file   • Number of children: Not on file   • Years of education: Not on file   • Highest education level: Not on file   Occupational History   • Not on file   Social Needs   • Financial resource strain: Not on file   • Food insecurity:     Worry: Not on file     Inability: Not on file   • Transportation needs:     Medical: Not on file     Non-medical: Not on file   Tobacco Use   • Smoking status: Never Smoker   • Smokeless tobacco: Never Used   Substance and Sexual Activity   • Alcohol use: Yes     Alcohol/week: 0.5 oz     Types: 1 Shots of liquor per week   • Drug use: No   • Sexual activity: Yes     Partners: Female   Lifestyle   • Physical activity:     Days per week: Not on file     Minutes per session: Not on file   • Stress: Not on file   Relationships   •  Social connections:     Talks on phone: Not on file     Gets together: Not on file     Attends Buddhist service: Not on file     Active member of club or organization: Not on file     Attends meetings of clubs or organizations: Not on file     Relationship status: Not on file   • Intimate partner violence:     Fear of current or ex partner: Not on file     Emotionally abused: Not on file     Physically abused: Not on file     Forced sexual activity: Not on file   Other Topics Concern   •  Service No   • Blood Transfusions No   • Caffeine Concern No   • Occupational Exposure No   • Hobby Hazards No   • Sleep Concern No   • Stress Concern No   • Weight Concern Yes   • Special Diet No   • Back Care No   • Exercise Yes   • Bike Helmet Yes   • Seat Belt Yes   • Self-Exams No   Social History Narrative   • Not on file       Family History:  Family History   Problem Relation Age of Onset   • Hyperlipidemia Mother    • Diabetes Father    • Heart Disease Father    • Diabetes Brother    • Heart Disease Paternal Grandfather    • Stroke Paternal Grandfather        Medications:    Current Outpatient Medications:   •  levothyroxine (SYNTHROID) 137 MCG Tab, Take 1 Tab by mouth Every morning on an empty stomach., Disp: 90 Tab, Rfl: 3  •  lansoprazole (PREVACID) 30 MG CAPSULE DELAYED RELEASE, TAKE 1 CAPSULE TWICE DAILY, Disp: 180 Cap, Rfl: 1  •  vitamin D (CHOLECALCIFEROL) 1000 UNIT Tab, Take 2,000 Units by mouth every day., Disp: , Rfl:   •  Semaglutide (OZEMPIC) 0.25 or 0.5 MG/DOSE Solution Pen-injector, Inject 0.25 mg as instructed every 7 days., Disp: 3 PEN, Rfl: 6  •  vitamin D, Ergocalciferol, (DRISDOL) 18966 units Cap capsule, Take 1 Cap by mouth every 7 days., Disp: 12 Cap, Rfl: 3  •  Diclofenac Sodium (VOLTAREN) 1 % Gel, Apply 5 g to skin as directed 3 times a day., Disp: 1 Tube, Rfl: 0  •  irbesartan (AVAPRO) 300 MG Tab, TAKE 1 TABLET DAILY, Disp: 90 Tab, Rfl: 2  •  metFORMIN ER (GLUCOPHAGE XR) 500 MG TABLET SR 24  "HR, Take 2 Tabs by mouth 2 times a day., Disp: 360 Tab, Rfl: 2  •  hydrochlorothiazide (MICROZIDE) 12.5 MG capsule, Take 1 Cap by mouth every day., Disp: 90 Cap, Rfl: 1  •  Dapagliflozin Propanediol (FARXIGA) 10 MG Tab, Take 10 mg by mouth every day., Disp: 90 Tab, Rfl: 2  •  fenofibrate (TRICOR) 145 MG Tab, TAKE 1 TABLET DAILY, Disp: 90 Tab, Rfl: 2  •  spironolactone (ALDACTONE) 25 MG Tab, Take 0.5 Tabs by mouth every day. TAKE 0.5 TABS BY MOUTH EVERY DAY., Disp: 45 Tab, Rfl: 2  •  carvedilol (COREG) 25 MG Tab, Take 0.5 Tabs by mouth 2 times a day., Disp: 90 Tab, Rfl: 2  •  atorvastatin (LIPITOR) 40 MG Tab, Take 1 Tab by mouth every day., Disp: 90 Tab, Rfl: 2  •  allopurinol (ZYLOPRIM) 300 MG Tab, Take 1 Tab by mouth every day., Disp: 90 Tab, Rfl: 3  •  Insulin Glargine (BASAGLAR KWIKPEN) 100 UNIT/ML Solution Pen-injector, Inject 70 Units as instructed every day., Disp: 63 mL, Rfl: 3  •  fluticasone (FLONASE) 50 MCG/ACT nasal spray, Spray 2 Sprays in nose every day., Disp: 16 g, Rfl: 0  •  Omega 3 1000 MG CAPS, Take 4 a day (Patient taking differently: 5,000 mg every day. Take 4 a day), Disp: 100 Cap, Rfl: 6  •  aspirin EC (ECOTRIN) 81 MG TBEC, Take 81 mg by mouth every day., Disp: , Rfl:   •  Blood Glucose Test Strips, Test strips order: Test strips for One Touch Ultra 2 meter. Sig: use QID and prn ssx high or low sugar, Disp: 100 Each, Rfl: 2  •  Syringe/Needle, Disp, (SYRINGE 3CC/99QF5-3/2\") 22G X 1-1/2\" 3 ML Misc, For use with Intra-muscular testosterone  injection every 2 weeks., Disp: 12 Each, Rfl: 1  •  Blood Glucose Monitoring Suppl Device, Meter: Dispense One Touch Ultra 2 meter. Sig. Use as directed for blood sugar monitoring., Disp: 1 Device, Rfl: 0  •  Insulin Pen Needle (BD PEN NEEDLE CARLY U/F) 32G X 4 MM Misc, For insulin shot once a day, Disp: 100 Each, Rfl: 3  •  Lancets Misc, Lancets order: Lancets for One Touch Ultra 2 meter. Sig: use QID and prn ssx high or low sugar., Disp: 100 Each, Rfl: " "3    Labs: Reviewed    Physical Examination:  Vital signs: /80 (BP Location: Left arm, Patient Position: Sitting)   Pulse 80   Ht 1.854 m (6' 1\")   Wt 113.9 kg (251 lb)   SpO2 94%   BMI 33.12 kg/m²  Body mass index is 33.12 kg/m².  General: No apparent distress, cooperative  Eyes: No scleral icterus or discharge  ENMT: Normal on external inspection of nose, lips, normal thyroid exam  Neck: No abnormal masses on inspection  Resp: Normal effort, clear to auscultation bilaterally   CVS: Regular rate and rhythm, S1 S2 normal, no murmur   Extremities: No edema  Abdomen: abdominal obesity present  Neuro: Alert and oriented  Skin: No rash  Psych: Normal mood and affect, intact memory and able to make informed decisions    Assessment and Plan:  1. Acquired hypothyroidism  Increase Levothyroxine to 150 mcg daily form 125 mcg daily.     2. Hypogonadism in male  Continue testosterone. MOnitor Hb and ht closely.     3. Vitamin D deficiency  Cont vit D with food as per HPI    4. Diabetes mellitus type 2, uncontrolled, with complications (HCC)  Continue current regimen ; to increase ozempic by 1 click above 0.25 mg every week until max tolerable dose of 0.5 mg weekly.     5. Mixed hyperlipidemia  Currently on atorvastatin and fenofibrate.    6. Essential hypertension   I rechecked BP again and it was normal and controlled.     Return in about 3 months (around 5/3/2020).    Thank you for allowing me to participate in the care of this patient.    Be Licona M.D.  01/30/20    CC:   Frida Baumann M.D.    This note was created using voice recognition software (Dragon). The accuracy of the dictation is limited by the abilities of the software. I have reviewed the note prior to signing, however some errors in grammar and context are still possible. If you have any questions related to this note please do not hesitate to contact our office.   "

## 2020-02-03 ENCOUNTER — OFFICE VISIT (OUTPATIENT)
Dept: ENDOCRINOLOGY | Facility: MEDICAL CENTER | Age: 50
End: 2020-02-03
Payer: COMMERCIAL

## 2020-02-03 VITALS
BODY MASS INDEX: 33.27 KG/M2 | HEIGHT: 73 IN | HEART RATE: 80 BPM | DIASTOLIC BLOOD PRESSURE: 80 MMHG | SYSTOLIC BLOOD PRESSURE: 128 MMHG | OXYGEN SATURATION: 94 % | WEIGHT: 251 LBS

## 2020-02-03 DIAGNOSIS — I10 ESSENTIAL HYPERTENSION: ICD-10-CM

## 2020-02-03 DIAGNOSIS — E55.9 VITAMIN D DEFICIENCY: ICD-10-CM

## 2020-02-03 DIAGNOSIS — E29.1 HYPOGONADISM IN MALE: ICD-10-CM

## 2020-02-03 DIAGNOSIS — E78.2 MIXED HYPERLIPIDEMIA: ICD-10-CM

## 2020-02-03 DIAGNOSIS — E83.52 HYPERCALCEMIA: ICD-10-CM

## 2020-02-03 DIAGNOSIS — Z79.899 HIGH RISK MEDICATION USE: ICD-10-CM

## 2020-02-03 DIAGNOSIS — E03.9 ACQUIRED HYPOTHYROIDISM: ICD-10-CM

## 2020-02-03 LAB
HBA1C MFR BLD: 8.4 % (ref 0–5.6)
INT CON NEG: ABNORMAL
INT CON POS: ABNORMAL

## 2020-02-03 PROCEDURE — 83036 HEMOGLOBIN GLYCOSYLATED A1C: CPT | Performed by: INTERNAL MEDICINE

## 2020-02-03 PROCEDURE — 99214 OFFICE O/P EST MOD 30 MIN: CPT | Performed by: INTERNAL MEDICINE

## 2020-02-03 RX ORDER — LEVOTHYROXINE SODIUM 137 UG/1
137 TABLET ORAL
Qty: 90 TAB | Refills: 3 | Status: SHIPPED | OUTPATIENT
Start: 2020-02-03 | End: 2020-04-20 | Stop reason: SDUPTHER

## 2020-02-24 ENCOUNTER — APPOINTMENT (RX ONLY)
Dept: URBAN - METROPOLITAN AREA CLINIC 20 | Facility: CLINIC | Age: 50
Setting detail: DERMATOLOGY
End: 2020-02-24

## 2020-02-24 DIAGNOSIS — L81.4 OTHER MELANIN HYPERPIGMENTATION: ICD-10-CM

## 2020-02-24 DIAGNOSIS — D17 BENIGN LIPOMATOUS NEOPLASM: ICD-10-CM

## 2020-02-24 DIAGNOSIS — D22 MELANOCYTIC NEVI: ICD-10-CM

## 2020-02-24 DIAGNOSIS — D18.0 HEMANGIOMA: ICD-10-CM

## 2020-02-24 DIAGNOSIS — L57.8 OTHER SKIN CHANGES DUE TO CHRONIC EXPOSURE TO NONIONIZING RADIATION: ICD-10-CM

## 2020-02-24 DIAGNOSIS — L82.1 OTHER SEBORRHEIC KERATOSIS: ICD-10-CM

## 2020-02-24 DIAGNOSIS — Z87.2 PERSONAL HISTORY OF DISEASES OF THE SKIN AND SUBCUTANEOUS TISSUE: ICD-10-CM

## 2020-02-24 PROBLEM — D17.1 BENIGN LIPOMATOUS NEOPLASM OF SKIN AND SUBCUTANEOUS TISSUE OF TRUNK: Status: ACTIVE | Noted: 2020-02-24

## 2020-02-24 PROBLEM — D22.62 MELANOCYTIC NEVI OF LEFT UPPER LIMB, INCLUDING SHOULDER: Status: ACTIVE | Noted: 2020-02-24

## 2020-02-24 PROBLEM — D22.72 MELANOCYTIC NEVI OF LEFT LOWER LIMB, INCLUDING HIP: Status: ACTIVE | Noted: 2020-02-24

## 2020-02-24 PROBLEM — D22.5 MELANOCYTIC NEVI OF TRUNK: Status: ACTIVE | Noted: 2020-02-24

## 2020-02-24 PROBLEM — D22.71 MELANOCYTIC NEVI OF RIGHT LOWER LIMB, INCLUDING HIP: Status: ACTIVE | Noted: 2020-02-24

## 2020-02-24 PROBLEM — D18.01 HEMANGIOMA OF SKIN AND SUBCUTANEOUS TISSUE: Status: ACTIVE | Noted: 2020-02-24

## 2020-02-24 PROBLEM — D22.61 MELANOCYTIC NEVI OF RIGHT UPPER LIMB, INCLUDING SHOULDER: Status: ACTIVE | Noted: 2020-02-24

## 2020-02-24 PROCEDURE — ? OBSERVATION AND MEASURE

## 2020-02-24 PROCEDURE — 99214 OFFICE O/P EST MOD 30 MIN: CPT

## 2020-02-24 PROCEDURE — ? COUNSELING

## 2020-02-24 ASSESSMENT — LOCATION DETAILED DESCRIPTION DERM
LOCATION DETAILED: LEFT DISTAL POSTERIOR THIGH
LOCATION DETAILED: RIGHT POSTERIOR SHOULDER
LOCATION DETAILED: RIGHT PROXIMAL DORSAL FOREARM
LOCATION DETAILED: LEFT DISTAL POSTERIOR UPPER ARM
LOCATION DETAILED: LEFT ANTERIOR DISTAL THIGH
LOCATION DETAILED: RIGHT ANTERIOR PROXIMAL UPPER ARM
LOCATION DETAILED: RIGHT INFERIOR UPPER BACK
LOCATION DETAILED: LEFT PROXIMAL DORSAL FOREARM
LOCATION DETAILED: LEFT INFERIOR CENTRAL MALAR CHEEK
LOCATION DETAILED: RIGHT ANTERIOR DISTAL THIGH
LOCATION DETAILED: STERNUM
LOCATION DETAILED: PERIUMBILICAL SKIN
LOCATION DETAILED: LEFT MEDIAL SUPERIOR CHEST
LOCATION DETAILED: RIGHT LATERAL ABDOMEN
LOCATION DETAILED: RIGHT DISTAL POSTERIOR UPPER ARM
LOCATION DETAILED: LEFT ANTERIOR PROXIMAL UPPER ARM
LOCATION DETAILED: RIGHT RIB CAGE
LOCATION DETAILED: RIGHT ANTERIOR DISTAL UPPER ARM
LOCATION DETAILED: EPIGASTRIC SKIN
LOCATION DETAILED: RIGHT DISTAL POSTERIOR THIGH
LOCATION DETAILED: LEFT LATERAL INFERIOR EYELID
LOCATION DETAILED: LEFT PROXIMAL POSTERIOR UPPER ARM
LOCATION DETAILED: RIGHT CENTRAL MALAR CHEEK
LOCATION DETAILED: RIGHT PROXIMAL POSTERIOR UPPER ARM
LOCATION DETAILED: RIGHT MID-UPPER BACK
LOCATION DETAILED: RIGHT SUPERIOR MEDIAL UPPER BACK

## 2020-02-24 ASSESSMENT — LOCATION SIMPLE DESCRIPTION DERM
LOCATION SIMPLE: LEFT THIGH
LOCATION SIMPLE: RIGHT CHEEK
LOCATION SIMPLE: CHEST
LOCATION SIMPLE: LEFT CHEEK
LOCATION SIMPLE: LEFT FOREARM
LOCATION SIMPLE: RIGHT SHOULDER
LOCATION SIMPLE: RIGHT THIGH
LOCATION SIMPLE: LEFT UPPER ARM
LOCATION SIMPLE: RIGHT POSTERIOR THIGH
LOCATION SIMPLE: LEFT INFERIOR EYELID
LOCATION SIMPLE: RIGHT UPPER BACK
LOCATION SIMPLE: RIGHT FOREARM
LOCATION SIMPLE: RIGHT UPPER ARM
LOCATION SIMPLE: LEFT POSTERIOR THIGH
LOCATION SIMPLE: ABDOMEN

## 2020-02-24 ASSESSMENT — LOCATION ZONE DERM
LOCATION ZONE: LEG
LOCATION ZONE: EYELID
LOCATION ZONE: ARM
LOCATION ZONE: TRUNK
LOCATION ZONE: FACE

## 2020-02-24 NOTE — PROCEDURE: OBSERVATION
Size Of Lesion: 3mm x 6mm
Detail Level: Detailed
Body Location Override (Optional - Billing Will Still Be Based On Selected Body Map Location If Applicable): Left Mid Abdomen
Size Of Lesion: 3mm x 8mm
Size Of Lesion: 4mm x 6mm
Body Location Override (Optional - Billing Will Still Be Based On Selected Body Map Location If Applicable): Mid Superior abdomen
Size Of Lesion: 6mm x 1cm
Body Location Override (Optional - Billing Will Still Be Based On Selected Body Map Location If Applicable): Mid inferior abdomen
Size Of Lesion: 4mm x 5mm

## 2020-04-20 DIAGNOSIS — M10.9 GOUT, UNSPECIFIED CAUSE, UNSPECIFIED CHRONICITY, UNSPECIFIED SITE: ICD-10-CM

## 2020-04-20 DIAGNOSIS — I10 ESSENTIAL HYPERTENSION: ICD-10-CM

## 2020-04-20 DIAGNOSIS — E11.65 TYPE 2 DIABETES MELLITUS WITH HYPERGLYCEMIA, WITH LONG-TERM CURRENT USE OF INSULIN (HCC): ICD-10-CM

## 2020-04-20 DIAGNOSIS — E78.2 MIXED HYPERLIPIDEMIA: ICD-10-CM

## 2020-04-20 DIAGNOSIS — E78.5 HLD (HYPERLIPIDEMIA): ICD-10-CM

## 2020-04-20 DIAGNOSIS — Z79.4 TYPE 2 DIABETES MELLITUS WITH HYPERGLYCEMIA, WITH LONG-TERM CURRENT USE OF INSULIN (HCC): ICD-10-CM

## 2020-04-20 DIAGNOSIS — E03.9 ACQUIRED HYPOTHYROIDISM: ICD-10-CM

## 2020-04-20 DIAGNOSIS — E11.65 UNCONTROLLED TYPE 2 DIABETES MELLITUS WITH HYPERGLYCEMIA, WITHOUT LONG-TERM CURRENT USE OF INSULIN (HCC): ICD-10-CM

## 2020-04-20 DIAGNOSIS — E29.1 HYPOGONADISM IN MALE: ICD-10-CM

## 2020-04-20 RX ORDER — TESTOSTERONE CYPIONATE 200 MG/ML
250 INJECTION, SOLUTION INTRAMUSCULAR
Qty: 10 ML | Refills: 0 | Status: SHIPPED | OUTPATIENT
Start: 2020-04-20 | End: 2020-04-21 | Stop reason: SDUPTHER

## 2020-04-20 RX ORDER — METFORMIN HYDROCHLORIDE 500 MG/1
1000 TABLET, EXTENDED RELEASE ORAL 2 TIMES DAILY
Qty: 360 TAB | Refills: 2 | Status: SHIPPED | OUTPATIENT
Start: 2020-04-20 | End: 2021-02-11 | Stop reason: SDUPTHER

## 2020-04-20 RX ORDER — SPIRONOLACTONE 25 MG/1
12.5 TABLET ORAL
Qty: 45 TAB | Refills: 2 | Status: SHIPPED | OUTPATIENT
Start: 2020-04-20 | End: 2021-02-11 | Stop reason: SDUPTHER

## 2020-04-20 RX ORDER — SYRINGE W-NEEDLE,DISPOSAB,3 ML 25GX5/8"
SYRINGE, EMPTY DISPOSABLE MISCELLANEOUS
Qty: 12 EACH | Refills: 1 | Status: SHIPPED | OUTPATIENT
Start: 2020-04-20

## 2020-04-20 RX ORDER — ALLOPURINOL 300 MG/1
300 TABLET ORAL DAILY
Qty: 90 TAB | Refills: 3 | Status: SHIPPED | OUTPATIENT
Start: 2020-04-20 | End: 2021-05-13 | Stop reason: SDUPTHER

## 2020-04-20 RX ORDER — IRBESARTAN 300 MG/1
TABLET ORAL
Qty: 90 TAB | Refills: 2 | Status: SHIPPED | OUTPATIENT
Start: 2020-04-20 | End: 2020-06-01

## 2020-04-20 RX ORDER — DAPAGLIFLOZIN 10 MG/1
10 TABLET, FILM COATED ORAL DAILY
Qty: 90 TAB | Refills: 2 | Status: SHIPPED | OUTPATIENT
Start: 2020-04-20 | End: 2021-02-11 | Stop reason: SDUPTHER

## 2020-04-20 RX ORDER — OMEGA-3 FATTY ACIDS/FISH OIL 300-1000MG
CAPSULE ORAL
Qty: 100 CAP | Refills: 6 | Status: SHIPPED | OUTPATIENT
Start: 2020-04-20

## 2020-04-20 RX ORDER — CARVEDILOL 25 MG/1
12.5 TABLET ORAL 2 TIMES DAILY
Qty: 90 TAB | Refills: 2 | Status: SHIPPED | OUTPATIENT
Start: 2020-04-20 | End: 2021-02-11 | Stop reason: SDUPTHER

## 2020-04-20 RX ORDER — PEN NEEDLE, DIABETIC 32GX 5/32"
NEEDLE, DISPOSABLE MISCELLANEOUS
Qty: 100 EACH | Refills: 3 | Status: SHIPPED | OUTPATIENT
Start: 2020-04-20 | End: 2021-05-13 | Stop reason: SDUPTHER

## 2020-04-20 RX ORDER — FENOFIBRATE 145 MG/1
TABLET, COATED ORAL
Qty: 90 TAB | Refills: 2 | Status: SHIPPED | OUTPATIENT
Start: 2020-04-20 | End: 2021-02-11 | Stop reason: SDUPTHER

## 2020-04-20 RX ORDER — ATORVASTATIN CALCIUM 40 MG/1
40 TABLET, FILM COATED ORAL DAILY
Qty: 90 TAB | Refills: 2 | Status: SHIPPED | OUTPATIENT
Start: 2020-04-20 | End: 2021-02-11 | Stop reason: SDUPTHER

## 2020-04-20 RX ORDER — LEVOTHYROXINE SODIUM 137 UG/1
137 TABLET ORAL
Qty: 90 TAB | Refills: 3 | Status: SHIPPED | OUTPATIENT
Start: 2020-04-20 | End: 2021-05-13 | Stop reason: SDUPTHER

## 2020-04-20 RX ORDER — LANCETS 30 GAUGE
EACH MISCELLANEOUS
Qty: 100 EACH | Refills: 3 | Status: SHIPPED | OUTPATIENT
Start: 2020-04-20

## 2020-04-20 RX ORDER — HYDROCHLOROTHIAZIDE 12.5 MG/1
12.5 CAPSULE, GELATIN COATED ORAL DAILY
Qty: 90 CAP | Refills: 1 | Status: SHIPPED | OUTPATIENT
Start: 2020-04-20 | End: 2020-06-01

## 2020-04-20 RX ORDER — INSULIN GLARGINE 100 [IU]/ML
70 INJECTION, SOLUTION SUBCUTANEOUS DAILY
Qty: 63 ML | Refills: 3 | Status: SHIPPED | OUTPATIENT
Start: 2020-04-20 | End: 2021-06-03 | Stop reason: SDUPTHER

## 2020-04-20 RX ORDER — SEMAGLUTIDE 1.34 MG/ML
0.25 INJECTION, SOLUTION SUBCUTANEOUS
Qty: 3 PEN | Refills: 6 | Status: SHIPPED | OUTPATIENT
Start: 2020-04-20 | End: 2021-07-16 | Stop reason: SINTOL

## 2020-04-21 DIAGNOSIS — E29.1 HYPOGONADISM IN MALE: ICD-10-CM

## 2020-04-21 RX ORDER — TESTOSTERONE CYPIONATE 200 MG/ML
250 INJECTION, SOLUTION INTRAMUSCULAR
Qty: 10 ML | Refills: 0 | Status: SHIPPED
Start: 2020-04-21 | End: 2020-06-16 | Stop reason: SDUPTHER

## 2020-04-24 DIAGNOSIS — I10 ESSENTIAL HYPERTENSION: ICD-10-CM

## 2020-04-24 RX ORDER — HYDROCHLOROTHIAZIDE 12.5 MG/1
12.5 TABLET ORAL DAILY
Qty: 90 TAB | Refills: 2 | Status: SHIPPED | OUTPATIENT
Start: 2020-04-24 | End: 2021-03-09 | Stop reason: SDUPTHER

## 2020-04-24 RX ORDER — IRBESARTAN 150 MG/1
300 TABLET ORAL DAILY
Qty: 180 TAB | Refills: 2 | Status: SHIPPED | OUTPATIENT
Start: 2020-04-24 | End: 2020-09-21

## 2020-05-01 ENCOUNTER — TELEPHONE (OUTPATIENT)
Dept: ENDOCRINOLOGY | Facility: MEDICAL CENTER | Age: 50
End: 2020-05-01

## 2020-05-01 NOTE — TELEPHONE ENCOUNTER
DOCUMENTATION OF PAR STATUS:    1. Name of Medication & Dose: testosterone 200mg     2. Name of Prescription Coverage Company & phone #: [email or web address]    3. Date Prior Auth Submitted: 05/01/2020  4. What information was given to obtain insurance decision? Last office notes and labs    5. Prior Auth Status? Pending    6. Patient Notified: yes

## 2020-05-28 NOTE — PROGRESS NOTES
"Endocrinology Clinic Progress Note  PCP: Frida Baumann M.D.  This encounter was conducted via Zoom .   Verbal consent was obtained. Patient's identity was verified.  HPI:  He denies any changes to his health.   Brett Vicente is a 49 y.o. old patient who is seen today for review of his endocrine problems.   1. Type 2 diabetes, uncontrolled:   Most Recent HbA1c:   Lab Results   Component Value Date/Time    HBA1C 8.4 (A) 02/03/2020 11:07 AM     Current Diabetes Regimen:  Ozempic 0.25 mg weekly, he has been unable to take more than 4 clicks above 0.25 he gets very nauseated.   Metformin 1000 mg bid  Farxiga 10 mg daily  Basaglar 70 units at hs    Patient has been testing blood sugars 1-2 times per day.   Fasting blood sugars running in the 117-159 range over the past 7  Hypoglycemia:  None    Exercise: states taking a brisk walk 3 times a week.   Diet:  Moderately \"healthy\" diet  in general  Last Retinal Exam: has not been able to schedule due to covid 19 restrictions.    Daily Foot Exam: yes , denies problems.   2. Hypogonadism in male:  He is currently on Testosterone Cypionate 250 mg IM every 14 days.  His last injection was over a month ago.  He states his insurance refused to cover stating he needed a cardiac approval.      Ref. Range 9/30/2019 09:26   Sex Hormone Bind Globulin Latest Ref Range: 11 - 80 nmol/L 20   Testosterone,Total Latest Ref Range: 175 - 781 ng/dL 313     Calculated Free Testosterone is 8.03    3. Hypothyroid: Brett is currently on Levothyroxine 137 mcg daily on empty stomach.      Ref. Range 1/30/2020 09:20   TSH Latest Ref Range: 0.380 - 5.330 uIU/mL 2.830   Free T-4 Latest Ref Range: 0.53 - 1.43 ng/dL 1.18   T3 Latest Ref Range: 60.0 - 181.0 ng/dL 90.0   T3,Free Latest Ref Range: 2.40 - 4.20 pg/mL 3.10     4. Vitamin D deficiency: currently on Vitamin D 10590 iu per week with additional 2000 iu daily.      Ref. Range 1/30/2020 09:20   25-Hydroxy   Vitamin D 25 Latest Ref Range: 30 - " 100 ng/mL 62     5 Dyslipidemia: currently on Atorvastatin 40 mg in the evening at hs. \     Ref. Range 9/30/2019 09:26   Cholesterol,Tot Latest Ref Range: 100 - 199 mg/dL 131   Triglycerides Latest Ref Range: 0 - 149 mg/dL 342 (H)   HDL Latest Ref Range: >=40 mg/dL 19 (A)   LDL Latest Ref Range: <100 mg/dL 44     ROS:  Constitutional: No weight loss  Cardiac: No palpitations or racing heart  Resp: No shortness of breath  Neuro: No numbness or tinging in feet  Endo: No heat or cold intolerance, no polyuria or polydipsia  All other systems were reviewed and were negative.    Past Medical History:  Patient Active Problem List    Diagnosis Date Noted   • Obesity (BMI 30-39.9) 04/24/2018   • Fatty liver 04/24/2018   • Moderate single current episode of major depressive disorder (HCC) 01/20/2017   • Vitamin D deficiency 01/12/2017   • Mixed hyperlipidemia 12/07/2016   • Hypercalcemia 12/22/2015   • Bilateral shoulder tendinopathy 12/22/2015   • Hypogonadism in male 06/25/2015   • Diabetes mellitus type 2, uncontrolled, with complications (HCC) 06/25/2015   • Essential hypertension 06/25/2015   • Hypothyroidism 06/25/2015   • Gastroesophageal reflux disease without esophagitis 06/25/2015   • Gout 08/19/2014       Past Surgical History:  Past Surgical History:   Procedure Laterality Date   • LUMBAR FUSION ANTERIOR  6/10/2009    Performed by LISA MONTERO at SURGERY [facility]   • LUMBAR FUSION ANTERIOR         Allergies:  Morphine and Pcn [penicillins]    Social History:  Social History     Socioeconomic History   • Marital status:      Spouse name: Not on file   • Number of children: Not on file   • Years of education: Not on file   • Highest education level: Not on file   Occupational History   • Not on file   Social Needs   • Financial resource strain: Not on file   • Food insecurity     Worry: Not on file     Inability: Not on file   • Transportation needs     Medical: Not on file     Non-medical: Not on  file   Tobacco Use   • Smoking status: Never Smoker   • Smokeless tobacco: Never Used   Substance and Sexual Activity   • Alcohol use: Yes     Alcohol/week: 0.5 oz     Types: 1 Shots of liquor per week   • Drug use: No   • Sexual activity: Yes     Partners: Female   Lifestyle   • Physical activity     Days per week: Not on file     Minutes per session: Not on file   • Stress: Not on file   Relationships   • Social connections     Talks on phone: Not on file     Gets together: Not on file     Attends Sabianism service: Not on file     Active member of club or organization: Not on file     Attends meetings of clubs or organizations: Not on file     Relationship status: Not on file   • Intimate partner violence     Fear of current or ex partner: Not on file     Emotionally abused: Not on file     Physically abused: Not on file     Forced sexual activity: Not on file   Other Topics Concern   •  Service No   • Blood Transfusions No   • Caffeine Concern No   • Occupational Exposure No   • Hobby Hazards No   • Sleep Concern No   • Stress Concern No   • Weight Concern Yes   • Special Diet No   • Back Care No   • Exercise Yes   • Bike Helmet Yes   • Seat Belt Yes   • Self-Exams No   Social History Narrative   • Not on file       Family History:  Family History   Problem Relation Age of Onset   • Hyperlipidemia Mother    • Diabetes Father    • Heart Disease Father    • Diabetes Brother    • Heart Disease Paternal Grandfather    • Stroke Paternal Grandfather        Medications:    Current Outpatient Medications:   •  hydroCHLOROthiazide (HYDRODIURIL) 12.5 MG tablet, Take 1 Tab by mouth every day., Disp: 90 Tab, Rfl: 2  •  irbesartan (AVAPRO) 150 MG Tab, Take 2 Tabs by mouth every day. (Patient taking differently: Take 150 mg by mouth every day.), Disp: 180 Tab, Rfl: 2  •  allopurinol (ZYLOPRIM) 300 MG Tab, Take 1 Tab by mouth every day., Disp: 90 Tab, Rfl: 3  •  aspirin EC (ECOTRIN) 81 MG Tablet Delayed Response, Take  1 Tab by mouth every day., Disp: 90 Tab, Rfl: 3  •  atorvastatin (LIPITOR) 40 MG Tab, Take 1 Tab by mouth every day., Disp: 90 Tab, Rfl: 2  •  carvedilol (COREG) 25 MG Tab, Take 0.5 Tabs by mouth 2 times a day., Disp: 90 Tab, Rfl: 2  •  Dapagliflozin Propanediol (FARXIGA) 10 MG Tab, Take 10 mg by mouth every day., Disp: 90 Tab, Rfl: 2  •  fenofibrate (TRICOR) 145 MG Tab, TAKE 1 TABLET DAILY, Disp: 90 Tab, Rfl: 2  •  insulin glargine (INSULIN GLARGINE) 100 UNIT/ML Solution Pen-injector injection, Inject 70 Units as instructed every day., Disp: 63 mL, Rfl: 3  •  levothyroxine (SYNTHROID) 137 MCG Tab, Take 1 Tab by mouth Every morning on an empty stomach., Disp: 90 Tab, Rfl: 3  •  metFORMIN ER (GLUCOPHAGE XR) 500 MG TABLET SR 24 HR, Take 2 Tabs by mouth 2 times a day., Disp: 360 Tab, Rfl: 2  •  Omega 3 1000 MG Cap, Take 4 a day, Disp: 100 Cap, Rfl: 6  •  Semaglutide,0.25 or 0.5MG/DOS, (OZEMPIC, 0.25 OR 0.5 MG/DOSE,) 2 MG/1.5ML Solution Pen-injector, Inject 0.25 mg as instructed every 7 days., Disp: 3 PEN, Rfl: 6  •  spironolactone (ALDACTONE) 25 MG Tab, Take 0.5 Tabs by mouth every day. TAKE 0.5 TABS BY MOUTH EVERY DAY., Disp: 45 Tab, Rfl: 2  •  lansoprazole (PREVACID) 30 MG CAPSULE DELAYED RELEASE, TAKE 1 CAPSULE TWICE DAILY (Patient taking differently: 30 mg 2 Times a Day.), Disp: 180 Cap, Rfl: 1  •  vitamin D (CHOLECALCIFEROL) 1000 UNIT Tab, Take 2,000 Units by mouth every day., Disp: , Rfl:   •  vitamin D, Ergocalciferol, (DRISDOL) 34712 units Cap capsule, Take 1 Cap by mouth every 7 days., Disp: 12 Cap, Rfl: 3  •  Diclofenac Sodium (VOLTAREN) 1 % Gel, Apply 5 g to skin as directed 3 times a day., Disp: 1 Tube, Rfl: 0  •  fluticasone (FLONASE) 50 MCG/ACT nasal spray, Spray 2 Sprays in nose every day., Disp: 16 g, Rfl: 0  •  testosterone cypionate (DEPO-TESTOSTERONE) 200 MG/ML Solution injection, 1.25 mL by Intramuscular route every 14 days for 112 days. (Patient not taking: Reported on 6/1/2020), Disp: 10 mL, Rfl:  "0  •  Blood Glucose Monitoring Suppl Device, Meter: Dispense One Touch Ultra 2 meter. Sig. Use as directed for blood sugar monitoring., Disp: 1 Device, Rfl: 0  •  Blood Glucose Test Strips, Test strips order: Test strips for One Touch Ultra 2 meter. Sig: use QID and prn ssx high or low sugar, Disp: 100 Each, Rfl: 2  •  Insulin Pen Needle 32 G x 4 mm (BD PEN NEEDLE CARLY U/F), For insulin shot once a day, Disp: 100 Each, Rfl: 3  •  Lancets, Lancets order: Lancets for One Touch Ultra 2 meter. Sig: use QID and prn ssx high or low sugar., Disp: 100 Each, Rfl: 3  •  Syringe/Needle, Disp, (SYRINGE 3CC/09NE7-0/2\") 22G X 1-1/2\" 3 ML Misc, For use with Intra-muscular testosterone  injection every 2 weeks., Disp: 12 Each, Rfl: 1    Labs: Reviewed    Physical Examination:  Vital signs: There were no vitals taken for this visit. There is no height or weight on file to calculate BMI.  General: No apparent distress, cooperative  Eyes: No scleral icterus or discharge  ENMT: Normal on external inspection of nose, lips, normal thyroid on inspection  Neck: No abnormal masses on inspection  Resp: Normal effort  Extremities: No visible edema  Abdomen:  visible abdominal obesity  Neuro: Alert and oriented  Skin: No visible rash  Psych: Normal mood and affect, intact memory and able to make informed decisions    Assessment and Plan:  1. Diabetes mellitus type 2, uncontrolled, with complications (HCC)  encouraged increased physical activity as when he is more active; his blood sugars are down.   - Discussed diabetic diet, encouraged portion control.   - Discussed importance of testing blood sugars and keeping logs.   - Discussed importance of daily exercise, recommended 30 minutes per day  - Reviewed medications and advised how to take.  - Discussed importance of immunizations and yearly eye exams.   - Advised daily foot  exams. Educated on signs of infection.   - Educated on need to stay well hydrated with water.  - Educated to call with " any questions or problems.      2. Hypogonadism in male  Insurance requires some cardiac related testing before renewing his testosterone.     3. Acquired hypothyroidism  Continue current dose; check labs.     4. Vitamin D deficiency  Cont vit D with food.discussed immune function of vit D and its role in some protection against COVID-19 as per recent early evidence.     5. Mixed hyperlipidemia  Continue statin.       Return in about 3 months (around 9/1/2020).    Thank you for allowing me to participate in the care of this patient.    Be Licona M.D.  05/28/20    CC:   Frida Baumann M.D.    This note was created using voice recognition software (Dragon). The accuracy of the dictation is limited by the abilities of the software. I have reviewed the note prior to signing, however some errors in grammar and context are still possible. If you have any questions related to this note please do not hesitate to contact our office.

## 2020-06-01 ENCOUNTER — TELEMEDICINE (OUTPATIENT)
Dept: ENDOCRINOLOGY | Facility: MEDICAL CENTER | Age: 50
End: 2020-06-01
Payer: COMMERCIAL

## 2020-06-01 DIAGNOSIS — E53.8 VITAMIN B 12 DEFICIENCY: ICD-10-CM

## 2020-06-01 DIAGNOSIS — E03.9 ACQUIRED HYPOTHYROIDISM: ICD-10-CM

## 2020-06-01 DIAGNOSIS — E55.9 VITAMIN D DEFICIENCY: ICD-10-CM

## 2020-06-01 DIAGNOSIS — E29.1 HYPOGONADISM IN MALE: ICD-10-CM

## 2020-06-01 DIAGNOSIS — E78.2 MIXED HYPERLIPIDEMIA: ICD-10-CM

## 2020-06-01 PROCEDURE — 99214 OFFICE O/P EST MOD 30 MIN: CPT | Mod: 95,CR | Performed by: INTERNAL MEDICINE

## 2020-06-16 DIAGNOSIS — E29.1 HYPOGONADISM IN MALE: ICD-10-CM

## 2020-06-16 NOTE — TELEPHONE ENCOUNTER
Received request via: Patient    Was the patient seen in the last year in this department? Yes    Does the patient have an active prescription (recently filled or refills available) for medication(s) requested? No     testosterone cypionate (DEPO-TESTOSTERONE) 200 MG/ML Solution injection             Si.25 mL by Intramuscular route every 14 days for 112 days.    Disp:  10 mL    Refills:  0    Start: 2020 - 10/6/2020    Class: Normal    Non-formulary

## 2020-06-19 RX ORDER — TESTOSTERONE CYPIONATE 200 MG/ML
250 INJECTION, SOLUTION INTRAMUSCULAR
Qty: 10 ML | Refills: 0 | Status: SHIPPED | OUTPATIENT
Start: 2020-06-19 | End: 2020-10-09

## 2020-06-27 DIAGNOSIS — K21.9 GASTRIC REFLUX: ICD-10-CM

## 2020-06-29 RX ORDER — LANSOPRAZOLE 30 MG/1
CAPSULE, DELAYED RELEASE ORAL
Qty: 180 CAP | Refills: 1 | Status: SHIPPED | OUTPATIENT
Start: 2020-06-29 | End: 2021-02-11 | Stop reason: SDUPTHER

## 2020-06-29 NOTE — TELEPHONE ENCOUNTER
Received request via: Pharmacy    Was the patient seen in the last year in this department? Yes    Does the patient have an active prescription (recently filled or refills available) for medication(s) requested? No       lansoprazole (PREVACID) 30 MG CAPSULE DELAYED RELEASE         Sig: TAKE 1 CAPSULE TWICE DAILY

## 2020-09-11 DIAGNOSIS — E55.9 VITAMIN D DEFICIENCY: ICD-10-CM

## 2020-09-11 RX ORDER — ERGOCALCIFEROL 1.25 MG/1
CAPSULE ORAL
Qty: 12 CAP | Refills: 3 | Status: SHIPPED | OUTPATIENT
Start: 2020-09-11 | End: 2021-07-16 | Stop reason: SDUPTHER

## 2020-09-11 NOTE — TELEPHONE ENCOUNTER
Received request via: Pharmacy    Was the patient seen in the last year in this department? Yes    Does the patient have an active prescription (recently filled or refills available) for medication(s) requested? No         vitamin D, Ergocalciferol, (DRISDOL) 1.25 MG (30798 UT) Cap capsule    Sig: TAKE 1 CAPSULE BY MOUTH EVERY 7 DAYS

## 2020-09-19 DIAGNOSIS — I10 ESSENTIAL HYPERTENSION: ICD-10-CM

## 2020-09-21 RX ORDER — IRBESARTAN 150 MG/1
TABLET ORAL
Qty: 180 TAB | Refills: 1 | Status: SHIPPED | OUTPATIENT
Start: 2020-09-21 | End: 2021-05-13 | Stop reason: SDUPTHER

## 2021-02-11 DIAGNOSIS — K21.9 GASTRIC REFLUX: ICD-10-CM

## 2021-02-11 DIAGNOSIS — E11.65 TYPE 2 DIABETES MELLITUS WITH HYPERGLYCEMIA, WITH LONG-TERM CURRENT USE OF INSULIN (HCC): ICD-10-CM

## 2021-02-11 DIAGNOSIS — Z79.4 TYPE 2 DIABETES MELLITUS WITH HYPERGLYCEMIA, WITH LONG-TERM CURRENT USE OF INSULIN (HCC): ICD-10-CM

## 2021-02-11 DIAGNOSIS — E78.2 MIXED HYPERLIPIDEMIA: ICD-10-CM

## 2021-02-11 DIAGNOSIS — I10 ESSENTIAL HYPERTENSION: ICD-10-CM

## 2021-02-11 RX ORDER — ATORVASTATIN CALCIUM 40 MG/1
40 TABLET, FILM COATED ORAL DAILY
Qty: 90 TABLET | Refills: 1 | Status: SHIPPED | OUTPATIENT
Start: 2021-02-11 | End: 2021-02-12 | Stop reason: SDUPTHER

## 2021-02-11 RX ORDER — LANSOPRAZOLE 30 MG/1
CAPSULE, DELAYED RELEASE ORAL
Qty: 180 CAPSULE | Refills: 1 | Status: SHIPPED | OUTPATIENT
Start: 2021-02-11 | End: 2021-07-19 | Stop reason: SDUPTHER

## 2021-02-11 RX ORDER — CARVEDILOL 25 MG/1
12.5 TABLET ORAL 2 TIMES DAILY
Qty: 90 TABLET | Refills: 1 | Status: SHIPPED | OUTPATIENT
Start: 2021-02-11 | End: 2021-07-19

## 2021-02-11 RX ORDER — FENOFIBRATE 145 MG/1
TABLET, COATED ORAL
Qty: 90 TABLET | Refills: 1 | Status: SHIPPED | OUTPATIENT
Start: 2021-02-11 | End: 2021-02-12 | Stop reason: SDUPTHER

## 2021-02-11 RX ORDER — METFORMIN HYDROCHLORIDE 500 MG/1
1000 TABLET, EXTENDED RELEASE ORAL 2 TIMES DAILY
Qty: 360 TABLET | Refills: 1 | Status: SHIPPED | OUTPATIENT
Start: 2021-02-11 | End: 2021-02-12 | Stop reason: SDUPTHER

## 2021-02-11 RX ORDER — DAPAGLIFLOZIN 10 MG/1
10 TABLET, FILM COATED ORAL DAILY
Qty: 90 TABLET | Refills: 1 | Status: SHIPPED | OUTPATIENT
Start: 2021-02-11 | End: 2021-02-12 | Stop reason: SDUPTHER

## 2021-02-11 RX ORDER — SPIRONOLACTONE 25 MG/1
12.5 TABLET ORAL
Qty: 45 TABLET | Refills: 1 | Status: SHIPPED | OUTPATIENT
Start: 2021-02-11 | End: 2021-07-16 | Stop reason: SDUPTHER

## 2021-02-12 DIAGNOSIS — E78.2 MIXED HYPERLIPIDEMIA: ICD-10-CM

## 2021-02-12 DIAGNOSIS — Z79.4 TYPE 2 DIABETES MELLITUS WITH HYPERGLYCEMIA, WITH LONG-TERM CURRENT USE OF INSULIN (HCC): ICD-10-CM

## 2021-02-12 DIAGNOSIS — E11.65 TYPE 2 DIABETES MELLITUS WITH HYPERGLYCEMIA, WITH LONG-TERM CURRENT USE OF INSULIN (HCC): ICD-10-CM

## 2021-02-12 RX ORDER — ATORVASTATIN CALCIUM 40 MG/1
40 TABLET, FILM COATED ORAL DAILY
Qty: 90 TABLET | Refills: 1 | Status: SHIPPED | OUTPATIENT
Start: 2021-02-12 | End: 2021-07-16 | Stop reason: SDUPTHER

## 2021-02-12 RX ORDER — DAPAGLIFLOZIN 10 MG/1
10 TABLET, FILM COATED ORAL DAILY
Qty: 90 TABLET | Refills: 1 | Status: SHIPPED | OUTPATIENT
Start: 2021-02-12 | End: 2021-07-16 | Stop reason: SDUPTHER

## 2021-02-12 RX ORDER — METFORMIN HYDROCHLORIDE 500 MG/1
1000 TABLET, EXTENDED RELEASE ORAL 2 TIMES DAILY
Qty: 360 TABLET | Refills: 1 | Status: SHIPPED | OUTPATIENT
Start: 2021-02-12 | End: 2021-07-16 | Stop reason: SDUPTHER

## 2021-02-12 RX ORDER — FENOFIBRATE 145 MG/1
TABLET, COATED ORAL
Qty: 90 TABLET | Refills: 1 | Status: SHIPPED | OUTPATIENT
Start: 2021-02-12 | End: 2021-11-08

## 2021-02-24 ENCOUNTER — APPOINTMENT (RX ONLY)
Dept: URBAN - METROPOLITAN AREA CLINIC 20 | Facility: CLINIC | Age: 51
Setting detail: DERMATOLOGY
End: 2021-02-24

## 2021-02-24 DIAGNOSIS — D17 BENIGN LIPOMATOUS NEOPLASM: ICD-10-CM

## 2021-02-24 DIAGNOSIS — L82.1 OTHER SEBORRHEIC KERATOSIS: ICD-10-CM

## 2021-02-24 DIAGNOSIS — L57.8 OTHER SKIN CHANGES DUE TO CHRONIC EXPOSURE TO NONIONIZING RADIATION: ICD-10-CM

## 2021-02-24 DIAGNOSIS — L81.4 OTHER MELANIN HYPERPIGMENTATION: ICD-10-CM

## 2021-02-24 DIAGNOSIS — D18.0 HEMANGIOMA: ICD-10-CM

## 2021-02-24 DIAGNOSIS — D22 MELANOCYTIC NEVI: ICD-10-CM

## 2021-02-24 DIAGNOSIS — Z87.2 PERSONAL HISTORY OF DISEASES OF THE SKIN AND SUBCUTANEOUS TISSUE: ICD-10-CM

## 2021-02-24 PROBLEM — D22.72 MELANOCYTIC NEVI OF LEFT LOWER LIMB, INCLUDING HIP: Status: ACTIVE | Noted: 2021-02-24

## 2021-02-24 PROBLEM — D22.5 MELANOCYTIC NEVI OF TRUNK: Status: ACTIVE | Noted: 2021-02-24

## 2021-02-24 PROBLEM — D48.5 NEOPLASM OF UNCERTAIN BEHAVIOR OF SKIN: Status: ACTIVE | Noted: 2021-02-24

## 2021-02-24 PROBLEM — D17.1 BENIGN LIPOMATOUS NEOPLASM OF SKIN AND SUBCUTANEOUS TISSUE OF TRUNK: Status: ACTIVE | Noted: 2021-02-24

## 2021-02-24 PROBLEM — D22.61 MELANOCYTIC NEVI OF RIGHT UPPER LIMB, INCLUDING SHOULDER: Status: ACTIVE | Noted: 2021-02-24

## 2021-02-24 PROBLEM — D18.01 HEMANGIOMA OF SKIN AND SUBCUTANEOUS TISSUE: Status: ACTIVE | Noted: 2021-02-24

## 2021-02-24 PROBLEM — D22.62 MELANOCYTIC NEVI OF LEFT UPPER LIMB, INCLUDING SHOULDER: Status: ACTIVE | Noted: 2021-02-24

## 2021-02-24 PROBLEM — D22.71 MELANOCYTIC NEVI OF RIGHT LOWER LIMB, INCLUDING HIP: Status: ACTIVE | Noted: 2021-02-24

## 2021-02-24 PROCEDURE — ? OBSERVATION AND MEASURE

## 2021-02-24 PROCEDURE — ? COUNSELING

## 2021-02-24 PROCEDURE — 99213 OFFICE O/P EST LOW 20 MIN: CPT | Mod: 25

## 2021-02-24 PROCEDURE — ? BIOPSY BY SHAVE METHOD

## 2021-02-24 PROCEDURE — 11102 TANGNTL BX SKIN SINGLE LES: CPT

## 2021-02-24 PROCEDURE — 11103 TANGNTL BX SKIN EA SEP/ADDL: CPT

## 2021-02-24 ASSESSMENT — LOCATION ZONE DERM
LOCATION ZONE: TRUNK
LOCATION ZONE: EYELID
LOCATION ZONE: ARM
LOCATION ZONE: LEG
LOCATION ZONE: FACE

## 2021-02-24 ASSESSMENT — LOCATION SIMPLE DESCRIPTION DERM
LOCATION SIMPLE: LEFT POSTERIOR THIGH
LOCATION SIMPLE: LEFT FOREARM
LOCATION SIMPLE: RIGHT THIGH
LOCATION SIMPLE: LEFT UPPER ARM
LOCATION SIMPLE: RIGHT CHEEK
LOCATION SIMPLE: LEFT INFERIOR EYELID
LOCATION SIMPLE: CHEST
LOCATION SIMPLE: RIGHT FOREARM
LOCATION SIMPLE: RIGHT UPPER BACK
LOCATION SIMPLE: ABDOMEN
LOCATION SIMPLE: LEFT THIGH
LOCATION SIMPLE: RIGHT UPPER ARM
LOCATION SIMPLE: RIGHT SHOULDER
LOCATION SIMPLE: RIGHT POSTERIOR THIGH
LOCATION SIMPLE: LEFT CHEEK

## 2021-02-24 ASSESSMENT — LOCATION DETAILED DESCRIPTION DERM
LOCATION DETAILED: RIGHT PROXIMAL DORSAL FOREARM
LOCATION DETAILED: RIGHT CENTRAL MALAR CHEEK
LOCATION DETAILED: LEFT DISTAL POSTERIOR UPPER ARM
LOCATION DETAILED: RIGHT DISTAL POSTERIOR UPPER ARM
LOCATION DETAILED: RIGHT LATERAL ABDOMEN
LOCATION DETAILED: RIGHT SUPERIOR MEDIAL UPPER BACK
LOCATION DETAILED: RIGHT PROXIMAL POSTERIOR UPPER ARM
LOCATION DETAILED: RIGHT DISTAL POSTERIOR THIGH
LOCATION DETAILED: LEFT INFERIOR CENTRAL MALAR CHEEK
LOCATION DETAILED: RIGHT RIB CAGE
LOCATION DETAILED: EPIGASTRIC SKIN
LOCATION DETAILED: LEFT MEDIAL SUPERIOR CHEST
LOCATION DETAILED: LEFT DISTAL POSTERIOR THIGH
LOCATION DETAILED: RIGHT ANTERIOR DISTAL THIGH
LOCATION DETAILED: LEFT ANTERIOR DISTAL THIGH
LOCATION DETAILED: RIGHT MID-UPPER BACK
LOCATION DETAILED: LEFT PROXIMAL POSTERIOR UPPER ARM
LOCATION DETAILED: LEFT LATERAL INFERIOR EYELID
LOCATION DETAILED: RIGHT ANTERIOR PROXIMAL UPPER ARM
LOCATION DETAILED: PERIUMBILICAL SKIN
LOCATION DETAILED: RIGHT POSTERIOR SHOULDER
LOCATION DETAILED: LEFT ANTERIOR PROXIMAL UPPER ARM
LOCATION DETAILED: STERNUM
LOCATION DETAILED: RIGHT ANTERIOR DISTAL UPPER ARM
LOCATION DETAILED: LEFT PROXIMAL DORSAL FOREARM

## 2021-02-24 NOTE — PROCEDURE: OBSERVATION
Size Of Lesion: 3mm x 6mm
Detail Level: Detailed
Body Location Override (Optional - Billing Will Still Be Based On Selected Body Map Location If Applicable): Left Mid Abdomen
Size Of Lesion: 3mm x 8mm
Size Of Lesion: 6mm x 1cm
Body Location Override (Optional - Billing Will Still Be Based On Selected Body Map Location If Applicable): Mid inferior abdomen

## 2021-02-24 NOTE — HPI: MOLE CHECK
What Is The Reason For Today's Visit?: Mole Check
Additional History: Right anterior distal upper arm - 3mm x 6mm
Additional History: Left mid abdomen - 3mm x 8mm
Additional History: Mid superior abdomen - 4mm x 6mm
Additional History: Mid inferior abdomen - 6mm x 1cm
Additional History: Right lateral abdomen - 4mm x 5mm

## 2021-02-24 NOTE — PROCEDURE: BIOPSY BY SHAVE METHOD
Detail Level: Detailed
Depth Of Biopsy: dermis
Was A Bandage Applied: Yes
Size Of Lesion In Cm: 0
Biopsy Type: H and E
Biopsy Method: Personna blade
Anesthesia Type: 1% lidocaine with 1:100,000 epinephrine and a 1:6 solution of 8.4% sodium bicarbonate
Anesthesia Volume In Cc: 0.2
Hemostasis: Drysol and Electrocautery
Wound Care: Aquaphor
Dressing: Band-Aid
Destruction After The Procedure: No
Type Of Destruction Used: Curettage
Curettage Text: The wound bed was treated with curettage after the biopsy was performed.
Cryotherapy Text: The wound bed was treated with cryotherapy after the biopsy was performed.
Electrodesiccation Text: The wound bed was treated with electrodesiccation after the biopsy was performed.
Electrodesiccation And Curettage Text: The wound bed was treated with electrodesiccation and curettage after the biopsy was performed.
Silver Nitrate Text: The wound bed was treated with silver nitrate after the biopsy was performed.
Lab: [id number]
Lab Facility: 
Consent: Written consent was obtained and risks were reviewed including but not limited to scarring, infection, bleeding, scabbing, incomplete removal, nerve damage and allergy to anesthesia.
Post-Care Instructions: I reviewed with the patient in detail post-care instructions. Patient is to keep the biopsy site dry overnight, and then apply bacitracin twice daily until healed. Patient may apply hydrogen peroxide soaks to remove any crusting.
Notification Instructions: Patient will be notified of biopsy results. However, patient instructed to call the office if not contacted within 2 weeks.
Billing Type: Third-Party Bill
Information: Selecting Yes will display possible errors in your note based on the variables you have selected. This validation is only offered as a suggestion for you. PLEASE NOTE THAT THE VALIDATION TEXT WILL BE REMOVED WHEN YOU FINALIZE YOUR NOTE. IF YOU WANT TO FAX A PRELIMINARY NOTE YOU WILL NEED TO TOGGLE THIS TO 'NO' IF YOU DO NOT WANT IT IN YOUR FAXED NOTE.
Body Location Override (Optional - Billing Will Still Be Based On Selected Body Map Location If Applicable): superior periumbilical

## 2021-03-09 ENCOUNTER — TELEPHONE (OUTPATIENT)
Dept: ENDOCRINOLOGY | Facility: MEDICAL CENTER | Age: 51
End: 2021-03-09

## 2021-03-09 DIAGNOSIS — I10 ESSENTIAL HYPERTENSION: ICD-10-CM

## 2021-03-09 DIAGNOSIS — E29.1 HYPOGONADISM IN MALE: ICD-10-CM

## 2021-03-09 RX ORDER — HYDROCHLOROTHIAZIDE 12.5 MG/1
12.5 TABLET ORAL DAILY
Qty: 90 TABLET | Refills: 2 | Status: SHIPPED | OUTPATIENT
Start: 2021-03-09 | End: 2021-07-16 | Stop reason: SDUPTHER

## 2021-03-09 NOTE — TELEPHONE ENCOUNTER
Patient is requesting a refill on HYDRODIURIL. He has made a N2U appt with Kira for 4/13. Please send to [facility] in [place].         Patient's number-[phone]

## 2021-03-31 ENCOUNTER — APPOINTMENT (RX ONLY)
Dept: URBAN - METROPOLITAN AREA CLINIC 36 | Facility: CLINIC | Age: 51
Setting detail: DERMATOLOGY
End: 2021-03-31

## 2021-03-31 PROBLEM — C44.311 BASAL CELL CARCINOMA OF SKIN OF NOSE: Status: ACTIVE | Noted: 2021-03-31

## 2021-03-31 PROCEDURE — 17312 MOHS ADDL STAGE: CPT

## 2021-03-31 PROCEDURE — 17311 MOHS 1 STAGE H/N/HF/G: CPT

## 2021-03-31 PROCEDURE — ? MOHS SURGERY

## 2021-03-31 PROCEDURE — 12011 RPR F/E/E/N/L/M 2.5 CM/<: CPT

## 2021-03-31 NOTE — PROCEDURE: MOHS SURGERY
Repair Anesthesia Type: 1% lidocaine with epinephrine
Cheiloplasty (Complex) Text: A decision was made to reconstruct the defect with a  cheiloplasty.  The defect was undermined extensively.  Additional obicularis oris muscle was excised with a 15 blade scalpel.  The defect was converted into a full thickness wedge to facilite a better cosmetic result.  Small vessels were then tied off with 5-0 monocyrl. The obicularis oris, superficial fascia, adipose and dermis were then reapproximated.  After the deeper layers were approximated the epidermis was reapproximated with particular care given to realign the vermilion border.
Anesthesia Volume In Cc: 4
Complex Requirements: Exposure Of Vital Structure?: No
Oculoplastic Surgeon Procedure Text (A): After obtaining clear surgical margins the patient was sent to oculoplastics for surgical repair.  The patient understands they will receive post-surgical care and follow-up from the referring physician's office.
Asc Procedure Text (B): After obtaining clear surgical margins the patient was sent to an ASC for surgical repair.  The patient understands they will receive post-surgical care and follow-up from the ASC physician.
Postop Diagnosis: same
Wound Care (No Sutures): Petrolatum
Spiral Flap Text: The defect edges were debeveled with a #15 scalpel blade.  Given the location of the defect, shape of the defect and the proximity to free margins a spiral flap was deemed most appropriate.  Using a sterile surgical marker, an appropriate rotation flap was drawn incorporating the defect and placing the expected incisions within the relaxed skin tension lines where possible. The area thus outlined was incised deep to adipose tissue with a #15 scalpel blade.  The skin margins were undermined to an appropriate distance in all directions utilizing iris scissors.
Repair Hemostasis (Optional): Pinpoint electrocautery
Provider Procedure Text (C): After obtaining clear surgical margins the defect was repaired by another provider.
Composite Graft Text: The defect edges were debeveled with a #15 scalpel blade.  Given the location of the defect, shape of the defect, the proximity to free margins and the fact the defect was full thickness a composite graft was deemed most appropriate.  The defect was outline and then transferred to the donor site.  A full thickness graft was then excised from the donor site. The graft was then placed in the primary defect, oriented appropriately and then sutured into place.  The secondary defect was then repaired using a primary closure.
Ftsg Text: The defect edges were debeveled with a #15 scalpel blade.  Given the location of the defect, shape of the defect and the proximity to free margins a full thickness skin graft was deemed most appropriate.  Using a sterile surgical marker, the primary defect shape was transferred to the donor site. The area thus outlined was incised deep to adipose tissue with a #15 scalpel blade.  The harvested graft was then trimmed of adipose tissue until only dermis and epidermis was left.  The skin margins of the secondary defect were undermined to an appropriate distance in all directions utilizing iris scissors.  The secondary defect was closed with interrupted buried subcutaneous sutures.  The skin edges were then re-apposed with running  sutures.  The skin graft was then placed in the primary defect and oriented appropriately.
Use Separate Skin Prep For Stages And Repair: Yes
Special Stains Stage 7 - Results: Base On Clearance Noted Above
Complex Repair And Graft Additional Text (Will Appearing After The Standard Complex Repair Text): The complex repair was not sufficient to completely close the primary defect. The remaining additional defect was repaired with the graft mentioned below.
Double O-Z Flap Text: The defect edges were debeveled with a #15 scalpel blade.  Given the location of the defect, shape of the defect and the proximity to free margins a Double O-Z flap was deemed most appropriate.  Using a sterile surgical marker, an appropriate transposition flap was drawn incorporating the defect and placing the expected incisions within the relaxed skin tension lines where possible. The area thus outlined was incised deep to adipose tissue with a #15 scalpel blade.  The skin margins were undermined to an appropriate distance in all directions utilizing iris scissors.
Stage 15: Additional Anesthesia Volume In Cc: 0
Location Indication Override (Is Already Calculated Based On Selected Body Location): Area H
Mid-Level Procedure Text (E): After obtaining clear surgical margins the patient was sent to a mid-level provider for surgical repair.  The patient understands they will receive post-surgical care and follow-up from the mid-level provider.
Dorsal Nasal Flap Text: The defect edges were debeveled with a #15 scalpel blade.  Given the location of the defect and the proximity to free margins a dorsal nasal flap was deemed most appropriate.  Using a sterile surgical marker, an appropriate dorsal nasal flap was drawn around the defect.    The area thus outlined was incised deep to adipose tissue with a #15 scalpel blade.  The skin margins were undermined to an appropriate distance in all directions utilizing iris scissors.
Split-Thickness Skin Graft Text: The defect edges were debeveled with a #15 scalpel blade.  Given the location of the defect, shape of the defect and the proximity to free margins a split thickness skin graft was deemed most appropriate.  Using a sterile surgical marker, the primary defect shape was transferred to the donor site. The split thickness graft was then harvested.  The skin graft was then placed in the primary defect and oriented appropriately.
Island Pedicle Flap Text: The defect edges were debeveled with a #15 scalpel blade.  Given the location of the defect, shape of the defect and the proximity to free margins an island pedicle advancement flap was deemed most appropriate.  Using a sterile surgical marker, an appropriate advancement flap was drawn incorporating the defect, outlining the appropriate donor tissue and placing the expected incisions within the relaxed skin tension lines where possible.    The area thus outlined was incised deep to adipose tissue with a #15 scalpel blade.  The skin margins were undermined to an appropriate distance in all directions around the primary defect and laterally outward around the island pedicle utilizing iris scissors.  There was minimal undermining beneath the pedicle flap.
Plastic Surgeon Procedure Text (D): After obtaining clear surgical margins the patient was sent to plastics for surgical repair.  The patient understands they will receive post-surgical care and follow-up from the referring physician's office.
Posterior Auricular Interpolation Flap Text: A decision was made to reconstruct the defect utilizing an interpolation axial flap and a staged reconstruction.  A telfa template was made of the defect.  This telfa template was then used to outline the posterior auricular interpolation flap.  The donor area for the pedicle flap was then injected with anesthesia.  The flap was excised through the skin and subcutaneous tissue down to the layer of the underlying musculature.  The pedicle flap was carefully excised within this deep plane to maintain its blood supply.  The edges of the donor site were undermined.   The donor site was closed in a primary fashion.  The pedicle was then rotated into position and sutured.  Once the tube was sutured into place, adequate blood supply was confirmed with blanching and refill.  The pedicle was then wrapped with xeroform gauze and dressed appropriately with a telfa and gauze bandage to ensure continued blood supply and protect the attached pedicle.
Closure 4 Information: This tab is for additional flaps and grafts above and beyond our usual structured repairs.  Please note if you enter information here it will not currently bill and you will need to add the billing information manually.
Area L Indication Text: Tumors in this location are included in Area L (trunk and extremities).  Mohs surgery is indicated for larger tumors, or tumors with aggressive histologic features, in these anatomic locations.
Cheek Interpolation Flap Text: A decision was made to reconstruct the defect utilizing an interpolation axial flap and a staged reconstruction.  A telfa template was made of the defect.  This telfa template was then used to outline the Cheek Interpolation flap.  The donor area for the pedicle flap was then injected with anesthesia.  The flap was excised through the skin and subcutaneous tissue down to the layer of the underlying musculature.  The interpolation flap was carefully excised within this deep plane to maintain its blood supply.  The edges of the donor site were undermined.   The donor site was closed in a primary fashion.  The pedicle was then rotated into position and sutured.  Once the tube was sutured into place, adequate blood supply was confirmed with blanching and refill.  The pedicle was then wrapped with xeroform gauze and dressed appropriately with a telfa and gauze bandage to ensure continued blood supply and protect the attached pedicle.
V-Y Flap Text: The defect edges were debeveled with a #15 scalpel blade.  Given the location of the defect, shape of the defect and the proximity to free margins a V-Y flap was deemed most appropriate.  Using a sterile surgical marker, an appropriate advancement flap was drawn incorporating the defect and placing the expected incisions within the relaxed skin tension lines where possible.    The area thus outlined was incised deep to adipose tissue with a #15 scalpel blade.  The skin margins were undermined to an appropriate distance in all directions utilizing iris scissors.
Advancement Flap (Double) Text: The defect edges were debeveled with a #15 scalpel blade.  Given the location of the defect and the proximity to free margins a double advancement flap was deemed most appropriate.  Using a sterile surgical marker, the appropriate advancement flaps were drawn incorporating the defect and placing the expected incisions within the relaxed skin tension lines where possible.    The area thus outlined was incised deep to adipose tissue with a #15 scalpel blade.  The skin margins were undermined to an appropriate distance in all directions utilizing iris scissors.
Intermediate Repair Preamble Text (Leave Blank If You Do Not Want): Undermining was performed with blunt dissection.
Melolabial Transposition Flap Text: The defect edges were debeveled with a #15 scalpel blade.  Given the location of the defect and the proximity to free margins a melolabial flap was deemed most appropriate.  Using a sterile surgical marker, an appropriate melolabial transposition flap was drawn incorporating the defect.    The area thus outlined was incised deep to adipose tissue with a #15 scalpel blade.  The skin margins were undermined to an appropriate distance in all directions utilizing iris scissors.
Double Island Pedicle Flap Text: The defect edges were debeveled with a #15 scalpel blade.  Given the location of the defect, shape of the defect and the proximity to free margins a double island pedicle advancement flap was deemed most appropriate.  Using a sterile surgical marker, an appropriate advancement flap was drawn incorporating the defect, outlining the appropriate donor tissue and placing the expected incisions within the relaxed skin tension lines where possible.    The area thus outlined was incised deep to adipose tissue with a #15 scalpel blade.  The skin margins were undermined to an appropriate distance in all directions around the primary defect and laterally outward around the island pedicle utilizing iris scissors.  There was minimal undermining beneath the pedicle flap.
Purse String (Intermediate) Text: Given the location of the defect and the characteristics of the surrounding skin a purse string intermediate closure was deemed most appropriate.  Undermining was performed circumfirentially around the surgical defect.  A purse string suture was then placed and tightened.
Lazy S Complex Repair Preamble Text (Leave Blank If You Do Not Want): Extensive wide undermining was performed.
A-T Advancement Flap Text: The defect edges were debeveled with a #15 scalpel blade.  Given the location of the defect, shape of the defect and the proximity to free margins an A-T advancement flap was deemed most appropriate.  Using a sterile surgical marker, an appropriate advancement flap was drawn incorporating the defect and placing the expected incisions within the relaxed skin tension lines where possible.    The area thus outlined was incised deep to adipose tissue with a #15 scalpel blade.  The skin margins were undermined to an appropriate distance in all directions utilizing iris scissors.
Graft Cartilage Fenestration Text: The cartilage was fenestrated with a 2mm punch biopsy to help facilitate graft survival and healing.
Epidermal Closure: simple interrupted
Mercedes Flap Text: The defect edges were debeveled with a #15 scalpel blade.  Given the location of the defect, shape of the defect and the proximity to free margins a Mercedes flap was deemed most appropriate.  Using a sterile surgical marker, an appropriate advancement flap was drawn incorporating the defect and placing the expected incisions within the relaxed skin tension lines where possible. The area thus outlined was incised deep to adipose tissue with a #15 scalpel blade.  The skin margins were undermined to an appropriate distance in all directions utilizing iris scissors.
Rhombic Flap Text: The defect edges were debeveled with a #15 scalpel blade.  Given the location of the defect and the proximity to free margins a rhombic flap was deemed most appropriate.  Using a sterile surgical marker, an appropriate rhombic flap was drawn incorporating the defect.    The area thus outlined was incised deep to adipose tissue with a #15 scalpel blade.  The skin margins were undermined to an appropriate distance in all directions utilizing iris scissors.
Consent (Marginal Mandibular)/Introductory Paragraph: The rationale for Mohs was explained to the patient and consent was obtained. The risks, benefits and alternatives to therapy were discussed in detail. Specifically, the risks of damage to the marginal mandibular branch of the facial nerve, infection, scarring, bleeding, prolonged wound healing, incomplete removal, allergy to anesthesia, and recurrence were addressed. Prior to the procedure, the treatment site was clearly identified and confirmed by the patient. All components of Universal Protocol/PAUSE Rule completed.
Otolaryngologist Procedure Text (C): After obtaining clear surgical margins the patient was sent to otolaryngology for surgical repair.  The patient understands they will receive post-surgical care and follow-up from the referring physician's office.
S Plasty Text: Given the location and shape of the defect, and the orientation of relaxed skin tension lines, an S-plasty was deemed most appropriate for repair.  Using a sterile surgical marker, the appropriate outline of the S-plasty was drawn, incorporating the defect and placing the expected incisions within the relaxed skin tension lines where possible.  The area thus outlined was incised deep to adipose tissue with a #15 scalpel blade.  The skin margins were undermined to an appropriate distance in all directions utilizing iris scissors. The skin flaps were advanced over the defect.  The opposing margins were then approximated with interrupted buried subcutaneous sutures.
Wound Care: Vaseline
Vermilion Border Text: The closure involved the vermilion border.
Consent 3/Introductory Paragraph: I gave the patient a chance to ask questions they had about the procedure.  Following this I explained the Mohs procedure and consent was obtained. The risks, benefits and alternatives to therapy were discussed in detail. Specifically, the risks of infection, scarring, bleeding, prolonged wound healing, incomplete removal, allergy to anesthesia, nerve injury and recurrence were addressed. Prior to the procedure, the treatment site was clearly identified and confirmed by the patient. All components of Universal Protocol/PAUSE Rule completed.
Chonodrocutaneous Helical Advancement Flap Text: The defect edges were debeveled with a #15 scalpel blade.  Given the location of the defect and the proximity to free margins a chondrocutaneous helical advancement flap was deemed most appropriate.  Using a sterile surgical marker, the appropriate advancement flap was drawn incorporating the defect and placing the expected incisions within the relaxed skin tension lines where possible.    The area thus outlined was incised deep to adipose tissue with a #15 scalpel blade.  The skin margins were undermined to an appropriate distance in all directions utilizing iris scissors.
Bcc Infiltrative Histology Text: There were numerous aggregates of basaloid cells demonstrating an infiltrative pattern.
Cheiloplasty (Less Than 50%) Text: A decision was made to reconstruct the defect with a  cheiloplasty.  The defect was undermined extensively.  Additional obicularis oris muscle was excised with a 15 blade scalpel.  The defect was converted into a full thickness wedge, of less than 50% of the vertical height of the lip, to facilite a better cosmetic result.  Small vessels were then tied off with 5-0 monocyrl. The obicularis oris, superficial fascia, adipose and dermis were then reapproximated.  After the deeper layers were approximated the epidermis was reapproximated with particular care given to realign the vermilion border.
Home Suture Removal Text: Patient was provided instructions on removing sutures and will remove their sutures at home.  If they have any questions or difficulties they will call the office.
Partial Purse String (Intermediate) Text: Given the location of the defect and the characteristics of the surrounding skin an intermediate purse string closure was deemed most appropriate.  Undermining was performed circumfirentially around the surgical defect.  A purse string suture was then placed and tightened. Wound tension only allowed a partial closure of the circular defect.
Closure 2 Information: This tab is for additional flaps and grafts, including complex repair and grafts and complex repair and flaps. You can also specify a different location for the additional defect, if the location is the same you do not need to select a new one. We will insert the automated text for the repair you select below just as we do for solitary flaps and grafts. Please note that at this time if you select a location with a different insurance zone you will need to override the ICD10 and CPT if appropriate.
Surgeon: Alivia Casey MD
Alar Island Pedicle Flap Text: The defect edges were debeveled with a #15 scalpel blade.  Given the location of the defect, shape of the defect and the proximity to the alar rim an island pedicle advancement flap was deemed most appropriate.  Using a sterile surgical marker, an appropriate advancement flap was drawn incorporating the defect, outlining the appropriate donor tissue and placing the expected incisions within the nasal ala running parallel to the alar rim. The area thus outlined was incised with a #15 scalpel blade.  The skin margins were undermined minimally to an appropriate distance in all directions around the primary defect and laterally outward around the island pedicle utilizing iris scissors.  There was minimal undermining beneath the pedicle flap.
Xenograft Text: The defect edges were debeveled with a #15 scalpel blade.  Given the location of the defect, shape of the defect and the proximity to free margins a xenograft was deemed most appropriate.  The graft was then trimmed to fit the size of the defect.  The graft was then placed in the primary defect and oriented appropriately.
H Plasty Text: Given the location of the defect, shape of the defect and the proximity to free margins a H-plasty was deemed most appropriate for repair.  Using a sterile surgical marker, the appropriate advancement arms of the H-plasty were drawn incorporating the defect and placing the expected incisions within the relaxed skin tension lines where possible. The area thus outlined was incised deep to adipose tissue with a #15 scalpel blade. The skin margins were undermined to an appropriate distance in all directions utilizing iris scissors.  The opposing advancement arms were then advanced into place in opposite direction and anchored with interrupted buried subcutaneous sutures.
Repair Anesthesia Method: local infiltration
Mucosal Advancement Flap Text: Given the location of the defect, shape of the defect and the proximity to free margins a mucosal advancement flap was deemed most appropriate. Incisions were made with a 15 blade scalpel in the appropriate fashion along the cutaneous vermilion border and the mucosal lip. The remaining actinically damaged mucosal tissue was excised.  The mucosal advancement flap was then elevated to the gingival sulcus with care taken to preserve the neurovascular structures and advanced into the primary defect. Care was taken to ensure that precise realignment of the vermilion border was achieved.
Ear Wedge Repair Text: A wedge excision was completed by carrying down an excision through the full thickness of the ear and cartilage with an inward facing Burow's triangle. The wound was then closed in a layered fashion.
Surgical Defect Width In Cm (Optional): 0.4
Area H Indication Text: Tumors in this location are included in Area H (eyelids, eyebrows, nose, lips, chin, ear, pre-auricular, post-auricular, temple, genitalia, hands, feet, ankles and areola).  Tissue conservation is critical in these anatomic locations.
Double O-Z Plasty Text: The defect edges were debeveled with a #15 scalpel blade.  Given the location of the defect, shape of the defect and the proximity to free margins a Double O-Z plasty (double transposition flap) was deemed most appropriate.  Using a sterile surgical marker, the appropriate transposition flaps were drawn incorporating the defect and placing the expected incisions within the relaxed skin tension lines where possible. The area thus outlined was incised deep to adipose tissue with a #15 scalpel blade.  The skin margins were undermined to an appropriate distance in all directions utilizing iris scissors.  Hemostasis was achieved with electrocautery.  The flaps were then transposed into place, one clockwise and the other counterclockwise, and anchored with interrupted buried subcutaneous sutures.
Modified Advancement Flap Text: The defect edges were debeveled with a #15 scalpel blade.  Given the location of the defect, shape of the defect and the proximity to free margins a modified advancement flap was deemed most appropriate.  Using a sterile surgical marker, an appropriate advancement flap was drawn incorporating the defect and placing the expected incisions within the relaxed skin tension lines where possible.    The area thus outlined was incised deep to adipose tissue with a #15 scalpel blade.  The skin margins were undermined to an appropriate distance in all directions utilizing iris scissors.
Z Plasty Text: The lesion was extirpated to the level of the fat with a #15 scalpel blade.  Given the location of the defect, shape of the defect and the proximity to free margins a Z-plasty was deemed most appropriate for repair.  Using a sterile surgical marker, the appropriate transposition arms of the Z-plasty were drawn incorporating the defect and placing the expected incisions within the relaxed skin tension lines where possible.    The area thus outlined was incised deep to adipose tissue with a #15 scalpel blade.  The skin margins were undermined to an appropriate distance in all directions utilizing iris scissors.  The opposing transposition arms were then transposed into place in opposite direction and anchored with interrupted buried subcutaneous sutures.
V-Y Plasty Text: The defect edges were debeveled with a #15 scalpel blade.  Given the location of the defect, shape of the defect and the proximity to free margins an V-Y advancement flap was deemed most appropriate.  Using a sterile surgical marker, an appropriate advancement flap was drawn incorporating the defect and placing the expected incisions within the relaxed skin tension lines where possible.    The area thus outlined was incised deep to adipose tissue with a #15 scalpel blade.  The skin margins were undermined to an appropriate distance in all directions utilizing iris scissors.
Consent (Ear)/Introductory Paragraph: The rationale for Mohs was explained to the patient and consent was obtained. The risks, benefits and alternatives to therapy were discussed in detail. Specifically, the risks of ear deformity, infection, scarring, bleeding, prolonged wound healing, incomplete removal, allergy to anesthesia, nerve injury and recurrence were addressed. Prior to the procedure, the treatment site was clearly identified and confirmed by the patient. All components of Universal Protocol/PAUSE Rule completed.
Bcc Histology Text: There were numerous aggregates of basaloid cells.
Tissue Cultured Epidermal Autograft Text: The defect edges were debeveled with a #15 scalpel blade.  Given the location of the defect, shape of the defect and the proximity to free margins a tissue cultured epidermal autograft was deemed most appropriate.  The graft was then trimmed to fit the size of the defect.  The graft was then placed in the primary defect and oriented appropriately.
Mart-1 - Positive Histology Text: MART-1 staining demonstrates areas of higher density and clustering of melanocytes with Pagetoid spread upwards within the epidermis. The surgical margins are positive for tumor cells.
Consent (Scalp)/Introductory Paragraph: The rationale for Mohs was explained to the patient and consent was obtained. The risks, benefits and alternatives to therapy were discussed in detail. Specifically, the risks of changes in hair growth pattern secondary to repair, infection, scarring, bleeding, prolonged wound healing, incomplete removal, allergy to anesthesia, nerve injury and recurrence were addressed. Prior to the procedure, the treatment site was clearly identified and confirmed by the patient. All components of Universal Protocol/PAUSE Rule completed.
Skin Substitute Text: The defect edges were debeveled with a #15 scalpel blade.  Given the location of the defect, shape of the defect and the proximity to free margins a skin substitute graft was deemed most appropriate.  The graft material was trimmed to fit the size of the defect. The graft was then placed in the primary defect and oriented appropriately.
Partial Purse String (Simple) Text: Given the location of the defect and the characteristics of the surrounding skin a simple purse string closure was deemed most appropriate.  Undermining was performed circumfirentially around the surgical defect.  A purse string suture was then placed and tightened. Wound tension only allowed a partial closure of the circular defect.
Hatchet Flap Text: The defect edges were debeveled with a #15 scalpel blade.  Given the location of the defect, shape of the defect and the proximity to free margins a hatchet flap was deemed most appropriate.  Using a sterile surgical marker, an appropriate hatchet flap was drawn incorporating the defect and placing the expected incisions within the relaxed skin tension lines where possible.    The area thus outlined was incised deep to adipose tissue with a #15 scalpel blade.  The skin margins were undermined to an appropriate distance in all directions utilizing iris scissors.
Banner Transposition Flap Text: The defect edges were debeveled with a #15 scalpel blade.  Given the location of the defect and the proximity to free margins a Banner transposition flap was deemed most appropriate.  Using a sterile surgical marker, an appropriate flap drawn around the defect. The area thus outlined was incised deep to adipose tissue with a #15 scalpel blade.  The skin margins were undermined to an appropriate distance in all directions utilizing iris scissors.
Consent (Lip)/Introductory Paragraph: The rationale for Mohs was explained to the patient and consent was obtained. The risks, benefits and alternatives to therapy were discussed in detail. Specifically, the risks of lip deformity, changes in the oral aperture, infection, scarring, bleeding, prolonged wound healing, incomplete removal, allergy to anesthesia, nerve injury and recurrence were addressed. Prior to the procedure, the treatment site was clearly identified and confirmed by the patient. All components of Universal Protocol/PAUSE Rule completed.
Epidermal Sutures: 5-0 Surgipro
Bilobed Flap Text: The defect edges were debeveled with a #15 scalpel blade.  Given the location of the defect and the proximity to free margins a bilobe flap was deemed most appropriate.  Using a sterile surgical marker, an appropriate bilobe flap drawn around the defect.    The area thus outlined was incised deep to adipose tissue with a #15 scalpel blade.  The skin margins were undermined to an appropriate distance in all directions utilizing iris scissors.
Unna Boot Text: An Unna boot was placed to help immobilize the limb and facilitate more rapid healing.
Star Wedge Flap Text: The defect edges were debeveled with a #15 scalpel blade.  Given the location of the defect, shape of the defect and the proximity to free margins a star wedge flap was deemed most appropriate.  Using a sterile surgical marker, an appropriate rotation flap was drawn incorporating the defect and placing the expected incisions within the relaxed skin tension lines where possible. The area thus outlined was incised deep to adipose tissue with a #15 scalpel blade.  The skin margins were undermined to an appropriate distance in all directions utilizing iris scissors.
Surgical Defect Length In Cm (Optional): 0.9
Consent (Temporal Branch)/Introductory Paragraph: The rationale for Mohs was explained to the patient and consent was obtained. The risks, benefits and alternatives to therapy were discussed in detail. Specifically, the risks of damage to the temporal branch of the facial nerve, infection, scarring, bleeding, prolonged wound healing, incomplete removal, allergy to anesthesia, and recurrence were addressed. Prior to the procedure, the treatment site was clearly identified and confirmed by the patient. All components of Universal Protocol/PAUSE Rule completed.
Undermining Location (Optional): in the superficial subcutaneous fat
Cheek-To-Nose Interpolation Flap Text: A decision was made to reconstruct the defect utilizing an interpolation axial flap and a staged reconstruction.  A telfa template was made of the defect.  This telfa template was then used to outline the Cheek-To-Nose Interpolation flap.  The donor area for the pedicle flap was then injected with anesthesia.  The flap was excised through the skin and subcutaneous tissue down to the layer of the underlying musculature.  The interpolation flap was carefully excised within this deep plane to maintain its blood supply.  The edges of the donor site were undermined.   The donor site was closed in a primary fashion.  The pedicle was then rotated into position and sutured.  Once the tube was sutured into place, adequate blood supply was confirmed with blanching and refill.  The pedicle was then wrapped with xeroform gauze and dressed appropriately with a telfa and gauze bandage to ensure continued blood supply and protect the attached pedicle.
Simple / Intermediate / Complex Repair - Final Wound Length In Cm: 2.2
Muscle Hinge Flap Text: The defect edges were debeveled with a #15 scalpel blade.  Given the size, depth and location of the defect and the proximity to free margins a muscle hinge flap was deemed most appropriate.  Using a sterile surgical marker, an appropriate hinge flap was drawn incorporating the defect. The area thus outlined was incised with a #15 scalpel blade.  The skin margins were undermined to an appropriate distance in all directions utilizing iris scissors.
Retention Suture Bite Size: 1 mm
Advancement-Rotation Flap Text: The defect edges were debeveled with a #15 scalpel blade.  Given the location of the defect, shape of the defect and the proximity to free margins an advancement-rotation flap was deemed most appropriate.  Using a sterile surgical marker, an appropriate flap was drawn incorporating the defect and placing the expected incisions within the relaxed skin tension lines where possible. The area thus outlined was incised deep to adipose tissue with a #15 scalpel blade.  The skin margins were undermined to an appropriate distance in all directions utilizing iris scissors.
Helical Rim Advancement Flap Text: The defect edges were debeveled with a #15 blade scalpel.  Given the location of the defect and the proximity to free margins (helical rim) a double helical rim advancement flap was deemed most appropriate.  Using a sterile surgical marker, the appropriate advancement flaps were drawn incorporating the defect and placing the expected incisions between the helical rim and antihelix where possible.  The area thus outlined was incised through and through with a #15 scalpel blade.  With a skin hook and iris scissors, the flaps were gently and sharply undermined and freed up.
Mart-1 - Negative Histology Text: MART-1 staining demonstrates a normal density and pattern of melanocytes along the dermal-epidermal junction. The surgical margins are negative for tumor cells.
W Plasty Text: The lesion was extirpated to the level of the fat with a #15 scalpel blade.  Given the location of the defect, shape of the defect and the proximity to free margins a W-plasty was deemed most appropriate for repair.  Using a sterile surgical marker, the appropriate transposition arms of the W-plasty were drawn incorporating the defect and placing the expected incisions within the relaxed skin tension lines where possible.    The area thus outlined was incised deep to adipose tissue with a #15 scalpel blade.  The skin margins were undermined to an appropriate distance in all directions utilizing iris scissors.  The opposing transposition arms were then transposed into place in opposite direction and anchored with interrupted buried subcutaneous sutures.
Melolabial Interpolation Flap Text: A decision was made to reconstruct the defect utilizing an interpolation axial flap and a staged reconstruction.  A telfa template was made of the defect.  This telfa template was then used to outline the melolabial interpolation flap.  The donor area for the pedicle flap was then injected with anesthesia.  The flap was excised through the skin and subcutaneous tissue down to the layer of the underlying musculature.  The pedicle flap was carefully excised within this deep plane to maintain its blood supply.  The edges of the donor site were undermined.   The donor site was closed in a primary fashion.  The pedicle was then rotated into position and sutured.  Once the tube was sutured into place, adequate blood supply was confirmed with blanching and refill.  The pedicle was then wrapped with xeroform gauze and dressed appropriately with a telfa and gauze bandage to ensure continued blood supply and protect the attached pedicle.
Burow's Advancement Flap Text: The defect edges were debeveled with a #15 scalpel blade.  Given the location of the defect and the proximity to free margins a Burow's advancement flap was deemed most appropriate.  Using a sterile surgical marker, the appropriate advancement flap was drawn incorporating the defect and placing the expected incisions within the relaxed skin tension lines where possible.    The area thus outlined was incised deep to adipose tissue with a #15 scalpel blade.  The skin margins were undermined to an appropriate distance in all directions utilizing iris scissors.
Date Of Previous Biopsy (Optional): 2-24-21
Mastoid Interpolation Flap Text: A decision was made to reconstruct the defect utilizing an interpolation axial flap and a staged reconstruction.  A telfa template was made of the defect.  This telfa template was then used to outline the mastoid interpolation flap.  The donor area for the pedicle flap was then injected with anesthesia.  The flap was excised through the skin and subcutaneous tissue down to the layer of the underlying musculature.  The pedicle flap was carefully excised within this deep plane to maintain its blood supply.  The edges of the donor site were undermined.   The donor site was closed in a primary fashion.  The pedicle was then rotated into position and sutured.  Once the tube was sutured into place, adequate blood supply was confirmed with blanching and refill.  The pedicle was then wrapped with xeroform gauze and dressed appropriately with a telfa and gauze bandage to ensure continued blood supply and protect the attached pedicle.
Inflammation Suggestive Of Cancer Camouflage Histology Text: There was a dense lymphocytic infiltrate which prevented adequate histologic evaluation of adjacent structures.
Pain Refusal Text: I offered to prescribe pain medication but the patient refused to take this medication.
No Repair - Repaired With Adjacent Surgical Defect Text (Leave Blank If You Do Not Want): After obtaining clear surgical margins the defect was repaired concurrently with another surgical defect which was in close approximation.
Full Thickness Lip Wedge Repair (Flap) Text: Given the location of the defect and the proximity to free margins a full thickness wedge repair was deemed most appropriate.  Using a sterile surgical marker, the appropriate repair was drawn incorporating the defect and placing the expected incisions perpendicular to the vermilion border.  The vermilion border was also meticulously outlined to ensure appropriate reapproximation during the repair.  The area thus outlined was incised through and through with a #15 scalpel blade.  The muscularis and dermis were reaproximated with deep sutures following hemostasis. Care was taken to realign the vermilion border before proceeding with the superficial closure.  Once the vermilion was realigned the superfical and mucosal closure was finished.
Suturegard Retention Suture: 0-0 Nylon
Tarsorrhaphy Text: A tarsorrhaphy was performed using Frost sutures.
Primary Defect Length In Cm (Final Defect Size - Required For Flaps/Grafts): 1.1
Consent (Spinal Accessory)/Introductory Paragraph: The rationale for Mohs was explained to the patient and consent was obtained. The risks, benefits and alternatives to therapy were discussed in detail. Specifically, the risks of damage to the spinal accessory nerve, infection, scarring, bleeding, prolonged wound healing, incomplete removal, allergy to anesthesia, and recurrence were addressed. Prior to the procedure, the treatment site was clearly identified and confirmed by the patient. All components of Universal Protocol/PAUSE Rule completed.
Epidermal Closure Graft Donor Site (Optional): running
Surgical Defect Width In Cm (Optional): 0.5
Consent 2/Introductory Paragraph: Mohs surgery was explained to the patient and consent was obtained. The risks, benefits and alternatives to therapy were discussed in detail. Specifically, the risks of infection, scarring, bleeding, prolonged wound healing, incomplete removal, allergy to anesthesia, nerve injury and recurrence were addressed. Prior to the procedure, the treatment site was clearly identified and confirmed by the patient. All components of Universal Protocol/PAUSE Rule completed.
Subsequent Stages Histo Method Verbiage: Using a similar technique to that described above, a thin layer of tissue was removed from all areas where tumor was visible on the previous stage.  The tissue was again oriented, mapped, dyed, and processed as above.
Referring Physician (Optional): Lu SHAHID
Consent (Nose)/Introductory Paragraph: The rationale for Mohs was explained to the patient and consent was obtained. The risks, benefits and alternatives to therapy were discussed in detail. Specifically, the risks of nasal deformity, changes in the flow of air through the nose, infection, scarring, bleeding, prolonged wound healing, incomplete removal, allergy to anesthesia, nerve injury and recurrence were addressed. Prior to the procedure, the treatment site was clearly identified and confirmed by the patient. All components of Universal Protocol/PAUSE Rule completed.
Bilateral Helical Rim Advancement Flap Text: The defect edges were debeveled with a #15 blade scalpel.  Given the location of the defect and the proximity to free margins (helical rim) a bilateral helical rim advancement flap was deemed most appropriate.  Using a sterile surgical marker, the appropriate advancement flaps were drawn incorporating the defect and placing the expected incisions between the helical rim and antihelix where possible.  The area thus outlined was incised through and through with a #15 scalpel blade.  With a skin hook and iris scissors, the flaps were gently and sharply undermined and freed up.
Complex Repair And Flap Additional Text (Will Appearing After The Standard Complex Repair Text): The complex repair was not sufficient to completely close the primary defect. The remaining additional defect was repaired with the flap mentioned below.
O-Z Plasty Text: The defect edges were debeveled with a #15 scalpel blade.  Given the location of the defect, shape of the defect and the proximity to free margins an O-Z plasty (double transposition flap) was deemed most appropriate.  Using a sterile surgical marker, the appropriate transposition flaps were drawn incorporating the defect and placing the expected incisions within the relaxed skin tension lines where possible.    The area thus outlined was incised deep to adipose tissue with a #15 scalpel blade.  The skin margins were undermined to an appropriate distance in all directions utilizing iris scissors.  Hemostasis was achieved with electrocautery.  The flaps were then transposed into place, one clockwise and the other counterclockwise, and anchored with interrupted buried subcutaneous sutures.
O-L Flap Text: The defect edges were debeveled with a #15 scalpel blade.  Given the location of the defect, shape of the defect and the proximity to free margins an O-L flap was deemed most appropriate.  Using a sterile surgical marker, an appropriate advancement flap was drawn incorporating the defect and placing the expected incisions within the relaxed skin tension lines where possible.    The area thus outlined was incised deep to adipose tissue with a #15 scalpel blade.  The skin margins were undermined to an appropriate distance in all directions utilizing iris scissors.
Trilobed Flap Text: The defect edges were debeveled with a #15 scalpel blade.  Given the location of the defect and the proximity to free margins a trilobed flap was deemed most appropriate.  Using a sterile surgical marker, an appropriate trilobed flap drawn around the defect.    The area thus outlined was incised deep to adipose tissue with a #15 scalpel blade.  The skin margins were undermined to an appropriate distance in all directions utilizing iris scissors.
Helical Rim Text: The closure involved the helical rim.
Stage 1: Number Of Blocks?: 1
Medical Necessity Statement: Based on my medical judgement, Mohs surgery is the most appropriate treatment for this cancer compared to other treatments.
Donor Site Anesthesia Type: same as repair anesthesia
Graft Donor Site Dermal Sutures (Optional): 5-0 Polysorb
Post-Care Instructions: I reviewed with the patient in detail post-care instructions. Patient is not to engage in any heavy lifting, exercise, or swimming for the next 14 days. Should the patient develop any fevers, chills, bleeding, severe pain patient will contact the office immediately.
Bi-Rhombic Flap Text: The defect edges were debeveled with a #15 scalpel blade.  Given the location of the defect and the proximity to free margins a bi-rhombic flap was deemed most appropriate.  Using a sterile surgical marker, an appropriate rhombic flap was drawn incorporating the defect. The area thus outlined was incised deep to adipose tissue with a #15 scalpel blade.  The skin margins were undermined to an appropriate distance in all directions utilizing iris scissors.
Dermal Autograft Text: The defect edges were debeveled with a #15 scalpel blade.  Given the location of the defect, shape of the defect and the proximity to free margins a dermal autograft was deemed most appropriate.  Using a sterile surgical marker, the primary defect shape was transferred to the donor site. The area thus outlined was incised deep to adipose tissue with a #15 scalpel blade.  The harvested graft was then trimmed of adipose and epidermal tissue until only dermis was left.  The skin graft was then placed in the primary defect and oriented appropriately.
Area M Indication Text: Tumors in this location are included in Area M (cheek, forehead, scalp, neck, jawline and pretibial skin).  Mohs surgery is indicated for tumors in these anatomic locations.
Hemostasis: Electrocautery
Retention Suture Text: Retention sutures were placed to support the closure and prevent dehiscence.
Suturegard Body: The suture ends were repeatedly re-tightened and re-clamped to achieve the desired tissue expansion.
Graft Donor Site Bandage (Optional-Leave Blank If You Don't Want In Note): Aquaplast was fitted to the graft site and sewn into place. A pressure bandage were applied to the donor site and over the aquaplast bolster.
Where Do You Want The Question To Include Opioid Counseling Located?: Case Summary Tab
Eye Protection Verbiage: Before proceeding with the stage, a plastic scleral shield was inserted. The globe was anesthetized with a few drops of 1% lidocaine with 1:100,000 epinephrine. Then, an appropriate sized scleral shield was chosen and coated with lacrilube ointment. The shield was gently inserted and left in place for the duration of each stage. After the stage was completed, the shield was gently removed.
X Size Of Lesion In Cm (Optional): 0.3
O-T Plasty Text: The defect edges were debeveled with a #15 scalpel blade.  Given the location of the defect, shape of the defect and the proximity to free margins an O-T plasty was deemed most appropriate.  Using a sterile surgical marker, an appropriate O-T plasty was drawn incorporating the defect and placing the expected incisions within the relaxed skin tension lines where possible.    The area thus outlined was incised deep to adipose tissue with a #15 scalpel blade.  The skin margins were undermined to an appropriate distance in all directions utilizing iris scissors.
Advancement Flap (Single) Text: The defect edges were debeveled with a #15 scalpel blade.  Given the location of the defect and the proximity to free margins a single advancement flap was deemed most appropriate.  Using a sterile surgical marker, an appropriate advancement flap was drawn incorporating the defect and placing the expected incisions within the relaxed skin tension lines where possible.    The area thus outlined was incised deep to adipose tissue with a #15 scalpel blade.  The skin margins were undermined to an appropriate distance in all directions utilizing iris scissors.
Paramedian Forehead Flap Text: A decision was made to reconstruct the defect utilizing an interpolation axial flap and a staged reconstruction.  A telfa template was made of the defect.  This telfa template was then used to outline the paramedian forehead pedicle flap.  The donor area for the pedicle flap was then injected with anesthesia.  The flap was excised through the skin and subcutaneous tissue down to the layer of the underlying musculature.  The pedicle flap was carefully excised within this deep plane to maintain its blood supply.  The edges of the donor site were undermined.   The donor site was closed in a primary fashion.  The pedicle was then rotated into position and sutured.  Once the tube was sutured into place, adequate blood supply was confirmed with blanching and refill.  The pedicle was then wrapped with xeroform gauze and dressed appropriately with a telfa and gauze bandage to ensure continued blood supply and protect the attached pedicle.
Debridement Text: The wound edges were debrided prior to proceeding with the closure to facilitate wound healing.
Manual Repair Warning Statement: We plan on removing the manually selected variable below in favor of our much easier automatic structured text blocks found in the previous tab. We decided to do this to help make the flow better and give you the full power of structured data. Manual selection is never going to be ideal in our platform and I would encourage you to avoid using manual selection from this point on, especially since I will be sunsetting this feature. It is important that you do one of two things with the customized text below. First, you can save all of the text in a word file so you can have it for future reference. Second, transfer the text to the appropriate area in the Library tab. Lastly, if there is a flap or graft type which we do not have you need to let us know right away so I can add it in before the variable is hidden. No need to panic, we plan to give you roughly 6 months to make the change.
Number Of Stages: 2
Consent 1/Introductory Paragraph: The rationale for Mohs was explained to the patient and consent was obtained. The risks, benefits and alternatives to therapy were discussed in detail. Specifically, the risks of infection, scarring, bleeding, prolonged wound healing, incomplete removal, allergy to anesthesia, nerve injury and recurrence were addressed. Prior to the procedure, the treatment site was clearly identified and confirmed by the patient. All components of Universal Protocol/PAUSE Rule completed.
Repair Type: Simple Repair
Estimated Blood Loss (Cc): minimal
O-Z Flap Text: The defect edges were debeveled with a #15 scalpel blade.  Given the location of the defect, shape of the defect and the proximity to free margins an O-Z flap was deemed most appropriate.  Using a sterile surgical marker, an appropriate transposition flap was drawn incorporating the defect and placing the expected incisions within the relaxed skin tension lines where possible. The area thus outlined was incised deep to adipose tissue with a #15 scalpel blade.  The skin margins were undermined to an appropriate distance in all directions utilizing iris scissors.
Purse String (Simple) Text: Given the location of the defect and the characteristics of the surrounding skin a purse string closure was deemed most appropriate.  Undermining was performed circumfirentially around the surgical defect.  A purse string suture was then placed and tightened.
Mohs Rapid Report Verbiage: The area of clinically evident tumor was marked with skin marking ink and appropriately hatched.  The initial incision was made following the Mohs approach through the skin.  The specimen was taken to the lab, divided into the necessary number of pieces, chromacoded and processed according to the Mohs protocol.  This was repeated in successive stages until a tumor free defect was achieved.
Localized Dermabrasion With Wire Brush Text: The patient was draped in routine manner.  Localized dermabrasion using 3 x 17 mm wire brush was performed in routine manner to papillary dermis. This spot dermabrasion is being performed to complete skin cancer reconstruction. It also will eliminate the other sun damaged precancerous cells that are known to be part of the regional effect of a lifetime's worth of sun exposure. This localized dermabrasion is therapeutic and should not be considered cosmetic in any regard.
Additional Anesthesia Volume In Cc: 6
Dressing (No Sutures): pressure dressing with telfa
Mohs Histo Method Verbiage: Each section was then chromacoded and processed in the Mohs lab using the Mohs protocol and submitted for frozen section.
Ear Star Wedge Flap Text: The defect edges were debeveled with a #15 blade scalpel.  Given the location of the defect and the proximity to free margins (helical rim) an ear star wedge flap was deemed most appropriate.  Using a sterile surgical marker, the appropriate flap was drawn incorporating the defect and placing the expected incisions between the helical rim and antihelix where possible.  The area thus outlined was incised through and through with a #15 scalpel blade.
Consent (Near Eyelid Margin)/Introductory Paragraph: The rationale for Mohs was explained to the patient and consent was obtained. The risks, benefits and alternatives to therapy were discussed in detail. Specifically, the risks of ectropion or eyelid deformity, infection, scarring, bleeding, prolonged wound healing, incomplete removal, allergy to anesthesia, nerve injury and recurrence were addressed. Prior to the procedure, the treatment site was clearly identified and confirmed by the patient. All components of Universal Protocol/PAUSE Rule completed.
Undermining Type: Entire Wound
Detail Level: Detailed
O-T Advancement Flap Text: The defect edges were debeveled with a #15 scalpel blade.  Given the location of the defect, shape of the defect and the proximity to free margins an O-T advancement flap was deemed most appropriate.  Using a sterile surgical marker, an appropriate advancement flap was drawn incorporating the defect and placing the expected incisions within the relaxed skin tension lines where possible.    The area thus outlined was incised deep to adipose tissue with a #15 scalpel blade.  The skin margins were undermined to an appropriate distance in all directions utilizing iris scissors.
Initial Size Of Lesion: 0.7
Consent Type: Consent 1 (Standard)
Non-Graft Cartilage Fenestration Text: The cartilage was fenestrated with a 2mm punch biopsy to help facilitate healing.
Mauc Instructions: By selecting yes to the question below the MAUC number will be added into the note.  This will be calculated automatically based on the diagnosis chosen, the size entered, the body zone selected (H,M,L) and the specific indications you chose. You will also have the option to override the Mohs AUC if you disagree with the automatically calculated number and this option is found in the Case Summary tab.
Rotation Flap Text: The defect edges were debeveled with a #15 scalpel blade.  Given the location of the defect, shape of the defect and the proximity to free margins a rotation flap was deemed most appropriate.  Using a sterile surgical marker, an appropriate rotation flap was drawn incorporating the defect and placing the expected incisions within the relaxed skin tension lines where possible.    The area thus outlined was incised deep to adipose tissue with a #15 scalpel blade.  The skin margins were undermined to an appropriate distance in all directions utilizing iris scissors.
Keystone Flap Text: The defect edges were debeveled with a #15 scalpel blade.  Given the location of the defect, shape of the defect a keystone flap was deemed most appropriate.  Using a sterile surgical marker, an appropriate keystone flap was drawn incorporating the defect, outlining the appropriate donor tissue and placing the expected incisions within the relaxed skin tension lines where possible. The area thus outlined was incised deep to adipose tissue with a #15 scalpel blade.  The skin margins were undermined to an appropriate distance in all directions around the primary defect and laterally outward around the flap utilizing iris scissors.
Alternatives Discussed Intro (Do Not Add Period): I discussed alternative treatments to Mohs surgery and specifically discussed the risks and benefits of
Suturegard Intro: Intraoperative tissue expansion was performed, utilizing the SUTUREGARD device, in order to reduce wound tension.
Island Pedicle Flap-Requiring Vessel Identification Text: The defect edges were debeveled with a #15 scalpel blade.  Given the location of the defect, shape of the defect and the proximity to free margins an island pedicle advancement flap was deemed most appropriate.  Using a sterile surgical marker, an appropriate advancement flap was drawn, based on the axial vessel mentioned above, incorporating the defect, outlining the appropriate donor tissue and placing the expected incisions within the relaxed skin tension lines where possible.    The area thus outlined was incised deep to adipose tissue with a #15 scalpel blade.  The skin margins were undermined to an appropriate distance in all directions around the primary defect and laterally outward around the island pedicle utilizing iris scissors.  There was minimal undermining beneath the pedicle flap.
Surgeon/Pathologist Verbiage (Will Incorporate Name Of Surgeon From Intro If Not Blank): operated in two distinct and integrated capacities as the surgeon and pathologist.
Island Pedicle Flap With Canthal Suspension Text: The defect edges were debeveled with a #15 scalpel blade.  Given the location of the defect, shape of the defect and the proximity to free margins an island pedicle advancement flap was deemed most appropriate.  Using a sterile surgical marker, an appropriate advancement flap was drawn incorporating the defect, outlining the appropriate donor tissue and placing the expected incisions within the relaxed skin tension lines where possible. The area thus outlined was incised deep to adipose tissue with a #15 scalpel blade.  The skin margins were undermined to an appropriate distance in all directions around the primary defect and laterally outward around the island pedicle utilizing iris scissors.  There was minimal undermining beneath the pedicle flap. A suspension suture was placed in the canthal tendon to prevent tension and prevent ectropion.
Bilobed Transposition Flap Text: The defect edges were debeveled with a #15 scalpel blade.  Given the location of the defect and the proximity to free margins a bilobed transposition flap was deemed most appropriate.  Using a sterile surgical marker, an appropriate bilobe flap drawn around the defect.    The area thus outlined was incised deep to adipose tissue with a #15 scalpel blade.  The skin margins were undermined to an appropriate distance in all directions utilizing iris scissors.
Transposition Flap Text: The defect edges were debeveled with a #15 scalpel blade.  Given the location of the defect and the proximity to free margins a transposition flap was deemed most appropriate.  Using a sterile surgical marker, an appropriate transposition flap was drawn incorporating the defect.    The area thus outlined was incised deep to adipose tissue with a #15 scalpel blade.  The skin margins were undermined to an appropriate distance in all directions utilizing iris scissors.
Crescentic Advancement Flap Text: The defect edges were debeveled with a #15 scalpel blade.  Given the location of the defect and the proximity to free margins a crescentic advancement flap was deemed most appropriate.  Using a sterile surgical marker, the appropriate advancement flap was drawn incorporating the defect and placing the expected incisions within the relaxed skin tension lines where possible.    The area thus outlined was incised deep to adipose tissue with a #15 scalpel blade.  The skin margins were undermined to an appropriate distance in all directions utilizing iris scissors.
No Residual Tumor Seen Histology Text: There were no malignant cells seen in the sections examined.
Tumor Debulked?: curette
Secondary Intention Text (Leave Blank If You Do Not Want): The defect will heal with secondary intention.
Wound Check: 6 weeks
Cartilage Graft Text: The defect edges were debeveled with a #15 scalpel blade.  Given the location of the defect, shape of the defect, the fact the defect involved a full thickness cartilage defect a cartilage graft was deemed most appropriate.  An appropriate donor site was identified, cleansed, and anesthetized. The cartilage graft was then harvested and transferred to the recipient site, oriented appropriately and then sutured into place.  The secondary defect was then repaired using a primary closure.
Mohs Case Number: [id number]
Rhomboid Transposition Flap Text: The defect edges were debeveled with a #15 scalpel blade.  Given the location of the defect and the proximity to free margins a rhomboid transposition flap was deemed most appropriate.  Using a sterile surgical marker, an appropriate rhomboid flap was drawn incorporating the defect.    The area thus outlined was incised deep to adipose tissue with a #15 scalpel blade.  The skin margins were undermined to an appropriate distance in all directions utilizing iris scissors.
Previous Accession (Optional): [id number]
Interpolation Flap Text: A decision was made to reconstruct the defect utilizing an interpolation axial flap and a staged reconstruction.  A telfa template was made of the defect.  This telfa template was then used to outline the interpolation flap.  The donor area for the pedicle flap was then injected with anesthesia.  The flap was excised through the skin and subcutaneous tissue down to the layer of the underlying musculature.  The interpolation flap was carefully excised within this deep plane to maintain its blood supply.  The edges of the donor site were undermined.   The donor site was closed in a primary fashion.  The pedicle was then rotated into position and sutured.  Once the tube was sutured into place, adequate blood supply was confirmed with blanching and refill.  The pedicle was then wrapped with xeroform gauze and dressed appropriately with a telfa and gauze bandage to ensure continued blood supply and protect the attached pedicle.
Mohs Method Verbiage: An incision at a 45 degree angle following the standard Mohs approach was done and the specimen was harvested as a microscopic controlled layer.
Suture Removal: 7 days
Epidermal Autograft Text: The defect edges were debeveled with a #15 scalpel blade.  Given the location of the defect, shape of the defect and the proximity to free margins an epidermal autograft was deemed most appropriate.  Using a sterile surgical marker, the primary defect shape was transferred to the donor site. The epidermal graft was then harvested.  The skin graft was then placed in the primary defect and oriented appropriately.
Nostril Rim Text: The closure involved the nostril rim.
Information: Selecting Yes will display possible errors in your note based on the variables you have selected. This validation is only offered as a suggestion for you. PLEASE NOTE THAT THE VALIDATION TEXT WILL BE REMOVED WHEN YOU FINALIZE YOUR NOTE. IF YOU WANT TO FAX A PRELIMINARY NOTE YOU WILL NEED TO TOGGLE THIS TO 'NO' IF YOU DO NOT WANT IT IN YOUR FAXED NOTE.
Same Histology In Subsequent Stages Text: The pattern and morphology of the tumor is as described in the first stage.
Include Tumor Staging In Mohs Note?: Please Select the Appropriate Response
Zygomaticofacial Flap Text: Given the location of the defect, shape of the defect and the proximity to free margins a zygomaticofacial flap was deemed most appropriate for repair.  Using a sterile surgical marker, the appropriate flap was drawn incorporating the defect and placing the expected incisions within the relaxed skin tension lines where possible. The area thus outlined was incised deep to adipose tissue with a #15 scalpel blade with preservation of a vascular pedicle.  The skin margins were undermined to an appropriate distance in all directions utilizing iris scissors.  The flap was then placed into the defect and anchored with interrupted buried subcutaneous sutures.
Hemigard Intro: Due to skin fragility and wound tension, it was decided to use HEMIGARD adhesive retention suture devices to permit a linear closure. The skin was cleaned and dried for a 6cm distance away from the wound. Excessive hair, if present, was removed to allow for adhesion.
Peng Advancement Flap Text: The defect edges were debeveled with a #15 scalpel blade.  Given the location of the defect, shape of the defect and the proximity to free margins a Peng advancement flap was deemed most appropriate.  Using a sterile surgical marker, an appropriate advancement flap was drawn incorporating the defect and placing the expected incisions within the relaxed skin tension lines where possible. The area thus outlined was incised deep to adipose tissue with a #15 scalpel blade.  The skin margins were undermined to an appropriate distance in all directions utilizing iris scissors.
Tumor Depth: Less than 6mm from granular layer and no invasion beyond the subcutaneous fat
Brow Lift Text: A midfrontal incision was made medially to the defect to allow access to the tissues just superior to the left eyebrow. Following careful dissection inferiorly in a supraperiosteal plane to the level of the left eyebrow, several 3-0 monocryl sutures were used to resuspend the eyebrow orbicularis oculi muscular unit to the superior frontal bone periosteum. This resulted in an appropriate reapproximation of static eyebrow symmetry and correction of the left brow ptosis.
Hemigard Postcare Instructions: The HEMIGARD strips are to remain completely dry for at least 5-7 days.
Nasal Turnover Hinge Flap Text: The defect edges were debeveled with a #15 scalpel blade.  Given the size, depth, location of the defect and the defect being full thickness a nasal turnover hinge flap was deemed most appropriate.  Using a sterile surgical marker, an appropriate hinge flap was drawn incorporating the defect. The area thus outlined was incised with a #15 scalpel blade. The flap was designed to recreate the nasal mucosal lining and the alar rim. The skin margins were undermined to an appropriate distance in all directions utilizing iris scissors.
Orbicularis Oris Muscle Flap Text: The defect edges were debeveled with a #15 scalpel blade.  Given that the defect affected the competency of the oral sphincter an obicularis oris muscle flap was deemed most appropriate to restore this competency and normal muscle function.  Using a sterile surgical marker, an appropriate flap was drawn incorporating the defect. The area thus outlined was incised with a #15 scalpel blade.
Nasalis-Muscle-Based Myocutaneous Island Pedicle Flap Text: Using a #15 blade, an incision was made around the donor flap to the level of the nasalis muscle. Wide lateral undermining was then performed in both the subcutaneous plane above the nasalis muscle, and in a submuscular plane just above periosteum. This allowed the formation of a free nasalis muscle axial pedicle (based on the angular artery) which was still attached to the actual cutaneous flap, increasing its mobility and vascular viability. Hemostasis was obtained with pinpoint electrocoagulation. The flap was mobilized into position and the pivotal anchor points positioned and stabilized with buried interrupted sutures. Subcutaneous and dermal tissues were closed in a multilayered fashion with sutures. Tissue redundancies were excised, and the epidermal edges were apposed without significant tension and sutured with sutures.
Burow's Graft Text: The defect edges were debeveled with a #15 scalpel blade.  Given the location of the defect, shape of the defect, the proximity to free margins and the presence of a standing cone deformity a Burow's skin graft was deemed most appropriate. The standing cone was removed and this tissue was then trimmed to the shape of the primary defect. The adipose tissue was also removed until only dermis and epidermis were left.  The skin margins of the secondary defect were undermined to an appropriate distance in all directions utilizing iris scissors.  The secondary defect was closed with interrupted buried subcutaneous sutures.  The skin edges were then re-apposed with running  sutures.  The skin graft was then placed in the primary defect and oriented appropriately.
Staging Info: By selecting yes to the question above you will include information on AJCC 8 tumor staging in your Mohs note. Information on tumor staging will be automatically added for SCCs on the head and neck. AJCC 8 includes tumor size, tumor depth, perineural involvement and bone invasion.

## 2021-04-06 ENCOUNTER — TELEPHONE (OUTPATIENT)
Dept: ENDOCRINOLOGY | Facility: MEDICAL CENTER | Age: 51
End: 2021-04-06

## 2021-04-07 ENCOUNTER — APPOINTMENT (RX ONLY)
Dept: URBAN - METROPOLITAN AREA CLINIC 36 | Facility: CLINIC | Age: 51
Setting detail: DERMATOLOGY
End: 2021-04-07

## 2021-04-07 DIAGNOSIS — L90.5 SCAR CONDITIONS AND FIBROSIS OF SKIN: ICD-10-CM

## 2021-04-07 DIAGNOSIS — Z48.02 ENCOUNTER FOR REMOVAL OF SUTURES: ICD-10-CM

## 2021-04-07 PROCEDURE — 99024 POSTOP FOLLOW-UP VISIT: CPT

## 2021-04-07 PROCEDURE — ? SUTURE REMOVAL (GLOBAL PERIOD)

## 2021-04-07 PROCEDURE — ? LASER RESURFACING

## 2021-04-07 ASSESSMENT — LOCATION DETAILED DESCRIPTION DERM: LOCATION DETAILED: RIGHT NASAL ALA

## 2021-04-07 ASSESSMENT — LOCATION SIMPLE DESCRIPTION DERM: LOCATION SIMPLE: RIGHT NOSE

## 2021-04-07 ASSESSMENT — LOCATION ZONE DERM: LOCATION ZONE: NOSE

## 2021-04-07 NOTE — PROCEDURE: LASER RESURFACING
Laser Type: CO2Re laser (Deep)
Treatment Number: 1
Consent: Written consent obtained, risks reviewed including but not limited to crusting, scabbing, blistering, scarring,  prolonged erythema and facial swelling, infection and bleeding.
Pulse Duration (Usec): 0
Power (Bruno): 40
Wavelength: 10,600nm
External Cooling Fan Speed: 5
Detail Level: Detailed
Number Of Passes: 3
Percent Coverage Per Pass (%): 10
Post-Care Instructions: I reviewed with the patient in detail post-care instructions. Patient should avoid sun until area fully healed. Pt should apply vaseline to treated areas, and remove crusts gently with water-vinegar soaks.
Energy(Mj): 49

## 2021-04-07 NOTE — PROCEDURE: SUTURE REMOVAL (GLOBAL PERIOD)
Detail Level: Detailed
Add 72338 Cpt? (Important Note: In 2017 The Use Of 28430 Is Being Tracked By Cms To Determine Future Global Period Reimbursement For Global Periods): yes

## 2021-04-08 ENCOUNTER — HOSPITAL ENCOUNTER (OUTPATIENT)
Dept: LAB | Facility: MEDICAL CENTER | Age: 51
End: 2021-04-08
Attending: NURSE PRACTITIONER
Payer: COMMERCIAL

## 2021-04-08 ENCOUNTER — HOSPITAL ENCOUNTER (OUTPATIENT)
Dept: LAB | Facility: MEDICAL CENTER | Age: 51
End: 2021-04-08
Attending: INTERNAL MEDICINE
Payer: COMMERCIAL

## 2021-04-08 DIAGNOSIS — E29.1 HYPOGONADISM IN MALE: ICD-10-CM

## 2021-04-08 DIAGNOSIS — E55.9 VITAMIN D DEFICIENCY: ICD-10-CM

## 2021-04-08 DIAGNOSIS — E78.2 MIXED HYPERLIPIDEMIA: ICD-10-CM

## 2021-04-08 DIAGNOSIS — E53.8 VITAMIN B 12 DEFICIENCY: ICD-10-CM

## 2021-04-08 DIAGNOSIS — E03.9 ACQUIRED HYPOTHYROIDISM: ICD-10-CM

## 2021-04-08 LAB
ALBUMIN SERPL BCP-MCNC: 4.5 G/DL (ref 3.2–4.9)
ALBUMIN/GLOB SERPL: 1.7 G/DL
ALP SERPL-CCNC: 59 U/L (ref 30–99)
ALT SERPL-CCNC: 27 U/L (ref 2–50)
ANION GAP SERPL CALC-SCNC: 10 MMOL/L (ref 7–16)
ANION GAP SERPL CALC-SCNC: 9 MMOL/L (ref 7–16)
AST SERPL-CCNC: 15 U/L (ref 12–45)
BILIRUB SERPL-MCNC: 0.5 MG/DL (ref 0.1–1.5)
BUN SERPL-MCNC: 23 MG/DL (ref 8–22)
BUN SERPL-MCNC: 24 MG/DL (ref 8–22)
CALCIUM SERPL-MCNC: 10.8 MG/DL (ref 8.5–10.5)
CALCIUM SERPL-MCNC: 10.8 MG/DL (ref 8.5–10.5)
CHLORIDE SERPL-SCNC: 102 MMOL/L (ref 96–112)
CHLORIDE SERPL-SCNC: 102 MMOL/L (ref 96–112)
CO2 SERPL-SCNC: 27 MMOL/L (ref 20–33)
CO2 SERPL-SCNC: 27 MMOL/L (ref 20–33)
CREAT SERPL-MCNC: 1.15 MG/DL (ref 0.5–1.4)
CREAT SERPL-MCNC: 1.27 MG/DL (ref 0.5–1.4)
GLOBULIN SER CALC-MCNC: 2.6 G/DL (ref 1.9–3.5)
GLUCOSE SERPL-MCNC: 160 MG/DL (ref 65–99)
GLUCOSE SERPL-MCNC: 162 MG/DL (ref 65–99)
POTASSIUM SERPL-SCNC: 3.9 MMOL/L (ref 3.6–5.5)
POTASSIUM SERPL-SCNC: 4 MMOL/L (ref 3.6–5.5)
PROT SERPL-MCNC: 7.1 G/DL (ref 6–8.2)
SODIUM SERPL-SCNC: 138 MMOL/L (ref 135–145)
SODIUM SERPL-SCNC: 139 MMOL/L (ref 135–145)

## 2021-04-08 PROCEDURE — 84153 ASSAY OF PSA TOTAL: CPT

## 2021-04-08 PROCEDURE — 84439 ASSAY OF FREE THYROXINE: CPT

## 2021-04-08 PROCEDURE — 82306 VITAMIN D 25 HYDROXY: CPT

## 2021-04-08 PROCEDURE — 84443 ASSAY THYROID STIM HORMONE: CPT | Mod: 91

## 2021-04-08 PROCEDURE — 84481 FREE ASSAY (FT-3): CPT

## 2021-04-08 PROCEDURE — 84402 ASSAY OF FREE TESTOSTERONE: CPT

## 2021-04-08 PROCEDURE — 84154 ASSAY OF PSA FREE: CPT

## 2021-04-08 PROCEDURE — 84270 ASSAY OF SEX HORMONE GLOBUL: CPT

## 2021-04-08 PROCEDURE — 80048 BASIC METABOLIC PNL TOTAL CA: CPT

## 2021-04-08 PROCEDURE — 36415 COLL VENOUS BLD VENIPUNCTURE: CPT

## 2021-04-08 PROCEDURE — 84443 ASSAY THYROID STIM HORMONE: CPT

## 2021-04-08 PROCEDURE — 82607 VITAMIN B-12: CPT

## 2021-04-08 PROCEDURE — 84480 ASSAY TRIIODOTHYRONINE (T3): CPT

## 2021-04-08 PROCEDURE — 80053 COMPREHEN METABOLIC PANEL: CPT

## 2021-04-08 PROCEDURE — 84403 ASSAY OF TOTAL TESTOSTERONE: CPT | Mod: 91

## 2021-04-08 PROCEDURE — 84270 ASSAY OF SEX HORMONE GLOBUL: CPT | Mod: 91

## 2021-04-08 PROCEDURE — 83036 HEMOGLOBIN GLYCOSYLATED A1C: CPT

## 2021-04-08 PROCEDURE — 84403 ASSAY OF TOTAL TESTOSTERONE: CPT

## 2021-04-09 LAB
EST. AVERAGE GLUCOSE BLD GHB EST-MCNC: 209 MG/DL
HBA1C MFR BLD: 8.9 % (ref 4–5.6)
T3 SERPL-MCNC: 68.2 NG/DL (ref 60–181)
T3FREE SERPL-MCNC: 2.53 PG/ML (ref 2–4.4)
T4 FREE SERPL-MCNC: 1.99 NG/DL (ref 0.93–1.7)
TSH SERPL DL<=0.005 MIU/L-ACNC: 2.44 UIU/ML (ref 0.38–5.33)
TSH SERPL DL<=0.005 MIU/L-ACNC: 2.46 UIU/ML (ref 0.38–5.33)
VIT B12 SERPL-MCNC: 579 PG/ML (ref 211–911)

## 2021-04-10 LAB
25(OH)D3 SERPL-MCNC: 39 NG/ML (ref 30–80)
PSA FREE MFR SERPL: 24 %
PSA FREE SERPL-MCNC: 0.8 NG/ML
PSA SERPL-MCNC: 3.4 NG/ML (ref 0–4)
SHBG SERPL-SCNC: 16 NMOL/L (ref 11–80)
SHBG SERPL-SCNC: 17 NMOL/L (ref 11–80)
TESTOST FREE MFR SERPL: 2.7 % (ref 1.6–2.9)
TESTOST FREE SERPL-MCNC: 212 PG/ML (ref 47–244)
TESTOST SERPL-MCNC: 786 NG/DL (ref 300–890)
TESTOST SERPL-MCNC: 827 NG/DL (ref 300–890)

## 2021-04-10 PROCEDURE — 84154 ASSAY OF PSA FREE: CPT

## 2021-05-12 DIAGNOSIS — M10.9 GOUT, UNSPECIFIED CAUSE, UNSPECIFIED CHRONICITY, UNSPECIFIED SITE: ICD-10-CM

## 2021-05-12 DIAGNOSIS — E03.9 ACQUIRED HYPOTHYROIDISM: ICD-10-CM

## 2021-05-12 DIAGNOSIS — Z79.4 TYPE 2 DIABETES MELLITUS WITH HYPERGLYCEMIA, WITH LONG-TERM CURRENT USE OF INSULIN (HCC): ICD-10-CM

## 2021-05-12 DIAGNOSIS — I10 ESSENTIAL HYPERTENSION: ICD-10-CM

## 2021-05-12 DIAGNOSIS — E11.65 TYPE 2 DIABETES MELLITUS WITH HYPERGLYCEMIA, WITH LONG-TERM CURRENT USE OF INSULIN (HCC): ICD-10-CM

## 2021-05-12 RX ORDER — ALLOPURINOL 300 MG/1
300 TABLET ORAL DAILY
Qty: 90 TABLET | Refills: 3 | Status: CANCELLED | OUTPATIENT
Start: 2021-05-12

## 2021-05-12 RX ORDER — LEVOTHYROXINE SODIUM 137 UG/1
137 TABLET ORAL
Qty: 90 TABLET | Refills: 3 | Status: CANCELLED | OUTPATIENT
Start: 2021-05-12

## 2021-05-12 RX ORDER — IRBESARTAN 150 MG/1
300 TABLET ORAL
Qty: 180 TABLET | Refills: 1 | Status: CANCELLED | OUTPATIENT
Start: 2021-05-12

## 2021-05-13 DIAGNOSIS — M10.9 GOUT, UNSPECIFIED CAUSE, UNSPECIFIED CHRONICITY, UNSPECIFIED SITE: ICD-10-CM

## 2021-05-13 DIAGNOSIS — Z79.4 TYPE 2 DIABETES MELLITUS WITH HYPERGLYCEMIA, WITH LONG-TERM CURRENT USE OF INSULIN (HCC): ICD-10-CM

## 2021-05-13 DIAGNOSIS — I10 ESSENTIAL HYPERTENSION: ICD-10-CM

## 2021-05-13 DIAGNOSIS — E11.65 TYPE 2 DIABETES MELLITUS WITH HYPERGLYCEMIA, WITH LONG-TERM CURRENT USE OF INSULIN (HCC): ICD-10-CM

## 2021-05-13 DIAGNOSIS — E03.9 ACQUIRED HYPOTHYROIDISM: ICD-10-CM

## 2021-05-13 RX ORDER — LEVOTHYROXINE SODIUM 137 UG/1
137 TABLET ORAL
Qty: 90 TABLET | Refills: 0 | Status: SHIPPED | OUTPATIENT
Start: 2021-05-13 | End: 2021-07-16 | Stop reason: SDUPTHER

## 2021-05-13 RX ORDER — ALLOPURINOL 300 MG/1
300 TABLET ORAL DAILY
Qty: 90 TABLET | Refills: 0 | Status: SHIPPED | OUTPATIENT
Start: 2021-05-13 | End: 2021-07-16 | Stop reason: SDUPTHER

## 2021-05-13 RX ORDER — PEN NEEDLE, DIABETIC 32GX 5/32"
NEEDLE, DISPOSABLE MISCELLANEOUS
Qty: 100 EACH | Refills: 0 | Status: SHIPPED | OUTPATIENT
Start: 2021-05-13 | End: 2021-07-16

## 2021-05-13 RX ORDER — IRBESARTAN 150 MG/1
300 TABLET ORAL
Qty: 180 TABLET | Refills: 0 | Status: SHIPPED | OUTPATIENT
Start: 2021-05-13 | End: 2021-07-16 | Stop reason: SDUPTHER

## 2021-05-14 RX ORDER — PEN NEEDLE, DIABETIC 32GX 5/32"
NEEDLE, DISPOSABLE MISCELLANEOUS
Qty: 100 EACH | Refills: 3 | Status: SHIPPED | OUTPATIENT
Start: 2021-05-14 | End: 2021-11-08

## 2021-06-03 ENCOUNTER — TELEMEDICINE (OUTPATIENT)
Dept: TELEHEALTH | Facility: TELEMEDICINE | Age: 51
End: 2021-06-03
Payer: COMMERCIAL

## 2021-06-03 DIAGNOSIS — Z79.4 TYPE 2 DIABETES MELLITUS WITH HYPERGLYCEMIA, WITH LONG-TERM CURRENT USE OF INSULIN (HCC): ICD-10-CM

## 2021-06-03 DIAGNOSIS — E11.65 TYPE 2 DIABETES MELLITUS WITH HYPERGLYCEMIA, WITH LONG-TERM CURRENT USE OF INSULIN (HCC): ICD-10-CM

## 2021-06-03 PROCEDURE — 99213 OFFICE O/P EST LOW 20 MIN: CPT | Mod: 95 | Performed by: NURSE PRACTITIONER

## 2021-06-03 RX ORDER — INSULIN GLARGINE 100 [IU]/ML
75 INJECTION, SOLUTION SUBCUTANEOUS DAILY
Qty: 63 ML | Refills: 2 | Status: SHIPPED | OUTPATIENT
Start: 2021-06-03 | End: 2021-07-16 | Stop reason: SDUPTHER

## 2021-06-03 RX ORDER — ASPIRIN 81 MG/1
81 TABLET ORAL DAILY
COMMUNITY

## 2021-06-04 NOTE — PROGRESS NOTES
Subjective:     Virtual Visit: Established Patient   This visit was conducted via Zoom using secure and encrypted videoconferencing technology. The patient was in a private location in the state of [place].    The patient's identity was confirmed and verbal consent was obtained for this virtual visit.    Subjective:   CC: Medication Refill    Brett Vicente is a 50 y.o. male presenting for evaluation and management of: Medication refill for his type 2 diabetes.  Patient states that he has had difficulty maintaining appointments with his endocrinologist due to provider changes in appointment cancellations.  Patient previously had a primary care provider, however unfortunately they passed away and he has not reestablished.  Patient states that he has an appoint with endocrinology on 6/16/2021, however needs refill of his insulin glargine between now and then.  Patient declines referral to new PCP, as he has several in mind that he would like to call and establish with.      ROS   Denies any recent fevers or chills. No nausea or vomiting. No chest pains or shortness of breath.     Allergies   Allergen Reactions   • Morphine Nausea   • Pcn [Penicillins]        Current medicines (including changes today)  Current Outpatient Medications   Medication Sig Dispense Refill   • insulin glargine (INSULIN GLARGINE) 100 UNIT/ML Solution Pen-injector injection Inject 75 Units under the skin every day. 63 mL 2   • aspirin 81 MG EC tablet Take 81 mg by mouth every day.     • vitamin D (VITAMIND D3) 1000 UNIT Tab Take 1,000 Units by mouth every day.     • Insulin Pen Needle 32 G x 4 mm (BD PEN NEEDLE CARLY U/F) For insulin shot once a day 100 Each 3   • levothyroxine (SYNTHROID) 137 MCG Tab Take 1 tablet by mouth every morning on an empty stomach. 90 tablet 0   • Insulin Pen Needle 32 G x 4 mm (BD PEN NEEDLE CARLY U/F) For insulin shot once a day 100 Each 0   • irbesartan (AVAPRO) 150 MG Tab Take 2 Tablets by mouth every day. 180  "tablet 0   • allopurinol (ZYLOPRIM) 300 MG Tab Take 1 tablet by mouth every day. 90 tablet 0   • hydroCHLOROthiazide (HYDRODIURIL) 12.5 MG tablet Take 1 tablet by mouth every day. 90 tablet 2   • Dapagliflozin Propanediol (FARXIGA) 10 MG Tab Take 10 mg by mouth every day. Will need to make an appointment for future refills. 90 tablet 1   • metFORMIN ER (GLUCOPHAGE XR) 500 MG TABLET SR 24 HR Take 2 Tablets by mouth 2 times a day. 360 tablet 1   • fenofibrate (TRICOR) 145 MG Tab TAKE 1 TABLET DAILY 90 tablet 1   • atorvastatin (LIPITOR) 40 MG Tab Take 1 tablet by mouth every day. 90 tablet 1   • carvedilol (COREG) 25 MG Tab Take 0.5 Tablets by mouth 2 times a day. 90 tablet 1   • spironolactone (ALDACTONE) 25 MG Tab Take 0.5 Tablets by mouth every day. TAKE 0.5 TABS BY MOUTH EVERY DAY. 45 tablet 1   • lansoprazole (PREVACID) 30 MG CAPSULE DELAYED RELEASE TAKE 1 CAPSULE TWICE DAILY 180 capsule 1   • vitamin D, Ergocalciferol, (DRISDOL) 1.25 MG (18566 UT) Cap capsule TAKE 1 CAPSULE BY MOUTH EVERY 7 DAYS 12 Cap 3   • Blood Glucose Monitoring Suppl Device Meter: Dispense One Touch Ultra 2 meter. Sig. Use as directed for blood sugar monitoring. 1 Device 0   • Blood Glucose Test Strips Test strips order: Test strips for One Touch Ultra 2 meter. Sig: use QID and prn ssx high or low sugar 100 Each 2   • Lancets Lancets order: Lancets for One Touch Ultra 2 meter. Sig: use QID and prn ssx high or low sugar. 100 Each 3   • Omega 3 1000 MG Cap Take 4 a day 100 Cap 6   • Semaglutide,0.25 or 0.5MG/DOS, (OZEMPIC, 0.25 OR 0.5 MG/DOSE,) 2 MG/1.5ML Solution Pen-injector Inject 0.25 mg as instructed every 7 days. 3 PEN 6   • Syringe/Needle, Disp, (SYRINGE 3CC/79LY3-7/2\") 22G X 1-1/2\" 3 ML Misc For use with Intra-muscular testosterone  injection every 2 weeks. 12 Each 1   • vitamin D (CHOLECALCIFEROL) 1000 UNIT Tab Take 2,000 Units by mouth every day.     • Diclofenac Sodium (VOLTAREN) 1 % Gel Apply 5 g to skin as directed 3 times a day. " 1 Tube 0   • fluticasone (FLONASE) 50 MCG/ACT nasal spray Spray 2 Sprays in nose every day. 16 g 0     No current facility-administered medications for this visit.       Patient Active Problem List    Diagnosis Date Noted   • Obesity (BMI 30-39.9) 04/24/2018   • Fatty liver 04/24/2018   • Moderate single current episode of major depressive disorder (HCC) 01/20/2017   • Vitamin D deficiency 01/12/2017   • Mixed hyperlipidemia 12/07/2016   • Hypercalcemia 12/22/2015   • Bilateral shoulder tendinopathy 12/22/2015   • Hypogonadism in male 06/25/2015   • Diabetes mellitus type 2, uncontrolled, with complications ([facility]) 06/25/2015   • Essential hypertension 06/25/2015   • Hypothyroidism 06/25/2015   • Gastroesophageal reflux disease without esophagitis 06/25/2015   • Gout 08/19/2014       Family History   Problem Relation Age of Onset   • Hyperlipidemia Mother    • Diabetes Father    • Heart Disease Father    • Diabetes Brother    • Heart Disease Paternal Grandfather    • Stroke Paternal Grandfather        He  has a past medical history of Diabetes (HCC), Fatty liver (4/24/2018), GERD (gastroesophageal reflux disease), Gout, Hyperlipidemia, Hypertension, Hypothyroidism (6/25/2015), and Thyroid disease.  He  has a past surgical history that includes lumbar fusion anterior (6/10/2009) and lumbar fusion anterior.       Objective:   There were no vitals taken for this visit.    Physical Exam:  Constitutional: Alert, no distress, well-groomed.  Skin: No rashes in visible areas.  Eye: Round. Conjunctiva clear, lids normal. No icterus.   ENMT: Lips pink without lesions, good dentition, moist mucous membranes. Phonation normal.  Neck: No masses, no thyromegaly. Moves freely without pain.  Respiratory: Unlabored respiratory effort, no cough or audible wheeze  Psych: Alert and oriented x3, normal affect and mood.       Assessment and Plan:   The following treatment plan was discussed:     1. Type 2 diabetes mellitus with  hyperglycemia, with long-term current use of insulin (HCC)  - insulin glargine (INSULIN GLARGINE) 100 UNIT/ML Solution Pen-injector injection; Inject 75 Units under the skin every day.  Dispense: 63 mL; Refill: 2    Discussed importance of maintaining medication management, patient agrees to this.  His labs are current, were drawn on 4/8/2021 by his endocrinology group.  Refilled medications as listed, with enough supply to maintain patient's normal dosage of 75 units daily until his follow-up endocrinology appointment.  Offered referral to new primary care, however patient declines but agrees to get this arranged.    Advised the patient to follow-up with the primary care provider for recheck, reevaluation, and/or consideration of further management if necessary. Red flags discussed and indications to call 911 or present to the ED.  All questions were encouraged and answered to the patient's satisfaction and understanding, and they agree to the plan of care.     I personally reviewed prior external notes and test results pertinent to today's visit.  I have independently reviewed and interpreted all diagnostics ordered during this urgent care acute visit. Time spent evaluating this patient was a minimum of 30 minutes and includes preparing for visit, counseling/education, exam, evaluation, obtaining history, and ordering lab/test/procedures.      Please note that this dictation was created using voice recognition software. I have made a reasonable attempt to correct obvious errors, but I expect that there are errors of grammar and possibly content that I did not discover before finalizing the note.

## 2021-07-16 ENCOUNTER — OFFICE VISIT (OUTPATIENT)
Dept: ENDOCRINOLOGY | Facility: MEDICAL CENTER | Age: 51
End: 2021-07-16
Attending: INTERNAL MEDICINE
Payer: COMMERCIAL

## 2021-07-16 VITALS
HEIGHT: 73 IN | BODY MASS INDEX: 32.47 KG/M2 | WEIGHT: 245 LBS | DIASTOLIC BLOOD PRESSURE: 80 MMHG | OXYGEN SATURATION: 92 % | SYSTOLIC BLOOD PRESSURE: 120 MMHG | HEART RATE: 80 BPM

## 2021-07-16 DIAGNOSIS — M54.41 CHRONIC BILATERAL LOW BACK PAIN WITH RIGHT-SIDED SCIATICA: ICD-10-CM

## 2021-07-16 DIAGNOSIS — I10 ESSENTIAL HYPERTENSION: ICD-10-CM

## 2021-07-16 DIAGNOSIS — E78.2 MIXED HYPERLIPIDEMIA: ICD-10-CM

## 2021-07-16 DIAGNOSIS — M10.9 GOUT, UNSPECIFIED CAUSE, UNSPECIFIED CHRONICITY, UNSPECIFIED SITE: ICD-10-CM

## 2021-07-16 DIAGNOSIS — Z79.4 TYPE 2 DIABETES MELLITUS WITH HYPERGLYCEMIA, WITH LONG-TERM CURRENT USE OF INSULIN (HCC): ICD-10-CM

## 2021-07-16 DIAGNOSIS — E03.9 ACQUIRED HYPOTHYROIDISM: ICD-10-CM

## 2021-07-16 DIAGNOSIS — E83.52 HYPERCALCEMIA: ICD-10-CM

## 2021-07-16 DIAGNOSIS — E55.9 VITAMIN D DEFICIENCY: ICD-10-CM

## 2021-07-16 DIAGNOSIS — G89.29 CHRONIC BILATERAL LOW BACK PAIN WITH RIGHT-SIDED SCIATICA: ICD-10-CM

## 2021-07-16 DIAGNOSIS — E29.1 HYPOGONADISM IN MALE: ICD-10-CM

## 2021-07-16 DIAGNOSIS — E11.65 TYPE 2 DIABETES MELLITUS WITH HYPERGLYCEMIA, WITH LONG-TERM CURRENT USE OF INSULIN (HCC): ICD-10-CM

## 2021-07-16 LAB
HBA1C MFR BLD: 8 % (ref 0–5.6)
INT CON NEG: ABNORMAL
INT CON POS: ABNORMAL

## 2021-07-16 PROCEDURE — 99212 OFFICE O/P EST SF 10 MIN: CPT | Performed by: INTERNAL MEDICINE

## 2021-07-16 PROCEDURE — 83036 HEMOGLOBIN GLYCOSYLATED A1C: CPT | Performed by: INTERNAL MEDICINE

## 2021-07-16 PROCEDURE — 99215 OFFICE O/P EST HI 40 MIN: CPT | Performed by: INTERNAL MEDICINE

## 2021-07-16 RX ORDER — DAPAGLIFLOZIN 10 MG/1
10 TABLET, FILM COATED ORAL DAILY
Qty: 90 TABLET | Refills: 1 | Status: SHIPPED | OUTPATIENT
Start: 2021-07-16 | End: 2021-11-08

## 2021-07-16 RX ORDER — METFORMIN HYDROCHLORIDE 500 MG/1
1000 TABLET, EXTENDED RELEASE ORAL 2 TIMES DAILY
Qty: 360 TABLET | Refills: 1 | Status: SHIPPED | OUTPATIENT
Start: 2021-07-16 | End: 2022-02-08

## 2021-07-16 RX ORDER — HYDROCHLOROTHIAZIDE 12.5 MG/1
12.5 TABLET ORAL DAILY
Qty: 90 TABLET | Refills: 2 | Status: SHIPPED | OUTPATIENT
Start: 2021-07-16 | End: 2022-04-26

## 2021-07-16 RX ORDER — ATORVASTATIN CALCIUM 40 MG/1
40 TABLET, FILM COATED ORAL DAILY
Qty: 90 TABLET | Refills: 1 | Status: SHIPPED | OUTPATIENT
Start: 2021-07-16 | End: 2022-01-31

## 2021-07-16 RX ORDER — IRBESARTAN 150 MG/1
300 TABLET ORAL
Qty: 180 TABLET | Refills: 0 | Status: SHIPPED | OUTPATIENT
Start: 2021-07-16 | End: 2021-10-01

## 2021-07-16 RX ORDER — SPIRONOLACTONE 25 MG/1
12.5 TABLET ORAL
Qty: 45 TABLET | Refills: 1 | Status: SHIPPED | OUTPATIENT
Start: 2021-07-16 | End: 2021-07-19

## 2021-07-16 RX ORDER — LEVOTHYROXINE SODIUM 137 UG/1
137 TABLET ORAL
Qty: 90 TABLET | Refills: 2 | Status: SHIPPED | OUTPATIENT
Start: 2021-07-16 | End: 2022-03-09

## 2021-07-16 RX ORDER — ALLOPURINOL 300 MG/1
300 TABLET ORAL DAILY
Qty: 90 TABLET | Refills: 0 | Status: SHIPPED | OUTPATIENT
Start: 2021-07-16 | End: 2021-10-01

## 2021-07-16 RX ORDER — ERGOCALCIFEROL 1.25 MG/1
CAPSULE ORAL
Qty: 12 CAPSULE | Refills: 3 | Status: SHIPPED | OUTPATIENT
Start: 2021-07-16

## 2021-07-16 RX ORDER — INSULIN GLARGINE 100 [IU]/ML
75 INJECTION, SOLUTION SUBCUTANEOUS DAILY
Qty: 63 ML | Refills: 2 | Status: SHIPPED | OUTPATIENT
Start: 2021-07-16 | End: 2022-04-26

## 2021-07-16 RX ORDER — METAXALONE 800 MG/1
800 TABLET ORAL 3 TIMES DAILY PRN
Qty: 270 TABLET | Refills: 0 | Status: SHIPPED | OUTPATIENT
Start: 2021-07-16 | End: 2021-11-08

## 2021-07-16 RX ORDER — TESTOSTERONE CYPIONATE 200 MG/ML
200 INJECTION, SOLUTION INTRAMUSCULAR
Qty: 2 ML | Refills: 5 | Status: SHIPPED | OUTPATIENT
Start: 2021-07-16 | End: 2022-01-19

## 2021-07-16 ASSESSMENT — FIBROSIS 4 INDEX: FIB4 SCORE: 0.63

## 2021-07-16 NOTE — PROGRESS NOTES
"RN-CDE Note    Subjective:   Endocrinology Clinic Progress Note  PCP: No primary care provider on file.    HPI:  Brett Vicente is a 50 y.o. old patient who is seen today for review of Type 2 Diabetes and Hypogonadism.    DM:   Last A1c:   Lab Results   Component Value Date/Time    HBA1C 8.9 (H) 04/08/2021 11:19 AM      Previous A1c was 8.9 on 4/8/21  A1C GOAL: < 7    Diabetes Medications:   Basaglar 75 units daily  Farxiga 10 mg daily  Metformin 1000 mg BID  Stopped Ozempic due to nausea.    Taking above medications as prescribed: yes  Taking daily ASA: Yes    Exercise: Walking dog  Diet: \"healthy\" diet  in general  Patient's body mass index is unknown because there is no height or weight on file. Exercise and nutrition counseling were performed at this visit.    Glucose monitoring frequency: Testing twice daily    Hypoglycemic episodes: no  Last Retinal Exam: Dr Olmstead at [facility] a year ago  Daily Foot Exam: Yes   Foot Exam:  Monofilament: done  Monofilament testing with a 10 gram force: sensation intact: intact bilaterally  Visual Inspection: Feet without maceration, ulcers, fissures.  Pedal pulses: intact bilaterally   Lab Results   Component Value Date/Time    MICROALBCALC 8.6 06/24/2014 12:00 AM    MALBCRT 4 09/30/2019 09:26 AM    MICROALBUR 1.2 09/30/2019 09:26 AM      ACR Albumin/Creatinine Ratio goal <30   Currently Rx ACE/ARB: Yes   Dyslipidemia:  Lab Results   Component Value Date/Time    CHOLSTRLTOT 131 09/30/2019 09:26 AM    LDL 44 09/30/2019 09:26 AM    HDL 19 (A) 09/30/2019 09:26 AM    TRIGLYCERIDE 342 (H) 09/30/2019 09:26 AM       Currently Rx Statin: Yes     He  reports that he has never smoked. He has never used smokeless tobacco.      Plan:     Discussed and educated on:   - All medications, side effects and compliance (discussed carefully)  - Annual eye examinations at Ophthalmology  - Home glucose monitoring emphasized  - Weight control and daily exercise    Recommended medication " changes: Did not tolerate Ozempic.

## 2021-07-16 NOTE — PROGRESS NOTES
CHIEF COMPLAINT: Patient is here for follow up of Type 2 Diabetes Mellitus    HPI:     Brett Vicente is a 50 y.o. male with Type 2 Diabetes Mellitus here for follow up.    Labs from 7/16/2021 HbA1c is 8.0%    He was previously seen by another endocrinologist and this is my first time meeting him.  Comorbid issues include hyperlipidemia, hypertension, hypogonadism and hypothyroidism    He was previously placed on Ozempic 0.25 mg weekly but he was not able to tolerate even the lowest dose of Ozempic      Currently he is on Basaglar 75 units daily, Farxiga 10 mg daily, Metformin 1000 mg twice a day.    He denies side effects with his other medications    He has a history of mixed hyperlipidemia and is on TriCor 145 mg daily and atorvastatin 40 mg daily  We do not have an updated lipid panel on hand    And we do not have an updated urine microalbumin on hand  He is taking irbesartan 300 mg every day hydrochlorothiazide 12.5 mg daily for hypertension          For his hypothyroidism he is on levothyroxine 137 mcg daily which has been his medication for at least a year  TSH was 2.4 in April 2021 with high normal free T4 of 1.9  He reports fatigue          He has hypogonadism and is on testosterone injections 200 mg every 2 weeks but has been off injections because his previous endocrinologist left [place]  His testosterone was 786 on April 2021 when he was on testosterone injections.  Hematocrit was not available  He is requesting to restart testosterone replacement therapy          I incidentally discovered that he has hypercalcemia which was first detected on labs from January 2020.  His calcium was 11.0 this was not mentioned or discussed by his previous endocrinologist which is concerning    PTH levels have not been measured    His calcium was 10.8 on April 2021 with a serum creatinine 1.27, albumin of 4.5, phosphorus is not available ionized calcium is not available and again PTH has not been checked.  The patient is  on a thiazide diuretic but very low-dose  He denies taking lithium  He denies taking calcium supplements  He does take vitamin D 50,000 units weekly along with 1000 units daily and his vitamin D was 39 on April 2021 we do not have an updated vitamin D level on hand  He denies a history of kidney stones but he does have chronic fatigue            BG Diary:07/16/21  Patient did not bring glucose meter    Weight has been stable    Diabetes Complications   Retinopathy: No known retinopathy.  Last eye exam: He is overdue for an eye exam.  His eye exam was completed last year at [facility] by Dr. Alston  Neuropathy: Denies paresthesias or numbness in hands or feet. Denies any foot wounds.  Exercise: Minimal.  Diet: Fair.  Patient's medications, allergies, and social histories were reviewed and updated as appropriate.    ROS:     CONS:     No fever, no chills   EYES:     No diplopia, no blurry vision   CV:           No chest pain, no palpitations   PULM:     No SOB, no cough, no hemoptysis.   GI:            No nausea, no vomiting, no diarrhea, no constipation   ENDO:     No polyuria, no polydipsia, no heat intolerance, no cold intolerance       Past Medical History:  Problem List:  2018-04: Obesity (BMI 30-39.9)  2018-04: Fatty liver  2017-01: Moderate single current episode of major depressive disorder   (HCC)  2017-01: Vitamin D deficiency  2016-12: Mixed hyperlipidemia  2016-06: Diabetes type 2, uncontrolled ([facility])  2016-06: Dyslipidemia  2015-12: Hypercalcemia  2015-12: Bilateral shoulder tendinopathy  2015-06: Hypogonadism in male  2015-06: Type 2 diabetes mellitus with hyperglycemia, with long-term   current use of insulin ([facility])  2015-06: HDL deficiency  2015-06: HLD (hyperlipidemia)  2015-06: Essential hypertension  2015-06: Acquired hypothyroidism  2015-06: Gastroesophageal reflux disease without esophagitis  2015-05: Diabetes type 2, uncontrolled ([facility])  2014-08: Type 2 diabetes mellitus ([facility])  2014-08: GERD  "(gastroesophageal reflux disease)  2014-08: HTN (hypertension)  2014-08: Dyslipidemia  2014-08: Low testosterone  2014-08: Hypothyroid  2014-08: Gout      Past Surgical History:  Past Surgical History:   Procedure Laterality Date   • LUMBAR FUSION ANTERIOR  6/10/2009    Performed by LISA MONTERO at [facility] ORS   • LUMBAR FUSION ANTERIOR          Allergies:  Morphine and Pcn [penicillins]     Social History:  Social History     Tobacco Use   • Smoking status: Never Smoker   • Smokeless tobacco: Never Used   Vaping Use   • Vaping Use: Never used   Substance Use Topics   • Alcohol use: Yes     Alcohol/week: 0.5 oz     Types: 1 Shots of liquor per week   • Drug use: No        Family History:   family history includes Diabetes in his brother and father; Heart Disease in his father and paternal grandfather; Hyperlipidemia in his mother; Stroke in his paternal grandfather.      PHYSICAL EXAM:   OBJECTIVE:  Vital signs: /80   Pulse 80   Ht 1.854 m (6' 1\")   Wt 111 kg (245 lb)   SpO2 92%   BMI 32.32 kg/m²   GENERAL: Well-developed, well-nourished in no apparent distress.   EYE:  No ocular asymmetry, PERRLA  HENT: Pink, moist mucous membranes.    NECK: No thyromegaly.   CARDIOVASCULAR:  No murmurs  LUNGS: Clear breath sounds  ABDOMEN: Soft, nontender   EXTREMITIES: No clubbing, cyanosis, or edema.   NEUROLOGICAL: No gross focal motor abnormalities   LYMPH: No cervical adenopathy palpated.   SKIN: No rashes, lesions.     Labs:  Lab Results   Component Value Date/Time    HBA1C 8.0 (A) 07/16/2021 01:49 PM        Lab Results   Component Value Date/Time    WBC 4.6 (L) 09/30/2019 09:26 AM    RBC 5.63 09/30/2019 09:26 AM    HEMOGLOBIN 15.8 09/30/2019 09:26 AM    MCV 85.1 09/30/2019 09:26 AM    MCH 28.1 09/30/2019 09:26 AM    MCHC 33.0 (L) 09/30/2019 09:26 AM    RDW 42.1 09/30/2019 09:26 AM    MPV 11.6 09/30/2019 09:26 AM       Lab Results   Component Value Date/Time    SODIUM 138 04/08/2021 11:19 AM    " SODIUM 139 04/08/2021 11:19 AM    POTASSIUM 4.0 04/08/2021 11:19 AM    POTASSIUM 3.9 04/08/2021 11:19 AM    CHLORIDE 102 04/08/2021 11:19 AM    CHLORIDE 102 04/08/2021 11:19 AM    CO2 27 04/08/2021 11:19 AM    CO2 27 04/08/2021 11:19 AM    ANION 9.0 04/08/2021 11:19 AM    ANION 10.0 04/08/2021 11:19 AM    GLUCOSE 160 (H) 04/08/2021 11:19 AM    GLUCOSE 162 (H) 04/08/2021 11:19 AM    BUN 24 (H) 04/08/2021 11:19 AM    BUN 23 (H) 04/08/2021 11:19 AM    CREATININE 1.27 04/08/2021 11:19 AM    CREATININE 1.15 04/08/2021 11:19 AM    CREATININE 0.81 09/24/2014 12:00 AM    CALCIUM 10.8 (H) 04/08/2021 11:19 AM    CALCIUM 10.8 (H) 04/08/2021 11:19 AM    ASTSGOT 15 04/08/2021 11:19 AM    ALTSGPT 27 04/08/2021 11:19 AM    TBILIRUBIN 0.5 04/08/2021 11:19 AM    ALBUMIN 4.5 04/08/2021 11:19 AM    TOTPROTEIN 7.1 04/08/2021 11:19 AM    GLOBULIN 2.6 04/08/2021 11:19 AM    AGRATIO 1.7 04/08/2021 11:19 AM       Lab Results   Component Value Date/Time    CHOLSTRLTOT 131 09/30/2019 0926    TRIGLYCERIDE 342 (H) 09/30/2019 0926    HDL 19 (A) 09/30/2019 0926    LDL 44 09/30/2019 0926       Lab Results   Component Value Date/Time    MICROALBCALC 8.6 06/24/2014 12:00 AM    MALBCRT 4 09/30/2019 09:26 AM    MICROALBUR 1.2 09/30/2019 09:26 AM        Lab Results   Component Value Date/Time    TSHULTRASEN 2.460 04/08/2021 1119    TSHULTRASEN 2.440 04/08/2021 1119     No results found for: FREEDIR  Lab Results   Component Value Date/Time    FREET3 2.53 04/08/2021 1119     No results found for: THYSTIMIG        ASSESSMENT/PLAN:     1. Type 2 diabetes mellitus with hyperglycemia, with long-term current use of insulin (HCC)  Uncontrolled secondary to hyperglycemia  Reviewed the goals for treatment of type 2 diabetes  Discussed the benefits of GLP-1 analog therapy  He did not tolerate Ozempic  I recommended he try Trulicity 0.75 mg weekly  I gave him samples and discussed the side effects of this medication  Will continue Basaglar 75 units daily and  Farxiga 10 mg daily and Metformin 1000 mg twice a day  I recommend that he watch his carb intake and exercise regularly  We will repeat his A1c in 3 months   I recommend that he bring his glucose meter  I recommend that he get an updated eye exam      2. Hypercalcemia  Unstable  He has hypercalcemia since January last year and has not had a work-up done  This was not discussed by his previous endocrinologist  I recommend conducting a work-up to rule out primary hyperparathyroidism  This week we will check a calcium, vitamin D, intact PTH, ionized calcium, phosphorus and 24 urine calcium with 24 urine creatinine.  I am also checking SPEP and UPEP  He is going to hold hydrochlorothiazide for now      3. Hypogonadism in male  Unstable  He has fatigue which is multifactorial might be related to his hypercalcemia  I want him to restart testosterone injections 200 mg every 2 weeks  We will repeat his testosterone in 3 to 4 months    4. Acquired hypothyroidism  Stable  Continue levothyroxine 137 mcg daily for now until we complete the work-up for his hypercalcemia    5. Essential hypertension  Controlled  I am holding his hydrochlorothiazide because we are conducting a work-up for hypercalcemia and he is going to complete a 24 urine calcium  Continue irbesartan  We will check a urine microalbumin today    6. Mixed hyperlipidemia  Unstable I am checking a lipid panel this week continue atorvastatin and fenofibrate    7. Vitamin D deficiency  Unstable I am checking calcium vitamin D with his labs this week    8. Gout, unspecified cause, unspecified chronicity, unspecified site  Unstable  Does not have a primary care and is needs a refill of his allopurinol  I refilled his medication and check his uric acid with his labs    9. Chronic bilateral low back pain with right-sided sciatica  Unstable he does not have a primary care and is complaining of lower back pain with radiculopathy.    Pain is not controlled with NSAIDs and  physical therapy  He did not tolerate Flexeril as it made him sedated  I am starting him on Skelaxin      Return in about 4 months (around 11/16/2021).      This patient during there office visit today was started on a new medication.  Side effects of the new medication were discussed with the patient today in the office.     Time spent on date of service exceeded over 40 minutes because we discussed hypercalcemia diagnosis which was missed by his previous endocrinologist we also discussed his new diabetes medication Trulicity we also assisted the patient with his hip pain and gout and we reviewed his previous labs.  He also does not have updated labs for serum creatinine lipids urine micro which we ordered.  We discussed the overview of primary hyperparathyroidism as well.  Time was also spent ordering labs for work-up of hypercalcemia and coordinating care and scheduling future follow-up      Thank you kindly for allowing me to participate in the diabetes care plan for this patient.    Piyush Vasquez MD, FACE, ECNU  07/16/21    CC:   No primary care provider on file.

## 2021-07-17 DIAGNOSIS — I10 ESSENTIAL HYPERTENSION: ICD-10-CM

## 2021-07-19 DIAGNOSIS — K21.9 GASTRIC REFLUX: ICD-10-CM

## 2021-07-19 RX ORDER — CARVEDILOL 25 MG/1
TABLET ORAL
Qty: 90 TABLET | Refills: 1 | Status: SHIPPED | OUTPATIENT
Start: 2021-07-19 | End: 2021-11-08

## 2021-07-19 RX ORDER — LANSOPRAZOLE 30 MG/1
CAPSULE, DELAYED RELEASE ORAL
Qty: 180 CAPSULE | Refills: 1 | Status: SHIPPED | OUTPATIENT
Start: 2021-07-19 | End: 2021-11-08

## 2021-07-19 RX ORDER — SPIRONOLACTONE 25 MG/1
TABLET ORAL
Qty: 45 TABLET | Refills: 1 | Status: SHIPPED | OUTPATIENT
Start: 2021-07-19 | End: 2022-02-11

## 2021-07-21 ENCOUNTER — HOSPITAL ENCOUNTER (OUTPATIENT)
Dept: LAB | Facility: MEDICAL CENTER | Age: 51
End: 2021-07-21
Attending: INTERNAL MEDICINE
Payer: COMMERCIAL

## 2021-07-21 DIAGNOSIS — E78.2 MIXED HYPERLIPIDEMIA: ICD-10-CM

## 2021-07-21 DIAGNOSIS — Z79.4 TYPE 2 DIABETES MELLITUS WITH HYPERGLYCEMIA, WITH LONG-TERM CURRENT USE OF INSULIN (HCC): ICD-10-CM

## 2021-07-21 DIAGNOSIS — E83.52 HYPERCALCEMIA: ICD-10-CM

## 2021-07-21 DIAGNOSIS — E55.9 VITAMIN D DEFICIENCY: ICD-10-CM

## 2021-07-21 DIAGNOSIS — M10.9 GOUT, UNSPECIFIED CAUSE, UNSPECIFIED CHRONICITY, UNSPECIFIED SITE: ICD-10-CM

## 2021-07-21 DIAGNOSIS — E29.1 HYPOGONADISM IN MALE: ICD-10-CM

## 2021-07-21 DIAGNOSIS — I10 ESSENTIAL HYPERTENSION: ICD-10-CM

## 2021-07-21 DIAGNOSIS — E11.65 TYPE 2 DIABETES MELLITUS WITH HYPERGLYCEMIA, WITH LONG-TERM CURRENT USE OF INSULIN (HCC): ICD-10-CM

## 2021-07-21 LAB
BASOPHILS # BLD AUTO: 0.8 % (ref 0–1.8)
BASOPHILS # BLD: 0.04 K/UL (ref 0–0.12)
CREAT UR-MCNC: 210.34 MG/DL
EOSINOPHIL # BLD AUTO: 0.2 K/UL (ref 0–0.51)
EOSINOPHIL NFR BLD: 3.9 % (ref 0–6.9)
ERYTHROCYTE [DISTWIDTH] IN BLOOD BY AUTOMATED COUNT: 41.6 FL (ref 35.9–50)
HCT VFR BLD AUTO: 43.7 % (ref 42–52)
HGB BLD-MCNC: 14.5 G/DL (ref 14–18)
IMM GRANULOCYTES # BLD AUTO: 0.06 K/UL (ref 0–0.11)
IMM GRANULOCYTES NFR BLD AUTO: 1.2 % (ref 0–0.9)
LYMPHOCYTES # BLD AUTO: 1.58 K/UL (ref 1–4.8)
LYMPHOCYTES NFR BLD: 30.6 % (ref 22–41)
MCH RBC QN AUTO: 28.7 PG (ref 27–33)
MCHC RBC AUTO-ENTMCNC: 33.2 G/DL (ref 33.7–35.3)
MCV RBC AUTO: 86.4 FL (ref 81.4–97.8)
MICROALBUMIN UR-MCNC: 4.2 MG/DL
MICROALBUMIN/CREAT UR: 20 MG/G (ref 0–30)
MONOCYTES # BLD AUTO: 0.39 K/UL (ref 0–0.85)
MONOCYTES NFR BLD AUTO: 7.5 % (ref 0–13.4)
NEUTROPHILS # BLD AUTO: 2.9 K/UL (ref 1.82–7.42)
NEUTROPHILS NFR BLD: 56 % (ref 44–72)
NRBC # BLD AUTO: 0 K/UL
NRBC BLD-RTO: 0 /100 WBC
PLATELET # BLD AUTO: 204 K/UL (ref 164–446)
PMV BLD AUTO: 11.8 FL (ref 9–12.9)
RBC # BLD AUTO: 5.06 M/UL (ref 4.7–6.1)
WBC # BLD AUTO: 5.2 K/UL (ref 4.8–10.8)

## 2021-07-21 PROCEDURE — 84155 ASSAY OF PROTEIN SERUM: CPT

## 2021-07-21 PROCEDURE — 84550 ASSAY OF BLOOD/URIC ACID: CPT

## 2021-07-21 PROCEDURE — 80061 LIPID PANEL: CPT

## 2021-07-21 PROCEDURE — 82164 ANGIOTENSIN I ENZYME TEST: CPT

## 2021-07-21 PROCEDURE — 83970 ASSAY OF PARATHORMONE: CPT

## 2021-07-21 PROCEDURE — 82043 UR ALBUMIN QUANTITATIVE: CPT

## 2021-07-21 PROCEDURE — 85025 COMPLETE CBC W/AUTO DIFF WBC: CPT

## 2021-07-21 PROCEDURE — 82330 ASSAY OF CALCIUM: CPT

## 2021-07-21 PROCEDURE — 36415 COLL VENOUS BLD VENIPUNCTURE: CPT

## 2021-07-21 PROCEDURE — 84165 PROTEIN E-PHORESIS SERUM: CPT

## 2021-07-21 PROCEDURE — 84100 ASSAY OF PHOSPHORUS: CPT

## 2021-07-21 PROCEDURE — 82652 VIT D 1 25-DIHYDROXY: CPT

## 2021-07-21 PROCEDURE — 82306 VITAMIN D 25 HYDROXY: CPT

## 2021-07-21 PROCEDURE — 82570 ASSAY OF URINE CREATININE: CPT

## 2021-07-21 PROCEDURE — 80053 COMPREHEN METABOLIC PANEL: CPT

## 2021-07-22 LAB
25(OH)D3 SERPL-MCNC: 42 NG/ML (ref 30–100)
ALBUMIN SERPL BCP-MCNC: 4.2 G/DL (ref 3.2–4.9)
ALBUMIN/GLOB SERPL: 1.9 G/DL
ALP SERPL-CCNC: 51 U/L (ref 30–99)
ALT SERPL-CCNC: 22 U/L (ref 2–50)
ANION GAP SERPL CALC-SCNC: 10 MMOL/L (ref 7–16)
AST SERPL-CCNC: 15 U/L (ref 12–45)
BILIRUB SERPL-MCNC: 0.4 MG/DL (ref 0.1–1.5)
BUN SERPL-MCNC: 17 MG/DL (ref 8–22)
CA-I SERPL-SCNC: 1.4 MMOL/L (ref 1.1–1.3)
CALCIUM SERPL-MCNC: 10 MG/DL (ref 8.5–10.5)
CHLORIDE SERPL-SCNC: 107 MMOL/L (ref 96–112)
CHOLEST SERPL-MCNC: 125 MG/DL (ref 100–199)
CO2 SERPL-SCNC: 24 MMOL/L (ref 20–33)
CREAT SERPL-MCNC: 0.94 MG/DL (ref 0.5–1.4)
FASTING STATUS PATIENT QL REPORTED: NORMAL
GLOBULIN SER CALC-MCNC: 2.2 G/DL (ref 1.9–3.5)
GLUCOSE SERPL-MCNC: 112 MG/DL (ref 65–99)
HDLC SERPL-MCNC: 19 MG/DL
LDLC SERPL CALC-MCNC: ABNORMAL MG/DL
PHOSPHATE SERPL-MCNC: 2.3 MG/DL (ref 2.5–4.5)
POTASSIUM SERPL-SCNC: 3.7 MMOL/L (ref 3.6–5.5)
PROT SERPL-MCNC: 6.4 G/DL (ref 6–8.2)
PTH-INTACT SERPL-MCNC: 48.2 PG/ML (ref 14–72)
SODIUM SERPL-SCNC: 141 MMOL/L (ref 135–145)
TRIGL SERPL-MCNC: 419 MG/DL (ref 0–149)
URATE SERPL-MCNC: 5 MG/DL (ref 2.5–8.3)

## 2021-07-23 LAB
1,25(OH)2D3 SERPL-MCNC: 71.6 PG/ML (ref 19.9–79.3)
ACE SERPL-CCNC: 27 U/L (ref 9–67)

## 2021-07-24 ENCOUNTER — HOSPITAL ENCOUNTER (OUTPATIENT)
Facility: MEDICAL CENTER | Age: 51
End: 2021-07-24
Attending: INTERNAL MEDICINE
Payer: COMMERCIAL

## 2021-07-24 DIAGNOSIS — Z79.4 TYPE 2 DIABETES MELLITUS WITH HYPERGLYCEMIA, WITH LONG-TERM CURRENT USE OF INSULIN (HCC): ICD-10-CM

## 2021-07-24 DIAGNOSIS — E78.2 MIXED HYPERLIPIDEMIA: ICD-10-CM

## 2021-07-24 DIAGNOSIS — I10 ESSENTIAL HYPERTENSION: ICD-10-CM

## 2021-07-24 DIAGNOSIS — E11.65 TYPE 2 DIABETES MELLITUS WITH HYPERGLYCEMIA, WITH LONG-TERM CURRENT USE OF INSULIN (HCC): ICD-10-CM

## 2021-07-24 DIAGNOSIS — E55.9 VITAMIN D DEFICIENCY: ICD-10-CM

## 2021-07-24 DIAGNOSIS — E83.52 HYPERCALCEMIA: ICD-10-CM

## 2021-07-24 DIAGNOSIS — E29.1 HYPOGONADISM IN MALE: ICD-10-CM

## 2021-07-24 LAB
CREAT 24H UR-MSRATE: 2258 MG/24 HR (ref 1000–2000)
CREAT UR-MCNC: 82.11 MG/DL
SPECIMEN VOL UR: 2750 ML

## 2021-07-24 PROCEDURE — 81050 URINALYSIS VOLUME MEASURE: CPT

## 2021-07-24 PROCEDURE — 82570 ASSAY OF URINE CREATININE: CPT

## 2021-07-25 LAB
ALBUMIN SERPL ELPH-MCNC: 3.87 G/DL (ref 3.75–5.01)
ALPHA1 GLOB SERPL ELPH-MCNC: 0.25 G/DL (ref 0.19–0.46)
ALPHA2 GLOB SERPL ELPH-MCNC: 0.51 G/DL (ref 0.48–1.05)
B-GLOBULIN SERPL ELPH-MCNC: 0.82 G/DL (ref 0.48–1.1)
GAMMA GLOB SERPL ELPH-MCNC: 0.74 G/DL (ref 0.62–1.51)
INTERPRETATION SERPL IFE-IMP: ABNORMAL
MONOCLON BAND OBS SERPL: ABNORMAL
PATHOLOGY STUDY: ABNORMAL
PROT SERPL-MCNC: 6.2 G/DL (ref 6.3–8.2)

## 2021-07-27 LAB
ALBUMIN 24H MFR UR ELPH: 59.4 %
ALPHA1 GLOB 24H MFR UR ELPH: 2.2 %
ALPHA2 GLOB 24H MFR UR ELPH: 5.7 %
B-GLOBULIN 24H MFR UR ELPH: 19.8 %
COLLECT DURATION TIME SPEC: NORMAL HRS
EER MONOCLONAL PROTEIN STUDY, 24 HOUR U Q5964: NORMAL
GAMMA GLOB 24H MFR UR ELPH: 12.9 %
INTERPRETATION UR IFE-IMP: NORMAL
M PROTEIN 24H MFR UR ELPH: 0 %
M PROTEIN 24H UR ELPH-MRATE: NORMAL MG/24 HRS
PROT 24H UR-MRATE: NORMAL MG/D (ref 40–150)
PROT UR-MCNC: 17 MG/DL
SPECIMEN VOL ?TM UR: NORMAL ML

## 2021-08-23 ENCOUNTER — APPOINTMENT (RX ONLY)
Dept: URBAN - METROPOLITAN AREA CLINIC 20 | Facility: CLINIC | Age: 51
Setting detail: DERMATOLOGY
End: 2021-08-23

## 2021-08-23 DIAGNOSIS — L08.9 LOCAL INFECTION OF THE SKIN AND SUBCUTANEOUS TISSUE, UNSPECIFIED: ICD-10-CM

## 2021-08-23 DIAGNOSIS — D17 BENIGN LIPOMATOUS NEOPLASM: ICD-10-CM

## 2021-08-23 DIAGNOSIS — D18.0 HEMANGIOMA: ICD-10-CM

## 2021-08-23 DIAGNOSIS — L57.8 OTHER SKIN CHANGES DUE TO CHRONIC EXPOSURE TO NONIONIZING RADIATION: ICD-10-CM

## 2021-08-23 DIAGNOSIS — L81.4 OTHER MELANIN HYPERPIGMENTATION: ICD-10-CM

## 2021-08-23 DIAGNOSIS — L82.1 OTHER SEBORRHEIC KERATOSIS: ICD-10-CM

## 2021-08-23 DIAGNOSIS — Z87.2 PERSONAL HISTORY OF DISEASES OF THE SKIN AND SUBCUTANEOUS TISSUE: ICD-10-CM

## 2021-08-23 DIAGNOSIS — D22 MELANOCYTIC NEVI: ICD-10-CM | Status: STABLE

## 2021-08-23 PROBLEM — D22.71 MELANOCYTIC NEVI OF RIGHT LOWER LIMB, INCLUDING HIP: Status: ACTIVE | Noted: 2021-08-23

## 2021-08-23 PROBLEM — D48.5 NEOPLASM OF UNCERTAIN BEHAVIOR OF SKIN: Status: ACTIVE | Noted: 2021-08-23

## 2021-08-23 PROBLEM — D22.62 MELANOCYTIC NEVI OF LEFT UPPER LIMB, INCLUDING SHOULDER: Status: ACTIVE | Noted: 2021-08-23

## 2021-08-23 PROBLEM — D17.1 BENIGN LIPOMATOUS NEOPLASM OF SKIN AND SUBCUTANEOUS TISSUE OF TRUNK: Status: ACTIVE | Noted: 2021-08-23

## 2021-08-23 PROBLEM — D22.72 MELANOCYTIC NEVI OF LEFT LOWER LIMB, INCLUDING HIP: Status: ACTIVE | Noted: 2021-08-23

## 2021-08-23 PROBLEM — D22.5 MELANOCYTIC NEVI OF TRUNK: Status: ACTIVE | Noted: 2021-08-23

## 2021-08-23 PROBLEM — D18.01 HEMANGIOMA OF SKIN AND SUBCUTANEOUS TISSUE: Status: ACTIVE | Noted: 2021-08-23

## 2021-08-23 PROBLEM — D22.61 MELANOCYTIC NEVI OF RIGHT UPPER LIMB, INCLUDING SHOULDER: Status: ACTIVE | Noted: 2021-08-23

## 2021-08-23 PROCEDURE — ? PRESCRIPTION

## 2021-08-23 PROCEDURE — 99213 OFFICE O/P EST LOW 20 MIN: CPT | Mod: 25

## 2021-08-23 PROCEDURE — ? OBSERVATION AND MEASURE

## 2021-08-23 PROCEDURE — ? BIOPSY BY SHAVE METHOD

## 2021-08-23 PROCEDURE — ? ADDITIONAL NOTES

## 2021-08-23 PROCEDURE — 11102 TANGNTL BX SKIN SINGLE LES: CPT

## 2021-08-23 PROCEDURE — ? COUNSELING

## 2021-08-23 RX ORDER — MUPIROCIN 20 MG/G
OINTMENT TOPICAL
Qty: 1 | Refills: 6 | Status: ERX | COMMUNITY
Start: 2021-08-23

## 2021-08-23 RX ADMIN — MUPIROCIN: 20 OINTMENT TOPICAL at 00:00

## 2021-08-23 ASSESSMENT — LOCATION SIMPLE DESCRIPTION DERM
LOCATION SIMPLE: RIGHT FOREARM
LOCATION SIMPLE: LEFT CHEEK
LOCATION SIMPLE: RIGHT NOSE
LOCATION SIMPLE: RIGHT CHEEK
LOCATION SIMPLE: RIGHT UPPER ARM
LOCATION SIMPLE: RIGHT UPPER BACK
LOCATION SIMPLE: RIGHT SHOULDER
LOCATION SIMPLE: LEFT FOREARM
LOCATION SIMPLE: LEFT UPPER ARM
LOCATION SIMPLE: RIGHT THIGH
LOCATION SIMPLE: LEFT THIGH
LOCATION SIMPLE: LEFT INFERIOR EYELID
LOCATION SIMPLE: ABDOMEN
LOCATION SIMPLE: RIGHT POSTERIOR THIGH
LOCATION SIMPLE: CHEST
LOCATION SIMPLE: LEFT POSTERIOR THIGH

## 2021-08-23 ASSESSMENT — LOCATION DETAILED DESCRIPTION DERM
LOCATION DETAILED: RIGHT ANTERIOR PROXIMAL UPPER ARM
LOCATION DETAILED: PERIUMBILICAL SKIN
LOCATION DETAILED: RIGHT PROXIMAL POSTERIOR UPPER ARM
LOCATION DETAILED: RIGHT RIB CAGE
LOCATION DETAILED: EPIGASTRIC SKIN
LOCATION DETAILED: RIGHT POSTERIOR SHOULDER
LOCATION DETAILED: LEFT PROXIMAL DORSAL FOREARM
LOCATION DETAILED: RIGHT DISTAL POSTERIOR THIGH
LOCATION DETAILED: LEFT ANTERIOR DISTAL THIGH
LOCATION DETAILED: RIGHT ANTERIOR DISTAL THIGH
LOCATION DETAILED: RIGHT ANTERIOR DISTAL UPPER ARM
LOCATION DETAILED: LEFT ANTERIOR PROXIMAL UPPER ARM
LOCATION DETAILED: LEFT MEDIAL SUPERIOR CHEST
LOCATION DETAILED: LEFT INFERIOR CENTRAL MALAR CHEEK
LOCATION DETAILED: LEFT DISTAL POSTERIOR UPPER ARM
LOCATION DETAILED: RIGHT MID-UPPER BACK
LOCATION DETAILED: RIGHT NARIS
LOCATION DETAILED: LEFT PROXIMAL POSTERIOR UPPER ARM
LOCATION DETAILED: RIGHT CENTRAL MALAR CHEEK
LOCATION DETAILED: RIGHT SUPERIOR MEDIAL UPPER BACK
LOCATION DETAILED: LEFT LATERAL INFERIOR EYELID
LOCATION DETAILED: STERNUM
LOCATION DETAILED: LEFT DISTAL POSTERIOR THIGH
LOCATION DETAILED: RIGHT DISTAL POSTERIOR UPPER ARM
LOCATION DETAILED: RIGHT SUPERIOR UPPER BACK
LOCATION DETAILED: RIGHT PROXIMAL DORSAL FOREARM

## 2021-08-23 ASSESSMENT — LOCATION ZONE DERM
LOCATION ZONE: ARM
LOCATION ZONE: LEG
LOCATION ZONE: TRUNK
LOCATION ZONE: EYELID
LOCATION ZONE: NOSE
LOCATION ZONE: FACE

## 2021-08-23 NOTE — PROCEDURE: ADDITIONAL NOTES
Render Risk Assessment In Note?: no
Additional Notes: If no improvement refer to ENT
Detail Level: Simple

## 2021-08-23 NOTE — HPI: MOLE CHECK
What Is The Reason For Today's Visit?: Mole Check
Additional History: Right anterior distal upper arm - 3mm x 6mm
Additional History: Left mid abdomen 3mm x 8mm
Additional History: Mid inferior abdomen - 6mm x 1cm

## 2021-08-23 NOTE — PROCEDURE: OBSERVATION
Size Of Lesion: 3mm x 7mm
Detail Level: Detailed
Body Location Override (Optional - Billing Will Still Be Based On Selected Body Map Location If Applicable): Left Mid Abdomen
Size Of Lesion: 3mm x 8mm
Size Of Lesion: 6mm x 1cm
Body Location Override (Optional - Billing Will Still Be Based On Selected Body Map Location If Applicable): Mid inferior abdomen

## 2021-09-22 ENCOUNTER — TELEPHONE (OUTPATIENT)
Dept: ENDOCRINOLOGY | Facility: MEDICAL CENTER | Age: 51
End: 2021-09-22

## 2021-09-22 NOTE — TELEPHONE ENCOUNTER
FINAL PRIOR AUTHORIZATION STATUS:    1.  Name of Medication & Dose: Testosterone Cypionate 200MG/ML intramuscular solution     2. Prior Auth Status: Approved through 09/22/2022     3. Action Taken: Pharmacy Notified: yes Patient Notified: yes     Normal vision: sees adequately in most situations; can see medication labels, newsprint

## 2021-09-22 NOTE — TELEPHONE ENCOUNTER
MEDICATION PRIOR AUTHORIZATION NEEDED:    1. Name of Medication: Testosterone Cypionate 200MG/ML intramuscular solution      2. Requested By (Name of Pharmacy): [facility]     3. Is insurance on file current? yes    4. What is the name & phone number of the 3rd party payor? [employer]

## 2021-09-22 NOTE — TELEPHONE ENCOUNTER
DOCUMENTATION OF PAR STATUS:    1. Name of Medication & Dose: Testosterone Cypionate 200MG/ML intramuscular solution     2. Name of Prescription Coverage Company & phone #: [email or web address]     3. Date Prior Auth Submitted: 09/22/2021    4. What information was given to obtain insurance decision? In Chart    5. Prior Auth Status? Pending    6. Patient Notified: yes

## 2021-09-30 DIAGNOSIS — I10 ESSENTIAL HYPERTENSION: ICD-10-CM

## 2021-09-30 DIAGNOSIS — M10.9 GOUT, UNSPECIFIED CAUSE, UNSPECIFIED CHRONICITY, UNSPECIFIED SITE: ICD-10-CM

## 2021-10-01 RX ORDER — IRBESARTAN 150 MG/1
TABLET ORAL
Qty: 180 TABLET | Refills: 0 | Status: SHIPPED | OUTPATIENT
Start: 2021-10-01 | End: 2021-11-08

## 2021-10-01 RX ORDER — ALLOPURINOL 300 MG/1
TABLET ORAL
Qty: 90 TABLET | Refills: 0 | Status: SHIPPED | OUTPATIENT
Start: 2021-10-01 | End: 2021-11-08

## 2021-10-01 NOTE — TELEPHONE ENCOUNTER
Received request via: Pharmacy    Was the patient seen in the last year in this department? Yes    Does the patient have an active prescription (recently filled or refills available) for medication(s) requested? No     REQUESTED MEDICATION:   Requested Prescriptions     Pending Prescriptions Disp Refills   • irbesartan (AVAPRO) 150 MG Tab [Pharmacy Med Name: IRBESARTAN TAB 150MG] 180 Tablet 0     Sig: TAKE 2 TABLETS DAILY   • allopurinol (ZYLOPRIM) 300 MG Tab [Pharmacy Med Name: ALLOPURINOL  TAB 300MG] 90 Tablet 0     Sig: TAKE 1 TABLET DAILY

## 2021-11-06 DIAGNOSIS — M54.41 CHRONIC BILATERAL LOW BACK PAIN WITH RIGHT-SIDED SCIATICA: ICD-10-CM

## 2021-11-06 DIAGNOSIS — K21.9 GASTRIC REFLUX: ICD-10-CM

## 2021-11-06 DIAGNOSIS — M10.9 GOUT, UNSPECIFIED CAUSE, UNSPECIFIED CHRONICITY, UNSPECIFIED SITE: ICD-10-CM

## 2021-11-06 DIAGNOSIS — Z79.4 TYPE 2 DIABETES MELLITUS WITH HYPERGLYCEMIA, WITH LONG-TERM CURRENT USE OF INSULIN (HCC): ICD-10-CM

## 2021-11-06 DIAGNOSIS — E78.2 MIXED HYPERLIPIDEMIA: ICD-10-CM

## 2021-11-06 DIAGNOSIS — E11.65 TYPE 2 DIABETES MELLITUS WITH HYPERGLYCEMIA, WITH LONG-TERM CURRENT USE OF INSULIN (HCC): ICD-10-CM

## 2021-11-06 DIAGNOSIS — I10 ESSENTIAL HYPERTENSION: ICD-10-CM

## 2021-11-06 DIAGNOSIS — G89.29 CHRONIC BILATERAL LOW BACK PAIN WITH RIGHT-SIDED SCIATICA: ICD-10-CM

## 2021-11-08 RX ORDER — CARVEDILOL 25 MG/1
TABLET ORAL
Qty: 90 TABLET | Refills: 1 | Status: SHIPPED | OUTPATIENT
Start: 2021-11-08 | End: 2022-04-26

## 2021-11-08 RX ORDER — PEN NEEDLE, DIABETIC 32GX 5/32"
NEEDLE, DISPOSABLE MISCELLANEOUS
Qty: 100 EACH | Refills: 3 | Status: SHIPPED | OUTPATIENT
Start: 2021-11-08

## 2021-11-08 RX ORDER — LANSOPRAZOLE 30 MG/1
CAPSULE, DELAYED RELEASE ORAL
Qty: 180 CAPSULE | Refills: 1 | Status: SHIPPED | OUTPATIENT
Start: 2021-11-08 | End: 2023-07-26 | Stop reason: SDUPTHER

## 2021-11-08 RX ORDER — FENOFIBRATE 145 MG/1
TABLET, COATED ORAL
Qty: 90 TABLET | Refills: 1 | Status: SHIPPED | OUTPATIENT
Start: 2021-11-08 | End: 2022-04-12

## 2021-11-08 RX ORDER — DAPAGLIFLOZIN 10 MG/1
TABLET, FILM COATED ORAL
Qty: 90 TABLET | Refills: 1 | Status: SHIPPED | OUTPATIENT
Start: 2021-11-08 | End: 2022-11-15 | Stop reason: SDUPTHER

## 2021-11-08 RX ORDER — IRBESARTAN 150 MG/1
TABLET ORAL
Qty: 180 TABLET | Refills: 0 | Status: SHIPPED | OUTPATIENT
Start: 2021-11-08 | End: 2022-04-26

## 2021-11-08 RX ORDER — ALLOPURINOL 300 MG/1
TABLET ORAL
Qty: 90 TABLET | Refills: 0 | Status: SHIPPED | OUTPATIENT
Start: 2021-11-08 | End: 2022-04-26

## 2021-11-08 RX ORDER — METAXALONE 800 MG/1
TABLET ORAL
Qty: 270 TABLET | Refills: 0 | Status: SHIPPED | OUTPATIENT
Start: 2021-11-08 | End: 2022-04-26

## 2021-11-08 NOTE — TELEPHONE ENCOUNTER
Received request via: Pharmacy    Was the patient seen in the last year in this department? Yes    Does the patient have an active prescription (recently filled or refills available) for medication(s) requested? No     REQUESTED MEDICATION:   Requested Prescriptions     Pending Prescriptions Disp Refills   • lansoprazole (PREVACID) 30 MG CAPSULE DELAYED RELEASE [Pharmacy Med Name: LANSOPRAZ DR CAP 30MG RX] 180 Capsule 1     Sig: TAKE 1 CAPSULE TWICE DAILY   • irbesartan (AVAPRO) 150 MG Tab [Pharmacy Med Name: IRBESARTAN TAB 150MG] 180 Tablet 0     Sig: TAKE 2 TABLETS DAILY   • FARXIGA 10 MG Tab [Pharmacy Med Name: FARXIGA TAB 10MG] 90 Tablet 1     Sig: TAKE 1 TABLET DAILY   • allopurinol (ZYLOPRIM) 300 MG Tab [Pharmacy Med Name: ALLOPURINOL  TAB 300MG] 90 Tablet 0     Sig: TAKE 1 TABLET DAILY   • metaxalone (SKELAXIN) 800 MG Tab [Pharmacy Med Name: METAXALONE TAB 800MG] 270 Tablet 0     Sig: TAKE 1 TABLET 3 TIMES A DAYAS NEEDED FOR MILD PAIN    MODERATE PAIN OR MUSCLE    SPASMS   • carvedilol (COREG) 25 MG Tab [Pharmacy Med Name: CARVEDILOL TAB 25MG] 90 Tablet 1     Sig: TAKE 1/2 TABLET TWICE A DAY   • Insulin Pen Needle 32 G x 4 mm (BD PEN NEEDLE CARLY 2ND GEN) [Pharmacy Med Name: BD CARLY 2NDG PEN [id number]]  0     Sig: USE FOR INSULIN INJECTION  ONCE DAILY   • fenofibrate (TRICOR) 145 MG Tab [Pharmacy Med Name: FENOFIBRATE  TAB 145MG] 90 Tablet 1     Sig: TAKE 1 TABLET DAILY

## 2022-01-19 DIAGNOSIS — E29.1 HYPOGONADISM IN MALE: ICD-10-CM

## 2022-01-19 RX ORDER — TESTOSTERONE CYPIONATE 200 MG/ML
200 INJECTION, SOLUTION INTRAMUSCULAR
Qty: 2 ML | Refills: 5 | Status: SHIPPED | OUTPATIENT
Start: 2022-01-19 | End: 2022-04-26

## 2022-01-19 NOTE — TELEPHONE ENCOUNTER
Received request via: Pharmacy    Was the patient seen in the last year in this department? Yes    Does the patient have an active prescription (recently filled or refills available) for medication(s) requested? No     REQUESTED MEDICATION:   Requested Prescriptions     Pending Prescriptions Disp Refills   • testosterone cypionate (DEPO-TESTOSTERONE) 200 MG/ML Solution injection [Pharmacy Med Name: TESTOSTERONE  MG/ML] 2 mL      Sig: INJECT 1 ML INTO THE SHOULDER, THIGH, OR BUTTOCKS EVERY 14 DAYS FOR 30 DAYS.

## 2022-02-08 DIAGNOSIS — Z79.4 TYPE 2 DIABETES MELLITUS WITH HYPERGLYCEMIA, WITH LONG-TERM CURRENT USE OF INSULIN (HCC): ICD-10-CM

## 2022-02-08 DIAGNOSIS — E11.65 TYPE 2 DIABETES MELLITUS WITH HYPERGLYCEMIA, WITH LONG-TERM CURRENT USE OF INSULIN (HCC): ICD-10-CM

## 2022-02-08 RX ORDER — METFORMIN HYDROCHLORIDE 500 MG/1
TABLET, EXTENDED RELEASE ORAL
Qty: 360 TABLET | Refills: 1 | Status: SHIPPED | OUTPATIENT
Start: 2022-02-08 | End: 2022-04-26

## 2022-02-10 DIAGNOSIS — I10 ESSENTIAL HYPERTENSION: ICD-10-CM

## 2022-02-11 RX ORDER — SPIRONOLACTONE 25 MG/1
TABLET ORAL
Qty: 45 TABLET | Refills: 1 | Status: SHIPPED | OUTPATIENT
Start: 2022-02-11 | End: 2022-04-26

## 2022-03-09 DIAGNOSIS — E03.9 ACQUIRED HYPOTHYROIDISM: ICD-10-CM

## 2022-03-09 RX ORDER — LEVOTHYROXINE SODIUM 137 MCG
TABLET ORAL
Qty: 90 TABLET | Refills: 2 | Status: SHIPPED | OUTPATIENT
Start: 2022-03-09 | End: 2022-11-15 | Stop reason: SDUPTHER

## 2022-04-13 ENCOUNTER — APPOINTMENT (RX ONLY)
Dept: URBAN - METROPOLITAN AREA CLINIC 20 | Facility: CLINIC | Age: 52
Setting detail: DERMATOLOGY
End: 2022-04-13

## 2022-04-13 DIAGNOSIS — Z87.2 PERSONAL HISTORY OF DISEASES OF THE SKIN AND SUBCUTANEOUS TISSUE: ICD-10-CM

## 2022-04-13 DIAGNOSIS — Z85.828 PERSONAL HISTORY OF OTHER MALIGNANT NEOPLASM OF SKIN: ICD-10-CM

## 2022-04-13 DIAGNOSIS — D22 MELANOCYTIC NEVI: ICD-10-CM

## 2022-04-13 DIAGNOSIS — L82.1 OTHER SEBORRHEIC KERATOSIS: ICD-10-CM

## 2022-04-13 DIAGNOSIS — D18.0 HEMANGIOMA: ICD-10-CM

## 2022-04-13 DIAGNOSIS — L81.4 OTHER MELANIN HYPERPIGMENTATION: ICD-10-CM

## 2022-04-13 DIAGNOSIS — L57.8 OTHER SKIN CHANGES DUE TO CHRONIC EXPOSURE TO NONIONIZING RADIATION: ICD-10-CM

## 2022-04-13 PROBLEM — D22.62 MELANOCYTIC NEVI OF LEFT UPPER LIMB, INCLUDING SHOULDER: Status: ACTIVE | Noted: 2022-04-13

## 2022-04-13 PROBLEM — D22.5 MELANOCYTIC NEVI OF TRUNK: Status: ACTIVE | Noted: 2022-04-13

## 2022-04-13 PROBLEM — D22.72 MELANOCYTIC NEVI OF LEFT LOWER LIMB, INCLUDING HIP: Status: ACTIVE | Noted: 2022-04-13

## 2022-04-13 PROBLEM — D48.5 NEOPLASM OF UNCERTAIN BEHAVIOR OF SKIN: Status: ACTIVE | Noted: 2022-04-13

## 2022-04-13 PROBLEM — D22.71 MELANOCYTIC NEVI OF RIGHT LOWER LIMB, INCLUDING HIP: Status: ACTIVE | Noted: 2022-04-13

## 2022-04-13 PROBLEM — D18.01 HEMANGIOMA OF SKIN AND SUBCUTANEOUS TISSUE: Status: ACTIVE | Noted: 2022-04-13

## 2022-04-13 PROBLEM — D22.61 MELANOCYTIC NEVI OF RIGHT UPPER LIMB, INCLUDING SHOULDER: Status: ACTIVE | Noted: 2022-04-13

## 2022-04-13 PROCEDURE — ? BIOPSY BY SHAVE METHOD

## 2022-04-13 PROCEDURE — 11103 TANGNTL BX SKIN EA SEP/ADDL: CPT | Mod: 59

## 2022-04-13 PROCEDURE — 11102 TANGNTL BX SKIN SINGLE LES: CPT | Mod: 59

## 2022-04-13 PROCEDURE — ? COUNSELING

## 2022-04-13 PROCEDURE — 69100 BIOPSY OF EXTERNAL EAR: CPT

## 2022-04-13 PROCEDURE — ? OBSERVATION AND MEASURE

## 2022-04-13 PROCEDURE — 99213 OFFICE O/P EST LOW 20 MIN: CPT | Mod: 25

## 2022-04-13 ASSESSMENT — LOCATION DETAILED DESCRIPTION DERM
LOCATION DETAILED: LEFT MEDIAL SUPERIOR CHEST
LOCATION DETAILED: RIGHT POSTERIOR SHOULDER
LOCATION DETAILED: RIGHT ANTERIOR DISTAL UPPER ARM
LOCATION DETAILED: RIGHT ANTERIOR PROXIMAL UPPER ARM
LOCATION DETAILED: LEFT PROXIMAL POSTERIOR UPPER ARM
LOCATION DETAILED: RIGHT SUPERIOR MEDIAL UPPER BACK
LOCATION DETAILED: LEFT DISTAL POSTERIOR UPPER ARM
LOCATION DETAILED: RIGHT ANTERIOR DISTAL THIGH
LOCATION DETAILED: STERNUM
LOCATION DETAILED: RIGHT MID-UPPER BACK
LOCATION DETAILED: LEFT AREOLA
LOCATION DETAILED: LEFT LATERAL INFERIOR EYELID
LOCATION DETAILED: LEFT DISTAL POSTERIOR THIGH
LOCATION DETAILED: LEFT INFERIOR CENTRAL MALAR CHEEK
LOCATION DETAILED: LEFT ANTERIOR PROXIMAL UPPER ARM
LOCATION DETAILED: RIGHT DISTAL POSTERIOR THIGH
LOCATION DETAILED: PERIUMBILICAL SKIN
LOCATION DETAILED: EPIGASTRIC SKIN
LOCATION DETAILED: RIGHT PROXIMAL POSTERIOR UPPER ARM
LOCATION DETAILED: LEFT PROXIMAL DORSAL FOREARM
LOCATION DETAILED: RIGHT NASAL ALA
LOCATION DETAILED: RIGHT DISTAL POSTERIOR UPPER ARM
LOCATION DETAILED: RIGHT CENTRAL MALAR CHEEK
LOCATION DETAILED: LEFT ANTERIOR DISTAL THIGH
LOCATION DETAILED: RIGHT PROXIMAL DORSAL FOREARM

## 2022-04-13 ASSESSMENT — LOCATION SIMPLE DESCRIPTION DERM
LOCATION SIMPLE: CHEST
LOCATION SIMPLE: RIGHT NOSE
LOCATION SIMPLE: LEFT INFERIOR EYELID
LOCATION SIMPLE: RIGHT UPPER ARM
LOCATION SIMPLE: LEFT CHEEK
LOCATION SIMPLE: RIGHT POSTERIOR THIGH
LOCATION SIMPLE: LEFT POSTERIOR THIGH
LOCATION SIMPLE: LEFT UPPER ARM
LOCATION SIMPLE: LEFT FOREARM
LOCATION SIMPLE: RIGHT FOREARM
LOCATION SIMPLE: LEFT CHEST
LOCATION SIMPLE: RIGHT CHEEK
LOCATION SIMPLE: ABDOMEN
LOCATION SIMPLE: LEFT THIGH
LOCATION SIMPLE: RIGHT SHOULDER
LOCATION SIMPLE: RIGHT UPPER BACK
LOCATION SIMPLE: RIGHT THIGH

## 2022-04-13 ASSESSMENT — LOCATION ZONE DERM
LOCATION ZONE: EYELID
LOCATION ZONE: ARM
LOCATION ZONE: FACE
LOCATION ZONE: TRUNK
LOCATION ZONE: NOSE
LOCATION ZONE: LEG

## 2022-04-13 NOTE — PROCEDURE: OBSERVATION
Size Of Lesion: 3mm x 7mm
Detail Level: Detailed
Body Location Override (Optional - Billing Will Still Be Based On Selected Body Map Location If Applicable): Left Mid Abdomen
Size Of Lesion: 4mm x 1cm
Size Of Lesion: 5mm x 1cm
Body Location Override (Optional - Billing Will Still Be Based On Selected Body Map Location If Applicable): Mid inferior abdomen

## 2022-04-13 NOTE — HPI: MOLE CHECK
What Is The Reason For Today's Visit?: Mole Check
Additional History: Rt ant dis upper arm 3mm x 7mm
Additional History: Lt mid abdomen 3mm x 8mm
Additional History: Mid inf abdomen 6mm x 1cm

## 2022-04-25 DIAGNOSIS — E11.65 TYPE 2 DIABETES MELLITUS WITH HYPERGLYCEMIA, WITH LONG-TERM CURRENT USE OF INSULIN (HCC): ICD-10-CM

## 2022-04-25 DIAGNOSIS — I10 ESSENTIAL HYPERTENSION: ICD-10-CM

## 2022-04-25 DIAGNOSIS — M10.9 GOUT, UNSPECIFIED CAUSE, UNSPECIFIED CHRONICITY, UNSPECIFIED SITE: ICD-10-CM

## 2022-04-25 DIAGNOSIS — Z79.4 TYPE 2 DIABETES MELLITUS WITH HYPERGLYCEMIA, WITH LONG-TERM CURRENT USE OF INSULIN (HCC): ICD-10-CM

## 2022-04-25 DIAGNOSIS — E29.1 HYPOGONADISM IN MALE: ICD-10-CM

## 2022-04-25 DIAGNOSIS — G89.29 CHRONIC BILATERAL LOW BACK PAIN WITH RIGHT-SIDED SCIATICA: ICD-10-CM

## 2022-04-25 DIAGNOSIS — M54.41 CHRONIC BILATERAL LOW BACK PAIN WITH RIGHT-SIDED SCIATICA: ICD-10-CM

## 2022-04-25 DIAGNOSIS — E78.2 MIXED HYPERLIPIDEMIA: ICD-10-CM

## 2022-04-26 RX ORDER — INSULIN GLARGINE 100 [IU]/ML
INJECTION, SOLUTION SUBCUTANEOUS
Qty: 60 ML | Refills: 2 | Status: SHIPPED | OUTPATIENT
Start: 2022-04-26 | End: 2022-11-15

## 2022-04-26 RX ORDER — HYDROCHLOROTHIAZIDE 12.5 MG/1
TABLET ORAL
Qty: 90 TABLET | Refills: 2 | Status: SHIPPED | OUTPATIENT
Start: 2022-04-26 | End: 2023-07-26 | Stop reason: SDUPTHER

## 2022-04-26 RX ORDER — FENOFIBRATE 145 MG/1
TABLET, COATED ORAL
Qty: 90 TABLET | Refills: 0 | Status: SHIPPED | OUTPATIENT
Start: 2022-04-26 | End: 2023-07-26 | Stop reason: SDUPTHER

## 2022-04-26 RX ORDER — ATORVASTATIN CALCIUM 40 MG/1
TABLET, FILM COATED ORAL
Qty: 90 TABLET | Refills: 1 | Status: SHIPPED | OUTPATIENT
Start: 2022-04-26

## 2022-04-26 RX ORDER — IRBESARTAN 150 MG/1
300 TABLET ORAL DAILY
Qty: 180 TABLET | Refills: 0 | Status: SHIPPED | OUTPATIENT
Start: 2022-04-26 | End: 2023-07-26 | Stop reason: SDUPTHER

## 2022-04-26 RX ORDER — METAXALONE 800 MG/1
TABLET ORAL
Qty: 270 TABLET | Refills: 0 | Status: SHIPPED | OUTPATIENT
Start: 2022-04-26

## 2022-04-26 RX ORDER — METFORMIN HYDROCHLORIDE 500 MG/1
1000 TABLET, EXTENDED RELEASE ORAL 2 TIMES DAILY
Qty: 360 TABLET | Refills: 0 | Status: SHIPPED | OUTPATIENT
Start: 2022-04-26

## 2022-04-26 RX ORDER — CARVEDILOL 25 MG/1
TABLET ORAL
Qty: 90 TABLET | Refills: 0 | Status: SHIPPED | OUTPATIENT
Start: 2022-04-26 | End: 2023-07-26 | Stop reason: SDUPTHER

## 2022-04-26 RX ORDER — ALLOPURINOL 300 MG/1
TABLET ORAL
Qty: 90 TABLET | Refills: 0 | Status: SHIPPED | OUTPATIENT
Start: 2022-04-26 | End: 2023-07-26 | Stop reason: SDUPTHER

## 2022-04-26 RX ORDER — TESTOSTERONE CYPIONATE 200 MG/ML
200 INJECTION, SOLUTION INTRAMUSCULAR
Qty: 2 ML | Refills: 5 | Status: SHIPPED | OUTPATIENT
Start: 2022-04-26 | End: 2022-05-26

## 2022-04-26 RX ORDER — SPIRONOLACTONE 25 MG/1
TABLET ORAL
Qty: 45 TABLET | Refills: 1 | Status: SHIPPED | OUTPATIENT
Start: 2022-04-26 | End: 2022-12-08 | Stop reason: SDUPTHER

## 2022-05-05 NOTE — TELEPHONE ENCOUNTER
**Last office visit 10/2/19**    Was the patient seen in the last year in this department? Yes    Does patient have an active prescription for medications requested? Yes    Received Request Via: Patient    Day supply: 90    RX needs to be for Synthroid Brand name.     Mac Cano

## 2022-09-27 DIAGNOSIS — Z79.4 TYPE 2 DIABETES MELLITUS WITH HYPERGLYCEMIA, WITH LONG-TERM CURRENT USE OF INSULIN (HCC): ICD-10-CM

## 2022-09-27 DIAGNOSIS — E11.65 TYPE 2 DIABETES MELLITUS WITH HYPERGLYCEMIA, WITH LONG-TERM CURRENT USE OF INSULIN (HCC): ICD-10-CM

## 2022-09-27 RX ORDER — DAPAGLIFLOZIN 10 MG/1
10 TABLET, FILM COATED ORAL DAILY
Qty: 90 TABLET | Refills: 1 | OUTPATIENT
Start: 2022-09-27

## 2022-09-27 NOTE — TELEPHONE ENCOUNTER
Spoke to Brett today and he is needing a refill for his medication sent to [facility].  Would you please authorize the prescription?    Thank you!    Daniella Meza  Rx Coordinator   [phone]

## 2022-10-12 ENCOUNTER — APPOINTMENT (RX ONLY)
Dept: URBAN - METROPOLITAN AREA CLINIC 20 | Facility: CLINIC | Age: 52
Setting detail: DERMATOLOGY
End: 2022-10-12

## 2022-10-12 DIAGNOSIS — L57.8 OTHER SKIN CHANGES DUE TO CHRONIC EXPOSURE TO NONIONIZING RADIATION: ICD-10-CM

## 2022-10-12 DIAGNOSIS — L91.8 OTHER HYPERTROPHIC DISORDERS OF THE SKIN: ICD-10-CM

## 2022-10-12 DIAGNOSIS — D18.0 HEMANGIOMA: ICD-10-CM

## 2022-10-12 DIAGNOSIS — L82.1 OTHER SEBORRHEIC KERATOSIS: ICD-10-CM

## 2022-10-12 DIAGNOSIS — D22 MELANOCYTIC NEVI: ICD-10-CM

## 2022-10-12 DIAGNOSIS — B07.8 OTHER VIRAL WARTS: ICD-10-CM

## 2022-10-12 DIAGNOSIS — Z85.828 PERSONAL HISTORY OF OTHER MALIGNANT NEOPLASM OF SKIN: ICD-10-CM

## 2022-10-12 DIAGNOSIS — L81.4 OTHER MELANIN HYPERPIGMENTATION: ICD-10-CM

## 2022-10-12 DIAGNOSIS — Z87.2 PERSONAL HISTORY OF DISEASES OF THE SKIN AND SUBCUTANEOUS TISSUE: ICD-10-CM

## 2022-10-12 PROBLEM — D22.62 MELANOCYTIC NEVI OF LEFT UPPER LIMB, INCLUDING SHOULDER: Status: ACTIVE | Noted: 2022-10-12

## 2022-10-12 PROBLEM — D22.71 MELANOCYTIC NEVI OF RIGHT LOWER LIMB, INCLUDING HIP: Status: ACTIVE | Noted: 2022-10-12

## 2022-10-12 PROBLEM — D22.61 MELANOCYTIC NEVI OF RIGHT UPPER LIMB, INCLUDING SHOULDER: Status: ACTIVE | Noted: 2022-10-12

## 2022-10-12 PROBLEM — D22.5 MELANOCYTIC NEVI OF TRUNK: Status: ACTIVE | Noted: 2022-10-12

## 2022-10-12 PROBLEM — D18.01 HEMANGIOMA OF SKIN AND SUBCUTANEOUS TISSUE: Status: ACTIVE | Noted: 2022-10-12

## 2022-10-12 PROBLEM — D22.72 MELANOCYTIC NEVI OF LEFT LOWER LIMB, INCLUDING HIP: Status: ACTIVE | Noted: 2022-10-12

## 2022-10-12 PROCEDURE — ? OBSERVATION AND MEASURE

## 2022-10-12 PROCEDURE — ? DIAGNOSIS COMMENT

## 2022-10-12 PROCEDURE — 99213 OFFICE O/P EST LOW 20 MIN: CPT | Mod: 25

## 2022-10-12 PROCEDURE — ? LIQUID NITROGEN

## 2022-10-12 PROCEDURE — 17110 DESTRUCTION B9 LES UP TO 14: CPT

## 2022-10-12 PROCEDURE — ? COUNSELING

## 2022-10-12 ASSESSMENT — LOCATION SIMPLE DESCRIPTION DERM
LOCATION SIMPLE: LEFT POSTERIOR THIGH
LOCATION SIMPLE: RIGHT THIGH
LOCATION SIMPLE: RIGHT ANTERIOR NECK
LOCATION SIMPLE: RIGHT NOSE
LOCATION SIMPLE: RIGHT ELBOW
LOCATION SIMPLE: RIGHT UPPER ARM
LOCATION SIMPLE: LEFT FOREARM
LOCATION SIMPLE: LEFT THIGH
LOCATION SIMPLE: LEFT INFERIOR EYELID
LOCATION SIMPLE: ABDOMEN
LOCATION SIMPLE: RIGHT FOREARM
LOCATION SIMPLE: LEFT UPPER ARM
LOCATION SIMPLE: RIGHT SHOULDER
LOCATION SIMPLE: CHEST
LOCATION SIMPLE: RIGHT POSTERIOR THIGH
LOCATION SIMPLE: RIGHT UPPER BACK
LOCATION SIMPLE: RIGHT CHEEK
LOCATION SIMPLE: LEFT CHEEK

## 2022-10-12 ASSESSMENT — LOCATION DETAILED DESCRIPTION DERM
LOCATION DETAILED: RIGHT PROXIMAL POSTERIOR UPPER ARM
LOCATION DETAILED: LEFT LATERAL INFERIOR EYELID
LOCATION DETAILED: LEFT INFERIOR CENTRAL MALAR CHEEK
LOCATION DETAILED: RIGHT CENTRAL MALAR CHEEK
LOCATION DETAILED: RIGHT ELBOW
LOCATION DETAILED: RIGHT ANTERIOR DISTAL UPPER ARM
LOCATION DETAILED: LEFT DISTAL POSTERIOR THIGH
LOCATION DETAILED: LEFT PROXIMAL DORSAL FOREARM
LOCATION DETAILED: PERIUMBILICAL SKIN
LOCATION DETAILED: LEFT MEDIAL SUPERIOR CHEST
LOCATION DETAILED: STERNUM
LOCATION DETAILED: EPIGASTRIC SKIN
LOCATION DETAILED: RIGHT NASAL ALA
LOCATION DETAILED: RIGHT DISTAL POSTERIOR THIGH
LOCATION DETAILED: RIGHT ANTERIOR PROXIMAL UPPER ARM
LOCATION DETAILED: RIGHT MEDIAL MALAR CHEEK
LOCATION DETAILED: RIGHT INFERIOR LATERAL NECK
LOCATION DETAILED: RIGHT MID-UPPER BACK
LOCATION DETAILED: RIGHT PROXIMAL DORSAL FOREARM
LOCATION DETAILED: LEFT ANTERIOR DISTAL THIGH
LOCATION DETAILED: LEFT DISTAL POSTERIOR UPPER ARM
LOCATION DETAILED: RIGHT ANTERIOR DISTAL THIGH
LOCATION DETAILED: RIGHT DISTAL POSTERIOR UPPER ARM
LOCATION DETAILED: RIGHT SUPERIOR MEDIAL UPPER BACK
LOCATION DETAILED: RIGHT POSTERIOR SHOULDER
LOCATION DETAILED: LEFT PROXIMAL POSTERIOR UPPER ARM
LOCATION DETAILED: LEFT ANTERIOR PROXIMAL UPPER ARM

## 2022-10-12 ASSESSMENT — LOCATION ZONE DERM
LOCATION ZONE: LEG
LOCATION ZONE: TRUNK
LOCATION ZONE: FACE
LOCATION ZONE: NOSE
LOCATION ZONE: NECK
LOCATION ZONE: ARM
LOCATION ZONE: EYELID

## 2022-10-12 NOTE — PROCEDURE: LIQUID NITROGEN
Show Spray Paint Technique Variable?: Yes
Post-Care Instructions: I reviewed with the patient in detail post-care instructions. Patient is to wear sunprotection, and avoid picking at any of the treated lesions. Pt may apply Vaseline to crusted or scabbing areas.
Detail Level: Detailed
Add 52 Modifier (Optional): no
Spray Paint Text: The liquid nitrogen was applied to the skin utilizing a spray paint frosting technique.
Consent: The patient's consent was obtained including but not limited to risks of crusting, scabbing, blistering, scarring, darker or lighter pigmentary change, recurrence, incomplete removal and infection.
Medical Necessity Information: It is in your best interest to select a reason for this procedure from the list below. All of these items fulfill various CMS LCD requirements except the new and changing color options.
Medical Necessity Clause: This procedure was medically necessary because the lesions that were treated were:

## 2022-10-12 NOTE — HPI: MOLE CHECK
What Is The Reason For Today's Visit?: Mole Check
Additional History: 3mm x 7mm - right anterior distal upper arm\\n\\n4mm x 1cm - Left Mid Abdomen\\n\\n5mm x 1cm - Mid inferior abdomen.

## 2022-10-12 NOTE — PROCEDURE: OBSERVATION
Body Location Override (Optional - Billing Will Still Be Based On Selected Body Map Location If Applicable): Left Mid Abdomen
Detail Level: Detailed
Size Of Lesion: 4mm x 1cm
Size Of Lesion: 5mm x 1cm
Body Location Override (Optional - Billing Will Still Be Based On Selected Body Map Location If Applicable): Mid inferior abdomen

## 2022-11-15 ENCOUNTER — TELEPHONE (OUTPATIENT)
Dept: HEALTH INFORMATION MANAGEMENT | Facility: OTHER | Age: 52
End: 2022-11-15

## 2022-11-15 ENCOUNTER — OFFICE VISIT (OUTPATIENT)
Dept: MEDICAL GROUP | Facility: LAB | Age: 52
End: 2022-11-15
Payer: COMMERCIAL

## 2022-11-15 VITALS
TEMPERATURE: 97.3 F | DIASTOLIC BLOOD PRESSURE: 86 MMHG | OXYGEN SATURATION: 96 % | RESPIRATION RATE: 12 BRPM | SYSTOLIC BLOOD PRESSURE: 148 MMHG | HEART RATE: 61 BPM | BODY MASS INDEX: 33.13 KG/M2 | HEIGHT: 73 IN | WEIGHT: 250 LBS

## 2022-11-15 DIAGNOSIS — Z79.4 TYPE 2 DIABETES MELLITUS WITH HYPERGLYCEMIA, WITH LONG-TERM CURRENT USE OF INSULIN (HCC): ICD-10-CM

## 2022-11-15 DIAGNOSIS — I10 ESSENTIAL HYPERTENSION: ICD-10-CM

## 2022-11-15 DIAGNOSIS — E55.9 VITAMIN D DEFICIENCY: ICD-10-CM

## 2022-11-15 DIAGNOSIS — E78.2 MIXED HYPERLIPIDEMIA: ICD-10-CM

## 2022-11-15 DIAGNOSIS — Z12.5 PROSTATE CANCER SCREENING: ICD-10-CM

## 2022-11-15 DIAGNOSIS — E11.65 TYPE 2 DIABETES MELLITUS WITH HYPERGLYCEMIA, WITH LONG-TERM CURRENT USE OF INSULIN (HCC): ICD-10-CM

## 2022-11-15 DIAGNOSIS — E03.9 ACQUIRED HYPOTHYROIDISM: ICD-10-CM

## 2022-11-15 DIAGNOSIS — Z12.11 COLON CANCER SCREENING: ICD-10-CM

## 2022-11-15 DIAGNOSIS — M54.9 MID BACK PAIN: ICD-10-CM

## 2022-11-15 PROCEDURE — 99204 OFFICE O/P NEW MOD 45 MIN: CPT | Performed by: FAMILY MEDICINE

## 2022-11-15 RX ORDER — DAPAGLIFLOZIN 10 MG/1
10 TABLET, FILM COATED ORAL DAILY
Qty: 90 TABLET | Refills: 1 | Status: SHIPPED | OUTPATIENT
Start: 2022-11-15 | End: 2023-04-06

## 2022-11-15 RX ORDER — LEVOTHYROXINE SODIUM 137 UG/1
137 TABLET ORAL
Qty: 90 TABLET | Refills: 2 | Status: SHIPPED | OUTPATIENT
Start: 2022-11-15 | End: 2023-05-01

## 2022-11-15 RX ORDER — INSULIN GLARGINE 100 [IU]/ML
80 INJECTION, SOLUTION SUBCUTANEOUS EVERY EVENING
Qty: 60 ML | Refills: 2 | Status: SHIPPED | OUTPATIENT
Start: 2022-11-15 | End: 2023-05-30

## 2022-11-15 SDOH — HEALTH STABILITY: PHYSICAL HEALTH: ON AVERAGE, HOW MANY DAYS PER WEEK DO YOU ENGAGE IN MODERATE TO STRENUOUS EXERCISE (LIKE A BRISK WALK)?: 2 DAYS

## 2022-11-15 SDOH — ECONOMIC STABILITY: HOUSING INSECURITY: IN THE LAST 12 MONTHS, HOW MANY PLACES HAVE YOU LIVED?: 1

## 2022-11-15 SDOH — ECONOMIC STABILITY: TRANSPORTATION INSECURITY
IN THE PAST 12 MONTHS, HAS LACK OF RELIABLE TRANSPORTATION KEPT YOU FROM MEDICAL APPOINTMENTS, MEETINGS, WORK OR FROM GETTING THINGS NEEDED FOR DAILY LIVING?: NO

## 2022-11-15 SDOH — ECONOMIC STABILITY: TRANSPORTATION INSECURITY
IN THE PAST 12 MONTHS, HAS THE LACK OF TRANSPORTATION KEPT YOU FROM MEDICAL APPOINTMENTS OR FROM GETTING MEDICATIONS?: NO

## 2022-11-15 SDOH — ECONOMIC STABILITY: FOOD INSECURITY: WITHIN THE PAST 12 MONTHS, YOU WORRIED THAT YOUR FOOD WOULD RUN OUT BEFORE YOU GOT MONEY TO BUY MORE.: NEVER TRUE

## 2022-11-15 SDOH — HEALTH STABILITY: PHYSICAL HEALTH: ON AVERAGE, HOW MANY MINUTES DO YOU ENGAGE IN EXERCISE AT THIS LEVEL?: 30 MIN

## 2022-11-15 SDOH — ECONOMIC STABILITY: FOOD INSECURITY: WITHIN THE PAST 12 MONTHS, THE FOOD YOU BOUGHT JUST DIDN'T LAST AND YOU DIDN'T HAVE MONEY TO GET MORE.: NEVER TRUE

## 2022-11-15 SDOH — ECONOMIC STABILITY: HOUSING INSECURITY
IN THE LAST 12 MONTHS, WAS THERE A TIME WHEN YOU DID NOT HAVE A STEADY PLACE TO SLEEP OR SLEPT IN A SHELTER (INCLUDING NOW)?: NO

## 2022-11-15 SDOH — ECONOMIC STABILITY: INCOME INSECURITY: HOW HARD IS IT FOR YOU TO PAY FOR THE VERY BASICS LIKE FOOD, HOUSING, MEDICAL CARE, AND HEATING?: NOT VERY HARD

## 2022-11-15 SDOH — ECONOMIC STABILITY: INCOME INSECURITY: IN THE LAST 12 MONTHS, WAS THERE A TIME WHEN YOU WERE NOT ABLE TO PAY THE MORTGAGE OR RENT ON TIME?: NO

## 2022-11-15 SDOH — ECONOMIC STABILITY: TRANSPORTATION INSECURITY
IN THE PAST 12 MONTHS, HAS LACK OF TRANSPORTATION KEPT YOU FROM MEETINGS, WORK, OR FROM GETTING THINGS NEEDED FOR DAILY LIVING?: NO

## 2022-11-15 SDOH — HEALTH STABILITY: MENTAL HEALTH
STRESS IS WHEN SOMEONE FEELS TENSE, NERVOUS, ANXIOUS, OR CAN'T SLEEP AT NIGHT BECAUSE THEIR MIND IS TROUBLED. HOW STRESSED ARE YOU?: ONLY A LITTLE

## 2022-11-15 ASSESSMENT — SOCIAL DETERMINANTS OF HEALTH (SDOH)
DO YOU BELONG TO ANY CLUBS OR ORGANIZATIONS SUCH AS CHURCH GROUPS UNIONS, FRATERNAL OR ATHLETIC GROUPS, OR SCHOOL GROUPS?: YES
HOW OFTEN DO YOU ATTEND CHURCH OR RELIGIOUS SERVICES?: NEVER
HOW OFTEN DO YOU ATTENT MEETINGS OF THE CLUB OR ORGANIZATION YOU BELONG TO?: MORE THAN 4 TIMES PER YEAR
HOW OFTEN DO YOU HAVE A DRINK CONTAINING ALCOHOL: 2-4 TIMES A MONTH
HOW OFTEN DO YOU GET TOGETHER WITH FRIENDS OR RELATIVES?: ONCE A WEEK
DO YOU BELONG TO ANY CLUBS OR ORGANIZATIONS SUCH AS CHURCH GROUPS UNIONS, FRATERNAL OR ATHLETIC GROUPS, OR SCHOOL GROUPS?: YES
HOW OFTEN DO YOU ATTEND CHURCH OR RELIGIOUS SERVICES?: NEVER
HOW HARD IS IT FOR YOU TO PAY FOR THE VERY BASICS LIKE FOOD, HOUSING, MEDICAL CARE, AND HEATING?: NOT VERY HARD
HOW OFTEN DO YOU ATTENT MEETINGS OF THE CLUB OR ORGANIZATION YOU BELONG TO?: MORE THAN 4 TIMES PER YEAR
IN A TYPICAL WEEK, HOW MANY TIMES DO YOU TALK ON THE PHONE WITH FAMILY, FRIENDS, OR NEIGHBORS?: TWICE A WEEK
HOW OFTEN DO YOU GET TOGETHER WITH FRIENDS OR RELATIVES?: ONCE A WEEK
WITHIN THE PAST 12 MONTHS, YOU WORRIED THAT YOUR FOOD WOULD RUN OUT BEFORE YOU GOT THE MONEY TO BUY MORE: NEVER TRUE
HOW OFTEN DO YOU HAVE SIX OR MORE DRINKS ON ONE OCCASION: NEVER
HOW MANY DRINKS CONTAINING ALCOHOL DO YOU HAVE ON A TYPICAL DAY WHEN YOU ARE DRINKING: 1 OR 2
IN A TYPICAL WEEK, HOW MANY TIMES DO YOU TALK ON THE PHONE WITH FAMILY, FRIENDS, OR NEIGHBORS?: TWICE A WEEK

## 2022-11-15 ASSESSMENT — LIFESTYLE VARIABLES
SKIP TO QUESTIONS 9-10: 1
HOW MANY STANDARD DRINKS CONTAINING ALCOHOL DO YOU HAVE ON A TYPICAL DAY: 1 OR 2
HOW OFTEN DO YOU HAVE SIX OR MORE DRINKS ON ONE OCCASION: NEVER
AUDIT-C TOTAL SCORE: 2
HOW OFTEN DO YOU HAVE A DRINK CONTAINING ALCOHOL: 2-4 TIMES A MONTH

## 2022-11-15 ASSESSMENT — FIBROSIS 4 INDEX: FIB4 SCORE: 0.83

## 2022-11-15 NOTE — PROGRESS NOTES
Chief Complaint   Patient presents with    New Patient         Brett Vicente is a 51 y.o. male here to establish care and for evaluation and management of:        HPI:    New to establish. Has seen Endocrine for diabetes, etc. Needs to establish.   Overall feeling ok. Just needs to get reset up with primaries.     Recently brother with prostate CA, age 57.     Checks BP at home 130-135/80-85.     Rarely some palpitations.     Diet: Standard american diet. Trying to cut back sugars.   ExercisE: light, couple days a week. History of back surgery, L5-S1 fusion. Spine Dr Laila Landin. 13 years ago. No PT.   Sleep: ok. Up once at night to pee.       Allergies   Allergen Reactions    Morphine Nausea    Pcn [Penicillins]        Current medicines (including changes today)  Current Outpatient Medications   Medication Sig Dispense Refill    fenofibrate (TRICOR) 145 MG Tab TAKE 1 TABLET DAILY.  Please make appointment for future refills 90 Tablet 0    irbesartan (AVAPRO) 150 MG Tab Take 2 Tablets by mouth every day. Please make appointment for future refills 180 Tablet 0    INSULIN GLARGINE 100 UNIT/ML injection PEN INJECT 75 UNITS            SUBCUTANEOUSLY DAILY 60 mL 2    atorvastatin (LIPITOR) 40 MG Tab TAKE 1 TABLET DAILY 90 Tablet 1    hydroCHLOROthiazide (HYDRODIURIL) 12.5 MG tablet TAKE 1 TABLET DAILY 90 Tablet 2    metaxalone (SKELAXIN) 800 MG Tab TAKE 1 TABLET 3 TIMES A DAYAS NEEDED FOR MILD PAIN    MODERATE PAIN OR MUSCLE    SPASMS 270 Tablet 0    carvedilol (COREG) 25 MG Tab TAKE 1/2 TABLET TWICE A DAY 90 Tablet 0    allopurinol (ZYLOPRIM) 300 MG Tab TAKE 1 TABLET DAILY 90 Tablet 0    spironolactone (ALDACTONE) 25 MG Tab TAKE 1/2 TABLET DAILY 45 Tablet 1    metFORMIN ER (GLUCOPHAGE XR) 500 MG TABLET SR 24 HR Take 2 Tablets by mouth 2 times a day. Please make an appointment for future refills 360 Tablet 0    SYNTHROID 137 MCG Tab TAKE 1 TABLET EVERY MORNINGON AN EMPTY STOMACH 90 Tablet 2    lansoprazole (PREVACID)  "30 MG CAPSULE DELAYED RELEASE TAKE 1 CAPSULE TWICE DAILY 180 Capsule 1    FARXIGA 10 MG Tab TAKE 1 TABLET DAILY 90 Tablet 1    Insulin Pen Needle 32 G x 4 mm (BD PEN NEEDLE CARLY 2ND GEN) USE FOR INSULIN INJECTION  ONCE DAILY 100 Each 3    vitamin D, Ergocalciferol, (DRISDOL) 1.25 MG (92695 UT) Cap capsule TAKE 1 CAPSULE BY MOUTH EVERY 7 DAYS 12 capsule 3    aspirin 81 MG EC tablet Take 81 mg by mouth every day.      vitamin D (VITAMIND D3) 1000 UNIT Tab Take 1,000 Units by mouth every day.      Blood Glucose Monitoring Suppl Device Meter: Dispense One Touch Ultra 2 meter. Sig. Use as directed for blood sugar monitoring. 1 Device 0    Blood Glucose Test Strips Test strips order: Test strips for One Touch Ultra 2 meter. Sig: use QID and prn ssx high or low sugar 100 Each 2    Lancets Lancets order: Lancets for One Touch Ultra 2 meter. Sig: use QID and prn ssx high or low sugar. 100 Each 3    Omega 3 1000 MG Cap Take 4 a day 100 Cap 6    Syringe/Needle, Disp, (SYRINGE 3CC/45GF0-8/2\") 22G X 1-1/2\" 3 ML Misc For use with Intra-muscular testosterone  injection every 2 weeks. 12 Each 1    fluticasone (FLONASE) 50 MCG/ACT nasal spray Spray 2 Sprays in nose every day. 16 g 0     No current facility-administered medications for this visit.     He  has a past medical history of Diabetes (HCC), Fatty liver (4/24/2018), GERD (gastroesophageal reflux disease), Gout, Hyperlipidemia, Hypertension, Hypothyroidism (6/25/2015), and Thyroid disease.  He  has a past surgical history that includes lumbar fusion anterior (6/10/2009) and lumbar fusion anterior.  Social History     Tobacco Use    Smoking status: Never    Smokeless tobacco: Never   Vaping Use    Vaping Use: Never used   Substance Use Topics    Alcohol use: Yes     Alcohol/week: 0.5 oz     Types: 1 Shots of liquor per week    Drug use: No     Social History     Social History Narrative    Not on file     Family History   Problem Relation Age of Onset    Hyperlipidemia Mother  " "   Diabetes Father     Heart Disease Father     Diabetes Brother     Heart Disease Paternal Grandfather     Stroke Paternal Grandfather      Family Status   Relation Name Status    Mo  Alive    Fa  Alive    Bro  Alive    Bro  Alive    Son  Alive    Son  Alive    PGFa  (Not Specified)         ROS  No fever or chills.  No nausea or vomiting.  No chest pain or palpitations.  No cough or SOB.  No pain with urination or hematuria.  No black or bloody stools.  All other systems reviewed and are negative     Objective:     BP (!) 148/86 (BP Location: Right arm, Patient Position: Sitting, BP Cuff Size: Adult)   Pulse 61   Temp 36.3 °C (97.3 °F)   Resp 12   Ht 1.854 m (6' 1\")   Wt 113 kg (250 lb)   SpO2 96%  Body mass index is 32.98 kg/m².  Physical Exam:      Well developed, well nourished.  Alert, oriented in no acute distress.  Psych: Eye contact is good, speech goal directed, affect calm  Eyes: conjunctiva non-injected, sclera non-icteric.  Ears: Pinna normal. TM pearly gray.   Nose: Nares are patent.  Normal mucosa  Mouth: Oral mucous membranes pink and moist with no lesions.  Neck Supple.  No adenopathy or masses in the neck or supraclavicular regions. No thyromegaly  Lungs: clear to auscultation bilaterally with good excursion. No wheezes or rhonchi  CV: regular rate and rhythm. No murmur  Abdomen: soft, nontender, no masses or organomegaly.  No rebound or gaurding  Ext: no edema, color normal, vascularity normal, temperature normal      Assessment and Plan:   The following treatment plan was discussed    1. Type 2 diabetes mellitus with hyperglycemia, with long-term current use of insulin (HCC)  Discussed medications.  Refills are needed today.  We will get some updated labs as well.  We will get him referred back to endocrinology as he has been seeing them for blood sugar control in the past.  - dapagliflozin propanediol (FARXIGA) 10 MG Tab; Take 1 Tablet by mouth every day.  Dispense: 90 Tablet; Refill: 1  - " insulin glargine 100 UNIT/ML Solution Pen-injector injection; Inject 80 Units under the skin every evening.  Dispense: 60 mL; Refill: 2  - CBC WITH DIFFERENTIAL; Future  - Comp Metabolic Panel; Future  - HEMOGLOBIN A1C; Future  - MICROALBUMIN CREAT RATIO URINE; Future  - Referral to Endocrinology    2. Mixed hyperlipidemia  History of elevated cholesterol most recently triglycerides.  He is on a statin as well as fenofibrate.  We will get this rechecked.  - Lipid Profile; Future    3. Essential hypertension  Blood pressure is little bit high today.  May need to see cardiology again.  Is taking 3 different agents for blood pressure at this point.  Continue these for now.  Continue monitoring at home as well.    4. Acquired hypothyroidism  Chronic, stable.  We will get rechecked for monitoring.  - levothyroxine (SYNTHROID) 137 MCG Tab; Take 1 Tablet by mouth every morning on an empty stomach.  Dispense: 90 Tablet; Refill: 2  - TSH WITH REFLEX TO FT4; Future    5. Prostate cancer screening  Recent family history of prostate disease.  We will get this rechecked.  - PROSTATE SPECIFIC AG SCREENING; Future    6. Vitamin D deficiency  History of vitamin D deficiency.  Supplementing.  - VITAMIN D,25 HYDROXY (DEFICIENCY); Future    7. Colon cancer screening  Has not had colonoscopy screening in his age 51.  We will get him referred for this.  - Referral to Gastroenterology    8. Mid back pain  Has not done any PT in some time.  We will get him referred to get this going.  May need some increased or updated imaging for the mid back which she seems to be having pain at right now.  Describes a sensation of instability at the level of his spine or his lower ribs meet.  History of L5-S1 fusion.  - Referral to Physical Therapy            Followup: No follow-ups on file.

## 2022-12-07 DIAGNOSIS — I10 ESSENTIAL HYPERTENSION: ICD-10-CM

## 2022-12-08 RX ORDER — SPIRONOLACTONE 25 MG/1
TABLET ORAL
Qty: 45 TABLET | Refills: 3 | Status: SHIPPED | OUTPATIENT
Start: 2022-12-08

## 2022-12-08 RX ORDER — SPIRONOLACTONE 25 MG/1
TABLET ORAL
Qty: 45 TABLET | Refills: 1 | OUTPATIENT
Start: 2022-12-08

## 2022-12-09 ENCOUNTER — HOSPITAL ENCOUNTER (OUTPATIENT)
Dept: LAB | Facility: MEDICAL CENTER | Age: 52
End: 2022-12-09
Attending: FAMILY MEDICINE
Payer: COMMERCIAL

## 2022-12-09 DIAGNOSIS — E11.65 TYPE 2 DIABETES MELLITUS WITH HYPERGLYCEMIA, WITH LONG-TERM CURRENT USE OF INSULIN (HCC): ICD-10-CM

## 2022-12-09 DIAGNOSIS — E55.9 VITAMIN D DEFICIENCY: ICD-10-CM

## 2022-12-09 DIAGNOSIS — Z79.4 TYPE 2 DIABETES MELLITUS WITH HYPERGLYCEMIA, WITH LONG-TERM CURRENT USE OF INSULIN (HCC): ICD-10-CM

## 2022-12-09 DIAGNOSIS — E03.9 ACQUIRED HYPOTHYROIDISM: ICD-10-CM

## 2022-12-09 DIAGNOSIS — Z12.5 PROSTATE CANCER SCREENING: ICD-10-CM

## 2022-12-09 DIAGNOSIS — E78.2 MIXED HYPERLIPIDEMIA: ICD-10-CM

## 2022-12-09 LAB
25(OH)D3 SERPL-MCNC: 46 NG/ML (ref 30–100)
ALBUMIN SERPL BCP-MCNC: 4.6 G/DL (ref 3.2–4.9)
ALBUMIN/GLOB SERPL: 1.8 G/DL
ALP SERPL-CCNC: 59 U/L (ref 30–99)
ALT SERPL-CCNC: 45 U/L (ref 2–50)
ANION GAP SERPL CALC-SCNC: 11 MMOL/L (ref 7–16)
AST SERPL-CCNC: 22 U/L (ref 12–45)
BASOPHILS # BLD AUTO: 0.9 % (ref 0–1.8)
BASOPHILS # BLD: 0.05 K/UL (ref 0–0.12)
BILIRUB SERPL-MCNC: 0.6 MG/DL (ref 0.1–1.5)
BUN SERPL-MCNC: 18 MG/DL (ref 8–22)
CALCIUM SERPL-MCNC: 11.3 MG/DL (ref 8.5–10.5)
CHLORIDE SERPL-SCNC: 106 MMOL/L (ref 96–112)
CHOLEST SERPL-MCNC: 152 MG/DL (ref 100–199)
CO2 SERPL-SCNC: 28 MMOL/L (ref 20–33)
CREAT SERPL-MCNC: 1.15 MG/DL (ref 0.5–1.4)
CREAT UR-MCNC: 144.77 MG/DL
EOSINOPHIL # BLD AUTO: 0.18 K/UL (ref 0–0.51)
EOSINOPHIL NFR BLD: 3.2 % (ref 0–6.9)
ERYTHROCYTE [DISTWIDTH] IN BLOOD BY AUTOMATED COUNT: 43 FL (ref 35.9–50)
EST. AVERAGE GLUCOSE BLD GHB EST-MCNC: 232 MG/DL
FASTING STATUS PATIENT QL REPORTED: NORMAL
GFR SERPLBLD CREATININE-BSD FMLA CKD-EPI: 76 ML/MIN/1.73 M 2
GLOBULIN SER CALC-MCNC: 2.5 G/DL (ref 1.9–3.5)
GLUCOSE SERPL-MCNC: 125 MG/DL (ref 65–99)
HBA1C MFR BLD: 9.7 % (ref 4–5.6)
HCT VFR BLD AUTO: 46.5 % (ref 42–52)
HDLC SERPL-MCNC: 22 MG/DL
HGB BLD-MCNC: 15.4 G/DL (ref 14–18)
IMM GRANULOCYTES # BLD AUTO: 0.02 K/UL (ref 0–0.11)
IMM GRANULOCYTES NFR BLD AUTO: 0.4 % (ref 0–0.9)
LDLC SERPL CALC-MCNC: 55 MG/DL
LYMPHOCYTES # BLD AUTO: 2.17 K/UL (ref 1–4.8)
LYMPHOCYTES NFR BLD: 38.6 % (ref 22–41)
MCH RBC QN AUTO: 28.1 PG (ref 27–33)
MCHC RBC AUTO-ENTMCNC: 33.1 G/DL (ref 33.7–35.3)
MCV RBC AUTO: 84.7 FL (ref 81.4–97.8)
MICROALBUMIN UR-MCNC: <1.2 MG/DL
MICROALBUMIN/CREAT UR: NORMAL MG/G (ref 0–30)
MONOCYTES # BLD AUTO: 0.45 K/UL (ref 0–0.85)
MONOCYTES NFR BLD AUTO: 8 % (ref 0–13.4)
NEUTROPHILS # BLD AUTO: 2.75 K/UL (ref 1.82–7.42)
NEUTROPHILS NFR BLD: 48.9 % (ref 44–72)
NRBC # BLD AUTO: 0 K/UL
NRBC BLD-RTO: 0 /100 WBC
PLATELET # BLD AUTO: 224 K/UL (ref 164–446)
PMV BLD AUTO: 11.3 FL (ref 9–12.9)
POTASSIUM SERPL-SCNC: 4.3 MMOL/L (ref 3.6–5.5)
PROT SERPL-MCNC: 7.1 G/DL (ref 6–8.2)
PSA SERPL-MCNC: 3.84 NG/ML (ref 0–4)
RBC # BLD AUTO: 5.49 M/UL (ref 4.7–6.1)
SODIUM SERPL-SCNC: 145 MMOL/L (ref 135–145)
TRIGL SERPL-MCNC: 373 MG/DL (ref 0–149)
TSH SERPL DL<=0.005 MIU/L-ACNC: 3.53 UIU/ML (ref 0.38–5.33)
WBC # BLD AUTO: 5.6 K/UL (ref 4.8–10.8)

## 2022-12-09 PROCEDURE — 82570 ASSAY OF URINE CREATININE: CPT

## 2022-12-09 PROCEDURE — 83036 HEMOGLOBIN GLYCOSYLATED A1C: CPT

## 2022-12-09 PROCEDURE — 84443 ASSAY THYROID STIM HORMONE: CPT

## 2022-12-09 PROCEDURE — 82306 VITAMIN D 25 HYDROXY: CPT

## 2022-12-09 PROCEDURE — 82043 UR ALBUMIN QUANTITATIVE: CPT

## 2022-12-09 PROCEDURE — 85025 COMPLETE CBC W/AUTO DIFF WBC: CPT

## 2022-12-09 PROCEDURE — 36415 COLL VENOUS BLD VENIPUNCTURE: CPT

## 2022-12-09 PROCEDURE — 80061 LIPID PANEL: CPT

## 2022-12-09 PROCEDURE — 84153 ASSAY OF PSA TOTAL: CPT

## 2022-12-09 PROCEDURE — 80053 COMPREHEN METABOLIC PANEL: CPT

## 2022-12-15 ENCOUNTER — OFFICE VISIT (OUTPATIENT)
Dept: MEDICAL GROUP | Facility: LAB | Age: 52
End: 2022-12-15
Payer: COMMERCIAL

## 2022-12-15 ENCOUNTER — TELEPHONE (OUTPATIENT)
Dept: MEDICAL GROUP | Facility: LAB | Age: 52
End: 2022-12-15

## 2022-12-15 VITALS
SYSTOLIC BLOOD PRESSURE: 130 MMHG | BODY MASS INDEX: 32.2 KG/M2 | TEMPERATURE: 97 F | HEIGHT: 73 IN | HEART RATE: 80 BPM | DIASTOLIC BLOOD PRESSURE: 76 MMHG | RESPIRATION RATE: 12 BRPM | OXYGEN SATURATION: 99 % | WEIGHT: 243 LBS

## 2022-12-15 DIAGNOSIS — Z79.4 TYPE 2 DIABETES MELLITUS WITH HYPERGLYCEMIA, WITH LONG-TERM CURRENT USE OF INSULIN (HCC): ICD-10-CM

## 2022-12-15 DIAGNOSIS — E03.9 ACQUIRED HYPOTHYROIDISM: ICD-10-CM

## 2022-12-15 DIAGNOSIS — E11.65 TYPE 2 DIABETES MELLITUS WITH HYPERGLYCEMIA, WITH LONG-TERM CURRENT USE OF INSULIN (HCC): ICD-10-CM

## 2022-12-15 DIAGNOSIS — E78.2 MIXED HYPERLIPIDEMIA: ICD-10-CM

## 2022-12-15 DIAGNOSIS — Z23 NEED FOR VACCINATION: ICD-10-CM

## 2022-12-15 PROCEDURE — 90472 IMMUNIZATION ADMIN EACH ADD: CPT | Performed by: FAMILY MEDICINE

## 2022-12-15 PROCEDURE — 90471 IMMUNIZATION ADMIN: CPT | Performed by: FAMILY MEDICINE

## 2022-12-15 PROCEDURE — 99214 OFFICE O/P EST MOD 30 MIN: CPT | Mod: 25 | Performed by: FAMILY MEDICINE

## 2022-12-15 PROCEDURE — 90715 TDAP VACCINE 7 YRS/> IM: CPT | Performed by: FAMILY MEDICINE

## 2022-12-15 PROCEDURE — 90670 PCV13 VACCINE IM: CPT | Performed by: FAMILY MEDICINE

## 2022-12-15 ASSESSMENT — FIBROSIS 4 INDEX: FIB4 SCORE: 0.78

## 2022-12-15 NOTE — PROGRESS NOTES
Subjective:     Chief Complaint   Patient presents with    Follow-Up     1 month follow up   labs         HPI:   Brett presents today with A1C follow up. Started Ozempic last month. Has lost 7 lbs. Does not some nausea.   A1C  this December  was 9.7  Diet: no changes lately. Some nausea with ozempic. Bit more reflux of late. Takes prevacid.   Exercise: Not too much.   Issues with his back, so hard to start exercise.     Has appts upcoming with PT, GI for colonoscopy, and Endocrine.     Mild cough of late, from his son. Some sputum.   No SOB, no CP.     Eye visit done recently, no diabetic changes.     Current Outpatient Medications Ordered in Epic   Medication Sig Dispense Refill    spironolactone (ALDACTONE) 25 MG Tab TAKE 1/2 TABLET DAILY Strength: 25 mg 45 Tablet 3    dapagliflozin propanediol (FARXIGA) 10 MG Tab Take 1 Tablet by mouth every day. 90 Tablet 1    Semaglutide,0.25 or 0.5MG/DOS, 2 MG/1.5ML Solution Pen-injector Inject 0.25 mg under the skin every 7 days. 1.5 mL 3    insulin glargine 100 UNIT/ML Solution Pen-injector injection Inject 80 Units under the skin every evening. 60 mL 2    levothyroxine (SYNTHROID) 137 MCG Tab Take 1 Tablet by mouth every morning on an empty stomach. 90 Tablet 2    fenofibrate (TRICOR) 145 MG Tab TAKE 1 TABLET DAILY.  Please make appointment for future refills 90 Tablet 0    irbesartan (AVAPRO) 150 MG Tab Take 2 Tablets by mouth every day. Please make appointment for future refills 180 Tablet 0    atorvastatin (LIPITOR) 40 MG Tab TAKE 1 TABLET DAILY 90 Tablet 1    hydroCHLOROthiazide (HYDRODIURIL) 12.5 MG tablet TAKE 1 TABLET DAILY 90 Tablet 2    metaxalone (SKELAXIN) 800 MG Tab TAKE 1 TABLET 3 TIMES A DAYAS NEEDED FOR MILD PAIN    MODERATE PAIN OR MUSCLE    SPASMS 270 Tablet 0    carvedilol (COREG) 25 MG Tab TAKE 1/2 TABLET TWICE A DAY 90 Tablet 0    allopurinol (ZYLOPRIM) 300 MG Tab TAKE 1 TABLET DAILY 90 Tablet 0    metFORMIN ER (GLUCOPHAGE XR) 500 MG TABLET SR 24 HR Take 2  "Tablets by mouth 2 times a day. Please make an appointment for future refills 360 Tablet 0    lansoprazole (PREVACID) 30 MG CAPSULE DELAYED RELEASE TAKE 1 CAPSULE TWICE DAILY 180 Capsule 1    Insulin Pen Needle 32 G x 4 mm (BD PEN NEEDLE CARLY 2ND GEN) USE FOR INSULIN INJECTION  ONCE DAILY 100 Each 3    vitamin D, Ergocalciferol, (DRISDOL) 1.25 MG (09017 UT) Cap capsule TAKE 1 CAPSULE BY MOUTH EVERY 7 DAYS 12 capsule 3    aspirin 81 MG EC tablet Take 81 mg by mouth every day.      vitamin D (VITAMIND D3) 1000 UNIT Tab Take 1,000 Units by mouth every day.      Blood Glucose Monitoring Suppl Device Meter: Dispense One Touch Ultra 2 meter. Sig. Use as directed for blood sugar monitoring. 1 Device 0    Blood Glucose Test Strips Test strips order: Test strips for One Touch Ultra 2 meter. Sig: use QID and prn ssx high or low sugar 100 Each 2    Lancets Lancets order: Lancets for One Touch Ultra 2 meter. Sig: use QID and prn ssx high or low sugar. 100 Each 3    Omega 3 1000 MG Cap Take 4 a day 100 Cap 6    Syringe/Needle, Disp, (SYRINGE 3CC/12XA3-7/2\") 22G X 1-1/2\" 3 ML Misc For use with Intra-muscular testosterone  injection every 2 weeks. 12 Each 1    fluticasone (FLONASE) 50 MCG/ACT nasal spray Spray 2 Sprays in nose every day. 16 g 0     No current Epic-ordered facility-administered medications on file.         ROS:  Gen: no fevers/chills, no changes in weight  Eyes: no changes in vision  ENT: no sore throat, no hearing loss, no bloody nose  Pulm: no sob, no cough  CV: no chest pain, no palpitations  GI: no nausea/vomiting, no diarrhea  : no dysuria  MSk: no myalgias  Skin: no rash  Neuro: no headaches, no numbness/tingling  Heme/Lymph: no easy bruising      Objective:     Exam:  /76 (BP Location: Right arm, Patient Position: Sitting, BP Cuff Size: Adult)   Pulse 80   Temp 36.1 °C (97 °F)   Resp 12   Ht 1.854 m (6' 1\")   Wt 110 kg (243 lb)   SpO2 99%   BMI 32.06 kg/m²  Body mass index is 32.06 " kg/m².    Gen: Alert and oriented, No apparent distress.  Neck: Neck is supple without lymphadenopathy.  Lungs: Normal effort, CTA bilaterally, no wheezes, rhonchi, or rales  CV: Regular rate and rhythm. No murmurs, rubs, or gallops.  Ext: No clubbing, cyanosis, edema.  Skin: Right posterior neck close to midline 3cm x 4 cm cyst or lipomatous mass.       Assessment & Plan:     51 y.o. male with the following -     1. Type 2 diabetes mellitus with hyperglycemia, with long-term current use of insulin (HCC)  Discussed diet and exercise recommendations.  Seem to start getting a little bit more active and to continue to cut back carbs and giving a better position.  Continue Ozempic as is.  I do not really want increase it at this point as he is having a lot of nausea.  We did discuss cutting portions particular in the first few days while taking this medication.    2. Mixed hyperlipidemia  Chronic, stable.  Continue Tricor for now.  Again we discussed carbs and triglyceride relationship and dietary changes.    3. Acquired hypothyroidism  , Stable.  No refills needed.  Continue current meds.    4. Need for vaccination  Will get his tetanus booster and pneumonia vaccine done today.  - Tdap Vaccine =>8YO IM  - Pneumococcal Conjugate Vaccine 13-Valent              Return in about 3 months (around 3/15/2023).    Please note that this dictation was created using voice recognition software. I have made every reasonable attempt to correct obvious errors, but I expect that there are errors of grammar and possibly content that I did not discover before finalizing the note.

## 2022-12-16 NOTE — TELEPHONE ENCOUNTER
I spoke with Dr. Olmstead's office at [facility], and asked for records to be faxed to us at [phone].    Records in chart

## 2022-12-27 ENCOUNTER — TELEPHONE (OUTPATIENT)
Dept: MEDICAL GROUP | Facility: LAB | Age: 52
End: 2022-12-27
Payer: COMMERCIAL

## 2023-01-04 ENCOUNTER — PHYSICAL THERAPY (OUTPATIENT)
Dept: PHYSICAL THERAPY | Facility: MEDICAL CENTER | Age: 53
End: 2023-01-04
Attending: FAMILY MEDICINE
Payer: COMMERCIAL

## 2023-01-04 DIAGNOSIS — M54.9 MID BACK PAIN: ICD-10-CM

## 2023-01-04 PROCEDURE — 97110 THERAPEUTIC EXERCISES: CPT

## 2023-01-04 PROCEDURE — 97162 PT EVAL MOD COMPLEX 30 MIN: CPT | Mod: 59

## 2023-01-04 PROCEDURE — 97014 ELECTRIC STIMULATION THERAPY: CPT

## 2023-01-04 ASSESSMENT — ENCOUNTER SYMPTOMS
PAIN TIMING: CONSTANT
QUALITY: SHARP
PAIN SCALE AT LOWEST: 3
QUALITY: ACHING
PAIN SCALE AT HIGHEST: 8
PAIN SCALE: 4

## 2023-01-04 NOTE — OP THERAPY EVALUATION
Outpatient Physical Therapy  INITIAL EVALUATION    [facility]  [address]  [place] [address]  Phone:  [phone]  Fax:  [phone]    Date of Evaluation: 01/04/2023    Patient: Brett Vicente  YOB: 1970  MRN: [id number]     Referring Provider: Lisa Wagner M.D.  [address] [facility] [address]  [place] [address]   Referring Diagnosis Mid back pain [M54.9]     Time Calculation  Start time: 1340  Stop time: 1455 Time Calculation (min): 75 minutes         Chief Complaint: No chief complaint on file.    Visit Diagnoses     ICD-10-CM   1. Mid back pain  M54.9       Date of onset of impairment: 9/1/2009    Subjective:   History of Present Illness:     Date of onset:  9/1/2009    Date of surgery:  7/1/2009    Mechanism of injury:  Patient is a 52 year old male who has been referred to skilled physical therapy for mid-back pain. Patient states that he underwent L5-S1 spinal fusion in 2009 by Dr. Ulloa at [facility] due to L foot numbness. A couple of months following surgery, patient noted onset of constant, pinpoint pain at base of right rib cage. Pain is constant but when aggravated can result in left upper thoracic pain. States that he has talked with surgeon, PCP, and underwent PT in the past with no specific diagnosis. Feels that pain is worsening and impacting his ability to participate daily activities including transitioning from sitting to standing, sleeping (waking 3-4 times a night), and performing more rigorous activities like cleaning the house or managing his properties (if tasks are greater than 2 hours, has increase in pain). Reports occasional pinpoint and short duration pain with coughing or sneezing. Pain is worse with bending, standing >2 hours and improves with use of vibrating massager or going to get massages, but relief only lasts duration of intervention.      Patient also complains of  bilateral shoulder pain (suspected frozen shoulder, R>L) with intermittent hand numbness (3-5th digits, L>R).      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 He started a 30 minute yoga program a few days ago, which aggravated his symptoms. He has not been participating in any other exercise routine due to knowing that increased activity increases his pain.     PMHx includes DM2, HTN, high cholesterol, gout.  Sleep disturbance:  Interrupted sleep  Pain:     Current pain ratin    At best pain rating:  3    At worst pain ratin    Location:  R T10 constant pain, L T5-6 when symptoms aggravated.    Quality:  Aching and sharp    Pain timing:  Constant    Progression:  Worsening  Social Support:     Lives with:  Spouse  Diagnostic Tests:     X-ray: normal      Diagnostic Tests Comments:  Thoracic Spine x-ray 18:  FINDINGS:  Vertebral alignment is maintained. No compression fracture is identified. Visualized lung fields are clear. There is mild endplate spurring.  Treatments:     Previous treatment:  Physical therapy and massage  Patient Goals:     Patient goals for therapy:  Return to sport/leisure activities, decreased pain, return to work and independence with ADLs/IADLs    Other patient goals:  Increased activity without pain    Past Medical History:   Diagnosis Date    Diabetes (HCC)     Fatty liver 2018    GERD (gastroesophageal reflux disease)     Gout     Hyperlipidemia     Hypertension     Hypothyroidism 2015     ICD-10 transition    Thyroid disease      Past Surgical History:   Procedure Laterality Date    LUMBAR FUSION ANTERIOR  6/10/2009    Performed by LISA MONTERO at [facility]    LUMBAR FUSION ANTERIOR       Social History     Tobacco Use    Smoking status: Never    Smokeless tobacco: Never   Substance Use Topics    Alcohol use: Yes     Alcohol/week: 0.5 oz     Types: 1 Shots of liquor per week      Family and Occupational History     Socioeconomic History    Marital status:      Spouse name: Not on file    Number of children: Not on file    Years of education: Not on file    Highest education level: Bachelor's degree (e.g., BA, AB, BS)   Occupational History    Not on file       Objective     Postural Observations  Seated posture: fair  Standing posture: fair  Correction of posture: makes symptoms better      Shoulder Screen    Shoulder range of motion within functional limits with the following exceptions: WFL except limited shoulder flexion and abduction bilaterally, approx 160 degrees  Shoulder strength within functional limits.    Neurological Testing     Reflexes   Left   Biceps (C5/C6): trace (1+)  Triceps (C7): trace (1+)    Right   Biceps (C5/C6): trace (1+)  Triceps (C7): trace (1+)    Myotome testing   Cervical (left)   All left cervical myotomes within normal limits    Cervical (right)   All right cervical myotomes within normal limits    Dermatome testing   Cervical (left)   All left cervical dermatomes intact    Cervical (right)   All right cervical dermatomes intact    Palpation   Left   No palpable tenderness to the cervical paraspinals, latissimus, levator scapulae, lower trapezius, pectoralis major, rhomboids and thoracic paraspinals.     Right   No palpable tenderness to the cervical paraspinals, latissimus, levator scapulae, lower trapezius, pectoralis major and rhomboids.   Hypertonic in the thoracic paraspinals.   Tenderness of the thoracic paraspinals.     Active Range of Motion     Cervical spine: All cervical active range of motion within functional limits    Additional Active Range of Motion Details  Thoracic AROM   Flexion: decreased with pinpoint pain at R T10 and stretching sensation along thoracic paraspinals   Extension: decreased with pinpoint R T10 pain   LSB, RSB: both decreased, both with pinpoint R T10 pain   R ROT: decreased, pinpoint R T10 pain  L ROT: decreased,  "no pain     Joint Play   Spine     Additional spine joint play details: T2-T10 hypomobility with PA with T6 and T10 painful, T11-L3 WFL, hypomobile R ribs, L ribs WFL      Tests   Cervical spine   Negative cervical spine compression and cervical spine distraction.     Thoracic spine   Positive thoracic spine compression.     Left Shoulder   Negative Spurling's sign.     Right Shoulder   Negative Spurling's sign.       Therapeutic Exercises (CPT 83223):     1. Education on exam findings and PT POC    2. Education on postural alignment in sitting and standing, Patient already uses pillow at lumbar spine during sitting    3. Corner stretch, 2 x 30\", Added to HEP    4. Thoracic mobilization with foam roller, x 5 minutes, Added to HEP, mobilizing T9 and above as T10 painful    Therapeutic Treatments and Modalities:     1. E Stim Unattended (CPT 08932), IFC and MHP to thoracic paraspinals x 15 minutes in supine, Pain relief at end of evaluation. Reported return to baseline level of pain.  Time-based treatments/modalities:    Physical Therapy Timed Treatment Charges  Therapeutic exercise minutes (CPT 74583): 15 minutes      Assessment, Response and Plan:   Impairments: abnormal or restricted ROM, activity intolerance, difficulty performing job, lacks appropriate home exercise program and pain with function    Assessment details:  Patient is a pleasant 52 year old male who was referred for midback pain since 2009 following L5-S1 spinal fusion. He presents today with worsening pain at right T10 with the following impairments: impaired posture, decreased thoracic ROM with reproduction of pain at end range, and hypomobility of thoracic spine and right ribs. These impairments are resulting in decreased activity tolerance due to pain and impacting QOL. Patient would benefit from trial of skilled physical therapy.      Barriers to therapy: Duration of symptoms.  Prognosis: fair    Goals:   Short Term Goals:   1. Instruct in HEP " for postural awareness and thoracic mobilization.   2. Transition from sitting to standing without increase in thoracic pain.     Short term goal time span:  2-4 weeks      Long Term Goals:    1. Independent with HEP.   2. Improved sleep with only waking 1-2 times a night.   3. Participate in more rigorous activities for 2 hours without increase in pain.   4. Demonstrate improved thoracic ROM to indicate decreased joint and rib restrictions.   Long term goal time span:  6-8 weeks    Plan:   Therapy options:  Physical therapy treatment to continue  Planned therapy interventions:  E Stim Unattended (CPT 02259), Manual Therapy (CPT 49464), Mechanical Traction (CPT 64048), Neuromuscular Re-education (CPT 42894), Therapeutic Activities (CPT 25520) and Therapeutic Exercise (CPT 75940)  Frequency: 1-2x/week.  Duration in weeks:  8  Discussed with:  Patient    Functional Assessment Used        Referring provider co-signature:  I have reviewed this plan of care and my co-signature certifies the need for services.    Certification Period: 01/04/2023 to  03/01/23    Physician Signature: ________________________________ Date: ______________

## 2023-01-16 ENCOUNTER — PHYSICAL THERAPY (OUTPATIENT)
Dept: PHYSICAL THERAPY | Facility: MEDICAL CENTER | Age: 53
End: 2023-01-16
Attending: FAMILY MEDICINE
Payer: COMMERCIAL

## 2023-01-16 DIAGNOSIS — M54.9 MID BACK PAIN: ICD-10-CM

## 2023-01-16 PROCEDURE — 97110 THERAPEUTIC EXERCISES: CPT

## 2023-01-16 NOTE — OP THERAPY DAILY TREATMENT
"  Outpatient Physical Therapy  DAILY TREATMENT     [facility]  [address]  [place] [address]  Phone:  [phone]  Fax:  [phone]    Date: 01/16/2023    Patient: Brett Vicente  YOB: 1970  MRN: [id number]     Time Calculation    Start time: 1245  Stop time: 1330 Time Calculation (min): 45 minutes         Chief Complaint: Back Problem    Visit #: 2    SUBJECTIVE:  Has been doing the HEP and has been following an online yoga program. \"I am starting to feel a little better.\"     OBJECTIVE:      Therapeutic Exercises (CPT 82014):     1. UBE, L3 x 4 min alt    2. foam roller, diaphragm breath, alt GH flex, scap prot/ret, open book pec, angels    3. SL t/s open book, x 10 ea    4. thread the needle follow to overhead reach, x 7 ea, Painful, fatiguing to reach overhead.    5. elbows on table upper t/s stretch, x 1 min, x 5 min demoing other environment options (tall table, wall), \"Gets to the area\"    6. counter \"T\" stretch, x 2 min, \"    7. rows, 17# crossover x 20    8. pullbacks, 12# crossover x 15    9. paloff press, 17# x 10 ea, muscle effot, but not pain      Therapeutic Exercise Summary:   Access Code: [email or web address]  URL: [email or web address]  Date: 01/16/2023  Prepared by: Christiane Vaughan    Exercises  Seated Thoracic Extension at Wall - 3 x daily - 1 min hold  Standing Anti-Rotation Press with Anchored Resistance - 1 x daily - 15 reps - 5 sec hold  Shoulder Extension with Resistance - 1 x daily - 20 reps      Therapeutic Treatments and Modalities:     1. E Stim Unattended (CPT 32647), not today  Time-based treatments/modalities:    Physical Therapy Timed Treatment Charges  Therapeutic exercise minutes (CPT 93219): 45 minutes    ASSESSMENT:   Response to treatment: Overall improved mobility, but painful with resisted thoracic rotation. Better tolerated isometrically. HEP updated accordingly.     PLAN/RECOMMENDATIONS: "   Plan for treatment: therapy treatment to continue next visit.  Planned interventions for next visit: continue with current treatment.  Continue to regain thoracic mobility and postural strength

## 2023-01-23 ENCOUNTER — PHYSICAL THERAPY (OUTPATIENT)
Dept: PHYSICAL THERAPY | Facility: MEDICAL CENTER | Age: 53
End: 2023-01-23
Attending: FAMILY MEDICINE
Payer: COMMERCIAL

## 2023-01-23 DIAGNOSIS — M54.9 MID BACK PAIN: ICD-10-CM

## 2023-01-23 PROCEDURE — 97110 THERAPEUTIC EXERCISES: CPT

## 2023-01-23 PROCEDURE — 97014 ELECTRIC STIMULATION THERAPY: CPT

## 2023-01-23 PROCEDURE — 97140 MANUAL THERAPY 1/> REGIONS: CPT

## 2023-01-23 NOTE — OP THERAPY DAILY TREATMENT
"  Outpatient Physical Therapy  DAILY TREATMENT     [facility]  [address]  [place] [address]  Phone:  [phone]  Fax:  [phone]    Date: 01/23/2023    Patient: Brett Vicente  YOB: 1970  MRN: [id number]     Time Calculation    Start time: 1415  Stop time: 1517 Time Calculation (min): 62 minutes         Chief Complaint: Back Problem    Visit #: 3    SUBJECTIVE:  I haven't been able to do as much of my HEP because I had a flare up of pain. Not sure what the source of the problem was.     OBJECTIVE:      Therapeutic Exercises (CPT 86111):     1. UBE, L3 x 4 min alt    2. SL t/s open book, x 10 ea, I feel it pulling in that area    3. segmental bridge, x 20, cuing required to recruit breath control. \"Comes right to the spot from below.\"    4. cat cow, x 15    5. tremaine pose, 3 way x 1 min ea    6. scap pushups quad, x 20    7. bear plank, 4x10\" ea, Overall increased irritability to follow    8. Postural neutral using wall, x 3 min      Therapeutic Exercise Summary:   Access Code: [email or web address]  URL: [email or web address]  Date: 01/16/2023  Prepared by: Christiane Vaughan    Exercises  Seated Thoracic Extension at Wall - 3 x daily - 1 min hold  Standing Anti-Rotation Press with Anchored Resistance - 1 x daily - 15 reps - 5 sec hold  Shoulder Extension with Resistance - 1 x daily - 20 reps      Therapeutic Treatments and Modalities:     1. Manual Therapy (CPT 12951), L SL: R perithoracic STM. GIII trunk rotation mobilzation targetting T6-10 levels. L scapular mobilizations.    2. E Stim Unattended (CPT 16473), IFC and MH to the R t/s x 15 min, Home TENS info provided.  Time-based treatments/modalities:    Physical Therapy Timed Treatment Charges  Manual therapy minutes (CPT 09241): 10 minutes  Therapeutic exercise minutes (CPT 68346): 30 minutes    ASSESSMENT:   Response to treatment: Increased irritability with extension/R SB " loading through the R T7-9 costovertebral jts. Point tender. Poor tolerance to postural loading today. Difficulty coordinating diaphragm, abdominal and spinal extensors together. Can utilize wall for postural cuing as needed.     PLAN/RECOMMENDATIONS:   Plan for treatment: therapy treatment to continue next visit.  Planned interventions for next visit: continue with current treatment.

## 2023-01-30 ENCOUNTER — PHYSICAL THERAPY (OUTPATIENT)
Dept: PHYSICAL THERAPY | Facility: MEDICAL CENTER | Age: 53
End: 2023-01-30
Attending: FAMILY MEDICINE
Payer: COMMERCIAL

## 2023-01-30 DIAGNOSIS — M54.9 MID BACK PAIN: ICD-10-CM

## 2023-01-30 PROCEDURE — 97140 MANUAL THERAPY 1/> REGIONS: CPT

## 2023-01-30 PROCEDURE — 97110 THERAPEUTIC EXERCISES: CPT

## 2023-01-30 NOTE — OP THERAPY DAILY TREATMENT
"  Outpatient Physical Therapy  DAILY TREATMENT     [facility] Out[facility]  [address]  [place] [address]  Phone:  [phone]  Fax:  [phone]    Date: 01/30/2023    Patient: Brett Vicente  YOB: 1970  MRN: [id number]     Time Calculation    Start time: 1418  Stop time: 1500 Time Calculation (min): 42 minutes         Chief Complaint: Back Problem    Visit #: 4    SUBJECTIVE:  I can feel my muscles are less tense and the core is more active, when I'm at rest, but I continue to have sharp quality pain with many movements (standing all the way up when rising from a chair, pushing self up with UEs rolling in bed, twisting, etc.)    OBJECTIVE:      Therapeutic Exercises (CPT 11136):     1. UBE, L3 x 4 min alt    2. segmental bridge, x 20, indep technique and breath control    3. counter \"L\" stretch, with PTs x 2 min, on varried angles x 4 min total    4. overhead lat stretch, at doorway x 2 min ea    5. wall angels, x 15, difficulty keeping UEs in contact while concurrently engaging the lower abdominals    6. Back to wall B shoulder ER, pink x 20      Therapeutic Exercise Summary:   Access Code: [email or web address]  URL: [email or web address]  Date: 01/16/2023  Prepared by: Christiane Vaughan    Exercises  Seated Thoracic Extension at Wall - 3 x daily - 1 min hold  Standing Anti-Rotation Press with Anchored Resistance - 1 x daily - 15 reps - 5 sec hold  Shoulder Extension with Resistance - 1 x daily - 20 reps      Therapeutic Treatments and Modalities:     1. Manual Therapy (CPT 65499), L SL: R perithoracic STM. GIII trunk rotation mobilzation targetting T6-10 levels. L scapular mobilizations. RROM R t/s rotation to neutral. KTape R R 7-8    2. E Stim Unattended (CPT 74818), not today  Time-based treatments/modalities:    Physical Therapy Timed Treatment Charges  Manual therapy minutes (CPT 70956): 12 minutes  Therapeutic exercise minutes (CPT " 73646): 30 minutes    ASSESSMENT:   Response to treatment: Overall reduced local muscle tone and guarding. Still with low tolerance to closing motions of the R mid t/s and ribs (ext and R SB most triggering). Improved recruitment to neutral using the wall for biofeedback. To focus on opening movements via counter stretch and wall supported B shoulder ER for HEP. HO/TB provided.     PLAN/RECOMMENDATIONS:   Plan for treatment: therapy treatment to continue next visit.  Planned interventions for next visit: continue with current treatment.

## 2023-02-06 ENCOUNTER — PHYSICAL THERAPY (OUTPATIENT)
Dept: PHYSICAL THERAPY | Facility: MEDICAL CENTER | Age: 53
End: 2023-02-06
Attending: FAMILY MEDICINE
Payer: COMMERCIAL

## 2023-02-06 DIAGNOSIS — M54.9 MID BACK PAIN: ICD-10-CM

## 2023-02-06 PROCEDURE — 97014 ELECTRIC STIMULATION THERAPY: CPT

## 2023-02-06 PROCEDURE — 97110 THERAPEUTIC EXERCISES: CPT

## 2023-02-06 NOTE — OP THERAPY DAILY TREATMENT
"  Outpatient Physical Therapy  DAILY TREATMENT     [facility]  [address]  [place] [address]  Phone:  [phone]  Fax:  [phone]    Date: 02/06/2023    Patient: Brett Vicente  YOB: 1970  MRN: [id number]     Time Calculation    Start time: 1417  Stop time: 1523 Time Calculation (min): 66 minutes         Chief Complaint: Back Problem    Visit #: 5    SUBJECTIVE:  I had a couple painful days last week, but better over the last 2-3 days. Reports the exercises that have him holding his core and midline (\"not over extending\") have been the most helpful. Did feel like the tape was helpful and did replace it.     OBJECTIVE:      Therapeutic Exercises (CPT 24369):     1. UBE, L3 x 4 min alt    2. SL t/s opener, x 10 ea    3. supine 90/90 hold with alt GH flex, 4x5 ea    4. segmental bridge, x 20    5. seated trunk SB over bolster (with breath and flex/ext), x 10 ea, felt sightly triggered after L SB, more restricted with R SB    6. segmental spine flexion with back to wall, x 10    7. wall angels, to shoulder height x 15    8. side wall facing t/s 'rainbow' rotations, x 10 ea      Therapeutic Exercise Summary:   HEP:   Mobility work as needed, guided yoga.   Strength- focus on back to wall angles or B shoulder ER and supine 90/90 with alt GH flex    Therapeutic Treatments and Modalities:     1. Manual Therapy (CPT 44572), not today    2. E Stim Unattended (CPT 19617), IFC and MH to the lower thoracic x 15 min  Time-based treatments/modalities:    Physical Therapy Timed Treatment Charges  Therapeutic exercise minutes (CPT 70389): 45 minutes    ASSESSMENT:   Response to treatment: Pt is reporting subjective decrease in tension through the R lower thoracic, but continues to have dull achy pain response to postural loading. This continues to limit his work tolerance to 1-1.5 hrs. Gets temporary relief from massage, percussion, ktape. " Continues to have reduced recruitment of lower abdominals and bias toward extension patterns. Will build his endurance in posterior PT in effort to offload the painful area.     PLAN/RECOMMENDATIONS:   Plan for treatment: therapy treatment to continue next visit.  Planned interventions for next visit: continue with current treatment.

## 2023-02-13 ENCOUNTER — APPOINTMENT (OUTPATIENT)
Dept: PHYSICAL THERAPY | Facility: MEDICAL CENTER | Age: 53
End: 2023-02-13
Attending: FAMILY MEDICINE
Payer: COMMERCIAL

## 2023-02-14 NOTE — OP THERAPY DAILY TREATMENT
"  Outpatient Physical Therapy  DAILY TREATMENT     [facility]  [address]  [place] [address]  Phone:  [phone]  Fax:  [phone]    Date: 02/15/2023    Patient: Brett Vicente  YOB: 1970  MRN: [id number]     Time Calculation    Start time: 0801  Stop time: 0857 Time Calculation (min): 56 minutes         Chief Complaint: Back Problem    Visit #: 6    SUBJECTIVE:  Generally I've felt a lot better over the last week. Sx at least 65-70% resolved. States he reduced exertion with working in the yard, shoveling snow, etc and has focused on stretching daily and this seems to be helping.     OBJECTIVE:      Therapeutic Exercises (CPT 55071):     1. UBE, L3 x 4 min alt    2. SL t/s opener, x 10 ea    3. supine 90/90 hold with alt GH flex, x 10 ea    4. dead bug, 2x5    5. tall sitting sacral scoops, attempts x 2 min, limited ROM in pelvic tilt. \"tight\" but not painful    6. side plank, x 30\" ea, more difficult on the R    7. stacie curl, 20# x 10, Feels good. I can feel my whole body resisting it    8. chops, 25# x 15 ea      Therapeutic Exercise Summary:   HEP:   Mobility work as needed, guided yoga.   Strength- focus on dead bugs, building endurance to at least 10 reps    Therapeutic Treatments and Modalities:     1. Manual Therapy (CPT 93178), not today    2. E Stim Unattended (CPT 92258), IFC and MH to the lower thoracic x 15 min    Time-based treatments/modalities:    Physical Therapy Timed Treatment Charges  Therapeutic exercise minutes (CPT 19683): 39 minutes    ASSESSMENT:   Response to treatment: Lower abdominal recruitment and ability to pelvic tilt in all planes is improving. Tends to load through anterior pelvic tilt. Load tolerance improves when cued to neutral. Monitor response to progressed strength work today. Continue to build load tolerance for functional activities at home.     PLAN/RECOMMENDATIONS:   Plan for " treatment: therapy treatment to continue next visit.  Planned interventions for next visit: continue with current treatment.

## 2023-02-15 ENCOUNTER — PHYSICAL THERAPY (OUTPATIENT)
Dept: PHYSICAL THERAPY | Facility: MEDICAL CENTER | Age: 53
End: 2023-02-15
Attending: FAMILY MEDICINE
Payer: COMMERCIAL

## 2023-02-15 DIAGNOSIS — M54.9 MID BACK PAIN: ICD-10-CM

## 2023-02-15 PROCEDURE — 97014 ELECTRIC STIMULATION THERAPY: CPT

## 2023-02-15 PROCEDURE — 97110 THERAPEUTIC EXERCISES: CPT

## 2023-02-20 ENCOUNTER — APPOINTMENT (OUTPATIENT)
Dept: PHYSICAL THERAPY | Facility: MEDICAL CENTER | Age: 53
End: 2023-02-20
Attending: FAMILY MEDICINE
Payer: COMMERCIAL

## 2023-02-20 NOTE — OP THERAPY DAILY TREATMENT
"  Outpatient Physical Therapy  DAILY TREATMENT     [facility]  [address]  [place] [address]  Phone:  [phone]  Fax:  [phone]    Date: 02/20/2023    Patient: Brett Vicente  YOB: 1970  MRN: [id number]     Time Calculation                   Chief Complaint: No chief complaint on file.    Visit #: 7    SUBJECTIVE:  ***    OBJECTIVE:      Therapeutic Exercises (CPT 47029):     1. UBE, L3 x 4 min alt    2. SL t/s opener, x 10 ea    3. supine 90/90 hold with alt GH flex, x 10 ea    4. dead bug, 2x5    5. tall sitting sacral scoops, attempts x 2 min, limited ROM in pelvic tilt. \"tight\" but not painful    6. side plank, x 30\" ea, more difficult on the R    7. stacie curl, 20# x 10, Feels good. I can feel my whole body resisting it    8. chops, 25# x 15 ea      Therapeutic Exercise Summary:   HEP:   Mobility work as needed, guided yoga.   Strength- focus on dead bugs, building endurance to at least 10 reps    Therapeutic Treatments and Modalities:     1. Manual Therapy (CPT 80674), not today    2. E Stim Unattended (CPT 98459), IFC and MH to the lower thoracic x 15 min  Time-based treatments/modalities:         ASSESSMENT:   Response to treatment: ***    PLAN/RECOMMENDATIONS:   Plan for treatment: therapy treatment to continue next visit.  Planned interventions for next visit: continue with current treatment.         "

## 2023-02-27 ENCOUNTER — PHYSICAL THERAPY (OUTPATIENT)
Dept: PHYSICAL THERAPY | Facility: MEDICAL CENTER | Age: 53
End: 2023-02-27
Attending: FAMILY MEDICINE
Payer: COMMERCIAL

## 2023-02-27 DIAGNOSIS — M54.9 MID BACK PAIN: ICD-10-CM

## 2023-02-27 PROCEDURE — 97110 THERAPEUTIC EXERCISES: CPT

## 2023-02-27 NOTE — OP THERAPY DISCHARGE SUMMARY
Outpatient Physical Therapy  DISCHARGE SUMMARY NOTE      [facility] Out[facility] Therapy  [address]  [place] [address]  Phone:  [phone]  Fax:  [phone]    Date of Visit: 02/27/2023    Patient: Brett Vicente  YOB: 1970  MRN: [id number]     Referring Provider: Lisa Wagner M.D.  [address] [facility]  [address]  [place] [address]   Referring Diagnosis Mid back pain [M54.9]         Functional Assessment Used        Your patient is being discharged from Physical Therapy with the following comments:   Goals met    Comments:  Mr. Vicente was seen for 7 PT sessions treating his mid back pain.  Therapy included manual techniques, progressive therex and estim for pain modulation. He made good overall progress considering the chronicity of this problem. Mr. Vicente is reporting about 65% overall improvement. His pain now is directly related to heavy loading and prolonged work around his home. He is able to modulate the pain via pacing strategies and uses his mobility techniques for pain control. He continues to over utilize his lumbar extensors during activation of his posterior chain, but is now aware of optimal mechanics and is able to self correct. Today he was able to squat and deadlift 20# for 20-30 reps without increased sx. Mr. Vicente is indep with the HEP that will be required for him to further develop his strength to match his needs for household activity. No further skilled guidance is indicated at this time. Welcomed to return with a new Rx if there is a change in status or if his symptoms fail to continue to resolve.     Christiane Vaughan, PT, DPT    Date: 2/27/2023

## 2023-02-27 NOTE — OP THERAPY DAILY TREATMENT
"  Outpatient Physical Therapy  DAILY TREATMENT     [facility]  [address]  [place] [address]  Phone:  [phone]  Fax:  [phone]    Date: 02/27/2023    Patient: Brett Vicente  YOB: 1970  MRN: [id number]     Time Calculation    Start time: 1417  Stop time: 1503 Time Calculation (min): 46 minutes         Chief Complaint: Back Problem    Visit #: 7    SUBJECTIVE:  I'm about 65% improved overall. When I have a bad day it is usually related to having to do a lot of lifting/loading. HEP is helping reduce the pain and improve mobility. Having to pace heavy tasks around the home. Just set up a home gym with free weights, squat rack, pulley system and TRX.     OBJECTIVE:      Therapeutic Exercises (CPT 91707):     1. UBE, L3 x 4 min alt    2. LTR-> SL t/s opener, x 10 ea    3. cat/cow, x 10    4. tremaine pose, 2x5    5. dead bug, x 10 ea    6. marching bridge, x 10 ea    7. side plank, to knees x 30 \" ea    8. air squat->goblet squat front-> goblet squat unliateral, 20# x 10 ea.    9. deadlift, standard 20# from 8\" step-> unilateral. 20# x 10 ea      Therapeutic Exercise Summary:   HEP:   Mobility work as needed, guided yoga.   Strength- continue dead bugs, side planks, bridges. Progress into free weights- squats and deadlifts.     Time-based treatments/modalities:    Physical Therapy Timed Treatment Charges  Therapeutic exercise minutes (CPT 64893): 40 minutes    ASSESSMENT:   Response to treatment: See d/c note    PLAN/RECOMMENDATIONS:   Plan for treatment: discharge.          "

## 2023-03-20 ENCOUNTER — OFFICE VISIT (OUTPATIENT)
Dept: MEDICAL GROUP | Facility: LAB | Age: 53
End: 2023-03-20
Payer: COMMERCIAL

## 2023-03-20 VITALS
OXYGEN SATURATION: 97 % | HEART RATE: 60 BPM | SYSTOLIC BLOOD PRESSURE: 110 MMHG | HEIGHT: 73 IN | DIASTOLIC BLOOD PRESSURE: 64 MMHG | RESPIRATION RATE: 12 BRPM | WEIGHT: 244 LBS | BODY MASS INDEX: 32.34 KG/M2 | TEMPERATURE: 97 F

## 2023-03-20 DIAGNOSIS — E66.9 OBESITY (BMI 30-39.9): ICD-10-CM

## 2023-03-20 DIAGNOSIS — Z79.4 TYPE 2 DIABETES MELLITUS WITH HYPERGLYCEMIA, WITH LONG-TERM CURRENT USE OF INSULIN (HCC): ICD-10-CM

## 2023-03-20 DIAGNOSIS — E11.65 TYPE 2 DIABETES MELLITUS WITH HYPERGLYCEMIA, WITH LONG-TERM CURRENT USE OF INSULIN (HCC): ICD-10-CM

## 2023-03-20 LAB
HBA1C MFR BLD: 7.9 % (ref ?–5.8)
POCT INT CON NEG: NEGATIVE
POCT INT CON POS: POSITIVE

## 2023-03-20 PROCEDURE — 83036 HEMOGLOBIN GLYCOSYLATED A1C: CPT | Performed by: FAMILY MEDICINE

## 2023-03-20 PROCEDURE — 99213 OFFICE O/P EST LOW 20 MIN: CPT | Performed by: FAMILY MEDICINE

## 2023-03-20 ASSESSMENT — PATIENT HEALTH QUESTIONNAIRE - PHQ9
SUM OF ALL RESPONSES TO PHQ9 QUESTIONS 1 AND 2: 0
2. FEELING DOWN, DEPRESSED, IRRITABLE, OR HOPELESS: NOT AT ALL
1. LITTLE INTEREST OR PLEASURE IN DOING THINGS: NOT AT ALL

## 2023-03-20 ASSESSMENT — FIBROSIS 4 INDEX: FIB4 SCORE: 0.79

## 2023-03-20 NOTE — PROGRESS NOTES
Subjective:     Chief Complaint   Patient presents with    Follow-Up     3 month         HPI:   Brett presents today with 3 month follow up of diabetes.   We had started ozempic last visit and he was havign a lot of nausea.   Already on night time insulin, and may be over basalized.   On Farxiga, metformin, Lantus, and Ozempic.   Seeing endocrine lately. Dr Chaney. He does admit to using ozempic better.   Using CGM as well, libre2. He does find this helpful.     Diet: improved. More veggies, salads, more awareness of carbs in diet.   Noon to midnight is high, but then comes down after that. Still using 80 units of basaglar.   He does see endo in 2 days time.   Exercise: Working on more exercise. Yoga in the Am with his wife. He does get his heart rate up with this. Not walking at lunch or anything right now.     Some swelling in hands/fingers on hikes. No numbness and tingling in extremities.         Current Outpatient Medications Ordered in Epic   Medication Sig Dispense Refill    spironolactone (ALDACTONE) 25 MG Tab TAKE 1/2 TABLET DAILY Strength: 25 mg 45 Tablet 3    dapagliflozin propanediol (FARXIGA) 10 MG Tab Take 1 Tablet by mouth every day. 90 Tablet 1    Semaglutide,0.25 or 0.5MG/DOS, 2 MG/1.5ML Solution Pen-injector Inject 0.25 mg under the skin every 7 days. 1.5 mL 3    insulin glargine 100 UNIT/ML Solution Pen-injector injection Inject 80 Units under the skin every evening. 60 mL 2    levothyroxine (SYNTHROID) 137 MCG Tab Take 1 Tablet by mouth every morning on an empty stomach. 90 Tablet 2    fenofibrate (TRICOR) 145 MG Tab TAKE 1 TABLET DAILY.  Please make appointment for future refills 90 Tablet 0    irbesartan (AVAPRO) 150 MG Tab Take 2 Tablets by mouth every day. Please make appointment for future refills 180 Tablet 0    atorvastatin (LIPITOR) 40 MG Tab TAKE 1 TABLET DAILY 90 Tablet 1    hydroCHLOROthiazide (HYDRODIURIL) 12.5 MG tablet TAKE 1 TABLET DAILY 90 Tablet 2    metaxalone (SKELAXIN) 800 MG  "Tab TAKE 1 TABLET 3 TIMES A DAYAS NEEDED FOR MILD PAIN    MODERATE PAIN OR MUSCLE    SPASMS 270 Tablet 0    carvedilol (COREG) 25 MG Tab TAKE 1/2 TABLET TWICE A DAY 90 Tablet 0    allopurinol (ZYLOPRIM) 300 MG Tab TAKE 1 TABLET DAILY 90 Tablet 0    metFORMIN ER (GLUCOPHAGE XR) 500 MG TABLET SR 24 HR Take 2 Tablets by mouth 2 times a day. Please make an appointment for future refills 360 Tablet 0    lansoprazole (PREVACID) 30 MG CAPSULE DELAYED RELEASE TAKE 1 CAPSULE TWICE DAILY 180 Capsule 1    Insulin Pen Needle 32 G x 4 mm (BD PEN NEEDLE CARLY 2ND GEN) USE FOR INSULIN INJECTION  ONCE DAILY 100 Each 3    vitamin D, Ergocalciferol, (DRISDOL) 1.25 MG (16889 UT) Cap capsule TAKE 1 CAPSULE BY MOUTH EVERY 7 DAYS 12 capsule 3    aspirin 81 MG EC tablet Take 81 mg by mouth every day.      vitamin D (VITAMIND D3) 1000 UNIT Tab Take 1,000 Units by mouth every day.      Blood Glucose Monitoring Suppl Device Meter: Dispense One Touch Ultra 2 meter. Sig. Use as directed for blood sugar monitoring. 1 Device 0    Blood Glucose Test Strips Test strips order: Test strips for One Touch Ultra 2 meter. Sig: use QID and prn ssx high or low sugar 100 Each 2    Lancets Lancets order: Lancets for One Touch Ultra 2 meter. Sig: use QID and prn ssx high or low sugar. 100 Each 3    Omega 3 1000 MG Cap Take 4 a day 100 Cap 6    Syringe/Needle, Disp, (SYRINGE 3CC/61JL0-1/2\") 22G X 1-1/2\" 3 ML Misc For use with Intra-muscular testosterone  injection every 2 weeks. 12 Each 1    fluticasone (FLONASE) 50 MCG/ACT nasal spray Spray 2 Sprays in nose every day. 16 g 0     No current Epic-ordered facility-administered medications on file.         ROS:  Gen: no fevers/chills, no changes in weight  Eyes: no changes in vision  ENT: no sore throat, no hearing loss, no bloody nose  Pulm: no sob, no cough  CV: no chest pain, no palpitations  GI: no nausea/vomiting, no diarrhea  : no dysuria  MSk: no myalgias  Skin: no rash  Neuro: no headaches, no " "numbness/tingling  Heme/Lymph: no easy bruising      Objective:     Exam:  /64 (BP Location: Right arm, Patient Position: Sitting, BP Cuff Size: Adult)   Pulse 60   Temp 36.1 °C (97 °F)   Resp 12   Ht 1.854 m (6' 1\")   Wt 111 kg (244 lb)   SpO2 97%   BMI 32.19 kg/m²  Body mass index is 32.19 kg/m².    Gen: Alert and oriented, No apparent distress.  Neck: Neck is supple without lymphadenopathy.  Lungs: Normal effort, CTA bilaterally, no wheezes, rhonchi, or rales  CV: Regular rate and rhythm. No murmurs, rubs, or gallops.  Ext: No clubbing, cyanosis, edema.      Assessment & Plan:     52 y.o. male with the following -     1. Type 2 diabetes mellitus with hyperglycemia, with long-term current use of insulin ([facility])  Congratulated on his improved A1c.  I do think the CGM is very helpful for him in general.  We did discuss continuing to work on cutting back the carbs and sugars in the diet and to really use his CGM as feedback for what foods are increasing his A1c.  I do think he may be over basal lysed in split dosing or adding some mealtime insulin may be a good idea.  He does follow with endocrinology in the next couple days.  - POCT  A1C    2. Obesity (BMI 30-39.9)  Discussed continuing to modify diet as well as adding some exercise regularly.  We did discuss that this will be helpful for his cardiovascular health but also for insulin sensitivity.  No medication changes at this time.  I will see us in 6 months.          No follow-ups on file.    Please note that this dictation was created using voice recognition software. I have made every reasonable attempt to correct obvious errors, but I expect that there are errors of grammar and possibly content that I did not discover before finalizing the note.        "

## 2023-04-05 DIAGNOSIS — E11.65 TYPE 2 DIABETES MELLITUS WITH HYPERGLYCEMIA, WITH LONG-TERM CURRENT USE OF INSULIN (HCC): ICD-10-CM

## 2023-04-05 DIAGNOSIS — Z79.4 TYPE 2 DIABETES MELLITUS WITH HYPERGLYCEMIA, WITH LONG-TERM CURRENT USE OF INSULIN (HCC): ICD-10-CM

## 2023-04-05 NOTE — TELEPHONE ENCOUNTER
Received request via: Pharmacy    Was the patient seen in the last year in this department? Yes  LOV 03/20/2023  Does the patient have an active prescription (recently filled or refills available) for medication(s) requested? No    Does the patient have longterm Plus and need 100 day supply (blood pressure, diabetes and cholesterol meds only)? Patient does not have SCP

## 2023-04-06 RX ORDER — DAPAGLIFLOZIN 10 MG/1
TABLET, FILM COATED ORAL
Qty: 90 TABLET | Refills: 1 | Status: SHIPPED | OUTPATIENT
Start: 2023-04-06 | End: 2023-11-03

## 2023-04-29 DIAGNOSIS — E03.9 ACQUIRED HYPOTHYROIDISM: ICD-10-CM

## 2023-05-01 RX ORDER — LEVOTHYROXINE SODIUM 137 MCG
TABLET ORAL
Qty: 90 TABLET | Refills: 2 | Status: SHIPPED | OUTPATIENT
Start: 2023-05-01

## 2023-05-01 NOTE — TELEPHONE ENCOUNTER
Received request via: Pharmacy  3/20/23lov  Was the patient seen in the last year in this department? Yes    Does the patient have an active prescription (recently filled or refills available) for medication(s) requested? No    Does the patient have detention Plus and need 100 day supply (blood pressure, diabetes and cholesterol meds only)? Patient does not have SCP

## 2023-05-29 DIAGNOSIS — E11.65 TYPE 2 DIABETES MELLITUS WITH HYPERGLYCEMIA, WITH LONG-TERM CURRENT USE OF INSULIN (HCC): ICD-10-CM

## 2023-05-29 DIAGNOSIS — Z79.4 TYPE 2 DIABETES MELLITUS WITH HYPERGLYCEMIA, WITH LONG-TERM CURRENT USE OF INSULIN (HCC): ICD-10-CM

## 2023-05-30 RX ORDER — INSULIN GLARGINE 100 [IU]/ML
INJECTION, SOLUTION SUBCUTANEOUS
Qty: 60 ML | Refills: 2 | Status: SHIPPED | OUTPATIENT
Start: 2023-05-30

## 2023-05-30 NOTE — TELEPHONE ENCOUNTER
Received request via: Pharmacy  3/20/23lov  Was the patient seen in the last year in this department? Yes    Does the patient have an active prescription (recently filled or refills available) for medication(s) requested? No    Does the patient have correction Plus and need 100 day supply (blood pressure, diabetes and cholesterol meds only)? Patient does not have SCP

## 2023-06-07 ENCOUNTER — APPOINTMENT (RX ONLY)
Dept: URBAN - METROPOLITAN AREA CLINIC 20 | Facility: CLINIC | Age: 53
Setting detail: DERMATOLOGY
End: 2023-06-07

## 2023-06-07 DIAGNOSIS — D18.0 HEMANGIOMA: ICD-10-CM

## 2023-06-07 DIAGNOSIS — Z87.2 PERSONAL HISTORY OF DISEASES OF THE SKIN AND SUBCUTANEOUS TISSUE: ICD-10-CM

## 2023-06-07 DIAGNOSIS — D22 MELANOCYTIC NEVI: ICD-10-CM

## 2023-06-07 DIAGNOSIS — L08.9 LOCAL INFECTION OF THE SKIN AND SUBCUTANEOUS TISSUE, UNSPECIFIED: ICD-10-CM

## 2023-06-07 DIAGNOSIS — L82.1 OTHER SEBORRHEIC KERATOSIS: ICD-10-CM

## 2023-06-07 DIAGNOSIS — L57.8 OTHER SKIN CHANGES DUE TO CHRONIC EXPOSURE TO NONIONIZING RADIATION: ICD-10-CM

## 2023-06-07 DIAGNOSIS — Z85.828 PERSONAL HISTORY OF OTHER MALIGNANT NEOPLASM OF SKIN: ICD-10-CM

## 2023-06-07 DIAGNOSIS — L81.4 OTHER MELANIN HYPERPIGMENTATION: ICD-10-CM

## 2023-06-07 DIAGNOSIS — L91.8 OTHER HYPERTROPHIC DISORDERS OF THE SKIN: ICD-10-CM

## 2023-06-07 PROBLEM — D22.72 MELANOCYTIC NEVI OF LEFT LOWER LIMB, INCLUDING HIP: Status: ACTIVE | Noted: 2023-06-07

## 2023-06-07 PROBLEM — D18.01 HEMANGIOMA OF SKIN AND SUBCUTANEOUS TISSUE: Status: ACTIVE | Noted: 2023-06-07

## 2023-06-07 PROBLEM — D22.71 MELANOCYTIC NEVI OF RIGHT LOWER LIMB, INCLUDING HIP: Status: ACTIVE | Noted: 2023-06-07

## 2023-06-07 PROBLEM — D22.5 MELANOCYTIC NEVI OF TRUNK: Status: ACTIVE | Noted: 2023-06-07

## 2023-06-07 PROBLEM — D22.62 MELANOCYTIC NEVI OF LEFT UPPER LIMB, INCLUDING SHOULDER: Status: ACTIVE | Noted: 2023-06-07

## 2023-06-07 PROBLEM — D22.61 MELANOCYTIC NEVI OF RIGHT UPPER LIMB, INCLUDING SHOULDER: Status: ACTIVE | Noted: 2023-06-07

## 2023-06-07 PROCEDURE — ? OBSERVATION AND MEASURE

## 2023-06-07 PROCEDURE — ? DIAGNOSIS COMMENT

## 2023-06-07 PROCEDURE — ? PRESCRIPTION

## 2023-06-07 PROCEDURE — ? COUNSELING

## 2023-06-07 PROCEDURE — 99213 OFFICE O/P EST LOW 20 MIN: CPT

## 2023-06-07 RX ORDER — MUPIROCIN 20 MG/G
OINTMENT TOPICAL
Qty: 15 | Refills: 0 | Status: ERX | COMMUNITY
Start: 2023-06-07

## 2023-06-07 RX ADMIN — MUPIROCIN: 20 OINTMENT TOPICAL at 00:00

## 2023-06-07 ASSESSMENT — LOCATION DETAILED DESCRIPTION DERM
LOCATION DETAILED: RIGHT POSTERIOR SHOULDER
LOCATION DETAILED: RIGHT NASAL ALA
LOCATION DETAILED: RIGHT PROXIMAL POSTERIOR UPPER ARM
LOCATION DETAILED: RIGHT SUPERIOR MEDIAL UPPER BACK
LOCATION DETAILED: LEFT PROXIMAL POSTERIOR UPPER ARM
LOCATION DETAILED: RIGHT CENTRAL MALAR CHEEK
LOCATION DETAILED: LEFT LATERAL INFERIOR EYELID
LOCATION DETAILED: LEFT PROXIMAL DORSAL FOREARM
LOCATION DETAILED: RIGHT DISTAL POSTERIOR UPPER ARM
LOCATION DETAILED: RIGHT DISTAL POSTERIOR THIGH
LOCATION DETAILED: EPIGASTRIC SKIN
LOCATION DETAILED: LEFT DISTAL POSTERIOR UPPER ARM
LOCATION DETAILED: LEFT DISTAL POSTERIOR THIGH
LOCATION DETAILED: LEFT INFERIOR CENTRAL MALAR CHEEK
LOCATION DETAILED: LEFT MEDIAL SUPERIOR CHEST
LOCATION DETAILED: LEFT ANTERIOR PROXIMAL UPPER ARM
LOCATION DETAILED: RIGHT ANTERIOR DISTAL THIGH
LOCATION DETAILED: RIGHT MID-UPPER BACK
LOCATION DETAILED: RIGHT NARIS
LOCATION DETAILED: RIGHT PROXIMAL DORSAL FOREARM
LOCATION DETAILED: LEFT ANTERIOR DISTAL THIGH
LOCATION DETAILED: PERIUMBILICAL SKIN
LOCATION DETAILED: RIGHT MEDIAL MALAR CHEEK
LOCATION DETAILED: STERNUM
LOCATION DETAILED: RIGHT ANTERIOR PROXIMAL UPPER ARM
LOCATION DETAILED: RIGHT INFERIOR LATERAL NECK
LOCATION DETAILED: RIGHT ANTERIOR DISTAL UPPER ARM

## 2023-06-07 ASSESSMENT — LOCATION SIMPLE DESCRIPTION DERM
LOCATION SIMPLE: RIGHT ANTERIOR NECK
LOCATION SIMPLE: LEFT INFERIOR EYELID
LOCATION SIMPLE: LEFT THIGH
LOCATION SIMPLE: LEFT POSTERIOR THIGH
LOCATION SIMPLE: ABDOMEN
LOCATION SIMPLE: RIGHT UPPER BACK
LOCATION SIMPLE: CHEST
LOCATION SIMPLE: RIGHT POSTERIOR THIGH
LOCATION SIMPLE: LEFT CHEEK
LOCATION SIMPLE: RIGHT NOSE
LOCATION SIMPLE: LEFT FOREARM
LOCATION SIMPLE: RIGHT UPPER ARM
LOCATION SIMPLE: RIGHT CHEEK
LOCATION SIMPLE: RIGHT THIGH
LOCATION SIMPLE: RIGHT FOREARM
LOCATION SIMPLE: RIGHT SHOULDER
LOCATION SIMPLE: LEFT UPPER ARM

## 2023-06-07 ASSESSMENT — LOCATION ZONE DERM
LOCATION ZONE: ARM
LOCATION ZONE: NECK
LOCATION ZONE: EYELID
LOCATION ZONE: LEG
LOCATION ZONE: NOSE
LOCATION ZONE: TRUNK
LOCATION ZONE: FACE

## 2023-06-07 NOTE — HPI: MOLE CHECK
What Is The Reason For Today's Visit?: Mole Check
Additional History: Left mid abdomen 4mm x 1cm\\n\\nMid inferior abdomen 5mm x 1cm

## 2023-07-23 ENCOUNTER — OFFICE VISIT (OUTPATIENT)
Dept: URGENT CARE | Facility: CLINIC | Age: 53
End: 2023-07-23
Payer: COMMERCIAL

## 2023-07-23 VITALS
HEART RATE: 80 BPM | HEIGHT: 72 IN | SYSTOLIC BLOOD PRESSURE: 140 MMHG | TEMPERATURE: 98.5 F | RESPIRATION RATE: 16 BRPM | OXYGEN SATURATION: 96 % | WEIGHT: 240 LBS | BODY MASS INDEX: 32.51 KG/M2 | DIASTOLIC BLOOD PRESSURE: 80 MMHG

## 2023-07-23 DIAGNOSIS — H60.503 ACUTE OTITIS EXTERNA OF BOTH EARS, UNSPECIFIED TYPE: ICD-10-CM

## 2023-07-23 PROCEDURE — 3079F DIAST BP 80-89 MM HG: CPT | Performed by: PHYSICIAN ASSISTANT

## 2023-07-23 PROCEDURE — 99213 OFFICE O/P EST LOW 20 MIN: CPT | Performed by: PHYSICIAN ASSISTANT

## 2023-07-23 PROCEDURE — 3077F SYST BP >= 140 MM HG: CPT | Performed by: PHYSICIAN ASSISTANT

## 2023-07-23 RX ORDER — CIPROFLOXACIN AND DEXAMETHASONE 3; 1 MG/ML; MG/ML
4 SUSPENSION/ DROPS AURICULAR (OTIC) 2 TIMES DAILY
Qty: 2.8 ML | Refills: 0 | Status: SHIPPED | OUTPATIENT
Start: 2023-07-23 | End: 2023-07-30

## 2023-07-23 ASSESSMENT — FIBROSIS 4 INDEX: FIB4 SCORE: 0.79

## 2023-07-24 ASSESSMENT — ENCOUNTER SYMPTOMS
FEVER: 0
DIZZINESS: 0
VOMITING: 0
SORE THROAT: 0
HEADACHES: 0
CHILLS: 0
NAUSEA: 0

## 2023-07-24 NOTE — PROGRESS NOTES
Subjective     Brett Vicente is a 52 y.o. male who presents with Ear Fullness (X6 weeks ago 2 ears ear infection, left the most, was treated.//Yesterday start right ear pain, fully clogged./)    HPI:  Brett Vicente is a 52 y.o. male who presents today for evaluation of ear pain. Patient was seen at [facility] on May 3 for evaluation of ear pain.  He was diagnosed with otitis media and otitis externa and was treated with a 10-day course of cefdinir and a 7-day course of ofloxacin otic solution.  Patient reports that he took the medications as prescribed.  Symptoms did improve but he has continued to feel a sensation of itching and dry skin in his ears since that time with mild discomfort.  Over the past couple days discomfort has gotten much worse and he feels his ear swelling.  He says that his right ear canal is completely occluded and he cannot hear out of it.  He has not had any fever.  He has been taking OTC analgesics with minimal improvement in symptoms.        Review of Systems   Constitutional:  Negative for chills and fever.   HENT:  Positive for ear pain and hearing loss. Negative for congestion and sore throat.    Gastrointestinal:  Negative for nausea and vomiting.   Neurological:  Negative for dizziness and headaches.         PMH:  has a past medical history of Diabetes (HCC), Fatty liver (4/24/2018), GERD (gastroesophageal reflux disease), Gout, Hyperlipidemia, Hypertension, Hypothyroidism (6/25/2015), and Thyroid disease.  MEDS:   Current Outpatient Medications:     ciprofloxacin/dexamethasone (CIPRODEX) 0.3-0.1 % Suspension, Administer 4 Drops into affected ear(s) 2 times a day for 7 days., Disp: 2.8 mL, Rfl: 0    INSULIN GLARGINE 100 UNIT/ML injection PEN, INJECT 80 UNITS            SUBCUTANEOUSLY IN THE      EVENING, Disp: 60 mL, Rfl: 2    SYNTHROID 137 MCG Tab, TAKE 1 TABLET EVERY MORNINGON AN EMPTY STOMACH, Disp: 90 Tablet, Rfl: 2    FARXIGA 10 MG Tab, TAKE 1 TABLET DAILY, Disp: 90  Tablet, Rfl: 1    Continuous Blood Gluc Sensor (FREESTYLE DANIEL 2 SENSOR) Misc, , Disp: , Rfl:     Semaglutide,0.25 or 0.5MG/DOS, 2 MG/1.5ML Solution Pen-injector, Inject 0.25 mg under the skin every 7 days., Disp: 1.5 mL, Rfl: 3    spironolactone (ALDACTONE) 25 MG Tab, TAKE 1/2 TABLET DAILY Strength: 25 mg, Disp: 45 Tablet, Rfl: 3    fenofibrate (TRICOR) 145 MG Tab, TAKE 1 TABLET DAILY.  Please make appointment for future refills, Disp: 90 Tablet, Rfl: 0    irbesartan (AVAPRO) 150 MG Tab, Take 2 Tablets by mouth every day. Please make appointment for future refills, Disp: 180 Tablet, Rfl: 0    atorvastatin (LIPITOR) 40 MG Tab, TAKE 1 TABLET DAILY, Disp: 90 Tablet, Rfl: 1    hydroCHLOROthiazide (HYDRODIURIL) 12.5 MG tablet, TAKE 1 TABLET DAILY, Disp: 90 Tablet, Rfl: 2    metaxalone (SKELAXIN) 800 MG Tab, TAKE 1 TABLET 3 TIMES A DAYAS NEEDED FOR MILD PAIN    MODERATE PAIN OR MUSCLE    SPASMS, Disp: 270 Tablet, Rfl: 0    carvedilol (COREG) 25 MG Tab, TAKE 1/2 TABLET TWICE A DAY, Disp: 90 Tablet, Rfl: 0    allopurinol (ZYLOPRIM) 300 MG Tab, TAKE 1 TABLET DAILY, Disp: 90 Tablet, Rfl: 0    metFORMIN ER (GLUCOPHAGE XR) 500 MG TABLET SR 24 HR, Take 2 Tablets by mouth 2 times a day. Please make an appointment for future refills, Disp: 360 Tablet, Rfl: 0    lansoprazole (PREVACID) 30 MG CAPSULE DELAYED RELEASE, TAKE 1 CAPSULE TWICE DAILY, Disp: 180 Capsule, Rfl: 1    Insulin Pen Needle 32 G x 4 mm (BD PEN NEEDLE CARLY 2ND GEN), USE FOR INSULIN INJECTION  ONCE DAILY, Disp: 100 Each, Rfl: 3    vitamin D, Ergocalciferol, (DRISDOL) 1.25 MG (82713 UT) Cap capsule, TAKE 1 CAPSULE BY MOUTH EVERY 7 DAYS, Disp: 12 capsule, Rfl: 3    aspirin 81 MG EC tablet, Take 81 mg by mouth every day., Disp: , Rfl:     vitamin D (VITAMIND D3) 1000 UNIT Tab, Take 1,000 Units by mouth every day., Disp: , Rfl:     Blood Glucose Monitoring Suppl Device, Meter: Dispense One Touch Ultra 2 meter. Sig. Use as directed for blood sugar monitoring., Disp: 1  "Device, Rfl: 0    Blood Glucose Test Strips, Test strips order: Test strips for One Touch Ultra 2 meter. Sig: use QID and prn ssx high or low sugar, Disp: 100 Each, Rfl: 2    Lancets, Lancets order: Lancets for One Touch Ultra 2 meter. Sig: use QID and prn ssx high or low sugar., Disp: 100 Each, Rfl: 3    Omega 3 1000 MG Cap, Take 4 a day, Disp: 100 Cap, Rfl: 6    Syringe/Needle, Disp, (SYRINGE 3CC/77ET3-9/2\") 22G X 1-1/2\" 3 ML Misc, For use with Intra-muscular testosterone  injection every 2 weeks., Disp: 12 Each, Rfl: 1    fluticasone (FLONASE) 50 MCG/ACT nasal spray, Spray 2 Sprays in nose every day., Disp: 16 g, Rfl: 0  ALLERGIES:   Allergies   Allergen Reactions    Morphine Nausea    Pcn [Penicillins]      SURGHX:   Past Surgical History:   Procedure Laterality Date    LUMBAR FUSION ANTERIOR  6/10/2009    Performed by LISA MONTERO at SURGERY [facility]    LUMBAR FUSION ANTERIOR       SOCHX:  reports that he has never smoked. He has never used smokeless tobacco. He reports current alcohol use of about 0.5 oz of alcohol per week. He reports that he does not use drugs.  FH: Family history was reviewed, no pertinent findings to report      Objective     BP (!) 140/80 (Patient Position: Sitting)   Pulse 80   Temp 36.9 °C (98.5 °F) (Temporal)   Resp 16   Ht 1.829 m (6')   Wt 109 kg (240 lb)   SpO2 96%   BMI 32.55 kg/m²      Physical Exam  Constitutional:       General: He is not in acute distress.     Appearance: He is not diaphoretic.   HENT:      Head: Normocephalic and atraumatic.      Right Ear: Swelling and tenderness present. No mastoid tenderness.      Left Ear: Swelling and tenderness present. No mastoid tenderness. Tympanic membrane is not erythematous.      Ears:      Comments: Left ear canal is mildly swollen and tender.  TM still visualized.  TM intact and nonerythematous.    Right ear canal is diffusely edematous and tender to palpation.  Can only mildly insert the otoscope into the ear " canal.  Unable to visualize the TM.  Eyes:      Conjunctiva/sclera: Conjunctivae normal.      Pupils: Pupils are equal, round, and reactive to light.   Pulmonary:      Effort: Pulmonary effort is normal. No respiratory distress.   Musculoskeletal:      Cervical back: Normal range of motion.   Skin:     Findings: No rash.   Neurological:      Mental Status: He is alert and oriented to person, place, and time.   Psychiatric:         Mood and Affect: Mood and affect normal.         Cognition and Memory: Memory normal.         Judgment: Judgment normal.           Assessment & Plan       1. Acute otitis externa of both ears, unspecified type  - ciprofloxacin/dexamethasone (CIPRODEX) 0.3-0.1 % Suspension; Administer 4 Drops into affected ear(s) 2 times a day for 7 days.  Dispense: 2.8 mL; Refill: 0  - Referral to ENT    Patient with otitis externa.  No evidence of otitis media on today's exam.  Cannot fully visualize right TM but left TM is normal in appearance.  Given the amount of swelling we will treat with ciprofloxacin with dexamethasone otic suspension.  Urgent referral to ENT also placed given the severity of the infection and recurrent symptoms.  Also recommend that he try to follow-up with his primary care provider for recheck later this week.  He may continue to use OTC analgesics as needed for pain.  He should try to keep the ear canals as dry as possible.        Differential Diagnosis, natural history, and supportive care discussed. Return to the Urgent Care or follow up with your PCP if symptoms fail to resolve, or for any new or worsening symptoms. Emergency room precautions discussed. Patient and/or family appears understanding of information.

## 2023-07-26 DIAGNOSIS — K21.9 GASTRIC REFLUX: ICD-10-CM

## 2023-07-26 DIAGNOSIS — I10 ESSENTIAL HYPERTENSION: ICD-10-CM

## 2023-07-26 DIAGNOSIS — M10.9 GOUT, UNSPECIFIED CAUSE, UNSPECIFIED CHRONICITY, UNSPECIFIED SITE: ICD-10-CM

## 2023-07-26 DIAGNOSIS — E78.2 MIXED HYPERLIPIDEMIA: ICD-10-CM

## 2023-07-26 NOTE — TELEPHONE ENCOUNTER
Received request via: Patient  Requesting 1 yr supply  Was the patient seen in the last year in this department? Yes  3/20/23  Does the patient have an active prescription (recently filled or refills available) for medication(s) requested? No    Does the patient have long term Plus and need 100 day supply (blood pressure, diabetes and cholesterol meds only)? Medication is not for cholesterol, blood pressure or diabetes

## 2023-07-27 RX ORDER — IRBESARTAN 150 MG/1
300 TABLET ORAL DAILY
Qty: 180 TABLET | Refills: 3 | Status: SHIPPED | OUTPATIENT
Start: 2023-07-27

## 2023-07-27 RX ORDER — LANSOPRAZOLE 30 MG/1
30 CAPSULE, DELAYED RELEASE ORAL 2 TIMES DAILY
Qty: 180 CAPSULE | Refills: 3 | Status: SHIPPED | OUTPATIENT
Start: 2023-07-27

## 2023-07-27 RX ORDER — ALLOPURINOL 300 MG/1
300 TABLET ORAL DAILY
Qty: 90 TABLET | Refills: 3 | Status: SHIPPED | OUTPATIENT
Start: 2023-07-27

## 2023-07-27 RX ORDER — FENOFIBRATE 145 MG/1
TABLET, COATED ORAL
Qty: 90 TABLET | Refills: 3 | Status: SHIPPED | OUTPATIENT
Start: 2023-07-27

## 2023-07-27 RX ORDER — CARVEDILOL 25 MG/1
TABLET ORAL
Qty: 180 TABLET | Refills: 3 | Status: SHIPPED | OUTPATIENT
Start: 2023-07-27

## 2023-07-27 RX ORDER — HYDROCHLOROTHIAZIDE 12.5 MG/1
12.5 TABLET ORAL DAILY
Qty: 90 TABLET | Refills: 3 | Status: SHIPPED | OUTPATIENT
Start: 2023-07-27

## 2023-09-28 ENCOUNTER — OFFICE VISIT (OUTPATIENT)
Dept: MEDICAL GROUP | Facility: LAB | Age: 53
End: 2023-09-28
Payer: COMMERCIAL

## 2023-09-28 VITALS
DIASTOLIC BLOOD PRESSURE: 68 MMHG | HEART RATE: 76 BPM | WEIGHT: 233 LBS | RESPIRATION RATE: 12 BRPM | SYSTOLIC BLOOD PRESSURE: 108 MMHG | TEMPERATURE: 97 F | HEIGHT: 73 IN | BODY MASS INDEX: 30.88 KG/M2 | OXYGEN SATURATION: 95 %

## 2023-09-28 DIAGNOSIS — M54.9 MID BACK PAIN: ICD-10-CM

## 2023-09-28 DIAGNOSIS — Z23 NEED FOR VACCINATION: ICD-10-CM

## 2023-09-28 DIAGNOSIS — Z79.4 TYPE 2 DIABETES MELLITUS WITH HYPERGLYCEMIA, WITH LONG-TERM CURRENT USE OF INSULIN (HCC): ICD-10-CM

## 2023-09-28 DIAGNOSIS — E11.65 TYPE 2 DIABETES MELLITUS WITH HYPERGLYCEMIA, WITH LONG-TERM CURRENT USE OF INSULIN (HCC): ICD-10-CM

## 2023-09-28 PROCEDURE — 90471 IMMUNIZATION ADMIN: CPT | Performed by: FAMILY MEDICINE

## 2023-09-28 PROCEDURE — 3074F SYST BP LT 130 MM HG: CPT | Performed by: FAMILY MEDICINE

## 2023-09-28 PROCEDURE — 99213 OFFICE O/P EST LOW 20 MIN: CPT | Mod: 25 | Performed by: FAMILY MEDICINE

## 2023-09-28 PROCEDURE — 3078F DIAST BP <80 MM HG: CPT | Performed by: FAMILY MEDICINE

## 2023-09-28 PROCEDURE — 90686 IIV4 VACC NO PRSV 0.5 ML IM: CPT | Performed by: FAMILY MEDICINE

## 2023-09-28 ASSESSMENT — PATIENT HEALTH QUESTIONNAIRE - PHQ9
SUM OF ALL RESPONSES TO PHQ QUESTIONS 1-9: 5
5. POOR APPETITE OR OVEREATING: 0 - NOT AT ALL
CLINICAL INTERPRETATION OF PHQ2 SCORE: 1

## 2023-09-28 ASSESSMENT — FIBROSIS 4 INDEX: FIB4 SCORE: 0.79

## 2023-09-28 NOTE — PROGRESS NOTES
Subjective:     Chief Complaint   Patient presents with    Follow-Up         HPI:   Brett presents today with 6 month follow up. Seeing endocrine for diabetes now. On multiple agents.   Has some lab orders from endocrine he needs to do.   Has appt after he gets these labs done.   Using a CGM. Lowering sugars overall, but still only in range 45% of the time in a 90 day range.     Still not doing great on diet and exercise.   Reports back pain is preventing his exercise. Did do PT last year for mid back pain.   Has seen Dr Najera in the past for trigger points in the past. Does have some fear of hurting things.     Dr Ulloa did his initial lumbar fusion in 2009.       Current Outpatient Medications Ordered in Epic   Medication Sig Dispense Refill    INSULIN GLARGINE 100 UNIT/ML injection PEN INJECT 80 UNITS            SUBCUTANEOUSLY IN THE      EVENING 60 mL 2    carvedilol (COREG) 25 MG Tab TAKE 1/2 TABLET TWICE A  Tablet 3    irbesartan (AVAPRO) 150 MG Tab Take 2 Tablets by mouth every day. 180 Tablet 3    allopurinol (ZYLOPRIM) 300 MG Tab Take 1 Tablet by mouth every day. 90 Tablet 3    fenofibrate (TRICOR) 145 MG Tab TAKE 1 TABLET DAILY. 90 Tablet 3    hydroCHLOROthiazide (HYDRODIURIL) 12.5 MG tablet Take 1 Tablet by mouth every day. 90 Tablet 3    lansoprazole (PREVACID) 30 MG CAPSULE DELAYED RELEASE Take 1 Capsule by mouth 2 times a day. 180 Capsule 3    SYNTHROID 137 MCG Tab TAKE 1 TABLET EVERY MORNINGON AN EMPTY STOMACH 90 Tablet 2    FARXIGA 10 MG Tab TAKE 1 TABLET DAILY 90 Tablet 1    Continuous Blood Gluc Sensor (FREESTYLE DANIEL 2 SENSOR) Misc       Semaglutide,0.25 or 0.5MG/DOS, 2 MG/1.5ML Solution Pen-injector Inject 0.25 mg under the skin every 7 days. 1.5 mL 3    spironolactone (ALDACTONE) 25 MG Tab TAKE 1/2 TABLET DAILY Strength: 25 mg 45 Tablet 3    atorvastatin (LIPITOR) 40 MG Tab TAKE 1 TABLET DAILY 90 Tablet 1    metaxalone (SKELAXIN) 800 MG Tab TAKE 1 TABLET 3 TIMES A DAYAS NEEDED FOR MILD  "PAIN    MODERATE PAIN OR MUSCLE    SPASMS 270 Tablet 0    metFORMIN ER (GLUCOPHAGE XR) 500 MG TABLET SR 24 HR Take 2 Tablets by mouth 2 times a day. Please make an appointment for future refills 360 Tablet 0    Insulin Pen Needle 32 G x 4 mm (BD PEN NEEDLE CARLY 2ND GEN) USE FOR INSULIN INJECTION  ONCE DAILY 100 Each 3    vitamin D, Ergocalciferol, (DRISDOL) 1.25 MG (25628 UT) Cap capsule TAKE 1 CAPSULE BY MOUTH EVERY 7 DAYS 12 capsule 3    aspirin 81 MG EC tablet Take 81 mg by mouth every day.      vitamin D (VITAMIND D3) 1000 UNIT Tab Take 1,000 Units by mouth every day.      Blood Glucose Monitoring Suppl Device Meter: Dispense One Touch Ultra 2 meter. Sig. Use as directed for blood sugar monitoring. 1 Device 0    Blood Glucose Test Strips Test strips order: Test strips for One Touch Ultra 2 meter. Sig: use QID and prn ssx high or low sugar 100 Each 2    Lancets Lancets order: Lancets for One Touch Ultra 2 meter. Sig: use QID and prn ssx high or low sugar. 100 Each 3    Omega 3 1000 MG Cap Take 4 a day 100 Cap 6    Syringe/Needle, Disp, (SYRINGE 3CC/83UZ3-1/2\") 22G X 1-1/2\" 3 ML Misc For use with Intra-muscular testosterone  injection every 2 weeks. 12 Each 1    fluticasone (FLONASE) 50 MCG/ACT nasal spray Spray 2 Sprays in nose every day. 16 g 0     No current Epic-ordered facility-administered medications on file.         ROS:  Gen: no fevers/chills, no changes in weight  Eyes: no changes in vision  ENT: no sore throat, no hearing loss, no bloody nose  Pulm: no sob, no cough  CV: no chest pain, no palpitations  GI: no nausea/vomiting, no diarrhea  : no dysuria  Skin: no rash  Neuro: no headaches, no numbness/tingling  Heme/Lymph: no easy bruising      Objective:     Exam:  /68 (BP Location: Left arm, Patient Position: Sitting, BP Cuff Size: Adult long)   Pulse 76   Temp 36.1 °C (97 °F)   Resp 12   Ht 1.854 m (6' 1\")   Wt 106 kg (233 lb)   SpO2 95%   BMI 30.74 kg/m²  Body mass index is 30.74 " kg/m².    Gen: AAOx3, NAD, well appearing  HEENT: NCAT, EOMI, Nares patent, Mucosa moist  Resp: Normal chest wall rise and fall, not SOB, no tachypnea  Skin: no rash or abnormality of visible skin.   Psych: normal speech, not slurred, good insight, affect full  MSK: Moves all four limbs equally and normally, gait normal.  Standing in exam room today is sitting hurts because he hurt his tailbone after falling down the stairs.    Assessment & Plan:     52 y.o. male with the following -     1. Type 2 diabetes mellitus with hyperglycemia, with long-term current use of insulin ([facility])  Reviewed a data from his CGM.  He is following with endocrinology.  Does have some routine lab work to do for them before he follows up in clinic.  Encouraged him to get this done and follow-up for any adjustments needed.  We did discuss also diet and exercise recommendations.  He is having a lot of trouble with his back and he reports anything longer than 2 hours causes more back pain.  We discussed that this does not change him being able to do something for 30 minutes at a time but he admits to having some fear of pain.  Discussed dietary changes as well his back pain should not change his choices for this.  We did discuss some things to swap out to lessen the carb load as well.    2. Need for vaccination    - INFLUENZA VACCINE QUAD INJ (PF)    3. Mid back pain  History of lumbar fusion.  Has been having more trouble in the lower T-spine.  Has seen PT in the past and actually did do a little bit better with this but still remains with pain.  He would like to see someone to evaluate this for neck steps.  Has seen Dr. Ulloa in the past who did his previous surgery.  - Referral to Neurosurgery        No follow-ups on file.    Please note that this dictation was created using voice recognition software. I have made every reasonable attempt to correct obvious errors, but I expect that there are errors of grammar and possibly content that I did  not discover before finalizing the note.

## 2023-10-16 ENCOUNTER — HOSPITAL ENCOUNTER (OUTPATIENT)
Dept: LAB | Facility: MEDICAL CENTER | Age: 53
End: 2023-10-16
Attending: INTERNAL MEDICINE
Payer: COMMERCIAL

## 2023-10-16 LAB
25(OH)D3 SERPL-MCNC: 42 NG/ML (ref 30–100)
ALBUMIN SERPL BCP-MCNC: 4 G/DL (ref 3.2–4.9)
ALBUMIN/GLOB SERPL: 1.7 G/DL
ALP SERPL-CCNC: 58 U/L (ref 30–99)
ALT SERPL-CCNC: 15 U/L (ref 2–50)
ANION GAP SERPL CALC-SCNC: 9 MMOL/L (ref 7–16)
AST SERPL-CCNC: 13 U/L (ref 12–45)
BILIRUB SERPL-MCNC: 0.6 MG/DL (ref 0.1–1.5)
BUN SERPL-MCNC: 19 MG/DL (ref 8–22)
CALCIUM ALBUM COR SERPL-MCNC: 10 MG/DL (ref 8.5–10.5)
CALCIUM SERPL-MCNC: 10 MG/DL (ref 8.5–10.5)
CHLORIDE SERPL-SCNC: 107 MMOL/L (ref 96–112)
CHOLEST SERPL-MCNC: 122 MG/DL (ref 100–199)
CO2 SERPL-SCNC: 27 MMOL/L (ref 20–33)
CREAT SERPL-MCNC: 0.95 MG/DL (ref 0.5–1.4)
CREAT UR-MCNC: 120.38 MG/DL
EST. AVERAGE GLUCOSE BLD GHB EST-MCNC: 194 MG/DL
FASTING STATUS PATIENT QL REPORTED: NORMAL
GFR SERPLBLD CREATININE-BSD FMLA CKD-EPI: 95 ML/MIN/1.73 M 2
GLOBULIN SER CALC-MCNC: 2.3 G/DL (ref 1.9–3.5)
GLUCOSE SERPL-MCNC: 157 MG/DL (ref 65–99)
HBA1C MFR BLD: 8.4 % (ref 4–5.6)
HDLC SERPL-MCNC: 24 MG/DL
LDLC SERPL CALC-MCNC: 50 MG/DL
MICROALBUMIN UR-MCNC: <1.2 MG/DL
MICROALBUMIN/CREAT UR: NORMAL MG/G (ref 0–30)
PHOSPHATE SERPL-MCNC: 1.9 MG/DL (ref 2.5–4.5)
POTASSIUM SERPL-SCNC: 4.1 MMOL/L (ref 3.6–5.5)
PROT SERPL-MCNC: 6.3 G/DL (ref 6–8.2)
PTH-INTACT SERPL-MCNC: 59.6 PG/ML (ref 14–72)
SODIUM SERPL-SCNC: 143 MMOL/L (ref 135–145)
TRIGL SERPL-MCNC: 239 MG/DL (ref 0–149)
TSH SERPL DL<=0.005 MIU/L-ACNC: 0.84 UIU/ML (ref 0.38–5.33)

## 2023-10-16 PROCEDURE — 84443 ASSAY THYROID STIM HORMONE: CPT

## 2023-10-16 PROCEDURE — 82570 ASSAY OF URINE CREATININE: CPT

## 2023-10-16 PROCEDURE — 80053 COMPREHEN METABOLIC PANEL: CPT

## 2023-10-16 PROCEDURE — 36415 COLL VENOUS BLD VENIPUNCTURE: CPT

## 2023-10-16 PROCEDURE — 82306 VITAMIN D 25 HYDROXY: CPT

## 2023-10-16 PROCEDURE — 82043 UR ALBUMIN QUANTITATIVE: CPT

## 2023-10-16 PROCEDURE — 84100 ASSAY OF PHOSPHORUS: CPT

## 2023-10-16 PROCEDURE — 83970 ASSAY OF PARATHORMONE: CPT

## 2023-10-16 PROCEDURE — 80061 LIPID PANEL: CPT

## 2023-10-16 PROCEDURE — 83036 HEMOGLOBIN GLYCOSYLATED A1C: CPT

## 2023-11-02 DIAGNOSIS — E11.65 TYPE 2 DIABETES MELLITUS WITH HYPERGLYCEMIA, WITH LONG-TERM CURRENT USE OF INSULIN (HCC): ICD-10-CM

## 2023-11-02 DIAGNOSIS — Z79.4 TYPE 2 DIABETES MELLITUS WITH HYPERGLYCEMIA, WITH LONG-TERM CURRENT USE OF INSULIN (HCC): ICD-10-CM

## 2023-11-03 RX ORDER — DAPAGLIFLOZIN 10 MG/1
TABLET, FILM COATED ORAL
Qty: 90 TABLET | Refills: 1 | Status: SHIPPED | OUTPATIENT
Start: 2023-11-03

## 2023-11-03 NOTE — TELEPHONE ENCOUNTER
Received request via: Pharmacy    Was the patient seen in the last year in this department? Yes  9/28/23  Does the patient have an active prescription (recently filled or refills available) for medication(s) requested? No    Does the patient have detention Plus and need 100 day supply (blood pressure, diabetes and cholesterol meds only)? Medication is not for cholesterol, blood pressure or diabetes

## 2023-12-16 ENCOUNTER — HOSPITAL ENCOUNTER (OUTPATIENT)
Dept: LAB | Facility: MEDICAL CENTER | Age: 53
End: 2023-12-16
Attending: PHYSICIAN ASSISTANT
Payer: COMMERCIAL

## 2023-12-16 LAB
BASOPHILS # BLD AUTO: 0.8 % (ref 0–1.8)
BASOPHILS # BLD: 0.04 K/UL (ref 0–0.12)
EOSINOPHIL # BLD AUTO: 0.13 K/UL (ref 0–0.51)
EOSINOPHIL NFR BLD: 2.7 % (ref 0–6.9)
ERYTHROCYTE [DISTWIDTH] IN BLOOD BY AUTOMATED COUNT: 39.6 FL (ref 35.9–50)
ESTRADIOL SERPL-MCNC: 22.3 PG/ML
HCT VFR BLD AUTO: 44.4 % (ref 42–52)
HGB BLD-MCNC: 15 G/DL (ref 14–18)
IMM GRANULOCYTES # BLD AUTO: 0.05 K/UL (ref 0–0.11)
IMM GRANULOCYTES NFR BLD AUTO: 1 % (ref 0–0.9)
LYMPHOCYTES # BLD AUTO: 1.72 K/UL (ref 1–4.8)
LYMPHOCYTES NFR BLD: 35.2 % (ref 22–41)
MCH RBC QN AUTO: 27.8 PG (ref 27–33)
MCHC RBC AUTO-ENTMCNC: 33.8 G/DL (ref 32.3–36.5)
MCV RBC AUTO: 82.4 FL (ref 81.4–97.8)
MONOCYTES # BLD AUTO: 0.4 K/UL (ref 0–0.85)
MONOCYTES NFR BLD AUTO: 8.2 % (ref 0–13.4)
NEUTROPHILS # BLD AUTO: 2.55 K/UL (ref 1.82–7.42)
NEUTROPHILS NFR BLD: 52.1 % (ref 44–72)
NRBC # BLD AUTO: 0 K/UL
NRBC BLD-RTO: 0 /100 WBC (ref 0–0.2)
PLATELET # BLD AUTO: 227 K/UL (ref 164–446)
PMV BLD AUTO: 11.5 FL (ref 9–12.9)
PSA SERPL-MCNC: 3.63 NG/ML (ref 0–4)
RBC # BLD AUTO: 5.39 M/UL (ref 4.7–6.1)
TESTOST SERPL-MCNC: 230 NG/DL (ref 175–781)
WBC # BLD AUTO: 4.9 K/UL (ref 4.8–10.8)

## 2023-12-16 PROCEDURE — 85025 COMPLETE CBC W/AUTO DIFF WBC: CPT

## 2023-12-16 PROCEDURE — 84153 ASSAY OF PSA TOTAL: CPT

## 2023-12-16 PROCEDURE — 36415 COLL VENOUS BLD VENIPUNCTURE: CPT

## 2023-12-16 PROCEDURE — 82670 ASSAY OF TOTAL ESTRADIOL: CPT

## 2023-12-16 PROCEDURE — 84403 ASSAY OF TOTAL TESTOSTERONE: CPT

## 2024-02-27 ENCOUNTER — APPOINTMENT (RX ONLY)
Dept: URBAN - METROPOLITAN AREA CLINIC 20 | Facility: CLINIC | Age: 54
Setting detail: DERMATOLOGY
End: 2024-02-27

## 2024-02-27 DIAGNOSIS — L57.8 OTHER SKIN CHANGES DUE TO CHRONIC EXPOSURE TO NONIONIZING RADIATION: ICD-10-CM

## 2024-02-27 DIAGNOSIS — D22 MELANOCYTIC NEVI: ICD-10-CM

## 2024-02-27 DIAGNOSIS — L82.1 OTHER SEBORRHEIC KERATOSIS: ICD-10-CM

## 2024-02-27 DIAGNOSIS — D18.0 HEMANGIOMA: ICD-10-CM

## 2024-02-27 DIAGNOSIS — L91.8 OTHER HYPERTROPHIC DISORDERS OF THE SKIN: ICD-10-CM

## 2024-02-27 DIAGNOSIS — Z87.2 PERSONAL HISTORY OF DISEASES OF THE SKIN AND SUBCUTANEOUS TISSUE: ICD-10-CM

## 2024-02-27 DIAGNOSIS — L81.4 OTHER MELANIN HYPERPIGMENTATION: ICD-10-CM

## 2024-02-27 DIAGNOSIS — Z85.828 PERSONAL HISTORY OF OTHER MALIGNANT NEOPLASM OF SKIN: ICD-10-CM

## 2024-02-27 PROBLEM — D22.5 MELANOCYTIC NEVI OF TRUNK: Status: ACTIVE | Noted: 2024-02-27

## 2024-02-27 PROBLEM — D48.5 NEOPLASM OF UNCERTAIN BEHAVIOR OF SKIN: Status: ACTIVE | Noted: 2024-02-27

## 2024-02-27 PROBLEM — D18.01 HEMANGIOMA OF SKIN AND SUBCUTANEOUS TISSUE: Status: ACTIVE | Noted: 2024-02-27

## 2024-02-27 PROBLEM — D22.71 MELANOCYTIC NEVI OF RIGHT LOWER LIMB, INCLUDING HIP: Status: ACTIVE | Noted: 2024-02-27

## 2024-02-27 PROBLEM — D22.72 MELANOCYTIC NEVI OF LEFT LOWER LIMB, INCLUDING HIP: Status: ACTIVE | Noted: 2024-02-27

## 2024-02-27 PROBLEM — D22.61 MELANOCYTIC NEVI OF RIGHT UPPER LIMB, INCLUDING SHOULDER: Status: ACTIVE | Noted: 2024-02-27

## 2024-02-27 PROBLEM — D22.62 MELANOCYTIC NEVI OF LEFT UPPER LIMB, INCLUDING SHOULDER: Status: ACTIVE | Noted: 2024-02-27

## 2024-02-27 PROCEDURE — 11102 TANGNTL BX SKIN SINGLE LES: CPT

## 2024-02-27 PROCEDURE — ? BIOPSY BY SHAVE METHOD

## 2024-02-27 PROCEDURE — ? COUNSELING

## 2024-02-27 PROCEDURE — ? OBSERVATION AND MEASURE

## 2024-02-27 PROCEDURE — ? DIAGNOSIS COMMENT

## 2024-02-27 PROCEDURE — 99213 OFFICE O/P EST LOW 20 MIN: CPT | Mod: 25

## 2024-02-27 ASSESSMENT — LOCATION DETAILED DESCRIPTION DERM
LOCATION DETAILED: RIGHT INFERIOR LATERAL NECK
LOCATION DETAILED: RIGHT ANTERIOR DISTAL UPPER ARM
LOCATION DETAILED: RIGHT POSTERIOR SHOULDER
LOCATION DETAILED: LEFT PROXIMAL POSTERIOR UPPER ARM
LOCATION DETAILED: RIGHT ANTERIOR DISTAL THIGH
LOCATION DETAILED: RIGHT PROXIMAL DORSAL FOREARM
LOCATION DETAILED: RIGHT SUPERIOR MEDIAL UPPER BACK
LOCATION DETAILED: RIGHT CENTRAL MALAR CHEEK
LOCATION DETAILED: RIGHT MEDIAL MALAR CHEEK
LOCATION DETAILED: LEFT MEDIAL SUPERIOR CHEST
LOCATION DETAILED: LEFT INFERIOR CENTRAL MALAR CHEEK
LOCATION DETAILED: LEFT PROXIMAL DORSAL FOREARM
LOCATION DETAILED: STERNUM
LOCATION DETAILED: RIGHT MID-UPPER BACK
LOCATION DETAILED: LEFT LATERAL INFERIOR EYELID
LOCATION DETAILED: RIGHT NASAL ALA
LOCATION DETAILED: RIGHT DISTAL POSTERIOR THIGH
LOCATION DETAILED: EPIGASTRIC SKIN
LOCATION DETAILED: RIGHT DISTAL POSTERIOR UPPER ARM
LOCATION DETAILED: RIGHT ANTERIOR PROXIMAL UPPER ARM
LOCATION DETAILED: PERIUMBILICAL SKIN
LOCATION DETAILED: LEFT DISTAL POSTERIOR THIGH
LOCATION DETAILED: LEFT ANTERIOR DISTAL THIGH
LOCATION DETAILED: LEFT ANTERIOR PROXIMAL UPPER ARM
LOCATION DETAILED: LEFT DISTAL POSTERIOR UPPER ARM
LOCATION DETAILED: RIGHT PROXIMAL POSTERIOR UPPER ARM

## 2024-02-27 ASSESSMENT — LOCATION SIMPLE DESCRIPTION DERM
LOCATION SIMPLE: RIGHT THIGH
LOCATION SIMPLE: LEFT INFERIOR EYELID
LOCATION SIMPLE: LEFT UPPER ARM
LOCATION SIMPLE: RIGHT UPPER ARM
LOCATION SIMPLE: ABDOMEN
LOCATION SIMPLE: LEFT POSTERIOR THIGH
LOCATION SIMPLE: RIGHT FOREARM
LOCATION SIMPLE: LEFT THIGH
LOCATION SIMPLE: RIGHT POSTERIOR THIGH
LOCATION SIMPLE: RIGHT UPPER BACK
LOCATION SIMPLE: RIGHT NOSE
LOCATION SIMPLE: CHEST
LOCATION SIMPLE: LEFT CHEEK
LOCATION SIMPLE: RIGHT ANTERIOR NECK
LOCATION SIMPLE: RIGHT SHOULDER
LOCATION SIMPLE: LEFT FOREARM
LOCATION SIMPLE: RIGHT CHEEK

## 2024-02-27 ASSESSMENT — LOCATION ZONE DERM
LOCATION ZONE: TRUNK
LOCATION ZONE: NECK
LOCATION ZONE: LEG
LOCATION ZONE: FACE
LOCATION ZONE: ARM
LOCATION ZONE: NOSE
LOCATION ZONE: EYELID

## 2024-02-29 NOTE — PROGRESS NOTES
A1c  6.0%.  This has been stable for over 5 years.  Continue to moderate carbohydrate intake and exercise regularly.  Will continue to monitor   Patient with existing type diabetes:  Type 2, follow up      Patient's health status since last visit: no change  Issues with diabetes since last visit none  Current Diabetes Medications: Farxiga 5 mg, Bydureon 2 mg weekly, Metformin ER 1000 mg bid and Basaglar 46 units at hs    HbA1c: @hba1c@  Lab Results   Component Value Date/Time    HBA1C 7 2017 07:18 AM        FSBS  Testin-1 times per day  7 day avg. 154 n+ 4  14 day avg 144 n = 9  28 day avg 159 n= 11  Hypoglycemia: denies  Exercise: walking dogs on occsion.     Retinal Exam:current as of 17    Daily Foot Exam: checks feet.  Monofilament exam completed on 17    Routine Dental Exams: current.   Flu vaccine: states current.   Pneumonia vaccine current

## 2024-03-12 ENCOUNTER — APPOINTMENT (RX ONLY)
Dept: URBAN - METROPOLITAN AREA CLINIC 20 | Facility: CLINIC | Age: 54
Setting detail: DERMATOLOGY
End: 2024-03-12

## 2024-03-12 DIAGNOSIS — D22 MELANOCYTIC NEVI: ICD-10-CM

## 2024-03-12 PROBLEM — D22.61 MELANOCYTIC NEVI OF RIGHT UPPER LIMB, INCLUDING SHOULDER: Status: ACTIVE | Noted: 2024-03-12

## 2024-03-12 PROCEDURE — ? EXCISION

## 2024-03-12 PROCEDURE — 12032 INTMD RPR S/A/T/EXT 2.6-7.5: CPT

## 2024-03-12 PROCEDURE — 11402 EXC TR-EXT B9+MARG 1.1-2 CM: CPT

## 2024-03-12 ASSESSMENT — LOCATION ZONE DERM: LOCATION ZONE: ARM

## 2024-03-12 ASSESSMENT — LOCATION SIMPLE DESCRIPTION DERM: LOCATION SIMPLE: RIGHT SHOULDER

## 2024-03-12 ASSESSMENT — LOCATION DETAILED DESCRIPTION DERM: LOCATION DETAILED: RIGHT POSTERIOR SHOULDER

## 2024-03-12 NOTE — HPI: SKIN LESION (DYSPLASTIC NEVUS)
Is This A New Presentation, Or A Follow-Up?: Dysplastic Nevus
When Was The Dysplastic Nevus Biopsied? (Optional): 02/27/2024
Accession # (Optional): [id number]

## 2024-03-12 NOTE — PROCEDURE: EXCISION
Medical Necessity Information: It is in your best interest to select a reason for this procedure from the list below. All of these items fulfill various CMS LCD requirements except lesion extends to a margin.
Include Z78.9 (Other Specified Conditions Influencing Health Status) As An Associated Diagnosis?: No
Medical Necessity Clause: This procedure was medically necessary because the lesion that was treated was:
Lab: [id number]
Lab Facility: 
Accession #: [id number]
Biopsy Photograph Reviewed: Yes
Size Of Lesion In Cm: 0.8
X Size Of Lesion In Cm (Optional): 0
Size Of Margin In Cm: 0.3
Anesthesia Volume In Cc: 6
Eye Clamp Note Details: An eye clamp was used during the procedure.
Excision Method: Elliptical
Saucerization Depth: dermis and superficial adipose tissue
Repair Type: Intermediate
Intermediate / Complex Repair - Final Wound Length In Cm: 4.2
Suturegard Retention Suture: 2-0 Nylon
Retention Suture Bite Size: 3 mm
Length To Time In Minutes Device Was In Place: 10
Number Of Hemigard Strips Per Side: 1
Undermining Type: Entire Wound
Debridement Text: The wound edges were debrided prior to proceeding with the closure to facilitate wound healing.
Helical Rim Text: The closure involved the helical rim.
Vermilion Border Text: The closure involved the vermilion border.
Nostril Rim Text: The closure involved the nostril rim.
Retention Suture Text: Retention sutures were placed to support the closure and prevent dehiscence.
Suture Removal: 14 days
Epidermal Closure Graft Donor Site (Optional): simple interrupted
Graft Donor Site Bandage (Optional-Leave Blank If You Don't Want In Note): Steri-strips and a pressure bandage were applied to the donor site.
Detail Level: Detailed
Excision Depth: adipose tissue
Scalpel Size: 15 blade
Anesthesia Type: 1% lidocaine with epinephrine
Hemostasis: Electrocautery
Estimated Blood Loss (Cc): minimal
Anesthesia Type: 1% lidocaine with epinephrine and a 1:10 solution of 8.4% sodium bicarbonate
Deep Sutures: 3-0 Polysorb
Dermal Closure: buried vertical mattress
Epidermal Sutures: 4-0 Nylon
Epidermal Closure: running
Wound Care: Petrolatum
Dressing: dry sterile dressing
Suturegard Intro: Intraoperative tissue expansion was performed, utilizing the SUTUREGARD device, in order to reduce wound tension.
Suturegard Body: The suture ends were repeatedly re-tightened and re-clamped to achieve the desired tissue expansion.
Hemigard Intro: Due to skin fragility and wound tension, it was decided to use HEMIGARD adhesive retention suture devices to permit a linear closure. The skin was cleaned and dried for a 6cm distance away from the wound. Excessive hair, if present, was removed to allow for adhesion.
Hemigard Postcare Instructions: The HEMIGARD strips are to remain completely dry for at least 5-7 days.
Positioning (Leave Blank If You Do Not Want): The patient was placed in a comfortable position exposing the surgical site.
Complex Repair Preamble Text (Leave Blank If You Do Not Want): Extensive wide undermining was performed.
Intermediate Repair Preamble Text (Leave Blank If You Do Not Want): Undermining was performed with blunt dissection.
Fusiform Excision Additional Text (Leave Blank If You Do Not Want): The margin was drawn around the clinically apparent lesion.  A fusiform shape was then drawn on the skin incorporating the lesion and margins.  Incisions were then made along these lines to the appropriate tissue plane and the lesion was extirpated.
Eliptical Excision Additional Text (Leave Blank If You Do Not Want): The margin was drawn around the clinically apparent lesion.  An elliptical shape was then drawn on the skin incorporating the lesion and margins.  Incisions were then made along these lines to the appropriate tissue plane and the lesion was extirpated.
Saucerization Excision Additional Text (Leave Blank If You Do Not Want): The margin was drawn around the clinically apparent lesion.  Incisions were then made along these lines, in a tangential fashion, to the appropriate tissue plane and the lesion was extirpated.
Slit Excision Additional Text (Leave Blank If You Do Not Want): A linear line was drawn on the skin overlying the lesion. An incision was made slowly until the lesion was visualized.  Once visualized, the lesion was removed with blunt dissection.
Excisional Biopsy Additional Text (Leave Blank If You Do Not Want): The margin was drawn around the clinically apparent lesion. An elliptical shape was then drawn on the skin incorporating the lesion and margins.  Incisions were then made along these lines to the appropriate tissue plane and the lesion was extirpated.
Perilesional Excision Additional Text (Leave Blank If You Do Not Want): The margin was drawn around the clinically apparent lesion. Incisions were then made along these lines to the appropriate tissue plane and the lesion was extirpated.
Repair Performed By Another Provider Text (Leave Blank If You Do Not Want): After the tissue was excised the defect was repaired by another provider.
No Repair - Repaired With Adjacent Surgical Defect Text (Leave Blank If You Do Not Want): After the excision the defect was repaired concurrently with another surgical defect which was in close approximation.
Adjacent Tissue Transfer Text: The defect edges were debeveled with a #15 scalpel blade. Given the location of the defect and the proximity to free margins an adjacent tissue transfer was deemed most appropriate. Using a sterile surgical marker, an appropriate flap was drawn incorporating the defect and placing the expected incisions within the relaxed skin tension lines where possible. The area thus outlined was incised deep to adipose tissue with a #15 scalpel blade. The skin margins were undermined to an appropriate distance in all directions utilizing iris scissors and carried over to close the primary defect.
Advancement Flap (Single) Text: The defect edges were debeveled with a #15 scalpel blade.  Given the location of the defect and the proximity to free margins a single advancement flap was deemed most appropriate.  Using a sterile surgical marker, an appropriate advancement flap was drawn incorporating the defect and placing the expected incisions within the relaxed skin tension lines where possible.    The area thus outlined was incised deep to adipose tissue with a #15 scalpel blade.  The skin margins were undermined to an appropriate distance in all directions utilizing iris scissors.
Advancement Flap (Double) Text: The defect edges were debeveled with a #15 scalpel blade.  Given the location of the defect and the proximity to free margins a double advancement flap was deemed most appropriate.  Using a sterile surgical marker, the appropriate advancement flaps were drawn incorporating the defect and placing the expected incisions within the relaxed skin tension lines where possible.    The area thus outlined was incised deep to adipose tissue with a #15 scalpel blade.  The skin margins were undermined to an appropriate distance in all directions utilizing iris scissors.
Burow's Advancement Flap Text: The defect edges were debeveled with a #15 scalpel blade.  Given the location of the defect and the proximity to free margins a Burow's advancement flap was deemed most appropriate.  Using a sterile surgical marker, the appropriate advancement flap was drawn incorporating the defect and placing the expected incisions within the relaxed skin tension lines where possible.    The area thus outlined was incised deep to adipose tissue with a #15 scalpel blade.  The skin margins were undermined to an appropriate distance in all directions utilizing iris scissors.
Chonodrocutaneous Helical Advancement Flap Text: The defect edges were debeveled with a #15 scalpel blade.  Given the location of the defect and the proximity to free margins a chondrocutaneous helical advancement flap was deemed most appropriate.  Using a sterile surgical marker, the appropriate advancement flap was drawn incorporating the defect and placing the expected incisions within the relaxed skin tension lines where possible.    The area thus outlined was incised deep to adipose tissue with a #15 scalpel blade.  The skin margins were undermined to an appropriate distance in all directions utilizing iris scissors.
Crescentic Advancement Flap Text: The defect edges were debeveled with a #15 scalpel blade.  Given the location of the defect and the proximity to free margins a crescentic advancement flap was deemed most appropriate.  Using a sterile surgical marker, the appropriate advancement flap was drawn incorporating the defect and placing the expected incisions within the relaxed skin tension lines where possible.    The area thus outlined was incised deep to adipose tissue with a #15 scalpel blade.  The skin margins were undermined to an appropriate distance in all directions utilizing iris scissors.
A-T Advancement Flap Text: The defect edges were debeveled with a #15 scalpel blade.  Given the location of the defect, shape of the defect and the proximity to free margins an A-T advancement flap was deemed most appropriate.  Using a sterile surgical marker, an appropriate advancement flap was drawn incorporating the defect and placing the expected incisions within the relaxed skin tension lines where possible.    The area thus outlined was incised deep to adipose tissue with a #15 scalpel blade.  The skin margins were undermined to an appropriate distance in all directions utilizing iris scissors.
O-T Advancement Flap Text: The defect edges were debeveled with a #15 scalpel blade.  Given the location of the defect, shape of the defect and the proximity to free margins an O-T advancement flap was deemed most appropriate.  Using a sterile surgical marker, an appropriate advancement flap was drawn incorporating the defect and placing the expected incisions within the relaxed skin tension lines where possible.    The area thus outlined was incised deep to adipose tissue with a #15 scalpel blade.  The skin margins were undermined to an appropriate distance in all directions utilizing iris scissors.
O-L Flap Text: The defect edges were debeveled with a #15 scalpel blade.  Given the location of the defect, shape of the defect and the proximity to free margins an O-L flap was deemed most appropriate.  Using a sterile surgical marker, an appropriate advancement flap was drawn incorporating the defect and placing the expected incisions within the relaxed skin tension lines where possible.    The area thus outlined was incised deep to adipose tissue with a #15 scalpel blade.  The skin margins were undermined to an appropriate distance in all directions utilizing iris scissors.
O-Z Flap Text: The defect edges were debeveled with a #15 scalpel blade.  Given the location of the defect, shape of the defect and the proximity to free margins an O-Z flap was deemed most appropriate.  Using a sterile surgical marker, an appropriate transposition flap was drawn incorporating the defect and placing the expected incisions within the relaxed skin tension lines where possible. The area thus outlined was incised deep to adipose tissue with a #15 scalpel blade.  The skin margins were undermined to an appropriate distance in all directions utilizing iris scissors.
Double O-Z Flap Text: The defect edges were debeveled with a #15 scalpel blade.  Given the location of the defect, shape of the defect and the proximity to free margins a Double O-Z flap was deemed most appropriate.  Using a sterile surgical marker, an appropriate transposition flap was drawn incorporating the defect and placing the expected incisions within the relaxed skin tension lines where possible. The area thus outlined was incised deep to adipose tissue with a #15 scalpel blade.  The skin margins were undermined to an appropriate distance in all directions utilizing iris scissors.
V-Y Flap Text: The defect edges were debeveled with a #15 scalpel blade.  Given the location of the defect, shape of the defect and the proximity to free margins a V-Y flap was deemed most appropriate.  Using a sterile surgical marker, an appropriate advancement flap was drawn incorporating the defect and placing the expected incisions within the relaxed skin tension lines where possible.    The area thus outlined was incised deep to adipose tissue with a #15 scalpel blade.  The skin margins were undermined to an appropriate distance in all directions utilizing iris scissors.
Advancement-Rotation Flap Text: The defect edges were debeveled with a #15 scalpel blade. Given the location of the defect, shape of the defect and the proximity to free margins an advancement-rotation flap was deemed most appropriate. Using a sterile surgical marker, an appropriate flap was drawn incorporating the defect and placing the expected incisions within the relaxed skin tension lines where possible. The area thus outlined was incised deep to adipose tissue with a #15 scalpel blade. The skin margins were undermined to an appropriate distance in all directions utilizing iris scissors. Following this, the designed flap was carried over into the primary defect and sutured into place.
Mercedes Flap Text: The defect edges were debeveled with a #15 scalpel blade.  Given the location of the defect, shape of the defect and the proximity to free margins a Mercedes flap was deemed most appropriate.  Using a sterile surgical marker, an appropriate advancement flap was drawn incorporating the defect and placing the expected incisions within the relaxed skin tension lines where possible. The area thus outlined was incised deep to adipose tissue with a #15 scalpel blade.  The skin margins were undermined to an appropriate distance in all directions utilizing iris scissors.
Modified Advancement Flap Text: The defect edges were debeveled with a #15 scalpel blade.  Given the location of the defect, shape of the defect and the proximity to free margins a modified advancement flap was deemed most appropriate.  Using a sterile surgical marker, an appropriate advancement flap was drawn incorporating the defect and placing the expected incisions within the relaxed skin tension lines where possible.    The area thus outlined was incised deep to adipose tissue with a #15 scalpel blade.  The skin margins were undermined to an appropriate distance in all directions utilizing iris scissors.
Mucosal Advancement Flap Text: Given the location of the defect, shape of the defect and the proximity to free margins a mucosal advancement flap was deemed most appropriate. Incisions were made with a 15 blade scalpel in the appropriate fashion along the cutaneous vermillion border and the mucosal lip. The remaining actinically damaged mucosal tissue was excised.  The mucosal advancement flap was then elevated to the gingival sulcus with care taken to preserve the neurovascular structures and advanced into the primary defect. Care was taken to ensure that precise realignment of the vermilion border was achieved.
Peng Advancement Flap Text: The defect edges were debeveled with a #15 scalpel blade. Given the location of the defect, shape of the defect and the proximity to free margins a Peng advancement flap was deemed most appropriate. Using a sterile surgical marker, an appropriate advancement flap was drawn incorporating the defect and placing the expected incisions within the relaxed skin tension lines where possible. The area thus outlined was incised deep to adipose tissue with a #15 scalpel blade. The skin margins were undermined to an appropriate distance in all directions utilizing iris scissors. Following this, the designed flap was advanced and carried over into the primary defect and sutured into place.
Hatchet Flap Text: The defect edges were debeveled with a #15 scalpel blade.  Given the location of the defect, shape of the defect and the proximity to free margins a hatchet flap was deemed most appropriate.  Using a sterile surgical marker, an appropriate hatchet flap was drawn incorporating the defect and placing the expected incisions within the relaxed skin tension lines where possible.    The area thus outlined was incised deep to adipose tissue with a #15 scalpel blade.  The skin margins were undermined to an appropriate distance in all directions utilizing iris scissors.
Rotation Flap Text: The defect edges were debeveled with a #15 scalpel blade.  Given the location of the defect, shape of the defect and the proximity to free margins a rotation flap was deemed most appropriate.  Using a sterile surgical marker, an appropriate rotation flap was drawn incorporating the defect and placing the expected incisions within the relaxed skin tension lines where possible.    The area thus outlined was incised deep to adipose tissue with a #15 scalpel blade.  The skin margins were undermined to an appropriate distance in all directions utilizing iris scissors.
Bilateral Rotation Flap Text: The defect edges were debeveled with a #15 scalpel blade. Given the location of the defect, shape of the defect and the proximity to free margins a bilateral rotation flap was deemed most appropriate. Using a sterile surgical marker, an appropriate rotation flap was drawn incorporating the defect and placing the expected incisions within the relaxed skin tension lines where possible. The area thus outlined was incised deep to adipose tissue with a #15 scalpel blade. The skin margins were undermined to an appropriate distance in all directions utilizing iris scissors. Following this, the designed flap was carried over into the primary defect and sutured into place.
Spiral Flap Text: The defect edges were debeveled with a #15 scalpel blade.  Given the location of the defect, shape of the defect and the proximity to free margins a spiral flap was deemed most appropriate.  Using a sterile surgical marker, an appropriate rotation flap was drawn incorporating the defect and placing the expected incisions within the relaxed skin tension lines where possible. The area thus outlined was incised deep to adipose tissue with a #15 scalpel blade.  The skin margins were undermined to an appropriate distance in all directions utilizing iris scissors.
Staged Advancement Flap Text: The defect edges were debeveled with a #15 scalpel blade. Given the location of the defect, shape of the defect and the proximity to free margins a staged advancement flap was deemed most appropriate. Using a sterile surgical marker, an appropriate advancement flap was drawn incorporating the defect and placing the expected incisions within the relaxed skin tension lines where possible. The area thus outlined was incised deep to adipose tissue with a #15 scalpel blade. The skin margins were undermined to an appropriate distance in all directions utilizing iris scissors. Following this, the designed flap was carried over into the primary defect and sutured into place.
Star Wedge Flap Text: The defect edges were debeveled with a #15 scalpel blade.  Given the location of the defect, shape of the defect and the proximity to free margins a star wedge flap was deemed most appropriate.  Using a sterile surgical marker, an appropriate rotation flap was drawn incorporating the defect and placing the expected incisions within the relaxed skin tension lines where possible. The area thus outlined was incised deep to adipose tissue with a #15 scalpel blade.  The skin margins were undermined to an appropriate distance in all directions utilizing iris scissors.
Transposition Flap Text: The defect edges were debeveled with a #15 scalpel blade.  Given the location of the defect and the proximity to free margins a transposition flap was deemed most appropriate.  Using a sterile surgical marker, an appropriate transposition flap was drawn incorporating the defect.    The area thus outlined was incised deep to adipose tissue with a #15 scalpel blade.  The skin margins were undermined to an appropriate distance in all directions utilizing iris scissors.
Muscle Hinge Flap Text: The defect edges were debeveled with a #15 scalpel blade.  Given the size, depth and location of the defect and the proximity to free margins a muscle hinge flap was deemed most appropriate.  Using a sterile surgical marker, an appropriate hinge flap was drawn incorporating the defect. The area thus outlined was incised with a #15 scalpel blade.  The skin margins were undermined to an appropriate distance in all directions utilizing iris scissors.
Mustarde Flap Text: The defect edges were debeveled with a #15 scalpel blade.  Given the size, depth and location of the defect and the proximity to free margins a Mustarde flap was deemed most appropriate. Using a sterile surgical marker, an appropriate flap was drawn incorporating the defect. The area thus outlined was incised with a #15 scalpel blade. The skin margins were undermined to an appropriate distance in all directions utilizing iris scissors. Following this, the designed flap was carried into the primary defect and sutured into place.
Nasal Turnover Hinge Flap Text: The defect edges were debeveled with a #15 scalpel blade.  Given the size, depth, location of the defect and the defect being full thickness a nasal turnover hinge flap was deemed most appropriate. Using a sterile surgical marker, an appropriate hinge flap was drawn incorporating the defect. The area thus outlined was incised with a #15 scalpel blade. The flap was designed to recreate the nasal mucosal lining and the alar rim. The skin margins were undermined to an appropriate distance in all directions utilizing iris scissors. Following this, the designed flap was carried over into the primary defect and sutured into place
Nasalis-Muscle-Based Myocutaneous Island Pedicle Flap Text: Using a #15 blade, an incision was made around the donor flap to the level of the nasalis muscle. Wide lateral undermining was then performed in both the subcutaneous plane above the nasalis muscle, and in a submuscular plane just above periosteum. This allowed the formation of a free nasalis muscle axial pedicle (based on the angular artery) which was still attached to the actual cutaneous flap, increasing its mobility and vascular viability. Hemostasis was obtained with pinpoint electrocoagulation. The flap was mobilized into position and the pivotal anchor points positioned and stabilized with buried interrupted sutures. Subcutaneous and dermal tissues were closed in a multilayered fashion with sutures. Tissue redundancies were excised, and the epidermal edges were apposed without significant tension and sutured with sutures.
Nasalis Myocutaneous Flap Text: Using a #15 blade, an incision was made around the donor flap to the level of the nasalis muscle. Wide lateral undermining was then performed in both the subcutaneous plane above the nasalis muscle, and in a submuscular plane just above periosteum. This allowed the formation of a free nasalis muscle axial pedicle which was still attached to the actual cutaneous flap, increasing its mobility and vascular viability. Hemostasis was obtained with pinpoint electrocoagulation. The flap was mobilized into position and the pivotal anchor points positioned and stabilized with buried interrupted sutures. Subcutaneous and dermal tissues were closed in a multilayered fashion with sutures. Tissue redundancies were excised, and the epidermal edges were apposed without significant tension and sutured with sutures.
Orbicularis Oris Muscle Flap Text: The defect edges were debeveled with a #15 scalpel blade.  Given that the defect affected the competency of the oral sphincter an orbicularis oris muscle flap was deemed most appropriate to restore this competency and normal muscle function.  Using a sterile surgical marker, an appropriate flap was drawn incorporating the defect. The area thus outlined was incised with a #15 scalpel blade. Following this, the designed flap was carried over into the primary defect and sutured into place.
Melolabial Transposition Flap Text: The defect edges were debeveled with a #15 scalpel blade.  Given the location of the defect and the proximity to free margins a melolabial flap was deemed most appropriate.  Using a sterile surgical marker, an appropriate melolabial transposition flap was drawn incorporating the defect.    The area thus outlined was incised deep to adipose tissue with a #15 scalpel blade.  The skin margins were undermined to an appropriate distance in all directions utilizing iris scissors.
Rectangular Flap Text: The defect edges were debeveled with a #15 scalpel blade. Given the location of the defect and the proximity to free margins a rectangular flap was deemed most appropriate. Using a sterile surgical marker, an appropriate rectangular flap was drawn incorporating the defect. The area thus outlined was incised deep to adipose tissue with a #15 scalpel blade. The skin margins were undermined to an appropriate distance in all directions utilizing iris scissors. Following this, the designed flap was carried over into the primary defect and sutured into place.
Rhombic Flap Text: The defect edges were debeveled with a #15 scalpel blade.  Given the location of the defect and the proximity to free margins a rhombic flap was deemed most appropriate.  Using a sterile surgical marker, an appropriate rhombic flap was drawn incorporating the defect.    The area thus outlined was incised deep to adipose tissue with a #15 scalpel blade.  The skin margins were undermined to an appropriate distance in all directions utilizing iris scissors.
Rhomboid Transposition Flap Text: The defect edges were debeveled with a #15 scalpel blade.  Given the location of the defect and the proximity to free margins a rhomboid transposition flap was deemed most appropriate.  Using a sterile surgical marker, an appropriate rhomboid flap was drawn incorporating the defect.    The area thus outlined was incised deep to adipose tissue with a #15 scalpel blade.  The skin margins were undermined to an appropriate distance in all directions utilizing iris scissors.
Bi-Rhombic Flap Text: The defect edges were debeveled with a #15 scalpel blade.  Given the location of the defect and the proximity to free margins a bi-rhombic flap was deemed most appropriate.  Using a sterile surgical marker, an appropriate rhombic flap was drawn incorporating the defect. The area thus outlined was incised deep to adipose tissue with a #15 scalpel blade.  The skin margins were undermined to an appropriate distance in all directions utilizing iris scissors.
Helical Rim Advancement Flap Text: The defect edges were debeveled with a #15 blade scalpel.  Given the location of the defect and the proximity to free margins (helical rim) a double helical rim advancement flap was deemed most appropriate.  Using a sterile surgical marker, the appropriate advancement flaps were drawn incorporating the defect and placing the expected incisions between the helical rim and antihelix where possible.  The area thus outlined was incised through and through with a #15 scalpel blade.  With a skin hook and iris scissors, the flaps were gently and sharply undermined and freed up.
Bilateral Helical Rim Advancement Flap Text: The defect edges were debeveled with a #15 blade scalpel.  Given the location of the defect and the proximity to free margins (helical rim) a bilateral helical rim advancement flap was deemed most appropriate.  Using a sterile surgical marker, the appropriate advancement flaps were drawn incorporating the defect and placing the expected incisions between the helical rim and antihelix where possible.  The area thus outlined was incised through and through with a #15 scalpel blade.  With a skin hook and iris scissors, the flaps were gently and sharply undermined and freed up.
Ear Star Wedge Flap Text: The defect edges were debeveled with a #15 blade scalpel.  Given the location of the defect and the proximity to free margins (helical rim) an ear star wedge flap was deemed most appropriate.  Using a sterile surgical marker, the appropriate flap was drawn incorporating the defect and placing the expected incisions between the helical rim and antihelix where possible.  The area thus outlined was incised through and through with a #15 scalpel blade.
Banner Transposition Flap Text: The defect edges were debeveled with a #15 scalpel blade.  Given the location of the defect and the proximity to free margins a Banner transposition flap was deemed most appropriate.  Using a sterile surgical marker, an appropriate flap drawn around the defect. The area thus outlined was incised deep to adipose tissue with a #15 scalpel blade.  The skin margins were undermined to an appropriate distance in all directions utilizing iris scissors.
Bilobed Flap Text: The defect edges were debeveled with a #15 scalpel blade.  Given the location of the defect and the proximity to free margins a bilobe flap was deemed most appropriate.  Using a sterile surgical marker, an appropriate bilobe flap drawn around the defect.    The area thus outlined was incised deep to adipose tissue with a #15 scalpel blade.  The skin margins were undermined to an appropriate distance in all directions utilizing iris scissors.
Bilobed Transposition Flap Text: The defect edges were debeveled with a #15 scalpel blade.  Given the location of the defect and the proximity to free margins a bilobed transposition flap was deemed most appropriate.  Using a sterile surgical marker, an appropriate bilobe flap drawn around the defect.    The area thus outlined was incised deep to adipose tissue with a #15 scalpel blade.  The skin margins were undermined to an appropriate distance in all directions utilizing iris scissors.
Trilobed Flap Text: The defect edges were debeveled with a #15 scalpel blade.  Given the location of the defect and the proximity to free margins a trilobed flap was deemed most appropriate.  Using a sterile surgical marker, an appropriate trilobed flap drawn around the defect.    The area thus outlined was incised deep to adipose tissue with a #15 scalpel blade.  The skin margins were undermined to an appropriate distance in all directions utilizing iris scissors.
Dorsal Nasal Flap Text: The defect edges were debeveled with a #15 scalpel blade.  Given the location of the defect and the proximity to free margins a dorsal nasal flap was deemed most appropriate.  Using a sterile surgical marker, an appropriate dorsal nasal flap was drawn around the defect.    The area thus outlined was incised deep to adipose tissue with a #15 scalpel blade.  The skin margins were undermined to an appropriate distance in all directions utilizing iris scissors.
Island Pedicle Flap Text: The defect edges were debeveled with a #15 scalpel blade.  Given the location of the defect, shape of the defect and the proximity to free margins an island pedicle advancement flap was deemed most appropriate.  Using a sterile surgical marker, an appropriate advancement flap was drawn incorporating the defect, outlining the appropriate donor tissue and placing the expected incisions within the relaxed skin tension lines where possible.    The area thus outlined was incised deep to adipose tissue with a #15 scalpel blade.  The skin margins were undermined to an appropriate distance in all directions around the primary defect and laterally outward around the island pedicle utilizing iris scissors.  There was minimal undermining beneath the pedicle flap.
Island Pedicle Flap With Canthal Suspension Text: The defect edges were debeveled with a #15 scalpel blade.  Given the location of the defect, shape of the defect and the proximity to free margins an island pedicle advancement flap was deemed most appropriate.  Using a sterile surgical marker, an appropriate advancement flap was drawn incorporating the defect, outlining the appropriate donor tissue and placing the expected incisions within the relaxed skin tension lines where possible. The area thus outlined was incised deep to adipose tissue with a #15 scalpel blade.  The skin margins were undermined to an appropriate distance in all directions around the primary defect and laterally outward around the island pedicle utilizing iris scissors.  There was minimal undermining beneath the pedicle flap. A suspension suture was placed in the canthal tendon to prevent tension and prevent ectropion.
Alar Island Pedicle Flap Text: The defect edges were debeveled with a #15 scalpel blade.  Given the location of the defect, shape of the defect and the proximity to the alar rim an island pedicle advancement flap was deemed most appropriate.  Using a sterile surgical marker, an appropriate advancement flap was drawn incorporating the defect, outlining the appropriate donor tissue and placing the expected incisions within the nasal ala running parallel to the alar rim. The area thus outlined was incised with a #15 scalpel blade.  The skin margins were undermined minimally to an appropriate distance in all directions around the primary defect and laterally outward around the island pedicle utilizing iris scissors.  There was minimal undermining beneath the pedicle flap.
Double Island Pedicle Flap Text: The defect edges were debeveled with a #15 scalpel blade.  Given the location of the defect, shape of the defect and the proximity to free margins a double island pedicle advancement flap was deemed most appropriate.  Using a sterile surgical marker, an appropriate advancement flap was drawn incorporating the defect, outlining the appropriate donor tissue and placing the expected incisions within the relaxed skin tension lines where possible.    The area thus outlined was incised deep to adipose tissue with a #15 scalpel blade.  The skin margins were undermined to an appropriate distance in all directions around the primary defect and laterally outward around the island pedicle utilizing iris scissors.  There was minimal undermining beneath the pedicle flap.
Island Pedicle Flap-Requiring Vessel Identification Text: The defect edges were debeveled with a #15 scalpel blade.  Given the location of the defect, shape of the defect and the proximity to free margins an island pedicle advancement flap was deemed most appropriate.  Using a sterile surgical marker, an appropriate advancement flap was drawn, based on the axial vessel mentioned above, incorporating the defect, outlining the appropriate donor tissue and placing the expected incisions within the relaxed skin tension lines where possible.    The area thus outlined was incised deep to adipose tissue with a #15 scalpel blade.  The skin margins were undermined to an appropriate distance in all directions around the primary defect and laterally outward around the island pedicle utilizing iris scissors.  There was minimal undermining beneath the pedicle flap.
Keystone Flap Text: The defect edges were debeveled with a #15 scalpel blade.  Given the location of the defect, shape of the defect a keystone flap was deemed most appropriate.  Using a sterile surgical marker, an appropriate keystone flap was drawn incorporating the defect, outlining the appropriate donor tissue and placing the expected incisions within the relaxed skin tension lines where possible. The area thus outlined was incised deep to adipose tissue with a #15 scalpel blade.  The skin margins were undermined to an appropriate distance in all directions around the primary defect and laterally outward around the flap utilizing iris scissors.
O-T Plasty Text: The defect edges were debeveled with a #15 scalpel blade.  Given the location of the defect, shape of the defect and the proximity to free margins an O-T plasty was deemed most appropriate.  Using a sterile surgical marker, an appropriate O-T plasty was drawn incorporating the defect and placing the expected incisions within the relaxed skin tension lines where possible.    The area thus outlined was incised deep to adipose tissue with a #15 scalpel blade.  The skin margins were undermined to an appropriate distance in all directions utilizing iris scissors.
O-Z Plasty Text: The defect edges were debeveled with a #15 scalpel blade.  Given the location of the defect, shape of the defect and the proximity to free margins an O-Z plasty (double transposition flap) was deemed most appropriate.  Using a sterile surgical marker, the appropriate transposition flaps were drawn incorporating the defect and placing the expected incisions within the relaxed skin tension lines where possible.    The area thus outlined was incised deep to adipose tissue with a #15 scalpel blade.  The skin margins were undermined to an appropriate distance in all directions utilizing iris scissors.  Hemostasis was achieved with electrocautery.  The flaps were then transposed into place, one clockwise and the other counterclockwise, and anchored with interrupted buried subcutaneous sutures.
Double O-Z Plasty Text: The defect edges were debeveled with a #15 scalpel blade.  Given the location of the defect, shape of the defect and the proximity to free margins a Double O-Z plasty (double transposition flap) was deemed most appropriate.  Using a sterile surgical marker, the appropriate transposition flaps were drawn incorporating the defect and placing the expected incisions within the relaxed skin tension lines where possible. The area thus outlined was incised deep to adipose tissue with a #15 scalpel blade.  The skin margins were undermined to an appropriate distance in all directions utilizing iris scissors.  Hemostasis was achieved with electrocautery.  The flaps were then transposed into place, one clockwise and the other counterclockwise, and anchored with interrupted buried subcutaneous sutures.
V-Y Plasty Text: The defect edges were debeveled with a #15 scalpel blade.  Given the location of the defect, shape of the defect and the proximity to free margins an V-Y advancement flap was deemed most appropriate.  Using a sterile surgical marker, an appropriate advancement flap was drawn incorporating the defect and placing the expected incisions within the relaxed skin tension lines where possible.    The area thus outlined was incised deep to adipose tissue with a #15 scalpel blade.  The skin margins were undermined to an appropriate distance in all directions utilizing iris scissors.
H Plasty Text: Given the location of the defect, shape of the defect and the proximity to free margins a H-plasty was deemed most appropriate for repair.  Using a sterile surgical marker, the appropriate advancement arms of the H-plasty were drawn incorporating the defect and placing the expected incisions within the relaxed skin tension lines where possible. The area thus outlined was incised deep to adipose tissue with a #15 scalpel blade. The skin margins were undermined to an appropriate distance in all directions utilizing iris scissors.  The opposing advancement arms were then advanced into place in opposite direction and anchored with interrupted buried subcutaneous sutures.
W Plasty Text: The lesion was extirpated to the level of the fat with a #15 scalpel blade.  Given the location of the defect, shape of the defect and the proximity to free margins a W-plasty was deemed most appropriate for repair.  Using a sterile surgical marker, the appropriate transposition arms of the W-plasty were drawn incorporating the defect and placing the expected incisions within the relaxed skin tension lines where possible.    The area thus outlined was incised deep to adipose tissue with a #15 scalpel blade.  The skin margins were undermined to an appropriate distance in all directions utilizing iris scissors.  The opposing transposition arms were then transposed into place in opposite direction and anchored with interrupted buried subcutaneous sutures.
Z Plasty Text: The lesion was extirpated to the level of the fat with a #15 scalpel blade.  Given the location of the defect, shape of the defect and the proximity to free margins a Z-plasty was deemed most appropriate for repair.  Using a sterile surgical marker, the appropriate transposition arms of the Z-plasty were drawn incorporating the defect and placing the expected incisions within the relaxed skin tension lines where possible.    The area thus outlined was incised deep to adipose tissue with a #15 scalpel blade.  The skin margins were undermined to an appropriate distance in all directions utilizing iris scissors.  The opposing transposition arms were then transposed into place in opposite direction and anchored with interrupted buried subcutaneous sutures.
Double Z Plasty Text: The lesion was extirpated to the level of the fat with a #15 scalpel blade. Given the location of the defect, shape of the defect and the proximity to free margins a double Z-plasty was deemed most appropriate for repair. Using a sterile surgical marker, the appropriate transposition arms of the double Z-plasty were drawn incorporating the defect and placing the expected incisions within the relaxed skin tension lines where possible. The area thus outlined was incised deep to adipose tissue with a #15 scalpel blade. The skin margins were undermined to an appropriate distance in all directions utilizing iris scissors. The opposing transposition arms were then transposed and carried over into place in opposite direction and anchored with interrupted buried subcutaneous sutures.
Zygomaticofacial Flap Text: Given the location of the defect, shape of the defect and the proximity to free margins a zygomaticofacial flap was deemed most appropriate for repair.  Using a sterile surgical marker, the appropriate flap was drawn incorporating the defect and placing the expected incisions within the relaxed skin tension lines where possible. The area thus outlined was incised deep to adipose tissue with a #15 scalpel blade with preservation of a vascular pedicle.  The skin margins were undermined to an appropriate distance in all directions utilizing iris scissors.  The flap was then placed into the defect and anchored with interrupted buried subcutaneous sutures.
Cheek Interpolation Flap Text: A decision was made to reconstruct the defect utilizing an interpolation axial flap and a staged reconstruction.  A telfa template was made of the defect.  This telfa template was then used to outline the Cheek Interpolation flap.  The donor area for the pedicle flap was then injected with anesthesia.  The flap was excised through the skin and subcutaneous tissue down to the layer of the underlying musculature.  The interpolation flap was carefully excised within this deep plane to maintain its blood supply.  The edges of the donor site were undermined.   The donor site was closed in a primary fashion.  The pedicle was then rotated into position and sutured.  Once the tube was sutured into place, adequate blood supply was confirmed with blanching and refill.  The pedicle was then wrapped with xeroform gauze and dressed appropriately with a telfa and gauze bandage to ensure continued blood supply and protect the attached pedicle.
Cheek-To-Nose Interpolation Flap Text: A decision was made to reconstruct the defect utilizing an interpolation axial flap and a staged reconstruction.  A telfa template was made of the defect.  This telfa template was then used to outline the Cheek-To-Nose Interpolation flap.  The donor area for the pedicle flap was then injected with anesthesia.  The flap was excised through the skin and subcutaneous tissue down to the layer of the underlying musculature.  The interpolation flap was carefully excised within this deep plane to maintain its blood supply.  The edges of the donor site were undermined.   The donor site was closed in a primary fashion.  The pedicle was then rotated into position and sutured.  Once the tube was sutured into place, adequate blood supply was confirmed with blanching and refill.  The pedicle was then wrapped with xeroform gauze and dressed appropriately with a telfa and gauze bandage to ensure continued blood supply and protect the attached pedicle.
Interpolation Flap Text: A decision was made to reconstruct the defect utilizing an interpolation axial flap and a staged reconstruction.  A telfa template was made of the defect.  This telfa template was then used to outline the interpolation flap.  The donor area for the pedicle flap was then injected with anesthesia.  The flap was excised through the skin and subcutaneous tissue down to the layer of the underlying musculature.  The interpolation flap was carefully excised within this deep plane to maintain its blood supply.  The edges of the donor site were undermined.   The donor site was closed in a primary fashion.  The pedicle was then rotated into position and sutured.  Once the tube was sutured into place, adequate blood supply was confirmed with blanching and refill.  The pedicle was then wrapped with xeroform gauze and dressed appropriately with a telfa and gauze bandage to ensure continued blood supply and protect the attached pedicle.
Melolabial Interpolation Flap Text: A decision was made to reconstruct the defect utilizing an interpolation axial flap and a staged reconstruction.  A telfa template was made of the defect.  This telfa template was then used to outline the melolabial interpolation flap.  The donor area for the pedicle flap was then injected with anesthesia.  The flap was excised through the skin and subcutaneous tissue down to the layer of the underlying musculature.  The pedicle flap was carefully excised within this deep plane to maintain its blood supply.  The edges of the donor site were undermined.   The donor site was closed in a primary fashion.  The pedicle was then rotated into position and sutured.  Once the tube was sutured into place, adequate blood supply was confirmed with blanching and refill.  The pedicle was then wrapped with xeroform gauze and dressed appropriately with a telfa and gauze bandage to ensure continued blood supply and protect the attached pedicle.
Mastoid Interpolation Flap Text: A decision was made to reconstruct the defect utilizing an interpolation axial flap and a staged reconstruction.  A telfa template was made of the defect.  This telfa template was then used to outline the mastoid interpolation flap.  The donor area for the pedicle flap was then injected with anesthesia.  The flap was excised through the skin and subcutaneous tissue down to the layer of the underlying musculature.  The pedicle flap was carefully excised within this deep plane to maintain its blood supply.  The edges of the donor site were undermined.   The donor site was closed in a primary fashion.  The pedicle was then rotated into position and sutured.  Once the tube was sutured into place, adequate blood supply was confirmed with blanching and refill.  The pedicle was then wrapped with xeroform gauze and dressed appropriately with a telfa and gauze bandage to ensure continued blood supply and protect the attached pedicle.
Posterior Auricular Interpolation Flap Text: A decision was made to reconstruct the defect utilizing an interpolation axial flap and a staged reconstruction.  A telfa template was made of the defect.  This telfa template was then used to outline the posterior auricular interpolation flap.  The donor area for the pedicle flap was then injected with anesthesia.  The flap was excised through the skin and subcutaneous tissue down to the layer of the underlying musculature.  The pedicle flap was carefully excised within this deep plane to maintain its blood supply.  The edges of the donor site were undermined.   The donor site was closed in a primary fashion.  The pedicle was then rotated into position and sutured.  Once the tube was sutured into place, adequate blood supply was confirmed with blanching and refill.  The pedicle was then wrapped with xeroform gauze and dressed appropriately with a telfa and gauze bandage to ensure continued blood supply and protect the attached pedicle.
Paramedian Forehead Flap Text: A decision was made to reconstruct the defect utilizing an interpolation axial flap and a staged reconstruction.  A telfa template was made of the defect.  This telfa template was then used to outline the paramedian forehead pedicle flap.  The donor area for the pedicle flap was then injected with anesthesia.  The flap was excised through the skin and subcutaneous tissue down to the layer of the underlying musculature.  The pedicle flap was carefully excised within this deep plane to maintain its blood supply.  The edges of the donor site were undermined.   The donor site was closed in a primary fashion.  The pedicle was then rotated into position and sutured.  Once the tube was sutured into place, adequate blood supply was confirmed with blanching and refill.  The pedicle was then wrapped with xeroform gauze and dressed appropriately with a telfa and gauze bandage to ensure continued blood supply and protect the attached pedicle.
Abbe Flap (Upper To Lower Lip) Text: The defect of the lower lip was assessed and measured.  Given the location and size of the defect, an Abbe flap was deemed most appropriate. Using a sterile surgical marker, an appropriate Abbe flap was measured and drawn on the upper lip. Local anesthesia was then infiltrated.  A scalpel was then used to incise the upper lip through and through the skin, vermilion, muscle and mucosa, leaving the flap pedicled on the opposite side.  The flap was then rotated and transferred to the lower lip defect.  The flap was then sutured into place with a three layer technique, closing the orbicularis oris muscle layer with subcutaneous buried sutures, followed by a mucosal layer and an epidermal layer.
Abbe Flap (Lower To Upper Lip) Text: The defect of the upper lip was assessed and measured.  Given the location and size of the defect, an Abbe flap was deemed most appropriate. Using a sterile surgical marker, an appropriate Abbe flap was measured and drawn on the lower lip. Local anesthesia was then infiltrated. A scalpel was then used to incise the upper lip through and through the skin, vermilion, muscle and mucosa, leaving the flap pedicled on the opposite side.  The flap was then rotated and transferred to the lower lip defect.  The flap was then sutured into place with a three layer technique, closing the orbicularis oris muscle layer with subcutaneous buried sutures, followed by a mucosal layer and an epidermal layer.
Estlander Flap (Upper To Lower Lip) Text: The defect of the lower lip was assessed and measured.  Given the location and size of the defect, an Estlander flap was deemed most appropriate. Using a sterile surgical marker, an appropriate Estlander flap was measured and drawn on the upper lip. Local anesthesia was then infiltrated. A scalpel was then used to incise the lateral aspect of the flap, through skin, muscle and mucosa, leaving the flap pedicled medially.  The flap was then rotated and positioned to fill the lower lip defect.  The flap was then sutured into place with a three layer technique, closing the orbicularis oris muscle layer with subcutaneous buried sutures, followed by a mucosal layer and an epidermal layer.
Lip Wedge Excision Repair Text: Given the location of the defect and the proximity to free margins a full thickness wedge repair was deemed most appropriate.  Using a sterile surgical marker, the appropriate repair was drawn incorporating the defect and placing the expected incisions perpendicular to the vermilion border.  The vermilion border was also meticulously outlined to ensure appropriate reapproximation during the repair.  The area thus outlined was incised through and through with a #15 scalpel blade.  The muscularis and dermis were reaproximated with deep sutures following hemostasis. Care was taken to realign the vermilion border before proceeding with the superficial closure.  Once the vermilion was realigned the superfical and mucosal closure was finished.
Ftsg Text: The defect edges were debeveled with a #15 scalpel blade.  Given the location of the defect, shape of the defect and the proximity to free margins a full thickness skin graft was deemed most appropriate.  Using a sterile surgical marker, the primary defect shape was transferred to the donor site. The area thus outlined was incised deep to adipose tissue with a #15 scalpel blade.  The harvested graft was then trimmed of adipose tissue until only dermis and epidermis was left.  The skin margins of the secondary defect were undermined to an appropriate distance in all directions utilizing iris scissors.  The secondary defect was closed with interrupted buried subcutaneous sutures.  The skin edges were then re-apposed with running  sutures.  The skin graft was then placed in the primary defect and oriented appropriately.
Split-Thickness Skin Graft Text: The defect edges were debeveled with a #15 scalpel blade.  Given the location of the defect, shape of the defect and the proximity to free margins a split thickness skin graft was deemed most appropriate.  Using a sterile surgical marker, the primary defect shape was transferred to the donor site. The split thickness graft was then harvested.  The skin graft was then placed in the primary defect and oriented appropriately.
Pinch Graft Text: The defect edges were debeveled with a #15 scalpel blade. Given the location of the defect, shape of the defect and the proximity to free margins a pinch graft was deemed most appropriate. Using a sterile surgical marker, the primary defect shape was transferred to the donor site. The area thus outlined was incised deep to adipose tissue with a #15 scalpel blade.  The harvested graft was then trimmed of adipose tissue until only dermis and epidermis was left. The skin margins of the secondary defect were undermined to an appropriate distance in all directions utilizing iris scissors.  The secondary defect was closed with interrupted buried subcutaneous sutures.  The skin edges were then re-apposed with running  sutures.  The skin graft was then placed in the primary defect and oriented appropriately.
Burow's Graft Text: The defect edges were debeveled with a #15 scalpel blade.  Given the location of the defect, shape of the defect, the proximity to free margins and the presence of a standing cone deformity a Burow's skin graft was deemed most appropriate. The standing cone was removed and this tissue was then trimmed to the shape of the primary defect. The adipose tissue was also removed until only dermis and epidermis were left.  The skin margins of the secondary defect were undermined to an appropriate distance in all directions utilizing iris scissors.  The secondary defect was closed with interrupted buried subcutaneous sutures.  The skin edges were then re-apposed with running  sutures.  The skin graft was then placed in the primary defect and oriented appropriately.
Cartilage Graft Text: The defect edges were debeveled with a #15 scalpel blade.  Given the location of the defect, shape of the defect, the fact the defect involved a full thickness cartilage defect a cartilage graft was deemed most appropriate.  An appropriate donor site was identified, cleansed, and anesthetized. The cartilage graft was then harvested and transferred to the recipient site, oriented appropriately and then sutured into place.  The secondary defect was then repaired using a primary closure.
Composite Graft Text: The defect edges were debeveled with a #15 scalpel blade.  Given the location of the defect, shape of the defect, the proximity to free margins and the fact the defect was full thickness a composite graft was deemed most appropriate.  The defect was outline and then transferred to the donor site.  A full thickness graft was then excised from the donor site. The graft was then placed in the primary defect, oriented appropriately and then sutured into place.  The secondary defect was then repaired using a primary closure.
Epidermal Autograft Text: The defect edges were debeveled with a #15 scalpel blade.  Given the location of the defect, shape of the defect and the proximity to free margins an epidermal autograft was deemed most appropriate.  Using a sterile surgical marker, the primary defect shape was transferred to the donor site. The epidermal graft was then harvested.  The skin graft was then placed in the primary defect and oriented appropriately.
Dermal Autograft Text: The defect edges were debeveled with a #15 scalpel blade.  Given the location of the defect, shape of the defect and the proximity to free margins a dermal autograft was deemed most appropriate.  Using a sterile surgical marker, the primary defect shape was transferred to the donor site. The area thus outlined was incised deep to adipose tissue with a #15 scalpel blade.  The harvested graft was then trimmed of adipose and epidermal tissue until only dermis was left.  The skin graft was then placed in the primary defect and oriented appropriately.
Skin Substitute Text: The defect edges were debeveled with a #15 scalpel blade.  Given the location of the defect, shape of the defect and the proximity to free margins a skin substitute graft was deemed most appropriate.  The graft material was trimmed to fit the size of the defect. The graft was then placed in the primary defect and oriented appropriately.
Tissue Cultured Epidermal Autograft Text: The defect edges were debeveled with a #15 scalpel blade.  Given the location of the defect, shape of the defect and the proximity to free margins a tissue cultured epidermal autograft was deemed most appropriate.  The graft was then trimmed to fit the size of the defect.  The graft was then placed in the primary defect and oriented appropriately.
Xenograft Text: The defect edges were debeveled with a #15 scalpel blade.  Given the location of the defect, shape of the defect and the proximity to free margins a xenograft was deemed most appropriate.  The graft was then trimmed to fit the size of the defect.  The graft was then placed in the primary defect and oriented appropriately.
Purse String (Intermediate) Text: Given the location of the defect and the characteristics of the surrounding skin a purse string intermediate closure was deemed most appropriate.  Undermining was performed circumferentially around the surgical defect.  A purse string suture was then placed and tightened.
Purse String (Simple) Text: Given the location of the defect and the characteristics of the surrounding skin a purse string simple closure was deemed most appropriate.  Undermining was performed circumferentially around the surgical defect.  A purse string suture was then placed and tightened.
Complex Repair And Single Advancement Flap Text: The defect edges were debeveled with a #15 scalpel blade.  The primary defect was closed partially with a complex linear closure.  Given the location of the remaining defect, shape of the defect and the proximity to free margins a single advancement flap was deemed most appropriate for complete closure of the defect.  Using a sterile surgical marker, an appropriate advancement flap was drawn incorporating the defect and placing the expected incisions within the relaxed skin tension lines where possible.    The area thus outlined was incised deep to adipose tissue with a #15 scalpel blade.  The skin margins were undermined to an appropriate distance in all directions utilizing iris scissors.
Complex Repair And Double Advancement Flap Text: The defect edges were debeveled with a #15 scalpel blade.  The primary defect was closed partially with a complex linear closure.  Given the location of the remaining defect, shape of the defect and the proximity to free margins a double advancement flap was deemed most appropriate for complete closure of the defect.  Using a sterile surgical marker, an appropriate advancement flap was drawn incorporating the defect and placing the expected incisions within the relaxed skin tension lines where possible.    The area thus outlined was incised deep to adipose tissue with a #15 scalpel blade.  The skin margins were undermined to an appropriate distance in all directions utilizing iris scissors.
Complex Repair And Modified Advancement Flap Text: The defect edges were debeveled with a #15 scalpel blade.  The primary defect was closed partially with a complex linear closure.  Given the location of the remaining defect, shape of the defect and the proximity to free margins a modified advancement flap was deemed most appropriate for complete closure of the defect.  Using a sterile surgical marker, an appropriate advancement flap was drawn incorporating the defect and placing the expected incisions within the relaxed skin tension lines where possible.    The area thus outlined was incised deep to adipose tissue with a #15 scalpel blade.  The skin margins were undermined to an appropriate distance in all directions utilizing iris scissors.
Complex Repair And A-T Advancement Flap Text: The defect edges were debeveled with a #15 scalpel blade.  The primary defect was closed partially with a complex linear closure.  Given the location of the remaining defect, shape of the defect and the proximity to free margins an A-T advancement flap was deemed most appropriate for complete closure of the defect.  Using a sterile surgical marker, an appropriate advancement flap was drawn incorporating the defect and placing the expected incisions within the relaxed skin tension lines where possible.    The area thus outlined was incised deep to adipose tissue with a #15 scalpel blade.  The skin margins were undermined to an appropriate distance in all directions utilizing iris scissors.
Complex Repair And O-T Advancement Flap Text: The defect edges were debeveled with a #15 scalpel blade.  The primary defect was closed partially with a complex linear closure.  Given the location of the remaining defect, shape of the defect and the proximity to free margins an O-T advancement flap was deemed most appropriate for complete closure of the defect.  Using a sterile surgical marker, an appropriate advancement flap was drawn incorporating the defect and placing the expected incisions within the relaxed skin tension lines where possible.    The area thus outlined was incised deep to adipose tissue with a #15 scalpel blade.  The skin margins were undermined to an appropriate distance in all directions utilizing iris scissors.
Complex Repair And O-L Flap Text: The defect edges were debeveled with a #15 scalpel blade.  The primary defect was closed partially with a complex linear closure.  Given the location of the remaining defect, shape of the defect and the proximity to free margins an O-L flap was deemed most appropriate for complete closure of the defect.  Using a sterile surgical marker, an appropriate flap was drawn incorporating the defect and placing the expected incisions within the relaxed skin tension lines where possible.    The area thus outlined was incised deep to adipose tissue with a #15 scalpel blade.  The skin margins were undermined to an appropriate distance in all directions utilizing iris scissors.
Complex Repair And Bilobe Flap Text: The defect edges were debeveled with a #15 scalpel blade.  The primary defect was closed partially with a complex linear closure.  Given the location of the remaining defect, shape of the defect and the proximity to free margins a bilobe flap was deemed most appropriate for complete closure of the defect.  Using a sterile surgical marker, an appropriate advancement flap was drawn incorporating the defect and placing the expected incisions within the relaxed skin tension lines where possible.    The area thus outlined was incised deep to adipose tissue with a #15 scalpel blade.  The skin margins were undermined to an appropriate distance in all directions utilizing iris scissors.
Complex Repair And Melolabial Flap Text: The defect edges were debeveled with a #15 scalpel blade.  The primary defect was closed partially with a complex linear closure.  Given the location of the remaining defect, shape of the defect and the proximity to free margins a melolabial flap was deemed most appropriate for complete closure of the defect.  Using a sterile surgical marker, an appropriate advancement flap was drawn incorporating the defect and placing the expected incisions within the relaxed skin tension lines where possible.    The area thus outlined was incised deep to adipose tissue with a #15 scalpel blade.  The skin margins were undermined to an appropriate distance in all directions utilizing iris scissors.
Complex Repair And Rotation Flap Text: The defect edges were debeveled with a #15 scalpel blade.  The primary defect was closed partially with a complex linear closure.  Given the location of the remaining defect, shape of the defect and the proximity to free margins a rotation flap was deemed most appropriate for complete closure of the defect.  Using a sterile surgical marker, an appropriate advancement flap was drawn incorporating the defect and placing the expected incisions within the relaxed skin tension lines where possible.    The area thus outlined was incised deep to adipose tissue with a #15 scalpel blade.  The skin margins were undermined to an appropriate distance in all directions utilizing iris scissors.
Complex Repair And Rhombic Flap Text: The defect edges were debeveled with a #15 scalpel blade.  The primary defect was closed partially with a complex linear closure.  Given the location of the remaining defect, shape of the defect and the proximity to free margins a rhombic flap was deemed most appropriate for complete closure of the defect.  Using a sterile surgical marker, an appropriate advancement flap was drawn incorporating the defect and placing the expected incisions within the relaxed skin tension lines where possible.    The area thus outlined was incised deep to adipose tissue with a #15 scalpel blade.  The skin margins were undermined to an appropriate distance in all directions utilizing iris scissors.
Complex Repair And Transposition Flap Text: The defect edges were debeveled with a #15 scalpel blade.  The primary defect was closed partially with a complex linear closure.  Given the location of the remaining defect, shape of the defect and the proximity to free margins a transposition flap was deemed most appropriate for complete closure of the defect.  Using a sterile surgical marker, an appropriate advancement flap was drawn incorporating the defect and placing the expected incisions within the relaxed skin tension lines where possible.    The area thus outlined was incised deep to adipose tissue with a #15 scalpel blade.  The skin margins were undermined to an appropriate distance in all directions utilizing iris scissors.
Complex Repair And V-Y Plasty Text: The defect edges were debeveled with a #15 scalpel blade.  The primary defect was closed partially with a complex linear closure.  Given the location of the remaining defect, shape of the defect and the proximity to free margins a V-Y plasty was deemed most appropriate for complete closure of the defect.  Using a sterile surgical marker, an appropriate advancement flap was drawn incorporating the defect and placing the expected incisions within the relaxed skin tension lines where possible.    The area thus outlined was incised deep to adipose tissue with a #15 scalpel blade.  The skin margins were undermined to an appropriate distance in all directions utilizing iris scissors.
Complex Repair And M Plasty Text: The defect edges were debeveled with a #15 scalpel blade.  The primary defect was closed partially with a complex linear closure.  Given the location of the remaining defect, shape of the defect and the proximity to free margins an M plasty was deemed most appropriate for complete closure of the defect.  Using a sterile surgical marker, an appropriate advancement flap was drawn incorporating the defect and placing the expected incisions within the relaxed skin tension lines where possible.    The area thus outlined was incised deep to adipose tissue with a #15 scalpel blade.  The skin margins were undermined to an appropriate distance in all directions utilizing iris scissors.
Complex Repair And Double M Plasty Text: The defect edges were debeveled with a #15 scalpel blade.  The primary defect was closed partially with a complex linear closure.  Given the location of the remaining defect, shape of the defect and the proximity to free margins a double M plasty was deemed most appropriate for complete closure of the defect.  Using a sterile surgical marker, an appropriate advancement flap was drawn incorporating the defect and placing the expected incisions within the relaxed skin tension lines where possible.    The area thus outlined was incised deep to adipose tissue with a #15 scalpel blade.  The skin margins were undermined to an appropriate distance in all directions utilizing iris scissors.
Complex Repair And W Plasty Text: The defect edges were debeveled with a #15 scalpel blade.  The primary defect was closed partially with a complex linear closure.  Given the location of the remaining defect, shape of the defect and the proximity to free margins a W plasty was deemed most appropriate for complete closure of the defect.  Using a sterile surgical marker, an appropriate advancement flap was drawn incorporating the defect and placing the expected incisions within the relaxed skin tension lines where possible.    The area thus outlined was incised deep to adipose tissue with a #15 scalpel blade.  The skin margins were undermined to an appropriate distance in all directions utilizing iris scissors.
Complex Repair And Z Plasty Text: The defect edges were debeveled with a #15 scalpel blade.  The primary defect was closed partially with a complex linear closure.  Given the location of the remaining defect, shape of the defect and the proximity to free margins a Z plasty was deemed most appropriate for complete closure of the defect.  Using a sterile surgical marker, an appropriate advancement flap was drawn incorporating the defect and placing the expected incisions within the relaxed skin tension lines where possible.    The area thus outlined was incised deep to adipose tissue with a #15 scalpel blade.  The skin margins were undermined to an appropriate distance in all directions utilizing iris scissors.
Complex Repair And Dorsal Nasal Flap Text: The defect edges were debeveled with a #15 scalpel blade.  The primary defect was closed partially with a complex linear closure.  Given the location of the remaining defect, shape of the defect and the proximity to free margins a dorsal nasal flap was deemed most appropriate for complete closure of the defect.  Using a sterile surgical marker, an appropriate flap was drawn incorporating the defect and placing the expected incisions within the relaxed skin tension lines where possible.    The area thus outlined was incised deep to adipose tissue with a #15 scalpel blade.  The skin margins were undermined to an appropriate distance in all directions utilizing iris scissors.
Complex Repair And Ftsg Text: The defect edges were debeveled with a #15 scalpel blade.  The primary defect was closed partially with a complex linear closure.  Given the location of the defect, shape of the defect and the proximity to free margins a full thickness skin graft was deemed most appropriate to repair the remaining defect.  The graft was trimmed to fit the size of the remaining defect.  The graft was then placed in the primary defect, oriented appropriately, and sutured into place.
Complex Repair And Burow's Graft Text: The defect edges were debeveled with a #15 scalpel blade.  The primary defect was closed partially with a complex linear closure.  Given the location of the defect, shape of the defect, the proximity to free margins and the presence of a standing cone deformity a Burow's graft was deemed most appropriate to repair the remaining defect.  The graft was trimmed to fit the size of the remaining defect.  The graft was then placed in the primary defect, oriented appropriately, and sutured into place.
Complex Repair And Split-Thickness Skin Graft Text: The defect edges were debeveled with a #15 scalpel blade.  The primary defect was closed partially with a complex linear closure.  Given the location of the defect, shape of the defect and the proximity to free margins a split thickness skin graft was deemed most appropriate to repair the remaining defect.  The graft was trimmed to fit the size of the remaining defect.  The graft was then placed in the primary defect, oriented appropriately, and sutured into place.
Complex Repair And Epidermal Autograft Text: The defect edges were debeveled with a #15 scalpel blade.  The primary defect was closed partially with a complex linear closure.  Given the location of the defect, shape of the defect and the proximity to free margins an epidermal autograft was deemed most appropriate to repair the remaining defect.  The graft was trimmed to fit the size of the remaining defect.  The graft was then placed in the primary defect, oriented appropriately, and sutured into place.
Complex Repair And Dermal Autograft Text: The defect edges were debeveled with a #15 scalpel blade.  The primary defect was closed partially with a complex linear closure.  Given the location of the defect, shape of the defect and the proximity to free margins an dermal autograft was deemed most appropriate to repair the remaining defect.  The graft was trimmed to fit the size of the remaining defect.  The graft was then placed in the primary defect, oriented appropriately, and sutured into place.
Complex Repair And Tissue Cultured Epidermal Autograft Text: The defect edges were debeveled with a #15 scalpel blade.  The primary defect was closed partially with a complex linear closure.  Given the location of the defect, shape of the defect and the proximity to free margins an tissue cultured epidermal autograft was deemed most appropriate to repair the remaining defect.  The graft was trimmed to fit the size of the remaining defect.  The graft was then placed in the primary defect, oriented appropriately, and sutured into place.
Complex Repair And Xenograft Text: The defect edges were debeveled with a #15 scalpel blade.  The primary defect was closed partially with a complex linear closure.  Given the location of the defect, shape of the defect and the proximity to free margins a xenograft was deemed most appropriate to repair the remaining defect.  The graft was trimmed to fit the size of the remaining defect.  The graft was then placed in the primary defect, oriented appropriately, and sutured into place.
Complex Repair And Skin Substitute Graft Text: The defect edges were debeveled with a #15 scalpel blade.  The primary defect was closed partially with a complex linear closure.  Given the location of the remaining defect, shape of the defect and the proximity to free margins a skin substitute graft was deemed most appropriate to repair the remaining defect.  The graft was trimmed to fit the size of the remaining defect.  The graft was then placed in the primary defect, oriented appropriately, and sutured into place.
Path Notes (To The Dermatopathologist): Please check margins.
Consent was obtained from the patient. The risks and benefits to therapy were discussed in detail. Specifically, the risks of infection, scarring, bleeding, prolonged wound healing, incomplete removal, allergy to anesthesia, nerve injury and recurrence were addressed. Prior to the procedure, the treatment site was clearly identified and confirmed by the patient. All components of Universal Protocol/PAUSE Rule completed.
Post-Care Instructions: I reviewed with the patient in detail post-care instructions. Patient is not to engage in any heavy lifting, exercise, or swimming for the next 14 days. Should the patient develop any fevers, chills, bleeding, severe pain patient will contact the office immediately.
Home Suture Removal Text: Patient was provided a home suture removal kit and will remove their sutures at home.  If they have any questions or difficulties they will call the office.
Where Do You Want The Question To Include Opioid Counseling Located?: Case Summary Tab
Billing Type: Third-Party Bill
Information: Selecting Yes will display possible errors in your note based on the variables you have selected. This validation is only offered as a suggestion for you. PLEASE NOTE THAT THE VALIDATION TEXT WILL BE REMOVED WHEN YOU FINALIZE YOUR NOTE. IF YOU WANT TO FAX A PRELIMINARY NOTE YOU WILL NEED TO TOGGLE THIS TO 'NO' IF YOU DO NOT WANT IT IN YOUR FAXED NOTE.

## 2024-04-17 DIAGNOSIS — E03.9 ACQUIRED HYPOTHYROIDISM: ICD-10-CM

## 2024-04-18 DIAGNOSIS — E11.65 TYPE 2 DIABETES MELLITUS WITH HYPERGLYCEMIA, WITH LONG-TERM CURRENT USE OF INSULIN (HCC): ICD-10-CM

## 2024-04-18 DIAGNOSIS — Z79.4 TYPE 2 DIABETES MELLITUS WITH HYPERGLYCEMIA, WITH LONG-TERM CURRENT USE OF INSULIN (HCC): ICD-10-CM

## 2024-04-18 RX ORDER — DAPAGLIFLOZIN 10 MG/1
TABLET, FILM COATED ORAL
Qty: 90 TABLET | Refills: 1 | Status: SHIPPED | OUTPATIENT
Start: 2024-04-18

## 2024-04-18 RX ORDER — LEVOTHYROXINE SODIUM 137 MCG
TABLET ORAL
Qty: 90 TABLET | Refills: 2 | Status: SHIPPED | OUTPATIENT
Start: 2024-04-18

## 2024-04-18 NOTE — TELEPHONE ENCOUNTER
Received request via: Pharmacy    Was the patient seen in the last year in this department? Yes  9/28/23  Does the patient have an active prescription (recently filled or refills available) for medication(s) requested? No    Pharmacy Name: [facility]    Does the patient have nursing home Plus and need 100 day supply (blood pressure, diabetes and cholesterol meds only)? Medication is not for cholesterol, blood pressure or diabetes

## 2024-06-13 ENCOUNTER — OFFICE VISIT (OUTPATIENT)
Dept: MEDICAL GROUP | Facility: LAB | Age: 54
End: 2024-06-13
Payer: COMMERCIAL

## 2024-06-13 VITALS
HEIGHT: 73 IN | BODY MASS INDEX: 31.07 KG/M2 | DIASTOLIC BLOOD PRESSURE: 64 MMHG | WEIGHT: 234.4 LBS | RESPIRATION RATE: 14 BRPM | SYSTOLIC BLOOD PRESSURE: 124 MMHG | TEMPERATURE: 96.9 F | HEART RATE: 88 BPM | OXYGEN SATURATION: 95 %

## 2024-06-13 DIAGNOSIS — Z79.4 TYPE 2 DIABETES MELLITUS WITH HYPERGLYCEMIA, WITH LONG-TERM CURRENT USE OF INSULIN (HCC): ICD-10-CM

## 2024-06-13 DIAGNOSIS — Z00.00 WELLNESS EXAMINATION: ICD-10-CM

## 2024-06-13 DIAGNOSIS — I10 ESSENTIAL HYPERTENSION: ICD-10-CM

## 2024-06-13 DIAGNOSIS — D17.0 LIPOMA OF NECK: ICD-10-CM

## 2024-06-13 DIAGNOSIS — E11.65 TYPE 2 DIABETES MELLITUS WITH HYPERGLYCEMIA, WITH LONG-TERM CURRENT USE OF INSULIN (HCC): ICD-10-CM

## 2024-06-13 LAB
HBA1C MFR BLD: 9 % (ref ?–5.8)
POCT INT CON NEG: NEGATIVE
POCT INT CON POS: POSITIVE

## 2024-06-13 PROCEDURE — 99214 OFFICE O/P EST MOD 30 MIN: CPT | Performed by: FAMILY MEDICINE

## 2024-06-13 PROCEDURE — 83036 HEMOGLOBIN GLYCOSYLATED A1C: CPT | Performed by: FAMILY MEDICINE

## 2024-06-13 PROCEDURE — 3078F DIAST BP <80 MM HG: CPT | Performed by: FAMILY MEDICINE

## 2024-06-13 PROCEDURE — 3074F SYST BP LT 130 MM HG: CPT | Performed by: FAMILY MEDICINE

## 2024-06-13 RX ORDER — TESTOSTERONE CYPIONATE 200 MG/ML
200 INJECTION, SOLUTION INTRAMUSCULAR ONCE
COMMUNITY

## 2024-06-13 RX ORDER — INSULIN GLARGINE 100 [IU]/ML
INJECTION, SOLUTION SUBCUTANEOUS
Qty: 60 ML | Refills: 2 | Status: SHIPPED | OUTPATIENT
Start: 2024-06-13

## 2024-06-13 RX ORDER — SPIRONOLACTONE 25 MG/1
TABLET ORAL
Qty: 45 TABLET | Refills: 3 | Status: SHIPPED | OUTPATIENT
Start: 2024-06-13

## 2024-06-13 RX ORDER — TIRZEPATIDE 2.5 MG/.5ML
2.5 INJECTION, SOLUTION SUBCUTANEOUS
Qty: 2 ML | Refills: 1 | Status: SHIPPED | OUTPATIENT
Start: 2024-06-13

## 2024-06-13 ASSESSMENT — FIBROSIS 4 INDEX: FIB4 SCORE: 0.81

## 2024-06-13 NOTE — PROGRESS NOTES
Subjective:     Chief Complaint   Patient presents with    Diabetes Follow-up         HPI:   Brett presents today to follow up diabetes. Was previously seeing endocrinology.   A1C today 9.0%.   No prior intolerance to meds. Is struggling though to get the ozempic dosing increased due to nausea.   Farxiga  Metformin  Ozempic  Insulin, long acting.     Diet: Restricting quantity, but working on more veggies, salads. Having hard time with crunchy foods, carbs.   Exercise: struggles with back pain.   Sleep: pretty good, up once to urinate.     Eye exam upcoming.   Prior L4-5 fusion, left foot lateral half numb a bit.   Some cramping in hands with use.   Sees derm regularly.           Current Outpatient Medications Ordered in Epic   Medication Sig Dispense Refill    metformin (GLUCOPHAGE) 1000 MG tablet       INSULIN GLARGINE 100 UNIT/ML injection PEN INJECT 80 UNITS            SUBCUTANEOUSLY IN THE      EVENING 60 mL 2    SYNTHROID 137 MCG Tab TAKE 1 TABLET EVERY MORNINGON AN EMPTY STOMACH 90 Tablet 2    FARXIGA 10 MG Tab TAKE 1 TABLET DAILY 90 Tablet 1    carvedilol (COREG) 25 MG Tab TAKE 1/2 TABLET TWICE A  Tablet 3    irbesartan (AVAPRO) 150 MG Tab Take 2 Tablets by mouth every day. 180 Tablet 3    allopurinol (ZYLOPRIM) 300 MG Tab Take 1 Tablet by mouth every day. 90 Tablet 3    fenofibrate (TRICOR) 145 MG Tab TAKE 1 TABLET DAILY. 90 Tablet 3    hydroCHLOROthiazide (HYDRODIURIL) 12.5 MG tablet Take 1 Tablet by mouth every day. 90 Tablet 3    lansoprazole (PREVACID) 30 MG CAPSULE DELAYED RELEASE Take 1 Capsule by mouth 2 times a day. 180 Capsule 3    Continuous Blood Gluc Sensor (FREESTYLE DANIEL 2 SENSOR) Misc       Semaglutide,0.25 or 0.5MG/DOS, 2 MG/1.5ML Solution Pen-injector Inject 0.25 mg under the skin every 7 days. 1.5 mL 3    spironolactone (ALDACTONE) 25 MG Tab TAKE 1/2 TABLET DAILY Strength: 25 mg 45 Tablet 3    atorvastatin (LIPITOR) 40 MG Tab TAKE 1 TABLET DAILY 90 Tablet 1    metaxalone (SKELAXIN)  "800 MG Tab TAKE 1 TABLET 3 TIMES A DAYAS NEEDED FOR MILD PAIN    MODERATE PAIN OR MUSCLE    SPASMS 270 Tablet 0    metFORMIN ER (GLUCOPHAGE XR) 500 MG TABLET SR 24 HR Take 2 Tablets by mouth 2 times a day. Please make an appointment for future refills 360 Tablet 0    Insulin Pen Needle 32 G x 4 mm (BD PEN NEEDLE CARLY 2ND GEN) USE FOR INSULIN INJECTION  ONCE DAILY 100 Each 3    vitamin D, Ergocalciferol, (DRISDOL) 1.25 MG (61763 UT) Cap capsule TAKE 1 CAPSULE BY MOUTH EVERY 7 DAYS 12 capsule 3    aspirin 81 MG EC tablet Take 81 mg by mouth every day.      vitamin D (VITAMIND D3) 1000 UNIT Tab Take 1,000 Units by mouth every day.      Blood Glucose Monitoring Suppl Device Meter: Dispense One Touch Ultra 2 meter. Sig. Use as directed for blood sugar monitoring. 1 Device 0    Blood Glucose Test Strips Test strips order: Test strips for One Touch Ultra 2 meter. Sig: use QID and prn ssx high or low sugar 100 Each 2    Lancets Lancets order: Lancets for One Touch Ultra 2 meter. Sig: use QID and prn ssx high or low sugar. 100 Each 3    Omega 3 1000 MG Cap Take 4 a day 100 Cap 6    Syringe/Needle, Disp, (SYRINGE 3CC/16DR5-8/2\") 22G X 1-1/2\" 3 ML Misc For use with Intra-muscular testosterone  injection every 2 weeks. 12 Each 1    fluticasone (FLONASE) 50 MCG/ACT nasal spray Spray 2 Sprays in nose every day. 16 g 0     No current Epic-ordered facility-administered medications on file.         ROS:  Gen: no fevers/chills, no changes in weight  Eyes: no changes in vision  ENT: no sore throat, no hearing loss, no bloody nose  Pulm: no sob, no cough  CV: no chest pain, no palpitations  GI: no nausea/vomiting, no diarrhea  : no dysuria  MSk: no myalgias  Skin: no rash  Neuro: no headaches, no numbness/tingling  Heme/Lymph: no easy bruising      Objective:     Exam:  /64 (BP Location: Right arm, Patient Position: Sitting, BP Cuff Size: Adult)   Pulse 88   Temp 36.1 °C (96.9 °F)   Resp 14   Ht 1.854 m (6' 1\")   Wt 106 kg " (234 lb 6.4 oz)   SpO2 95%   BMI 30.93 kg/m²  Body mass index is 30.93 kg/m².    Gen: Alert and oriented, No apparent distress.  Neck: Neck is supple without lymphadenopathy.  Lungs: Normal effort, CTA bilaterally, no wheezes, rhonchi, or rales  CV: Regular rate and rhythm. No murmurs, rubs, or gallops.  Ext: No clubbing, cyanosis, edema.  Normal foot exam. Normal distal pulses.         Assessment & Plan:     53 y.o. male with the following -     1. Type 2 diabetes mellitus with hyperglycemia, with long-term current use of insulin ([facility])  Discussed uncontrolled diabetes.  Would really like to get him seen by endocrinology soon as possible.  Will try to swap out the Ozempic for Mounjaro to see if he has better side effect profile with this.  Will continue his current medications otherwise.  I think he may be over basal lysed with long-acting insulin and may need some daytime insulin as well.  Will get some updated labs as well.  - POCT  A1C  - insulin glargine 100 UNIT/ML Solution Pen-injector injection; 50 units twice daily  Dispense: 60 mL; Refill: 2  - Referral to Endocrinology  - Comp Metabolic Panel; Future  - Lipid Profile; Future  - PROTEIN/CREAT RATIO URINE; Future  - Tirzepatide (MOUNJARO) 2.5 MG/0.5ML Solution Pen-injector; Inject 2.5 mg under the skin every 7 days.  Dispense: 2 mL; Refill: 1    2. Wellness examination    - CBC WITH DIFFERENTIAL; Future  - TSH WITH REFLEX TO FT4; Future  - VITAMIN D,25 HYDROXY (DEFICIENCY); Future    3. Lipoma of neck    - Referral to General Surgery            No follow-ups on file.    Please note that this dictation was created using voice recognition software. I have made every reasonable attempt to correct obvious errors, but I expect that there are errors of grammar and possibly content that I did not discover before finalizing the note.

## 2024-06-27 ENCOUNTER — OFFICE VISIT (OUTPATIENT)
Dept: MEDICAL GROUP | Facility: LAB | Age: 54
End: 2024-06-27
Payer: COMMERCIAL

## 2024-06-27 VITALS
SYSTOLIC BLOOD PRESSURE: 122 MMHG | OXYGEN SATURATION: 96 % | DIASTOLIC BLOOD PRESSURE: 74 MMHG | TEMPERATURE: 97.2 F | HEIGHT: 73 IN | WEIGHT: 240 LBS | RESPIRATION RATE: 16 BRPM | HEART RATE: 60 BPM | BODY MASS INDEX: 31.81 KG/M2

## 2024-06-27 DIAGNOSIS — Z79.4 TYPE 2 DIABETES MELLITUS WITH HYPERGLYCEMIA, WITH LONG-TERM CURRENT USE OF INSULIN (HCC): ICD-10-CM

## 2024-06-27 DIAGNOSIS — E11.65 TYPE 2 DIABETES MELLITUS WITH HYPERGLYCEMIA, WITH LONG-TERM CURRENT USE OF INSULIN (HCC): ICD-10-CM

## 2024-06-27 DIAGNOSIS — Z02.89 PHYSICAL EXAM FOR CAMP: ICD-10-CM

## 2024-06-27 ASSESSMENT — FIBROSIS 4 INDEX: FIB4 SCORE: 0.81

## 2024-06-27 NOTE — PROGRESS NOTES
Subjective:     Chief Complaint   Patient presents with    Paperwork     Camp Physical Form           HPI:   Brett presents today for paperwork for camp clearance.  Has a history of poorly controlled diabetes.  Referred to endocrinology.  Not scheduled yet.     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     He will be gone for a week, starting in 2 days.      Still taking ozempic for now. No mounjaro paperwork, or denial yet.             Current Outpatient Medications Ordered in Epic   Medication Sig Dispense Refill    metformin (GLUCOPHAGE) 1000 MG tablet Take 1,000 mg by mouth 2 times a day with meals.      insulin glargine 100 UNIT/ML Solution Pen-injector injection 50 units twice daily 60 mL 2    testosterone cypionate (DEPO-TESTOSTERONE) 200 MG/ML injection Inject 200 mg into the shoulder, thigh, or buttocks one time. Every 2 weeks, urology managing      Tirzepatide (MOUNJARO) 2.5 MG/0.5ML Solution Pen-injector Inject 2.5 mg under the skin every 7 days. 2 mL 1    spironolactone (ALDACTONE) 25 MG Tab TAKE 1/2 TABLET DAILY Strength: 25 mg 45 Tablet 3    SYNTHROID 137 MCG Tab TAKE 1 TABLET EVERY MORNINGON AN EMPTY STOMACH 90 Tablet 2    FARXIGA 10 MG Tab TAKE 1 TABLET DAILY 90 Tablet 1    carvedilol (COREG) 25 MG Tab TAKE 1/2 TABLET TWICE A  Tablet 3    irbesartan (AVAPRO) 150 MG Tab Take 2 Tablets by mouth every day. 180 Tablet 3    allopurinol (ZYLOPRIM) 300 MG Tab Take 1 Tablet by mouth every day. 90 Tablet 3    fenofibrate (TRICOR) 145 MG Tab TAKE 1 TABLET DAILY. 90 Tablet 3    hydroCHLOROthiazide (HYDRODIURIL) 12.5 MG tablet Take 1 Tablet by mouth every day. 90 Tablet 3    lansoprazole (PREVACID) 30 MG CAPSULE DELAYED RELEASE Take 1 Capsule by mouth 2 times a day. 180 Capsule 3    Continuous Blood Gluc Sensor (FREESTYLE DANIEL 2 SENSOR) Misc       Semaglutide,0.25 or 0.5MG/DOS, 2 MG/1.5ML Solution Pen-injector Inject 0.25 mg under the skin every 7 days. 1.5 mL 3    atorvastatin (LIPITOR) 40 MG  "Tab TAKE 1 TABLET DAILY 90 Tablet 1    metaxalone (SKELAXIN) 800 MG Tab TAKE 1 TABLET 3 TIMES A DAYAS NEEDED FOR MILD PAIN    MODERATE PAIN OR MUSCLE    SPASMS 270 Tablet 0    Insulin Pen Needle 32 G x 4 mm (BD PEN NEEDLE CARLY 2ND GEN) USE FOR INSULIN INJECTION  ONCE DAILY 100 Each 3    vitamin D, Ergocalciferol, (DRISDOL) 1.25 MG (72716 UT) Cap capsule TAKE 1 CAPSULE BY MOUTH EVERY 7 DAYS 12 capsule 3    aspirin 81 MG EC tablet Take 81 mg by mouth every day.      vitamin D (VITAMIND D3) 1000 UNIT Tab Take 1,000 Units by mouth every day.      Blood Glucose Monitoring Suppl Device Meter: Dispense One Touch Ultra 2 meter. Sig. Use as directed for blood sugar monitoring. 1 Device 0    Blood Glucose Test Strips Test strips order: Test strips for One Touch Ultra 2 meter. Sig: use QID and prn ssx high or low sugar 100 Each 2    Lancets Lancets order: Lancets for One Touch Ultra 2 meter. Sig: use QID and prn ssx high or low sugar. 100 Each 3    Omega 3 1000 MG Cap Take 4 a day 100 Cap 6    Syringe/Needle, Disp, (SYRINGE 3CC/03WF4-4/2\") 22G X 1-1/2\" 3 ML Misc For use with Intra-muscular testosterone  injection every 2 weeks. 12 Each 1    fluticasone (FLONASE) 50 MCG/ACT nasal spray Spray 2 Sprays in nose every day. 16 g 0     No current Epic-ordered facility-administered medications on file.         ROS:  Gen: no fevers/chills, no changes in weight  Eyes: no changes in vision  ENT: no sore throat, no hearing loss, no bloody nose  Pulm: no sob, no cough  CV: no chest pain, no palpitations  GI: no nausea/vomiting, no diarrhea  : no dysuria  MSk: no myalgias  Skin: no rash  Neuro: no headaches, no numbness/tingling  Heme/Lymph: no easy bruising      Objective:     Exam:  /74   Pulse 60   Temp 36.2 °C (97.2 °F)   Resp 16   Ht 1.854 m (6' 1\")   Wt 109 kg (240 lb)   SpO2 96%   BMI 31.66 kg/m²  Body mass index is 31.66 kg/m².    Gen: Alert and oriented, No apparent distress.  Neck: Neck is supple without " lymphadenopathy.  Lungs: Normal effort, CTA bilaterally, no wheezes, rhonchi, or rales  CV: Regular rate and rhythm. No murmurs, rubs, or gallops.  Ext: No clubbing, cyanosis, edema.      Assessment & Plan:     53 y.o. male with the following -     1. Type 2 diabetes mellitus with hyperglycemia, with long-term current use of insulin ([facility])  Discussed poor control.  He will contact endocrinology to get set up.  We did discuss that while he does have poorly controlled diabetes it sickly does not preclude him from participating in normal daily activities.  We did discuss that he is not doing any high adventure activities with [place].  Paperwork filled out stating as such.  We did discuss use of his medications and storage of such when he is there as well.  Discussed maintaining good hydration overall.    2. Physical exam for [place]              No follow-ups on file.    Please note that this dictation was created using voice recognition software. I have made every reasonable attempt to correct obvious errors, but I expect that there are errors of grammar and possibly content that I did not discover before finalizing the note.

## 2024-07-13 ENCOUNTER — HOSPITAL ENCOUNTER (OUTPATIENT)
Dept: LAB | Facility: MEDICAL CENTER | Age: 54
End: 2024-07-13
Attending: FAMILY MEDICINE
Payer: COMMERCIAL

## 2024-07-13 DIAGNOSIS — E11.65 TYPE 2 DIABETES MELLITUS WITH HYPERGLYCEMIA, WITH LONG-TERM CURRENT USE OF INSULIN (HCC): ICD-10-CM

## 2024-07-13 DIAGNOSIS — Z00.00 WELLNESS EXAMINATION: ICD-10-CM

## 2024-07-13 DIAGNOSIS — Z79.4 TYPE 2 DIABETES MELLITUS WITH HYPERGLYCEMIA, WITH LONG-TERM CURRENT USE OF INSULIN (HCC): ICD-10-CM

## 2024-07-13 LAB
25(OH)D3 SERPL-MCNC: 42 NG/ML (ref 30–100)
ALBUMIN SERPL BCP-MCNC: 4.3 G/DL (ref 3.2–4.9)
ALBUMIN/GLOB SERPL: 1.8 G/DL
ALP SERPL-CCNC: 65 U/L (ref 30–99)
ALT SERPL-CCNC: 30 U/L (ref 2–50)
ANION GAP SERPL CALC-SCNC: 12 MMOL/L (ref 7–16)
AST SERPL-CCNC: 25 U/L (ref 12–45)
BASOPHILS # BLD AUTO: 0.8 % (ref 0–1.8)
BASOPHILS # BLD: 0.05 K/UL (ref 0–0.12)
BILIRUB SERPL-MCNC: 0.6 MG/DL (ref 0.1–1.5)
BUN SERPL-MCNC: 14 MG/DL (ref 8–22)
CALCIUM ALBUM COR SERPL-MCNC: 10.3 MG/DL (ref 8.5–10.5)
CALCIUM SERPL-MCNC: 10.5 MG/DL (ref 8.5–10.5)
CHLORIDE SERPL-SCNC: 105 MMOL/L (ref 96–112)
CHOLEST SERPL-MCNC: 141 MG/DL (ref 100–199)
CO2 SERPL-SCNC: 25 MMOL/L (ref 20–33)
CREAT SERPL-MCNC: 1.02 MG/DL (ref 0.5–1.4)
CREAT UR-MCNC: 256.07 MG/DL
EOSINOPHIL # BLD AUTO: 0.18 K/UL (ref 0–0.51)
EOSINOPHIL NFR BLD: 3 % (ref 0–6.9)
ERYTHROCYTE [DISTWIDTH] IN BLOOD BY AUTOMATED COUNT: 42 FL (ref 35.9–50)
GFR SERPLBLD CREATININE-BSD FMLA CKD-EPI: 86 ML/MIN/1.73 M 2
GLOBULIN SER CALC-MCNC: 2.4 G/DL (ref 1.9–3.5)
GLUCOSE SERPL-MCNC: 84 MG/DL (ref 65–99)
HCT VFR BLD AUTO: 47 % (ref 42–52)
HDLC SERPL-MCNC: 27 MG/DL
HGB BLD-MCNC: 15.3 G/DL (ref 14–18)
IMM GRANULOCYTES # BLD AUTO: 0.07 K/UL (ref 0–0.11)
IMM GRANULOCYTES NFR BLD AUTO: 1.2 % (ref 0–0.9)
LDLC SERPL CALC-MCNC: 78 MG/DL
LYMPHOCYTES # BLD AUTO: 1.12 K/UL (ref 1–4.8)
LYMPHOCYTES NFR BLD: 18.6 % (ref 22–41)
MCH RBC QN AUTO: 27.9 PG (ref 27–33)
MCHC RBC AUTO-ENTMCNC: 32.6 G/DL (ref 32.3–36.5)
MCV RBC AUTO: 85.6 FL (ref 81.4–97.8)
MONOCYTES # BLD AUTO: 0.51 K/UL (ref 0–0.85)
MONOCYTES NFR BLD AUTO: 8.5 % (ref 0–13.4)
NEUTROPHILS # BLD AUTO: 4.09 K/UL (ref 1.82–7.42)
NEUTROPHILS NFR BLD: 67.9 % (ref 44–72)
NRBC # BLD AUTO: 0 K/UL
NRBC BLD-RTO: 0 /100 WBC (ref 0–0.2)
PLATELET # BLD AUTO: 204 K/UL (ref 164–446)
PMV BLD AUTO: 11.2 FL (ref 9–12.9)
POTASSIUM SERPL-SCNC: 4.1 MMOL/L (ref 3.6–5.5)
PROT SERPL-MCNC: 6.7 G/DL (ref 6–8.2)
PROT UR-MCNC: 13 MG/DL (ref 0–15)
PROT/CREAT UR: 51 MG/G (ref 15–68)
RBC # BLD AUTO: 5.49 M/UL (ref 4.7–6.1)
SODIUM SERPL-SCNC: 142 MMOL/L (ref 135–145)
TRIGL SERPL-MCNC: 181 MG/DL (ref 0–149)
TSH SERPL DL<=0.005 MIU/L-ACNC: 3.91 UIU/ML (ref 0.38–5.33)
WBC # BLD AUTO: 6 K/UL (ref 4.8–10.8)

## 2024-07-13 PROCEDURE — 85025 COMPLETE CBC W/AUTO DIFF WBC: CPT

## 2024-07-13 PROCEDURE — 80061 LIPID PANEL: CPT

## 2024-07-13 PROCEDURE — 84443 ASSAY THYROID STIM HORMONE: CPT

## 2024-07-13 PROCEDURE — 82306 VITAMIN D 25 HYDROXY: CPT

## 2024-07-13 PROCEDURE — 82570 ASSAY OF URINE CREATININE: CPT

## 2024-07-13 PROCEDURE — 80053 COMPREHEN METABOLIC PANEL: CPT

## 2024-07-13 PROCEDURE — 84156 ASSAY OF PROTEIN URINE: CPT

## 2024-07-13 PROCEDURE — 36415 COLL VENOUS BLD VENIPUNCTURE: CPT

## 2024-07-25 ENCOUNTER — APPOINTMENT (OUTPATIENT)
Dept: SURGICAL ONCOLOGY | Facility: MEDICAL CENTER | Age: 54
End: 2024-07-25
Payer: COMMERCIAL

## 2024-07-26 ENCOUNTER — HOSPITAL ENCOUNTER (OUTPATIENT)
Dept: LAB | Facility: MEDICAL CENTER | Age: 54
End: 2024-07-26
Attending: PHYSICIAN ASSISTANT
Payer: COMMERCIAL

## 2024-07-26 LAB
ESTRADIOL SERPL-MCNC: 28.7 PG/ML
PSA SERPL-MCNC: 4.13 NG/ML (ref 0–4)
TESTOST SERPL-MCNC: 335 NG/DL (ref 175–781)

## 2024-07-26 PROCEDURE — 82670 ASSAY OF TOTAL ESTRADIOL: CPT

## 2024-07-26 PROCEDURE — 36415 COLL VENOUS BLD VENIPUNCTURE: CPT

## 2024-07-26 PROCEDURE — 84403 ASSAY OF TOTAL TESTOSTERONE: CPT

## 2024-07-26 PROCEDURE — 84153 ASSAY OF PSA TOTAL: CPT

## 2024-08-20 ENCOUNTER — APPOINTMENT (OUTPATIENT)
Dept: SURGICAL ONCOLOGY | Facility: MEDICAL CENTER | Age: 54
End: 2024-08-20
Payer: COMMERCIAL

## 2024-08-20 VITALS
BODY MASS INDEX: 31.83 KG/M2 | DIASTOLIC BLOOD PRESSURE: 68 MMHG | SYSTOLIC BLOOD PRESSURE: 118 MMHG | HEART RATE: 86 BPM | TEMPERATURE: 96.1 F | WEIGHT: 235 LBS | HEIGHT: 72 IN | OXYGEN SATURATION: 97 %

## 2024-08-20 DIAGNOSIS — D17.0 LIPOMA OF NECK: ICD-10-CM

## 2024-08-20 PROCEDURE — 99203 OFFICE O/P NEW LOW 30 MIN: CPT | Performed by: ORTHOPAEDIC SURGERY

## 2024-08-20 PROCEDURE — 3074F SYST BP LT 130 MM HG: CPT | Performed by: ORTHOPAEDIC SURGERY

## 2024-08-20 PROCEDURE — 3078F DIAST BP <80 MM HG: CPT | Performed by: ORTHOPAEDIC SURGERY

## 2024-08-20 ASSESSMENT — ENCOUNTER SYMPTOMS
CHILLS: 0
WEAKNESS: 0
SENSORY CHANGE: 0
MYALGIAS: 0
FEVER: 0
SHORTNESS OF BREATH: 0

## 2024-08-20 ASSESSMENT — FIBROSIS 4 INDEX: FIB4 SCORE: 1.23

## 2024-08-20 NOTE — PROGRESS NOTES
Subjective:   8/20/2024  2:33 PM  Primary care physician:Lisa Wagner M.D.    Chief Complaint: Soft tissue mass of neck    History of presenting illness:  Brett Vicente  is a pleasant 53 y.o. male who was referred for evaluation of a posterior neck soft tissue mass.  He reports he is have the mass for 5-8 years.  It has slowly grown with time.  It is not particularly painful, but he states he does notice it often.  He denies any skin changes overlying the mass.  He does have 2 other similar masses on his right chest wall and left flank.  He reports those masses have not grown at all.  He denies any personal history of cancer, but does have a brother with prostate cancer.  He denies any tobacco use.  He does have significant past medical history of poorly controlled diabetes with last hemoglobin A1c of 9.0.  He reports his doctor has changed his medications recently to work on lowering his A1c.    Past Medical History:   Diagnosis Date    Diabetes (HCC)     Fatty liver 4/24/2018    GERD (gastroesophageal reflux disease)     Gout     Hyperlipidemia     Hypertension     Hypothyroidism 6/25/2015     ICD-10 transition    Thyroid disease      Past Surgical History:   Procedure Laterality Date    LUMBAR FUSION ANTERIOR  6/10/2009    Performed by LISA MONTERO at [facility]    LUMBAR FUSION ANTERIOR       Allergies   Allergen Reactions    Morphine Nausea    Pcn [Penicillins]      Outpatient Encounter Medications as of 8/20/2024   Medication Sig Dispense Refill    metformin (GLUCOPHAGE) 1000 MG tablet Take 1,000 mg by mouth 2 times a day with meals.      insulin glargine 100 UNIT/ML Solution Pen-injector injection 50 units twice daily 60 mL 2    testosterone cypionate (DEPO-TESTOSTERONE) 200 MG/ML injection Inject 200 mg into the shoulder, thigh, or buttocks one time. Every 2 weeks, urology managing      Tirzepatide (MOUNJARO) 2.5 MG/0.5ML Solution Pen-injector Inject 2.5 mg under the skin every 7  days. 2 mL 1    spironolactone (ALDACTONE) 25 MG Tab TAKE 1/2 TABLET DAILY Strength: 25 mg 45 Tablet 3    SYNTHROID 137 MCG Tab TAKE 1 TABLET EVERY MORNINGON AN EMPTY STOMACH 90 Tablet 2    FARXIGA 10 MG Tab TAKE 1 TABLET DAILY 90 Tablet 1    carvedilol (COREG) 25 MG Tab TAKE 1/2 TABLET TWICE A  Tablet 3    irbesartan (AVAPRO) 150 MG Tab Take 2 Tablets by mouth every day. 180 Tablet 3    allopurinol (ZYLOPRIM) 300 MG Tab Take 1 Tablet by mouth every day. 90 Tablet 3    fenofibrate (TRICOR) 145 MG Tab TAKE 1 TABLET DAILY. 90 Tablet 3    hydroCHLOROthiazide (HYDRODIURIL) 12.5 MG tablet Take 1 Tablet by mouth every day. 90 Tablet 3    lansoprazole (PREVACID) 30 MG CAPSULE DELAYED RELEASE Take 1 Capsule by mouth 2 times a day. 180 Capsule 3    Continuous Blood Gluc Sensor (FREESTYLE DANIEL 2 SENSOR) Misc       Semaglutide,0.25 or 0.5MG/DOS, 2 MG/1.5ML Solution Pen-injector Inject 0.25 mg under the skin every 7 days. 1.5 mL 3    atorvastatin (LIPITOR) 40 MG Tab TAKE 1 TABLET DAILY 90 Tablet 1    metaxalone (SKELAXIN) 800 MG Tab TAKE 1 TABLET 3 TIMES A DAYAS NEEDED FOR MILD PAIN    MODERATE PAIN OR MUSCLE    SPASMS 270 Tablet 0    Insulin Pen Needle 32 G x 4 mm (BD PEN NEEDLE CARLY 2ND GEN) USE FOR INSULIN INJECTION  ONCE DAILY 100 Each 3    vitamin D, Ergocalciferol, (DRISDOL) 1.25 MG (03275 UT) Cap capsule TAKE 1 CAPSULE BY MOUTH EVERY 7 DAYS 12 capsule 3    aspirin 81 MG EC tablet Take 81 mg by mouth every day.      vitamin D (VITAMIND D3) 1000 UNIT Tab Take 1,000 Units by mouth every day.      Blood Glucose Monitoring Suppl Device Meter: Dispense One Touch Ultra 2 meter. Sig. Use as directed for blood sugar monitoring. 1 Device 0    Blood Glucose Test Strips Test strips order: Test strips for One Touch Ultra 2 meter. Sig: use QID and prn ssx high or low sugar 100 Each 2    Lancets Lancets order: Lancets for One Touch Ultra 2 meter. Sig: use QID and prn ssx high or low sugar. 100 Each 3    Omega 3 1000 MG Cap Take  "4 a day 100 Cap 6    Syringe/Needle, Disp, (SYRINGE 3CC/59JZ1-5/2\") 22G X 1-1/2\" 3 ML Misc For use with Intra-muscular testosterone  injection every 2 weeks. 12 Each 1    fluticasone (FLONASE) 50 MCG/ACT nasal spray Spray 2 Sprays in nose every day. 16 g 0     No facility-administered encounter medications on file as of 8/20/2024.     Social History     Socioeconomic History    Marital status:      Spouse name: Not on file    Number of children: Not on file    Years of education: Not on file    Highest education level: Bachelor's degree (e.g., BA, AB, BS)   Occupational History    Not on file   Tobacco Use    Smoking status: Never    Smokeless tobacco: Never   Vaping Use    Vaping status: Never Used   Substance and Sexual Activity    Alcohol use: Yes     Alcohol/week: 0.5 oz     Types: 1 Shots of liquor per week    Drug use: No    Sexual activity: Yes     Partners: Female   Other Topics Concern     Service No    Blood Transfusions No    Caffeine Concern No    Occupational Exposure No    Hobby Hazards No    Sleep Concern No    Stress Concern No    Weight Concern Yes    Special Diet No    Back Care No    Exercise Yes    Bike Helmet Yes    Seat Belt Yes    Self-Exams No   Social History Narrative    Not on file     Social Determinants of Health     Financial Resource Strain: Low Risk  (11/15/2022)    Overall Financial Resource Strain (CARDIA)     Difficulty of Paying Living Expenses: Not very hard   Food Insecurity: No Food Insecurity (11/15/2022)    Hunger Vital Sign     Worried About Running Out of Food in the Last Year: Never true     Ran Out of Food in the Last Year: Never true   Transportation Needs: No Transportation Needs (11/15/2022)    PRAPARE - Transportation     Lack of Transportation (Medical): No     Lack of Transportation (Non-Medical): No   Physical Activity: Insufficiently Active (11/15/2022)    Exercise Vital Sign     Days of Exercise per Week: 2 days     Minutes of Exercise per Session: " 30 min   Stress: No Stress Concern Present (11/15/2022)    [place] [employer] - Occupational Stress Questionnaire     Feeling of Stress : Only a little   Social Connections: Moderately Integrated (11/15/2022)    Social Connection and Isolation Panel [NHANES]     Frequency of Communication with Friends and Family: Twice a week     Frequency of Social Gatherings with Friends and Family: Once a week     Attends Jehovah's witness Services: Never     Active Member of Clubs or Organizations: Yes     Attends Club or Organization Meetings: More than 4 times per year     Marital Status:    Intimate Partner Violence: Not on file   Housing Stability: Low Risk  (11/15/2022)    Housing Stability Vital Sign     Unable to Pay for Housing in the Last Year: No     Number of Places Lived in the Last Year: 1     Unstable Housing in the Last Year: No      Social History     Tobacco Use   Smoking Status Never   Smokeless Tobacco Never     Social History     Substance and Sexual Activity   Alcohol Use Yes    Alcohol/week: 0.5 oz    Types: 1 Shots of liquor per week     Social History     Substance and Sexual Activity   Drug Use No        Family History   Problem Relation Age of Onset    Hyperlipidemia Mother     Diabetes Father     Heart Disease Father     Diabetes Brother     Heart Disease Paternal Grandfather     Stroke Paternal Grandfather        Review of Systems   Constitutional:  Negative for chills and fever.   Respiratory:  Negative for shortness of breath.    Cardiovascular:  Negative for chest pain.   Musculoskeletal:  Negative for joint pain and myalgias.   Neurological:  Negative for sensory change and weakness.        Objective:   /68 (BP Location: Left arm, Patient Position: Sitting)   Pulse 86   Temp (!) 35.6 °C (96.1 °F) (Temporal)   Ht 1.829 m (6')   Wt 107 kg (235 lb)   SpO2 97%   BMI 31.87 kg/m²     Physical Exam  Constitutional:       General: He is not in acute distress.      Appearance: Normal appearance.   HENT:      Head: Normocephalic and atraumatic.      Right Ear: External ear normal.      Left Ear: External ear normal.      Nose: Nose normal.      Mouth/Throat:      Mouth: Mucous membranes are moist.   Eyes:      Extraocular Movements: Extraocular movements intact.      Conjunctiva/sclera: Conjunctivae normal.   Cardiovascular:      Rate and Rhythm: Normal rate.      Pulses: Normal pulses.   Pulmonary:      Effort: Pulmonary effort is normal. No respiratory distress.      Breath sounds: No wheezing.   Abdominal:      General: Abdomen is flat. There is no distension.   Musculoskeletal:      Cervical back: Normal range of motion and neck supple.      Comments: Neck: Palpable soft tissue mass in the posterior neck that is approximately 3 cm in greatest dimension.  It is doughy to palpation and mobile in the subcutaneous tissue.  It is nontender to palpation.  There are no overlying skin changes.  Brisk capillary refill to surrounding skin.  2 other similar soft tissue masses one of the right chest wall and the other left flank that are approximately 1 cm in diameter.   Skin:     General: Skin is warm and dry.      Capillary Refill: Capillary refill takes less than 2 seconds.   Neurological:      Mental Status: He is alert and oriented to person, place, and time.   Psychiatric:         Mood and Affect: Mood normal.         Behavior: Behavior normal.               Diagnosis:     1. Lipoma of neck                Assessment/Plan:     I counseled the patient regarding the above clinical findings.  This is most likely a lipoma which is a benign entity.  It is reassuring that it has been present for several years with only slow growth.  If it were a dangerous mass it would be unlikely to grow at such a slow rate and be so small.  We discussed options for observation versus surgical excision.  He thinks he would like to have the mass taken out.  I recommended he get his diabetes under better  control prior to excising this mass because it is elective and would minimize risk for wound complications and infection.  He verbalized understanding and was in agreement with the plan.  He will work on getting better control of his blood sugars and will follow-up in 6 months.    Referrals: none  Restrictions: none  New medications/refills: none  Imaging studies: none  Labs: none  Follow-up: 6 months      Chloe Saunders DO  Orthopedic Oncologist

## 2024-09-03 ENCOUNTER — APPOINTMENT (OUTPATIENT)
Dept: ENDOCRINOLOGY | Facility: MEDICAL CENTER | Age: 54
End: 2024-09-03
Attending: INTERNAL MEDICINE
Payer: COMMERCIAL

## 2024-09-12 ENCOUNTER — OFFICE VISIT (OUTPATIENT)
Dept: ENDOCRINOLOGY | Facility: MEDICAL CENTER | Age: 54
End: 2024-09-12
Attending: INTERNAL MEDICINE
Payer: COMMERCIAL

## 2024-09-12 VITALS
HEART RATE: 71 BPM | RESPIRATION RATE: 17 BRPM | OXYGEN SATURATION: 96 % | DIASTOLIC BLOOD PRESSURE: 68 MMHG | WEIGHT: 236.5 LBS | SYSTOLIC BLOOD PRESSURE: 118 MMHG | HEIGHT: 72 IN | BODY MASS INDEX: 32.03 KG/M2

## 2024-09-12 DIAGNOSIS — E83.52 HYPERCALCEMIA: ICD-10-CM

## 2024-09-12 DIAGNOSIS — Z79.4 LONG-TERM INSULIN USE (HCC): ICD-10-CM

## 2024-09-12 DIAGNOSIS — Z79.4 TYPE 2 DIABETES MELLITUS WITH HYPERGLYCEMIA, WITH LONG-TERM CURRENT USE OF INSULIN (HCC): ICD-10-CM

## 2024-09-12 DIAGNOSIS — E23.0 HYPOGONADOTROPIC HYPOGONADISM (HCC): ICD-10-CM

## 2024-09-12 DIAGNOSIS — E11.65 TYPE 2 DIABETES MELLITUS WITH HYPERGLYCEMIA, WITH LONG-TERM CURRENT USE OF INSULIN (HCC): ICD-10-CM

## 2024-09-12 DIAGNOSIS — E78.2 MIXED HYPERLIPIDEMIA: ICD-10-CM

## 2024-09-12 DIAGNOSIS — E55.9 VITAMIN D DEFICIENCY: ICD-10-CM

## 2024-09-12 DIAGNOSIS — R97.20 ELEVATED PSA: ICD-10-CM

## 2024-09-12 DIAGNOSIS — E21.0 PRIMARY HYPERPARATHYROIDISM (HCC): ICD-10-CM

## 2024-09-12 LAB
HBA1C MFR BLD: 9.9 % (ref ?–5.8)
POCT INT CON NEG: NEGATIVE
POCT INT CON POS: POSITIVE

## 2024-09-12 PROCEDURE — 99214 OFFICE O/P EST MOD 30 MIN: CPT | Performed by: INTERNAL MEDICINE

## 2024-09-12 PROCEDURE — 83036 HEMOGLOBIN GLYCOSYLATED A1C: CPT | Performed by: INTERNAL MEDICINE

## 2024-09-12 RX ORDER — TIRZEPATIDE 2.5 MG/.5ML
2.5 INJECTION, SOLUTION SUBCUTANEOUS
Qty: 2 ML | Refills: 1 | Status: SHIPPED
Start: 2024-09-12

## 2024-09-12 ASSESSMENT — FIBROSIS 4 INDEX: FIB4 SCORE: 1.23

## 2024-09-12 NOTE — PROGRESS NOTES
CHIEF COMPLAINT: Patient is here for follow up of Type 2 Diabetes Mellitus, hypercalcemia, primary hyperparathyroidism, history of hypogonadism, mixed hyperlipidemia    HPI:     Brett Vicente is a 53 y.o. male with Type 2 Diabetes Mellitus here for follow up.    Labs from 9/12/2024 show A1c is 9.9%  Labs from 7/16/2021 HbA1c is 8.0%    He was previously seen by another endocrinologist.  I saw him once  2021 then he went to [facility] to see Dr. Chaney  Comorbid issues include hyperlipidemia, hypertension, hypogonadism and hypothyroidism    He cannot tolerate higher doses of Ozempic        Currently he is on   Basaglar 50 units 2 x daily   Farxiga 10 mg daily,   Metformin 1000 mg twice a day  Ozempic 0.25mg weekly.    He denies side effects with his other medications  He was last seen in the office in 2021  He saw Dr. Chaney  He reports pcp ordered Elle  He had a Pattie CGM but it stopped working with his phone          He has a history of mixed hyperlipidemia and is on TriCor 145 mg daily and atorvastatin 40 mg daily   Latest Reference Range & Units 07/13/24 09:20   Cholesterol,Tot 100 - 199 mg/dL 141   Triglycerides 0 - 149 mg/dL 181 (H)   HDL >=40 mg/dL 27 !   LDL <100 mg/dL 78       He no longer has albuminuria   He is taking irbesartan 300 mg every day hydrochlorothiazide 12.5 mg daily for hypertension   Latest Reference Range & Units 07/13/24 09:19   Creatinine, Random Urine mg/dL 256.07   Total Protein, Urine 0.0 - 15.0 mg/dL 13.0   Protein Creatinine Ratio 15 - 68 mg/g 51           For his hypothyroidism he is on levothyroxine 137 mcg daily which has been his medication for at least 2 years    Latest Reference Range & Units 07/13/24 09:20   TSH 0.380 - 5.330 uIU/mL 3.910             He has hypogonadism and was on testosterone injections 200 mg every 2 weeks but has been off injections for 3-4 mos due to elevated PSAs.  His urologist is monitoring his PSA levels.        I incidentally discovered that he has  hypercalcemia which was first detected on labs from January 2020.  His calcium was 11.0 this was not mentioned or discussed by his previous endocrinologist which is concerning      His calcium was 10.8 on April 2021 with a serum creatinine 1.27, albumin of 4.5, phosphorus is not available ionized calcium is not available and again PTH has not been checked.  The patient is on a thiazide diuretic but very low-dose  He denies taking lithium  He denies taking calcium supplements  He does take vitamin D 50,000 units weekly along with 1000 units daily and his vitamin D was 39 on April 2021 we do not have an updated vitamin D level on hand  He denies a history of kidney stones but he does have chronic fatigue    His ionized calcium was elevated 1.4 2021 and PTH was nonsuppressed at 40    In December 2022 serum calcium was 11.3 ionized calcium was not measured PTH was not measured vitamin D was 46 creatinine was 1.15      BG Diary:  Patient did not bring glucose meter    Weight has been stable    Diabetes Complications   Retinopathy: No known retinopathy.  Last eye exam: Eye exam was done today  Neuropathy: Denies paresthesias or numbness in hands or feet. Denies any foot wounds.  Exercise: Minimal.  Diet: Fair.  Patient's medications, allergies, and social histories were reviewed and updated as appropriate.    ROS:     CONS:     No fever, no chills   EYES:     No diplopia, no blurry vision   CV:           No chest pain, no palpitations   PULM:     No SOB, no cough, no hemoptysis.   GI:            No nausea, no vomiting, no diarrhea, no constipation   ENDO:     No polyuria, no polydipsia, no heat intolerance, no cold intolerance       Past Medical History:  Problem List:  2018-04: Obesity (BMI 30-39.9)  2018-04: Fatty liver  2017-01: Moderate single current episode of major depressive disorder   (HCC)  2017-01: Vitamin D deficiency  2016-12: Mixed hyperlipidemia  2016-06: Diabetes type 2, uncontrolled  2016-06:  Dyslipidemia  2015-12: Hypercalcemia  2015-12: Bilateral shoulder tendinopathy  2015-06: Hypogonadism in male  2015-06: Type 2 diabetes mellitus with hyperglycemia, with long-term   current use of insulin (HCC)  2015-06: HDL deficiency  2015-06: HLD (hyperlipidemia)  2015-06: Essential hypertension  2015-06: Acquired hypothyroidism  2015-06: Gastroesophageal reflux disease without esophagitis  2015-05: Diabetes type 2, uncontrolled  2014-08: Type 2 diabetes mellitus (HCC)  2014-08: GERD (gastroesophageal reflux disease)  2014-08: HTN (hypertension)  2014-08: Dyslipidemia  2014-08: Low testosterone  2014-08: Hypothyroid  2014-08: Gout      Past Surgical History:  Past Surgical History:   Procedure Laterality Date    LUMBAR FUSION ANTERIOR  6/10/2009    Performed by LISA MONTERO at [facility]    LUMBAR FUSION ANTERIOR          Allergies:  Morphine and Pcn [penicillins]     Social History:  Social History     Tobacco Use    Smoking status: Never    Smokeless tobacco: Never   Vaping Use    Vaping status: Never Used   Substance Use Topics    Alcohol use: Yes     Alcohol/week: 0.5 oz     Types: 1 Shots of liquor per week    Drug use: No        Family History:   family history includes Diabetes in his brother and father; Heart Disease in his father and paternal grandfather; Hyperlipidemia in his mother; Stroke in his paternal grandfather.      PHYSICAL EXAM:   OBJECTIVE:  Vital signs: /68 (BP Location: Left arm, Patient Position: Sitting)   Pulse 71   Resp 17   Ht 1.829 m (6')   Wt 107 kg (236 lb 8 oz)   SpO2 96%   BMI 32.08 kg/m²   GENERAL: Well-developed, well-nourished in no apparent distress.   EYE:  No ocular asymmetry, PERRLA  HENT: Pink, moist mucous membranes.    NECK: No thyromegaly.   CARDIOVASCULAR:  No murmurs  LUNGS: Clear breath sounds  ABDOMEN: Soft, nontender   EXTREMITIES: No clubbing, cyanosis, or edema.   NEUROLOGICAL: No gross focal motor abnormalities   LYMPH: No cervical  adenopathy palpated.   SKIN: No rashes, lesions.     Labs:  Lab Results   Component Value Date/Time    HBA1C 9.9 (A) 09/12/2024 07:59 AM        Lab Results   Component Value Date/Time    WBC 6.0 07/13/2024 09:20 AM    RBC 5.49 07/13/2024 09:20 AM    HEMOGLOBIN 15.3 07/13/2024 09:20 AM    MCV 85.6 07/13/2024 09:20 AM    MCH 27.9 07/13/2024 09:20 AM    MCHC 32.6 07/13/2024 09:20 AM    RDW 42.0 07/13/2024 09:20 AM    MPV 11.2 07/13/2024 09:20 AM       Lab Results   Component Value Date/Time    SODIUM 142 07/13/2024 09:20 AM    POTASSIUM 4.1 07/13/2024 09:20 AM    CHLORIDE 105 07/13/2024 09:20 AM    CO2 25 07/13/2024 09:20 AM    ANION 12.0 07/13/2024 09:20 AM    GLUCOSE 84 07/13/2024 09:20 AM    BUN 14 07/13/2024 09:20 AM    CREATININE 1.02 07/13/2024 09:20 AM    CREATININE 0.81 09/24/2014 12:00 AM    CALCIUM 10.5 07/13/2024 09:20 AM    ASTSGOT 25 07/13/2024 09:20 AM    ALTSGPT 30 07/13/2024 09:20 AM    TBILIRUBIN 0.6 07/13/2024 09:20 AM    ALBUMIN 4.3 07/13/2024 09:20 AM    ALBUMIN 3.87 07/21/2021 09:06 AM    TOTPROTEIN 6.7 07/13/2024 09:20 AM    TOTPROTEIN 6.2 (L) 07/21/2021 09:06 AM    GLOBULIN 2.4 07/13/2024 09:20 AM    AGRATIO 1.8 07/13/2024 09:20 AM       Lab Results   Component Value Date/Time    CHOLSTRLTOT 131 09/30/2019 0926    TRIGLYCERIDE 342 (H) 09/30/2019 0926    HDL 19 (A) 09/30/2019 0926    LDL 44 09/30/2019 0926       Lab Results   Component Value Date/Time    MICROALBCALC 8.6 06/24/2014 12:00 AM    MALBCRT see below 10/16/2023 08:17 AM    MICROALBUR <1.2 10/16/2023 08:17 AM        Lab Results   Component Value Date/Time    TSHULTRASEN 2.460 04/08/2021 1119    TSHULTRASEN 2.440 04/08/2021 1119     No results found for: FREEDIR  Lab Results   Component Value Date/Time    FREET3 2.53 04/08/2021 1119     No results found for: THYSTIMIG        ASSESSMENT/PLAN:   1. Type 2 diabetes mellitus with hyperglycemia, with long-term current use of insulin (HCC)  Uncontrolled secondary to hyperglycemia  Again he has  not been seen in the office for 3 years he is getting reestablish  I agree with replacing Ozempic with Mounjaro   I have a high suspicion that this patient may require mealtime insulin  Continue Lantus 50 units twice a day  Continue metformin at 1000 mg twice a day  Continue Farxiga 10 mg daily  Follow-up in 4 weeks to review sugars    2. Mixed hyperlipidemia  Controlled continue Tricor and atorvastatin continue monitoring lipids watch out for muscle aches from the combination of the statin and fibrate    3. Hypercalcemia  Unstable he has probable primary hyperparathyroidism we also discovered this 3 years ago when I saw him last time.  I am schedule him for a parathyroid scan I will probably do an ultrasound in the office for him but my main focus right now is his diabetes    4. Primary hyperparathyroidism (HCC)  This is the likely     5. Hypogonadotropic hypogonadism (HCC)  Unstable testosterone is on hold because of elevated PSAs monitor for now    6. Elevated PSA  Stable continue follow-up with urology agree with holding testosterone    7. Vitamin D deficiency  Stable   Vitamin D labs were reviewed with patient  Continue current supplements  Continue monitoring levels       8. Long-term insulin use (HCC)  Patient is on long-term basal insulin therapy          No follow-ups on file.      Total time spent on day of service was over 60 minutes which included obtaining a detailed history and physical exam, ordering labs, coordinating care and scheduling future follow-up    This patient during there office visit today was started on a new medication.  Side effects of the new medication were discussed with the patient today in the office.     Thank you kindly for allowing me to participate in the diabetes care plan for this patient.    Piyush Vasquez MD, FACE, NU      CC:   No primary care provider on file.

## 2024-10-15 ENCOUNTER — HOSPITAL ENCOUNTER (OUTPATIENT)
Dept: RADIOLOGY | Facility: MEDICAL CENTER | Age: 54
End: 2024-10-15
Attending: INTERNAL MEDICINE
Payer: COMMERCIAL

## 2024-10-15 DIAGNOSIS — E83.52 HYPERCALCEMIA: ICD-10-CM

## 2024-10-15 PROCEDURE — A9500 TC99M SESTAMIBI: HCPCS

## 2024-10-22 ENCOUNTER — APPOINTMENT (RX ONLY)
Dept: URBAN - METROPOLITAN AREA CLINIC 20 | Facility: CLINIC | Age: 54
Setting detail: DERMATOLOGY
End: 2024-10-22

## 2024-10-22 DIAGNOSIS — Z87.2 PERSONAL HISTORY OF DISEASES OF THE SKIN AND SUBCUTANEOUS TISSUE: ICD-10-CM

## 2024-10-22 DIAGNOSIS — L82.1 OTHER SEBORRHEIC KERATOSIS: ICD-10-CM

## 2024-10-22 DIAGNOSIS — Z85.828 PERSONAL HISTORY OF OTHER MALIGNANT NEOPLASM OF SKIN: ICD-10-CM

## 2024-10-22 DIAGNOSIS — L57.0 ACTINIC KERATOSIS: ICD-10-CM

## 2024-10-22 DIAGNOSIS — L81.4 OTHER MELANIN HYPERPIGMENTATION: ICD-10-CM

## 2024-10-22 DIAGNOSIS — L91.8 OTHER HYPERTROPHIC DISORDERS OF THE SKIN: ICD-10-CM

## 2024-10-22 DIAGNOSIS — D18.0 HEMANGIOMA: ICD-10-CM

## 2024-10-22 DIAGNOSIS — L57.8 OTHER SKIN CHANGES DUE TO CHRONIC EXPOSURE TO NONIONIZING RADIATION: ICD-10-CM

## 2024-10-22 DIAGNOSIS — D22 MELANOCYTIC NEVI: ICD-10-CM

## 2024-10-22 PROBLEM — D48.5 NEOPLASM OF UNCERTAIN BEHAVIOR OF SKIN: Status: ACTIVE | Noted: 2024-10-22

## 2024-10-22 PROBLEM — D22.71 MELANOCYTIC NEVI OF RIGHT LOWER LIMB, INCLUDING HIP: Status: ACTIVE | Noted: 2024-10-22

## 2024-10-22 PROBLEM — D18.01 HEMANGIOMA OF SKIN AND SUBCUTANEOUS TISSUE: Status: ACTIVE | Noted: 2024-10-22

## 2024-10-22 PROBLEM — D22.61 MELANOCYTIC NEVI OF RIGHT UPPER LIMB, INCLUDING SHOULDER: Status: ACTIVE | Noted: 2024-10-22

## 2024-10-22 PROBLEM — D22.72 MELANOCYTIC NEVI OF LEFT LOWER LIMB, INCLUDING HIP: Status: ACTIVE | Noted: 2024-10-22

## 2024-10-22 PROBLEM — D22.62 MELANOCYTIC NEVI OF LEFT UPPER LIMB, INCLUDING SHOULDER: Status: ACTIVE | Noted: 2024-10-22

## 2024-10-22 PROBLEM — D22.5 MELANOCYTIC NEVI OF TRUNK: Status: ACTIVE | Noted: 2024-10-22

## 2024-10-22 PROCEDURE — ? COUNSELING

## 2024-10-22 PROCEDURE — 99213 OFFICE O/P EST LOW 20 MIN: CPT | Mod: 25

## 2024-10-22 PROCEDURE — 11102 TANGNTL BX SKIN SINGLE LES: CPT

## 2024-10-22 PROCEDURE — ? LIQUID NITROGEN

## 2024-10-22 PROCEDURE — ? BIOPSY BY SHAVE METHOD

## 2024-10-22 PROCEDURE — 17000 DESTRUCT PREMALG LESION: CPT | Mod: 59

## 2024-10-22 PROCEDURE — ? DIAGNOSIS COMMENT

## 2024-10-22 PROCEDURE — ? OBSERVATION AND MEASURE

## 2024-10-22 ASSESSMENT — LOCATION SIMPLE DESCRIPTION DERM
LOCATION SIMPLE: RIGHT ANTERIOR NECK
LOCATION SIMPLE: RIGHT UPPER BACK
LOCATION SIMPLE: RIGHT CHEEK
LOCATION SIMPLE: LEFT FOREARM
LOCATION SIMPLE: RIGHT NOSE
LOCATION SIMPLE: CHEST
LOCATION SIMPLE: RIGHT UPPER ARM
LOCATION SIMPLE: LEFT LOWER BACK
LOCATION SIMPLE: LEFT UPPER ARM
LOCATION SIMPLE: LEFT POSTERIOR THIGH
LOCATION SIMPLE: LEFT THIGH
LOCATION SIMPLE: LEFT CHEEK
LOCATION SIMPLE: RIGHT SHOULDER
LOCATION SIMPLE: ABDOMEN
LOCATION SIMPLE: RIGHT THIGH
LOCATION SIMPLE: LEFT INFERIOR EYELID
LOCATION SIMPLE: RIGHT FOREARM
LOCATION SIMPLE: RIGHT POSTERIOR THIGH

## 2024-10-22 ASSESSMENT — LOCATION DETAILED DESCRIPTION DERM
LOCATION DETAILED: LEFT ANTERIOR DISTAL THIGH
LOCATION DETAILED: RIGHT PROXIMAL DORSAL FOREARM
LOCATION DETAILED: LEFT MEDIAL SUPERIOR CHEST
LOCATION DETAILED: LEFT DISTAL POSTERIOR UPPER ARM
LOCATION DETAILED: RIGHT DISTAL POSTERIOR THIGH
LOCATION DETAILED: RIGHT INFERIOR LATERAL NECK
LOCATION DETAILED: LEFT PROXIMAL DORSAL FOREARM
LOCATION DETAILED: PERIUMBILICAL SKIN
LOCATION DETAILED: LEFT PROXIMAL POSTERIOR UPPER ARM
LOCATION DETAILED: RIGHT POSTERIOR SHOULDER
LOCATION DETAILED: RIGHT SUPERIOR MEDIAL UPPER BACK
LOCATION DETAILED: RIGHT ANTERIOR DISTAL THIGH
LOCATION DETAILED: LEFT ANTERIOR PROXIMAL UPPER ARM
LOCATION DETAILED: RIGHT DISTAL POSTERIOR UPPER ARM
LOCATION DETAILED: LEFT INFERIOR CENTRAL MALAR CHEEK
LOCATION DETAILED: RIGHT ANTERIOR DISTAL UPPER ARM
LOCATION DETAILED: RIGHT CENTRAL MALAR CHEEK
LOCATION DETAILED: EPIGASTRIC SKIN
LOCATION DETAILED: LEFT LATERAL INFERIOR EYELID
LOCATION DETAILED: RIGHT MEDIAL MALAR CHEEK
LOCATION DETAILED: LEFT INFERIOR MEDIAL MIDBACK
LOCATION DETAILED: LEFT DISTAL POSTERIOR THIGH
LOCATION DETAILED: RIGHT NASAL ALA
LOCATION DETAILED: RIGHT MID-UPPER BACK
LOCATION DETAILED: STERNUM
LOCATION DETAILED: RIGHT ANTERIOR PROXIMAL UPPER ARM
LOCATION DETAILED: RIGHT PROXIMAL POSTERIOR UPPER ARM

## 2024-10-22 ASSESSMENT — LOCATION ZONE DERM
LOCATION ZONE: NOSE
LOCATION ZONE: FACE
LOCATION ZONE: NECK
LOCATION ZONE: ARM
LOCATION ZONE: EYELID
LOCATION ZONE: LEG
LOCATION ZONE: TRUNK

## 2024-10-30 ENCOUNTER — TELEPHONE (OUTPATIENT)
Dept: ENDOCRINOLOGY | Facility: MEDICAL CENTER | Age: 54
End: 2024-10-30

## 2024-10-30 ENCOUNTER — OFFICE VISIT (OUTPATIENT)
Dept: ENDOCRINOLOGY | Facility: MEDICAL CENTER | Age: 54
End: 2024-10-30
Attending: INTERNAL MEDICINE
Payer: COMMERCIAL

## 2024-10-30 VITALS — BODY MASS INDEX: 32.4 KG/M2 | WEIGHT: 238.9 LBS

## 2024-10-30 DIAGNOSIS — Z79.4 TYPE 2 DIABETES MELLITUS WITH HYPERGLYCEMIA, WITH LONG-TERM CURRENT USE OF INSULIN (HCC): Primary | ICD-10-CM

## 2024-10-30 DIAGNOSIS — E11.65 TYPE 2 DIABETES MELLITUS WITH HYPERGLYCEMIA, WITH LONG-TERM CURRENT USE OF INSULIN (HCC): Primary | ICD-10-CM

## 2024-10-30 PROCEDURE — 99213 OFFICE O/P EST LOW 20 MIN: CPT

## 2024-10-30 RX ORDER — PEN NEEDLE, DIABETIC 32GX 5/32"
NEEDLE, DISPOSABLE MISCELLANEOUS
Qty: 200 EACH | Refills: 3 | Status: SHIPPED | OUTPATIENT
Start: 2024-10-30

## 2024-10-30 ASSESSMENT — FIBROSIS 4 INDEX: FIB4 SCORE: 1.23

## 2024-11-30 ENCOUNTER — HOSPITAL ENCOUNTER (OUTPATIENT)
Dept: RADIOLOGY | Facility: MEDICAL CENTER | Age: 54
End: 2024-11-30
Attending: PHYSICIAN ASSISTANT
Payer: COMMERCIAL

## 2024-11-30 DIAGNOSIS — R97.20 ELEVATED PROSTATE SPECIFIC ANTIGEN (PSA): ICD-10-CM

## 2024-11-30 PROCEDURE — 700111 HCHG RX REV CODE 636 W/ 250 OVERRIDE (IP): Mod: JZ | Performed by: RADIOLOGY

## 2024-11-30 PROCEDURE — 72197 MRI PELVIS W/O & W/DYE: CPT

## 2024-11-30 PROCEDURE — A9579 GAD-BASE MR CONTRAST NOS,1ML: HCPCS | Mod: JZ | Performed by: PHYSICIAN ASSISTANT

## 2024-11-30 PROCEDURE — 700117 HCHG RX CONTRAST REV CODE 255: Mod: JZ | Performed by: PHYSICIAN ASSISTANT

## 2024-11-30 RX ADMIN — GLUCAGON 1 MG: 1 INJECTION, POWDER, LYOPHILIZED, FOR SOLUTION INTRAMUSCULAR; INTRAVENOUS at 08:48

## 2024-11-30 RX ADMIN — GADOTERIDOL 20 ML: 279.3 INJECTION, SOLUTION INTRAVENOUS at 09:34

## 2025-01-09 ENCOUNTER — TELEPHONE (OUTPATIENT)
Dept: ENDOCRINOLOGY | Facility: MEDICAL CENTER | Age: 55
End: 2025-01-09
Payer: COMMERCIAL

## 2025-02-20 ENCOUNTER — APPOINTMENT (OUTPATIENT)
Dept: SURGICAL ONCOLOGY | Facility: MEDICAL CENTER | Age: 55
End: 2025-02-20
Payer: COMMERCIAL

## 2025-02-27 NOTE — TELEPHONE ENCOUNTER
Was the patient seen in the last year in this department? Yes    Does patient have an active prescription for medications requested? Yes    Received Request Via: Pharmacy     Last seen 06/26/2019  
systolic

## 2025-03-21 DIAGNOSIS — E11.65 TYPE 2 DIABETES MELLITUS WITH HYPERGLYCEMIA, WITH LONG-TERM CURRENT USE OF INSULIN (HCC): ICD-10-CM

## 2025-03-21 DIAGNOSIS — Z79.4 TYPE 2 DIABETES MELLITUS WITH HYPERGLYCEMIA, WITH LONG-TERM CURRENT USE OF INSULIN (HCC): ICD-10-CM

## 2025-03-25 ENCOUNTER — OFFICE VISIT (OUTPATIENT)
Dept: URGENT CARE | Facility: CLINIC | Age: 55
End: 2025-03-25
Payer: COMMERCIAL

## 2025-03-25 VITALS
HEART RATE: 63 BPM | OXYGEN SATURATION: 96 % | DIASTOLIC BLOOD PRESSURE: 102 MMHG | RESPIRATION RATE: 14 BRPM | WEIGHT: 234.57 LBS | BODY MASS INDEX: 31.77 KG/M2 | HEIGHT: 72 IN | TEMPERATURE: 97.1 F | SYSTOLIC BLOOD PRESSURE: 164 MMHG

## 2025-03-25 DIAGNOSIS — J22 ACUTE RESPIRATORY INFECTION: ICD-10-CM

## 2025-03-25 DIAGNOSIS — B30.9 VIRAL CONJUNCTIVITIS: ICD-10-CM

## 2025-03-25 DIAGNOSIS — R03.0 ELEVATED BLOOD PRESSURE READING: ICD-10-CM

## 2025-03-25 PROCEDURE — 3077F SYST BP >= 140 MM HG: CPT | Performed by: NURSE PRACTITIONER

## 2025-03-25 PROCEDURE — 3080F DIAST BP >= 90 MM HG: CPT | Performed by: NURSE PRACTITIONER

## 2025-03-25 PROCEDURE — 99213 OFFICE O/P EST LOW 20 MIN: CPT | Performed by: NURSE PRACTITIONER

## 2025-03-25 RX ORDER — DOXYCYCLINE HYCLATE 100 MG
100 TABLET ORAL 2 TIMES DAILY
Qty: 14 TABLET | Refills: 0 | Status: SHIPPED | OUTPATIENT
Start: 2025-03-25 | End: 2025-04-01

## 2025-03-25 RX ORDER — DEXTROMETHORPHAN HYDROBROMIDE AND PROMETHAZINE HYDROCHLORIDE 15; 6.25 MG/5ML; MG/5ML
5 SYRUP ORAL EVERY 6 HOURS PRN
Qty: 120 ML | Refills: 0 | Status: SHIPPED | OUTPATIENT
Start: 2025-03-25 | End: 2025-04-01

## 2025-03-25 RX ORDER — BENZONATATE 100 MG/1
100 CAPSULE ORAL 3 TIMES DAILY PRN
Qty: 30 CAPSULE | Refills: 0 | Status: SHIPPED | OUTPATIENT
Start: 2025-03-25

## 2025-03-25 RX ORDER — KETOTIFEN FUMARATE 0.35 MG/ML
1 SOLUTION/ DROPS OPHTHALMIC 2 TIMES DAILY
COMMUNITY
Start: 2025-03-25

## 2025-03-25 ASSESSMENT — ENCOUNTER SYMPTOMS
FEVER: 0
HEMOPTYSIS: 0
SPUTUM PRODUCTION: 1
WHEEZING: 1
EYE REDNESS: 1
SHORTNESS OF BREATH: 0
COUGH: 1
SORE THROAT: 1

## 2025-03-25 ASSESSMENT — FIBROSIS 4 INDEX: FIB4 SCORE: 1.25

## 2025-03-25 NOTE — PROGRESS NOTES
Subjective:     Brett Vicente is a 54 y.o. male who presents for Cough (Headache, right eye redness, sore throat, sinus and ear pressure/X 3 weeks )      Reports cough and pharyngitis x 3 weeks, with green phlegm. Right eye redness x 3 days. Denies an eye injury. Has had watery eye discharge.    Cough  This is a new problem. The current episode started 1 to 4 weeks ago. The cough is Productive of sputum. Associated symptoms include ear congestion, eye redness, a sore throat and wheezing. Pertinent negatives include no chest pain, ear pain, fever, hemoptysis or shortness of breath. Treatments tried: OTC cold medications.   Sinusitis  The current episode started 1 to 4 weeks ago. Associated symptoms include congestion, coughing, diaphoresis, sinus pressure and a sore throat. Pertinent negatives include no ear pain or shortness of breath.       Past Medical History:   Diagnosis Date   • Diabetes (HCC)    • Fatty liver 4/24/2018   • GERD (gastroesophageal reflux disease)    • Gout    • Hyperlipidemia    • Hypertension    • Hypothyroidism 6/25/2015     ICD-10 transition   • Thyroid disease        Past Surgical History:   Procedure Laterality Date   • LUMBAR FUSION ANTERIOR  6/10/2009    Performed by LISA MONTERO at SURGERY [facility] ORS   • LUMBAR FUSION ANTERIOR         Social History     Socioeconomic History   • Marital status:      Spouse name: Not on file   • Number of children: Not on file   • Years of education: Not on file   • Highest education level: Bachelor's degree (e.g., BA, AB, BS)   Occupational History   • Not on file   Tobacco Use   • Smoking status: Never   • Smokeless tobacco: Never   Vaping Use   • Vaping status: Never Used   Substance and Sexual Activity   • Alcohol use: Yes     Alcohol/week: 0.5 oz     Types: 1 Shots of liquor per week   • Drug use: No   • Sexual activity: Yes     Partners: Female   Other Topics Concern   •  Service No   • Blood Transfusions No   • Caffeine  Concern No   • Occupational Exposure No   • Hobby Hazards No   • Sleep Concern No   • Stress Concern No   • Weight Concern Yes   • Special Diet No   • Back Care No   • Exercise Yes   • Bike Helmet Yes   • Seat Belt Yes   • Self-Exams No   Social History Narrative   • Not on file     Social Drivers of Health     Financial Resource Strain: Low Risk  (11/15/2022)    Overall Financial Resource Strain (CARDIA)    • Difficulty of Paying Living Expenses: Not very hard   Food Insecurity: No Food Insecurity (11/15/2022)    Hunger Vital Sign    • Worried About Running Out of Food in the Last Year: Never true    • Ran Out of Food in the Last Year: Never true   Transportation Needs: No Transportation Needs (11/15/2022)    PRAPARE - Transportation    • Lack of Transportation (Medical): No    • Lack of Transportation (Non-Medical): No   Physical Activity: Insufficiently Active (11/15/2022)    Exercise Vital Sign    • Days of Exercise per Week: 2 days    • Minutes of Exercise per Session: 30 min   Stress: No Stress Concern Present (11/15/2022)    [place] [employer] - Occupational Stress Questionnaire    • Feeling of Stress : Only a little   Social Connections: Moderately Integrated (11/15/2022)    Social Connection and Isolation Panel [NHANES]    • Frequency of Communication with Friends and Family: Twice a week    • Frequency of Social Gatherings with Friends and Family: Once a week    • Attends Druze Services: Never    • Active Member of Clubs or Organizations: Yes    • Attends Club or Organization Meetings: More than 4 times per year    • Marital Status:    Intimate Partner Violence: Not on file   Housing Stability: Low Risk  (11/15/2022)    Housing Stability Vital Sign    • Unable to Pay for Housing in the Last Year: No    • Number of Places Lived in the Last Year: 1    • Unstable Housing in the Last Year: No        Family History   Problem Relation Age of Onset   • Hyperlipidemia Mother    •  Diabetes Father    • Heart Disease Father    • Diabetes Brother    • Heart Disease Paternal Grandfather    • Stroke Paternal Grandfather         Allergies   Allergen Reactions   • Morphine Nausea   • Pcn [Penicillins]        Review of Systems   Constitutional:  Positive for diaphoresis. Negative for fever.   HENT:  Positive for congestion, sinus pressure and sore throat. Negative for ear pain.    Eyes:  Positive for redness. Negative for blurred vision, double vision and pain.   Respiratory:  Positive for cough, sputum production and wheezing. Negative for hemoptysis and shortness of breath.    Cardiovascular:  Negative for chest pain.   All other systems reviewed and are negative.       Objective:   BP (!) 164/102   Pulse 63   Temp 36.2 °C (97.1 °F) (Temporal)   Resp 14   Ht 1.829 m (6')   Wt 106 kg (234 lb 9.1 oz)   SpO2 96%   BMI 31.81 kg/m²     Physical Exam  Vitals reviewed.   Constitutional:       General: He is not in acute distress.     Appearance: He is well-developed. He is not toxic-appearing.   HENT:      Head: Normocephalic and atraumatic.      Right Ear: Ear canal and external ear normal. A middle ear effusion is present. Tympanic membrane is not perforated or erythematous.      Left Ear: Ear canal and external ear normal.  No middle ear effusion. Tympanic membrane is not perforated or erythematous.      Nose: Congestion present.      Mouth/Throat:      Lips: Pink.      Mouth: Mucous membranes are moist.      Pharynx: Posterior oropharyngeal erythema present. No oropharyngeal exudate.   Eyes:      Conjunctiva/sclera:      Right eye: Right conjunctiva is injected. No chemosis, exudate or hemorrhage.     Left eye: Left conjunctiva is not injected.      Pupils: Pupils are equal, round, and reactive to light.   Cardiovascular:      Rate and Rhythm: Normal rate.   Pulmonary:      Effort: Pulmonary effort is normal. No respiratory distress.      Breath sounds: Normal breath sounds. No stridor. No  wheezing, rhonchi or rales.   Skin:     General: Skin is warm and dry.      Findings: No rash.   Neurological:      Mental Status: He is alert and oriented to person, place, and time.      GCS: GCS eye subscore is 4. GCS verbal subscore is 5. GCS motor subscore is 6.   Psychiatric:         Speech: Speech normal.       Assessment/Plan:   1. Acute respiratory infection  - doxycycline (VIBRAMYCIN) 100 MG Tab; Take 1 Tablet by mouth 2 times a day for 7 days.  Dispense: 14 Tablet; Refill: 0  - promethazine-dextromethorphan (PROMETHAZINE-DM) 6.25-15 MG/5ML syrup; Take 5 mL by mouth every 6 hours as needed for Cough for up to 7 days.  Dispense: 120 mL; Refill: 0  - benzonatate (TESSALON) 100 MG Cap; Take 1 Capsule by mouth 3 times a day as needed for Cough.  Dispense: 30 Capsule; Refill: 0    2. Viral conjunctivitis  - ketotifen (ZADITOR) 0.035 % ophthalmic solution; Administer 1 Drop into both eyes 2 times a day.    3. Elevated blood pressure reading  Symptomatic care.  -Oral hydration and rest.   -Cough control: nonpharmacologic options for cough relief such as throat lozenges, hot tea, honey.  -Over the counter expectorant as directed; Guaifenesin (Mucinex).  -Tylenol for pain and fever as directed.   -Warm salt water gargles.  -OTC Throat lozenges or spray (Cepacol).  -Nasal spray and allergy medications as directed (Zyrtec or Loratadine).  -You may try saline irrigation or neti pot.    -Warm compress to sinuses.   -Monitor your blood pressure and follow up with your Primary Care Provider for persistent elevation.      Seek emergency medical care immediately for: Trouble breathing, persistent pain or pressure in the chest, confusion, inability to wake or stay awake, bluish lips or face, persistent tachycardia (fast heart rate), prolonged dizziness, persistent high grade fevers. Follow up for prolonged cough, persistent wheezing, persistent throat pain, difficulty swallowing, persistent fevers, leg swelling, or any  other concerns.     -Presents with his family of 4, for respiratory symptoms x 3 weeks. Stable Vitals. Had discussed possibility of recurrent exposure to viral illnesses. No pneumonia noted. Has had persistent sinus symptoms. Discussed symptomatic care, and S&S of PNA with follow up.     Differential diagnosis, natural history, supportive care, and indications for immediate follow-up discussed.

## 2025-03-25 NOTE — PATIENT INSTRUCTIONS
Symptomatic care.  -Oral hydration and rest.   -Cough control: nonpharmacologic options for cough relief such as throat lozenges, hot tea, honey.  -Over the counter expectorant as directed; Guaifenesin (Mucinex).  -Tylenol for pain and fever as directed.   -Warm salt water gargles.  -OTC Throat lozenges or spray (Cepacol).  -Nasal spray and allergy medications as directed (Zyrtec or Loratadine).  -You may try saline irrigation or neti pot.    -Warm compress to sinuses.   -Monitor your blood pressure and follow up with your Primary Care Provider for persistent elevation.      Seek emergency medical care immediately for: Trouble breathing, persistent pain or pressure in the chest, confusion, inability to wake or stay awake, bluish lips or face, persistent tachycardia (fast heart rate), prolonged dizziness, persistent high grade fevers. Follow up for prolonged cough, persistent wheezing, persistent throat pain, difficulty swallowing, persistent fevers, leg swelling, or any other concerns.

## 2025-03-26 ENCOUNTER — TELEPHONE (OUTPATIENT)
Dept: HEALTH INFORMATION MANAGEMENT | Facility: OTHER | Age: 55
End: 2025-03-26
Payer: COMMERCIAL

## 2025-03-28 ASSESSMENT — ENCOUNTER SYMPTOMS
SINUS PRESSURE: 1
EYE PAIN: 0
DOUBLE VISION: 0
DIAPHORESIS: 1
BLURRED VISION: 0

## 2025-04-07 DIAGNOSIS — K21.9 GASTRIC REFLUX: ICD-10-CM

## 2025-04-07 RX ORDER — LANSOPRAZOLE 30 MG/1
30 CAPSULE, DELAYED RELEASE ORAL 2 TIMES DAILY
Qty: 180 CAPSULE | Refills: 3 | Status: SHIPPED | OUTPATIENT
Start: 2025-04-07

## 2025-04-22 ENCOUNTER — APPOINTMENT (OUTPATIENT)
Dept: URBAN - METROPOLITAN AREA CLINIC 20 | Facility: CLINIC | Age: 55
Setting detail: DERMATOLOGY
End: 2025-04-22

## 2025-04-22 DIAGNOSIS — L57.8 OTHER SKIN CHANGES DUE TO CHRONIC EXPOSURE TO NONIONIZING RADIATION: ICD-10-CM

## 2025-04-22 DIAGNOSIS — Z85.828 PERSONAL HISTORY OF OTHER MALIGNANT NEOPLASM OF SKIN: ICD-10-CM

## 2025-04-22 DIAGNOSIS — L81.4 OTHER MELANIN HYPERPIGMENTATION: ICD-10-CM

## 2025-04-22 DIAGNOSIS — D18.0 HEMANGIOMA: ICD-10-CM

## 2025-04-22 DIAGNOSIS — D22 MELANOCYTIC NEVI: ICD-10-CM

## 2025-04-22 DIAGNOSIS — Z87.2 PERSONAL HISTORY OF DISEASES OF THE SKIN AND SUBCUTANEOUS TISSUE: ICD-10-CM

## 2025-04-22 DIAGNOSIS — L91.8 OTHER HYPERTROPHIC DISORDERS OF THE SKIN: ICD-10-CM

## 2025-04-22 DIAGNOSIS — L82.1 OTHER SEBORRHEIC KERATOSIS: ICD-10-CM

## 2025-04-22 PROBLEM — D22.61 MELANOCYTIC NEVI OF RIGHT UPPER LIMB, INCLUDING SHOULDER: Status: ACTIVE | Noted: 2025-04-22

## 2025-04-22 PROBLEM — D18.01 HEMANGIOMA OF SKIN AND SUBCUTANEOUS TISSUE: Status: ACTIVE | Noted: 2025-04-22

## 2025-04-22 PROBLEM — D22.72 MELANOCYTIC NEVI OF LEFT LOWER LIMB, INCLUDING HIP: Status: ACTIVE | Noted: 2025-04-22

## 2025-04-22 PROBLEM — D22.62 MELANOCYTIC NEVI OF LEFT UPPER LIMB, INCLUDING SHOULDER: Status: ACTIVE | Noted: 2025-04-22

## 2025-04-22 PROBLEM — D22.5 MELANOCYTIC NEVI OF TRUNK: Status: ACTIVE | Noted: 2025-04-22

## 2025-04-22 PROBLEM — D22.71 MELANOCYTIC NEVI OF RIGHT LOWER LIMB, INCLUDING HIP: Status: ACTIVE | Noted: 2025-04-22

## 2025-04-22 PROCEDURE — ? OBSERVATION

## 2025-04-22 PROCEDURE — 99213 OFFICE O/P EST LOW 20 MIN: CPT

## 2025-04-22 PROCEDURE — ? COUNSELING

## 2025-04-22 PROCEDURE — ? DIAGNOSIS COMMENT

## 2025-04-22 ASSESSMENT — LOCATION DETAILED DESCRIPTION DERM
LOCATION DETAILED: LEFT ANTERIOR DISTAL THIGH
LOCATION DETAILED: RIGHT ANTERIOR PROXIMAL UPPER ARM
LOCATION DETAILED: LEFT PROXIMAL POSTERIOR UPPER ARM
LOCATION DETAILED: LEFT DISTAL POSTERIOR THIGH
LOCATION DETAILED: EPIGASTRIC SKIN
LOCATION DETAILED: RIGHT INFERIOR LATERAL NECK
LOCATION DETAILED: LEFT ANTERIOR PROXIMAL UPPER ARM
LOCATION DETAILED: STERNUM
LOCATION DETAILED: RIGHT DISTAL POSTERIOR UPPER ARM
LOCATION DETAILED: RIGHT DISTAL POSTERIOR THIGH
LOCATION DETAILED: RIGHT MEDIAL MALAR CHEEK
LOCATION DETAILED: LEFT DISTAL POSTERIOR UPPER ARM
LOCATION DETAILED: RIGHT ANTERIOR DISTAL UPPER ARM
LOCATION DETAILED: PERIUMBILICAL SKIN
LOCATION DETAILED: RIGHT PROXIMAL POSTERIOR UPPER ARM
LOCATION DETAILED: RIGHT SUPERIOR MEDIAL UPPER BACK
LOCATION DETAILED: LEFT INFERIOR CENTRAL MALAR CHEEK
LOCATION DETAILED: RIGHT POSTERIOR SHOULDER
LOCATION DETAILED: RIGHT PROXIMAL DORSAL FOREARM
LOCATION DETAILED: RIGHT MID-UPPER BACK
LOCATION DETAILED: RIGHT INFERIOR MEDIAL UPPER BACK
LOCATION DETAILED: LEFT MEDIAL SUPERIOR CHEST
LOCATION DETAILED: LEFT LATERAL INFERIOR EYELID
LOCATION DETAILED: RIGHT CENTRAL MALAR CHEEK
LOCATION DETAILED: RIGHT ANTERIOR DISTAL THIGH
LOCATION DETAILED: LEFT PROXIMAL DORSAL FOREARM
LOCATION DETAILED: RIGHT NASAL ALA

## 2025-04-22 ASSESSMENT — LOCATION ZONE DERM
LOCATION ZONE: TRUNK
LOCATION ZONE: FACE
LOCATION ZONE: EYELID
LOCATION ZONE: LEG
LOCATION ZONE: NOSE
LOCATION ZONE: ARM
LOCATION ZONE: NECK

## 2025-04-22 ASSESSMENT — LOCATION SIMPLE DESCRIPTION DERM
LOCATION SIMPLE: RIGHT NOSE
LOCATION SIMPLE: RIGHT POSTERIOR THIGH
LOCATION SIMPLE: LEFT FOREARM
LOCATION SIMPLE: ABDOMEN
LOCATION SIMPLE: LEFT INFERIOR EYELID
LOCATION SIMPLE: RIGHT UPPER BACK
LOCATION SIMPLE: LEFT CHEEK
LOCATION SIMPLE: RIGHT SHOULDER
LOCATION SIMPLE: LEFT POSTERIOR THIGH
LOCATION SIMPLE: LEFT THIGH
LOCATION SIMPLE: RIGHT CHEEK
LOCATION SIMPLE: RIGHT FOREARM
LOCATION SIMPLE: RIGHT THIGH
LOCATION SIMPLE: RIGHT ANTERIOR NECK
LOCATION SIMPLE: LEFT UPPER ARM
LOCATION SIMPLE: RIGHT UPPER ARM
LOCATION SIMPLE: CHEST

## 2025-04-23 ENCOUNTER — TELEPHONE (OUTPATIENT)
Dept: ENDOCRINOLOGY | Facility: MEDICAL CENTER | Age: 55
End: 2025-04-23

## 2025-04-23 ENCOUNTER — OFFICE VISIT (OUTPATIENT)
Dept: ENDOCRINOLOGY | Facility: MEDICAL CENTER | Age: 55
End: 2025-04-23
Attending: INTERNAL MEDICINE
Payer: COMMERCIAL

## 2025-04-23 VITALS
SYSTOLIC BLOOD PRESSURE: 124 MMHG | WEIGHT: 224 LBS | HEART RATE: 80 BPM | OXYGEN SATURATION: 94 % | BODY MASS INDEX: 30.34 KG/M2 | HEIGHT: 72 IN | DIASTOLIC BLOOD PRESSURE: 84 MMHG

## 2025-04-23 DIAGNOSIS — E83.52 HYPERCALCEMIA: ICD-10-CM

## 2025-04-23 DIAGNOSIS — M10.9 GOUT, UNSPECIFIED CAUSE, UNSPECIFIED CHRONICITY, UNSPECIFIED SITE: ICD-10-CM

## 2025-04-23 DIAGNOSIS — E23.0 HYPOGONADOTROPIC HYPOGONADISM (HCC): ICD-10-CM

## 2025-04-23 DIAGNOSIS — E21.0 PRIMARY HYPERPARATHYROIDISM (HCC): ICD-10-CM

## 2025-04-23 DIAGNOSIS — E78.2 MIXED HYPERLIPIDEMIA: ICD-10-CM

## 2025-04-23 DIAGNOSIS — Z91.199 NONCOMPLIANCE WITH DIABETES TREATMENT: ICD-10-CM

## 2025-04-23 DIAGNOSIS — E11.65 TYPE 2 DIABETES MELLITUS WITH HYPERGLYCEMIA, WITH LONG-TERM CURRENT USE OF INSULIN (HCC): ICD-10-CM

## 2025-04-23 DIAGNOSIS — E55.9 VITAMIN D DEFICIENCY: ICD-10-CM

## 2025-04-23 DIAGNOSIS — Z79.4 TYPE 2 DIABETES MELLITUS WITH HYPERGLYCEMIA, WITH LONG-TERM CURRENT USE OF INSULIN (HCC): ICD-10-CM

## 2025-04-23 DIAGNOSIS — E03.9 ACQUIRED HYPOTHYROIDISM: ICD-10-CM

## 2025-04-23 DIAGNOSIS — I10 ESSENTIAL HYPERTENSION: ICD-10-CM

## 2025-04-23 DIAGNOSIS — Z79.4 LONG-TERM INSULIN USE (HCC): ICD-10-CM

## 2025-04-23 LAB
HBA1C MFR BLD: 13.2 % (ref ?–5.8)
POCT INT CON NEG: NEGATIVE
POCT INT CON POS: POSITIVE

## 2025-04-23 PROCEDURE — 99214 OFFICE O/P EST MOD 30 MIN: CPT | Performed by: INTERNAL MEDICINE

## 2025-04-23 PROCEDURE — 3074F SYST BP LT 130 MM HG: CPT | Performed by: INTERNAL MEDICINE

## 2025-04-23 PROCEDURE — 83036 HEMOGLOBIN GLYCOSYLATED A1C: CPT | Performed by: INTERNAL MEDICINE

## 2025-04-23 PROCEDURE — 3079F DIAST BP 80-89 MM HG: CPT | Performed by: INTERNAL MEDICINE

## 2025-04-23 PROCEDURE — 99211 OFF/OP EST MAY X REQ PHY/QHP: CPT | Performed by: INTERNAL MEDICINE

## 2025-04-23 RX ORDER — HYDROCHLOROTHIAZIDE 12.5 MG/1
1 CAPSULE ORAL
Qty: 6 EACH | Refills: 1 | Status: SHIPPED | OUTPATIENT
Start: 2025-04-23

## 2025-04-23 RX ORDER — INSULIN GLARGINE 100 [IU]/ML
INJECTION, SOLUTION SUBCUTANEOUS
Qty: 90 ML | Refills: 0 | Status: SHIPPED | OUTPATIENT
Start: 2025-04-23

## 2025-04-23 RX ORDER — DAPAGLIFLOZIN 10 MG/1
10 TABLET, FILM COATED ORAL DAILY
Qty: 90 TABLET | Refills: 0 | Status: SHIPPED | OUTPATIENT
Start: 2025-04-23 | End: 2025-04-24

## 2025-04-23 RX ORDER — KETOROLAC TROMETHAMINE 30 MG/ML
1 INJECTION, SOLUTION INTRAMUSCULAR; INTRAVENOUS DAILY
Qty: 1 EACH | Refills: 0 | Status: SHIPPED | OUTPATIENT
Start: 2025-04-23

## 2025-04-23 RX ORDER — ATORVASTATIN CALCIUM 40 MG/1
40 TABLET, FILM COATED ORAL DAILY
Qty: 90 TABLET | Refills: 0 | Status: SHIPPED | OUTPATIENT
Start: 2025-04-23

## 2025-04-23 RX ORDER — LEVOTHYROXINE SODIUM 137 MCG
137 TABLET ORAL
Qty: 90 TABLET | Refills: 1 | Status: SHIPPED | OUTPATIENT
Start: 2025-04-23

## 2025-04-23 ASSESSMENT — FIBROSIS 4 INDEX: FIB4 SCORE: 1.25

## 2025-04-23 NOTE — PROGRESS NOTES
CHIEF COMPLAINT: Patient is here for follow up of Type 2 Diabetes Mellitus, hypercalcemia, primary hyperparathyroidism, history of hypogonadism, mixed hyperlipidemia    HPI:     Brett Vicente is a 54 y.o. male with Type 2 Diabetes Mellitus here for follow up.    Labs from April 23, 2025 show A1c is 13%  Labs from 9/12/2024 show A1c is 9.9%  Labs from 7/16/2021 HbA1c is 8.0%    He was previously seen by another endocrinologist.  I saw him once  2021 then he went to [facility] to see Dr. Chaney  He then returned to [facility] on 9/2024  Comorbid issues include hyperlipidemia, hypertension, hypogonadism and hypothyroidism    He cannot tolerate higher doses of Ozempic        Currently he is on   Basaglar 50 units 2 x daily   Farxiga 10 mg daily,   Metformin 1000 mg twice a day  Ozempic 0.25mg weekly.    He denies side effects with his other medications  He admits to medication noncompliance due to lack of motivation   He reports feeling overwhelmed with all his medical issues and the number of office visits  She saw the pharmacist on  10/2024 but then he didn't follow up  Mounjaro was approved but he didn't take it as it was out of stock  at his mail order - we were not made aware   He reports that he has been out of meds  He reports that he lost his Pattie reader  He has not been checking his sugars          He has a history of mixed hyperlipidemia and is on TriCor 145 mg daily and atorvastatin 40 mg daily   Latest Reference Range & Units 07/13/24 09:20   Cholesterol,Tot 100 - 199 mg/dL 141   Triglycerides 0 - 149 mg/dL 181 (H)   HDL >=40 mg/dL 27 !   LDL <100 mg/dL 78       He no longer has albuminuria   He is taking irbesartan 300 mg every day hydrochlorothiazide 12.5 mg daily for hypertension   Latest Reference Range & Units 07/13/24 09:19   Creatinine, Random Urine mg/dL 256.07   Total Protein, Urine 0.0 - 15.0 mg/dL 13.0   Protein Creatinine Ratio 15 - 68 mg/g 51           For his hypothyroidism he is on levothyroxine 137  mcg daily which has been his medication for at least 2 years    Latest Reference Range & Units 07/13/24 09:20   TSH 0.380 - 5.330 uIU/mL 3.910             He has hypogonadism and was on testosterone injections 200 mg every 2 weeks but has been off injections for 3-4 mos due to elevated PSAs.  His urologist is monitoring his PSA levels.        I incidentally discovered that he has hypercalcemia which was first detected on labs from January 2020.  His calcium was 11.0 this was not mentioned or discussed by his previous endocrinologist which is concerning      His calcium was 10.8 on April 2021 with a serum creatinine 1.27, albumin of 4.5, phosphorus is not available ionized calcium is not available and again PTH has not been checked.  The patient is on a thiazide diuretic but very low-dose  He denies taking lithium  He denies taking calcium supplements  He does take vitamin D 50,000 units weekly along with 1000 units daily and his vitamin D was 39 on April 2021 we do not have an updated vitamin D level on hand  He denies a history of kidney stones but he does have chronic fatigue    His ionized calcium was elevated 1.4  in  2021 and PTH was nonsuppressed at 40    In December 2022 serum calcium was 11.3 ionized calcium was not measured PTH was not measured vitamin D was 46 creatinine was 1.15    He completed a sestamibi scan on October 2024 which came back nonlocalizing      BG Diary:  Patient did not bring glucose meter    Weight has been stable    Diabetes Complications   Retinopathy: No known retinopathy.  Last eye exam: Eye exam was done today  Neuropathy: Denies paresthesias or numbness in hands or feet. Denies any foot wounds.  Exercise: Minimal.  Diet: Fair.  Patient's medications, allergies, and social histories were reviewed and updated as appropriate.    ROS:     CONS:     No fever, no chills   EYES:     No diplopia, no blurry vision   CV:           No chest pain, no palpitations   PULM:     No SOB, no cough,  no hemoptysis.   GI:            No nausea, no vomiting, no diarrhea, no constipation   ENDO:     No polyuria, no polydipsia, no heat intolerance, no cold intolerance       Past Medical History:  Problem List:  2024-09: Primary hyperparathyroidism (HCC)  2024-09: Hypogonadotropic hypogonadism ([facility])  2024-09: Long-term insulin use (Formerly [facility])  2018-04: Obesity (BMI 30-39.9)  2018-04: Fatty liver  2017-01: Moderate single current episode of major depressive disorder   (Formerly [facility])  2017-01: Vitamin D deficiency  2016-12: Mixed hyperlipidemia  2016-06: Diabetes type 2, uncontrolled  2016-06: Dyslipidemia  2015-12: Hypercalcemia  2015-12: Bilateral shoulder tendinopathy  2015-06: Hypogonadism in male  2015-06: Type 2 diabetes mellitus with hyperglycemia, with long-term   current use of insulin (Former[facility])  2015-06: HDL deficiency  2015-06: HLD (hyperlipidemia)  2015-06: Essential hypertension  2015-06: Acquired hypothyroidism  2015-06: Gastroesophageal reflux disease without esophagitis  2015-05: Diabetes type 2, uncontrolled  2014-08: Type 2 diabetes mellitus (HCC)  2014-08: GERD (gastroesophageal reflux disease)  2014-08: HTN (hypertension)  2014-08: Dyslipidemia  2014-08: Low testosterone  2014-08: Hypothyroid  2014-08: Gout      Past Surgical History:  Past Surgical History:   Procedure Laterality Date    LUMBAR FUSION ANTERIOR  6/10/2009    Performed by LISA MONTERO at SURGERY [facility] ORS    LUMBAR FUSION ANTERIOR          Allergies:  Morphine and Pcn [penicillins]     Social History:  Social History     Tobacco Use    Smoking status: Never    Smokeless tobacco: Never   Vaping Use    Vaping status: Never Used   Substance Use Topics    Alcohol use: Yes     Alcohol/week: 0.5 oz     Types: 1 Shots of liquor per week    Drug use: No        Family History:   family history includes Diabetes in his brother and father; Heart Disease in his father and paternal grandfather; Hyperlipidemia in his mother; Stroke in his paternal  grandfather.      PHYSICAL EXAM:   OBJECTIVE:  Vital signs: /84 (BP Location: Left arm, Patient Position: Sitting, BP Cuff Size: Adult long)   Pulse 80   Ht 1.829 m (6')   Wt 102 kg (224 lb)   SpO2 94%   BMI 30.38 kg/m²   GENERAL: Well-developed, well-nourished in no apparent distress.   EYE:  No ocular asymmetry, PERRLA  HENT: Pink, moist mucous membranes.    NECK: No thyromegaly.   CARDIOVASCULAR:  No murmurs  LUNGS: Clear breath sounds  ABDOMEN: Soft, nontender   EXTREMITIES: No clubbing, cyanosis, or edema.   NEUROLOGICAL: No gross focal motor abnormalities   LYMPH: No cervical adenopathy palpated.   SKIN: No rashes, lesions.     Labs:  Lab Results   Component Value Date/Time    HBA1C 9.9 (A) 09/12/2024 07:59 AM        Lab Results   Component Value Date/Time    WBC 6.0 07/13/2024 09:20 AM    RBC 5.49 07/13/2024 09:20 AM    HEMOGLOBIN 15.3 07/13/2024 09:20 AM    MCV 85.6 07/13/2024 09:20 AM    MCH 27.9 07/13/2024 09:20 AM    MCHC 32.6 07/13/2024 09:20 AM    RDW 42.0 07/13/2024 09:20 AM    MPV 11.2 07/13/2024 09:20 AM       Lab Results   Component Value Date/Time    SODIUM 142 07/13/2024 09:20 AM    POTASSIUM 4.1 07/13/2024 09:20 AM    CHLORIDE 105 07/13/2024 09:20 AM    CO2 25 07/13/2024 09:20 AM    ANION 12.0 07/13/2024 09:20 AM    GLUCOSE 84 07/13/2024 09:20 AM    BUN 14 07/13/2024 09:20 AM    CREATININE 1.02 07/13/2024 09:20 AM    CREATININE 0.81 09/24/2014 12:00 AM    CALCIUM 10.5 07/13/2024 09:20 AM    ASTSGOT 25 07/13/2024 09:20 AM    ALTSGPT 30 07/13/2024 09:20 AM    TBILIRUBIN 0.6 07/13/2024 09:20 AM    ALBUMIN 4.3 07/13/2024 09:20 AM    ALBUMIN 3.87 07/21/2021 09:06 AM    TOTPROTEIN 6.7 07/13/2024 09:20 AM    TOTPROTEIN 6.2 (L) 07/21/2021 09:06 AM    GLOBULIN 2.4 07/13/2024 09:20 AM    AGRATIO 1.8 07/13/2024 09:20 AM       Lab Results   Component Value Date/Time    CHOLSTRLTOT 131 09/30/2019 0926    TRIGLYCERIDE 342 (H) 09/30/2019 0926    HDL 19 (A) 09/30/2019 0926    LDL 44 09/30/2019 0926        Lab Results   Component Value Date/Time    MICROALBCALC 8.6 06/24/2014 12:00 AM    MALBCRT see below 10/16/2023 08:17 AM    MICROALBUR <1.2 10/16/2023 08:17 AM        Lab Results   Component Value Date/Time    TSHULTRASEN 2.460 04/08/2021 1119    TSHULTRASEN 2.440 04/08/2021 1119     No results found for: FREEDIR  Lab Results   Component Value Date/Time    FREET3 2.53 04/08/2021 1119     No results found for: THYSTIMIG        ASSESSMENT/PLAN:   1. Type 2 diabetes mellitus with hyperglycemia, with long-term current use of insulin (HCC)  Uncontrolled secondary to hyperglycemia  He admits to noncompliance and lack of motivation  He has insurance approved Mounjaro but he was not able to get it so he stayed on Ozempic low-dose  He has not been using his CGM as well so we do not have data  I want him to get M[facility]o [facility]  Continue metformin 1000 mg twice a day  Continue Farxiga 10 mg daily  Follow-up with clinical pharmacist in 1 month to review sugars    2. Mixed hyperlipidemia  Controlled   Continue atorvastatin I refilled his medications  I want him to get updated fasting lipids    3. Hypercalcemia  Stable  We need to get updated labs for his calcium, ionized calcium, PTH, vitamin D and phosphorus      4. Primary hyperparathyroidism (HCC)  This is the likely etiology of his hypercalcemia    5. Hypogonadotropic hypogonadism (HCC)  Unstable testosterone is on hold because of elevated PSAs monitor for now    6. Elevated PSA  Stable continue follow-up with urology agree with holding testosterone    7. Vitamin D deficiency  Stable   Vitamin D labs were reviewed with patient  Continue current supplements  Continue monitoring levels       8. Long-term insulin use (HCC)  Patient is on long-term basal insulin therapy      Return in about 4 weeks (around 5/21/2025).      Thank you kindly for allowing me to participate in the diabetes care plan for this patient.    Piyush Vasquez MD, FACE, ECNU      CC:    No primary care provider on file.

## 2025-04-23 NOTE — TELEPHONE ENCOUNTER
Patient called and left a voicemail stating that lab work may be needed before upcoming appointment.     Patient was seen today and labs were ordered

## 2025-04-24 ENCOUNTER — TELEPHONE (OUTPATIENT)
Dept: PHARMACY | Facility: MEDICAL CENTER | Age: 55
End: 2025-04-24
Payer: COMMERCIAL

## 2025-04-24 PROCEDURE — RXMED WILLOW AMBULATORY MEDICATION CHARGE: Performed by: INTERNAL MEDICINE

## 2025-04-24 RX ORDER — DAPAGLIFLOZIN 10 MG/1
10 TABLET, FILM COATED ORAL DAILY
Qty: 90 TABLET | Refills: 1 | Status: SHIPPED | OUTPATIENT
Start: 2025-04-24

## 2025-04-24 RX ORDER — CARVEDILOL 25 MG/1
TABLET ORAL
Qty: 90 TABLET | Refills: 3 | Status: SHIPPED | OUTPATIENT
Start: 2025-04-24

## 2025-04-24 RX ORDER — FENOFIBRATE 145 MG/1
145 TABLET, FILM COATED ORAL DAILY
Qty: 90 TABLET | Refills: 3 | Status: SHIPPED | OUTPATIENT
Start: 2025-04-24

## 2025-04-24 RX ORDER — HYDROCHLOROTHIAZIDE 12.5 MG/1
12.5 TABLET ORAL DAILY
Qty: 90 TABLET | Refills: 3 | Status: SHIPPED | OUTPATIENT
Start: 2025-04-24

## 2025-04-24 RX ORDER — ALLOPURINOL 300 MG/1
300 TABLET ORAL DAILY
Qty: 90 TABLET | Refills: 3 | Status: SHIPPED | OUTPATIENT
Start: 2025-04-24

## 2025-04-24 RX ORDER — IRBESARTAN 150 MG/1
300 TABLET ORAL DAILY
Qty: 180 TABLET | Refills: 3 | Status: SHIPPED | OUTPATIENT
Start: 2025-04-24

## 2025-04-24 NOTE — TELEPHONE ENCOUNTER
Received request via: Pharmacy    Was the patient seen in the last year in this department? Yes    Does the patient have an active prescription (recently filled or refills available) for medication(s) requested? No    Pharmacy Name: [facility]    Does the patient have MCFP Plus and need 100-day supply? (This applies to ALL medications) Patient does not have SCP

## 2025-04-24 NOTE — TELEPHONE ENCOUNTER
Contact:  Phone number:[phone] (mobile)    Name of person spoken with and relationship to patient: Brett patient   Patient’s Adherence:  How patient is doing on medication: Very Well    How many missed doses and reason: 0 N/A    Any new medications: No    Any new conditions: No    Any new allergies: No    Any new side effects: No    Any new diagnoses: No    How many doses remainin    Did patient want to speak with pharmacist: No   Delivery:  Delivery date and method: 2025 via     Needs by Date: 2025    Signature required: No     Any additional details for : N/A   Teach Appointment Date:  N/A   Shipping Address:  [address] [place] [id number]   Medication(name,strength and dose):  MOUNJARO SOLUTION AUTO-INJECTOR 2.5 MG/0.5ML   Copay:  $47.34   Payment Method:  Credit card on file   Supplies:  [employer] Alcohol Swabs   Additional Information:  NONE     Usha Beckman, Pharmacy Liaison/ RX Coordinator

## 2025-04-25 ENCOUNTER — PHARMACY VISIT (OUTPATIENT)
Dept: PHARMACY | Facility: MEDICAL CENTER | Age: 55
End: 2025-04-25
Payer: COMMERCIAL

## 2025-05-19 ENCOUNTER — TELEPHONE (OUTPATIENT)
Dept: PHARMACY | Facility: MEDICAL CENTER | Age: 55
End: 2025-05-19
Payer: COMMERCIAL

## 2025-05-19 DIAGNOSIS — E11.65 TYPE 2 DIABETES MELLITUS WITH HYPERGLYCEMIA, WITH LONG-TERM CURRENT USE OF INSULIN (HCC): ICD-10-CM

## 2025-05-19 DIAGNOSIS — Z79.4 TYPE 2 DIABETES MELLITUS WITH HYPERGLYCEMIA, WITH LONG-TERM CURRENT USE OF INSULIN (HCC): ICD-10-CM

## 2025-05-19 PROCEDURE — RXMED WILLOW AMBULATORY MEDICATION CHARGE: Performed by: INTERNAL MEDICINE

## 2025-05-19 RX ORDER — KETOROLAC TROMETHAMINE 30 MG/ML
1 INJECTION, SOLUTION INTRAMUSCULAR; INTRAVENOUS DAILY
Qty: 1 EACH | Refills: 0 | Status: SHIPPED | OUTPATIENT
Start: 2025-05-19

## 2025-05-19 NOTE — TELEPHONE ENCOUNTER
Contact:  Phone number:[phone] (mobile)    Name of person spoken with and relationship to patient: Brett patient   Patient’s Adherence:  How patient is doing on medication: Very Well    How many missed doses and reason: 0 N/A    Any new medications: No    Any new conditions: No    Any new allergies: No    Any new side effects: No    Any new diagnoses: No    How many doses remainin    Did patient want to speak with pharmacist: No   Delivery:  Delivery date and method: 2025 via     Needs by Date: 2025    Signature required: No     Any additional details for : N/A   Teach Appointment Date:  N/A   Shipping Address:  [address] [place] [id number]   Medication(name,strength and dose):  Mounjaro Solution Auto-injector 2.5 MG/0.5ML,FreeStyle Pattie 3 Ragland Device   Copay:  $47.34   Payment Method:  Credit card on file   Supplies:  Alcohol Swabs   Additional Information:  NONE     Usha Beckman, Pharmacy Liaison/ RX Coordinator

## 2025-05-21 ENCOUNTER — PHARMACY VISIT (OUTPATIENT)
Dept: PHARMACY | Facility: MEDICAL CENTER | Age: 55
End: 2025-05-21
Payer: COMMERCIAL

## 2025-05-28 ENCOUNTER — OFFICE VISIT (OUTPATIENT)
Dept: ENDOCRINOLOGY | Facility: MEDICAL CENTER | Age: 55
End: 2025-05-28
Attending: INTERNAL MEDICINE
Payer: COMMERCIAL

## 2025-05-28 VITALS — BODY MASS INDEX: 31.03 KG/M2 | WEIGHT: 228.8 LBS

## 2025-05-28 DIAGNOSIS — E11.65 TYPE 2 DIABETES MELLITUS WITH HYPERGLYCEMIA, WITH LONG-TERM CURRENT USE OF INSULIN (HCC): Primary | ICD-10-CM

## 2025-05-28 DIAGNOSIS — Z79.4 TYPE 2 DIABETES MELLITUS WITH HYPERGLYCEMIA, WITH LONG-TERM CURRENT USE OF INSULIN (HCC): Primary | ICD-10-CM

## 2025-05-28 PROCEDURE — 99212 OFFICE O/P EST SF 10 MIN: CPT

## 2025-05-28 RX ORDER — TIRZEPATIDE 5 MG/.5ML
5 INJECTION, SOLUTION SUBCUTANEOUS
Qty: 2 ML | Refills: 1 | Status: SHIPPED | OUTPATIENT
Start: 2025-05-28

## 2025-05-28 ASSESSMENT — FIBROSIS 4 INDEX: FIB4 SCORE: 1.25

## 2025-05-28 NOTE — PROGRESS NOTES
Patient Consult Note    Primary care physician: Lisa Wagner M.D.    Reason for consult: Management of Uncontrolled Type 2 Diabetes    HPI:  Brett Vicente is a 53 y.o. old patient who comes in today for evaluation of above stated problem.    Allergies  Morphine and Pcn [penicillins]    Current Diabetes Medication Regimen  Metformin IR: 1000 mg BID  GLP-1 or GLP-1/GIP Agent: tirzepatide (Mounjaro) 2.5 mg weekly -- injected 5 doses so far  SGLT-2 Inhibitor: dapagliflozin (Farxiga) 10 mg daily  Basal Insulin: Basaglar 50 units BID    Previous Diabetes Medications and Reason for Discontinuation  Exenatide (Bydureon) - N/V  Glipizide - d/c upon initiation of insulin  Victoza - cannot tolerate higher doses  Ozempic - cannot tolerate higher doses  Janumet - d/c upon switching to GLP1a    Potential Barriers to Care:  Adherence: reports occasional missed doses (maybe misses once a week if at all)  Side effects: none  Affordability: no issues    SMBG  Pt has home glucometer and proper testing technique - uses [employer] but unable to download today. He has only had his sensor on for ~6 days.     7-days   TIR 52%   Hyperglycemia 48%   Hypoglycemia 0%   Ave Glucose 183 md/gl     Hypoglycemia  Hypoglycemia awareness: Yes  Nocturnal hypoglycemia: None  Hypoglycemia:  None  Pt's treatment of Hypoglycemia  Discussed 15:15 Rule    Lifestyle  Current Exercise - limited d/t back surgery/pain, but plans to start walking his dogs    Dietary - being more mindful since he has a CGM on  Breakfast - toast/egg, cereal  Lunch - open face sandwich  Dinner - varies; pasta, protein/salad, carbs  Snacks - limited, protein bar, cottage cheese  Drinks - water, gatorade zero    Labs  Lab Results   Component Value Date/Time    HBA1C 13.2 (A) 04/23/2025 12:31 PM    HBA1C 9.9 (A) 09/12/2024 07:59 AM    HBA1C 9.0 (A) 06/13/2024 01:23 PM      Lab Results   Component Value Date/Time    SODIUM 142 07/13/2024 09:20 AM    POTASSIUM 4.1  07/13/2024 09:20 AM    CHLORIDE 105 07/13/2024 09:20 AM    CO2 25 07/13/2024 09:20 AM    GLUCOSE 84 07/13/2024 09:20 AM    BUN 14 07/13/2024 09:20 AM    CREATININE 1.02 07/13/2024 09:20 AM    CREATININE 0.81 09/24/2014 12:00 AM     Lab Results   Component Value Date/Time    ALKPHOSPHAT 65 07/13/2024 09:20 AM    ASTSGOT 25 07/13/2024 09:20 AM    ALTSGPT 30 07/13/2024 09:20 AM    TBILIRUBIN 0.6 07/13/2024 09:20 AM    INR 1.06 11/02/2018 10:21 AM    ALBUMIN 4.3 07/13/2024 09:20 AM    ALBUMIN 3.87 07/21/2021 09:06 AM      Lab Results   Component Value Date/Time    CHOLSTRLTOT 141 07/13/2024 09:20 AM    LDL 78 07/13/2024 09:20 AM    HDL 27 (A) 07/13/2024 09:20 AM    TRIGLYCERIDE 181 (H) 07/13/2024 09:20 AM       Lab Results   Component Value Date/Time    MALBCRT see below 10/16/2023 08:17 AM    MICROALBUR <1.2 10/16/2023 08:17 AM       Physical Examination:  Vital signs: Wt 104 kg (228 lb 12.8 oz)   BMI 31.03 kg/m²  Body mass index is 31.03 kg/m².    Assessment and Plan:    1. DM2  Discussed Goals: FBG 80 - 130, 2hPP < 180, a1c < 7.0%  Last a1c drawn on 04/23/25 was 13.2%, which is not at goal and has worsened since the previous reading  Limited readings available through CGM however available readings show an average glucose of 183 mg/dl  Patient is working on some dietary modifications based on the glucose trends he observes through his CGM  He is tolerating Mounjaro well and would benefit from increasing his dose for additional PPG coverage. May be able to decrease basal insulin requirements as Mounjaro is titrated (pending tolerability).  LDL at goal < 100 mg/dl, pt is on high intensity statin  UACR WNL but overdue, labs previously ordered. Pt is on ARB.    - Medication changes:  Exhaust supply of Mounjaro 2.5 mg weekly, then increase to 5 mg weekly  - Continue:  Continue all other medications listed above    - Lifestyle changes:  Exercise Goal - increase as tolerated  Dietary Goal - limit carb intake, maximize  proteins and non-starchy veggies    - Preventative management:  REC DM Score: 3  Care gaps addressed:   A1c is above 8%: Optimized DM medications/management  Microalbumin:creatine is due: UACR ordered  Retinal exam due: Reminded patient to complete retinal exam  Care gaps updated in Health Maintenance    Follow Up:  6 weeks for possible Mounjaro titration    Yasmin Kurtz, ArtiD    CC:   MELLO Rosas MD

## 2025-05-30 ENCOUNTER — TELEPHONE (OUTPATIENT)
Dept: PHARMACY | Facility: MEDICAL CENTER | Age: 55
End: 2025-05-30
Payer: COMMERCIAL

## 2025-05-30 PROCEDURE — RXMED WILLOW AMBULATORY MEDICATION CHARGE: Performed by: INTERNAL MEDICINE

## 2025-05-30 NOTE — TELEPHONE ENCOUNTER
Contact:  Phone number:[phone] (mobile)    Name of person spoken with and relationship to patient: Brett patient   Patient’s Adherence:  How patient is doing on medication: Very Well    How many missed doses and reason: 0 N/A    Any new medications: No    Any new conditions: No    Any new allergies: No    Any new side effects: No    Any new diagnoses: No    How many doses remainin    Did patient want to speak with pharmacist: No   Delivery:  Delivery date and method: 2025 via     Needs by Date: 2025    Signature required: No     Any additional details for : N/A   Teach Appointment Date:  N/A   Shipping Address:  [address] [place] [id number]   Medication(name,strength and dose):  MOUNJARO SOLUTION AUTO-INJECTOR 5 MG/0.5ML   Copay:  $47.34   Payment Method:  Credit card on file   Supplies:  Alcohol Swabs   Additional Information:  NONE     Usha Beckman, Pharmacy Liaison/ RX Coordinator

## 2025-06-02 ENCOUNTER — APPOINTMENT (OUTPATIENT)
Dept: ENDOCRINOLOGY | Facility: MEDICAL CENTER | Age: 55
End: 2025-06-02
Attending: INTERNAL MEDICINE
Payer: COMMERCIAL

## 2025-06-02 ENCOUNTER — PHARMACY VISIT (OUTPATIENT)
Dept: PHARMACY | Facility: MEDICAL CENTER | Age: 55
End: 2025-06-02
Payer: COMMERCIAL

## 2025-06-18 DIAGNOSIS — I10 ESSENTIAL HYPERTENSION: ICD-10-CM

## 2025-06-19 RX ORDER — SPIRONOLACTONE 25 MG/1
12.5 TABLET ORAL DAILY
Qty: 45 TABLET | Refills: 3 | Status: SHIPPED | OUTPATIENT
Start: 2025-06-19

## 2025-07-10 ENCOUNTER — TELEPHONE (OUTPATIENT)
Dept: PHARMACY | Facility: MEDICAL CENTER | Age: 55
End: 2025-07-10
Payer: COMMERCIAL

## 2025-07-10 PROCEDURE — RXMED WILLOW AMBULATORY MEDICATION CHARGE: Performed by: INTERNAL MEDICINE

## 2025-07-10 NOTE — TELEPHONE ENCOUNTER
Contact:  Phone number:[phone] (mobile)    Name of person spoken with and relationship to patient: Brett patient   Patient’s Adherence:  How patient is doing on medication: Very Well    How many missed doses and reason: 0 N/A    Any new medications: No    Any new conditions: No    Any new allergies: No    Any new side effects: No    Any new diagnoses: No    How many doses remainin    Did patient want to speak with pharmacist: No   Delivery:  Delivery date and method: 2025 via     Needs by Date: 2025    Signature required: No     Any additional details for : N/A   Teach Appointment Date:  N/A   Shipping Address:  [address] [place] [id number]   Medication(name,strength and dose):  MOUNJARO SOLUTION AUTO-INJECTOR 5 MG/0.5ML   Copay:  $47.34   Payment Method:  Credit card on file   Supplies:  Alcohol Swabs   Additional Information:  NONE     Usha Beckman, Pharmacy Liaison/ RX Coordinator

## 2025-07-14 ENCOUNTER — PHARMACY VISIT (OUTPATIENT)
Dept: PHARMACY | Facility: MEDICAL CENTER | Age: 55
End: 2025-07-14
Payer: COMMERCIAL

## 2025-07-14 ENCOUNTER — OFFICE VISIT (OUTPATIENT)
Dept: ENDOCRINOLOGY | Facility: MEDICAL CENTER | Age: 55
End: 2025-07-14
Attending: INTERNAL MEDICINE
Payer: COMMERCIAL

## 2025-07-14 VITALS — WEIGHT: 229.1 LBS | BODY MASS INDEX: 31.07 KG/M2

## 2025-07-14 DIAGNOSIS — Z79.4 TYPE 2 DIABETES MELLITUS WITH HYPERGLYCEMIA, WITH LONG-TERM CURRENT USE OF INSULIN (HCC): Primary | ICD-10-CM

## 2025-07-14 DIAGNOSIS — E11.65 TYPE 2 DIABETES MELLITUS WITH HYPERGLYCEMIA, WITH LONG-TERM CURRENT USE OF INSULIN (HCC): Primary | ICD-10-CM

## 2025-07-14 LAB
HBA1C MFR BLD: 7.9 % (ref ?–5.8)
POCT INT CON NEG: NEGATIVE
POCT INT CON POS: POSITIVE

## 2025-07-14 PROCEDURE — 83036 HEMOGLOBIN GLYCOSYLATED A1C: CPT

## 2025-07-14 PROCEDURE — 99212 OFFICE O/P EST SF 10 MIN: CPT

## 2025-07-14 ASSESSMENT — FIBROSIS 4 INDEX: FIB4 SCORE: 1.25

## 2025-07-14 NOTE — PROGRESS NOTES
Patient Consult Note    Primary care physician: Lisa Wagner M.D.    Reason for consult: Management of Uncontrolled Type 2 Diabetes    HPI:  Brett Vicente is a 53 y.o. old patient who comes in today for evaluation of above stated problem.    Allergies  Morphine and Pcn [penicillins]    Current Diabetes Medication Regimen  Metformin IR: 1000 mg BID  GLP-1 or GLP-1/GIP Agent: tirzepatide (Mounjaro) 5 mg weekly -- injected 3 doses so far, already received next month's refill  SGLT-2 Inhibitor: dapagliflozin (Farxiga) 10 mg daily  Basal Insulin: Basaglar 50 units BID    Previous Diabetes Medications and Reason for Discontinuation  Exenatide (Bydureon) - N/V  Glipizide - d/c upon initiation of insulin  Victoza - cannot tolerate higher doses  Ozempic - cannot tolerate higher doses  Janumet - d/c upon switching to GLP1a    Potential Barriers to Care:  Adherence: reports some missed doses in the last week but has been compliant otherwise  Side effects: none  Affordability: no issues    SMBG      Hypoglycemia  Hypoglycemia awareness: Yes  Nocturnal hypoglycemia: Occasional  Hypoglycemia:  Occasional  Pt's treatment of Hypoglycemia  Discussed 15:15 Rule    Lifestyle  Current Exercise - limited d/t back surgery/pain, but plans to start walking his dogs. Increased activity level while at camp las week.    Dietary - being more mindful since he has a CGM on  Breakfast - toast/egg, cereal  Lunch - open face sandwich  Dinner - varies; pasta, protein/salad, carbs  Snacks - limited, protein bar, cottage cheese  Drinks - water, gatorade zero    Labs  Lab Results   Component Value Date/Time    HBA1C 7.9 (A) 07/14/2025 11:51 AM    HBA1C 13.2 (A) 04/23/2025 12:31 PM    HBA1C 9.9 (A) 09/12/2024 07:59 AM      Lab Results   Component Value Date/Time    SODIUM 142 07/13/2024 09:20 AM    POTASSIUM 4.1 07/13/2024 09:20 AM    CHLORIDE 105 07/13/2024 09:20 AM    CO2 25 07/13/2024 09:20 AM    GLUCOSE 84 07/13/2024 09:20 AM    BUN  14 07/13/2024 09:20 AM    CREATININE 1.02 07/13/2024 09:20 AM    CREATININE 0.81 09/24/2014 12:00 AM     Lab Results   Component Value Date/Time    ALKPHOSPHAT 65 07/13/2024 09:20 AM    ASTSGOT 25 07/13/2024 09:20 AM    ALTSGPT 30 07/13/2024 09:20 AM    TBILIRUBIN 0.6 07/13/2024 09:20 AM    INR 1.06 11/02/2018 10:21 AM    ALBUMIN 4.3 07/13/2024 09:20 AM    ALBUMIN 3.87 07/21/2021 09:06 AM      Lab Results   Component Value Date/Time    CHOLSTRLTOT 141 07/13/2024 09:20 AM    LDL 78 07/13/2024 09:20 AM    HDL 27 (A) 07/13/2024 09:20 AM    TRIGLYCERIDE 181 (H) 07/13/2024 09:20 AM       Lab Results   Component Value Date/Time    MALBCRT see below 10/16/2023 08:17 AM    MICROALBUR <1.2 10/16/2023 08:17 AM       Physical Examination:  Vital signs: Wt 104 kg (229 lb 1.6 oz)   BMI 31.07 kg/m²  Body mass index is 31.07 kg/m².    Assessment and Plan:    1. DM2  Discussed Goals: FBG 80 - 130, 2hPP < 180, a1c < 7.0%  Last a1c drawn today was 7.9%, which is not at goal and has improved significantly since the previous reading  TIR not at goal of > 70% (PPG elevated)  Patient continues to work on some dietary modifications based on the glucose trends he observes through his CGM. He is also planning to increase his activity level.  Given occasional nocturnal and AM hypoglycemia, will decrease basal insulin  He is tolerating Mounjaro well and would benefit from increasing his dose for additional PPG coverage.  LDL at goal < 100 mg/dl, pt is on high intensity statin  UACR WNL but overdue, labs previously ordered. Pt is on ARB.    - Medication changes:  Exhaust supply of Mounjaro 5 mg weekly, then increase to 7.5 mg weekly  Decrease Basaglar to 46 units BID    - Continue:  Continue all other medications listed above    - Lifestyle changes:  Exercise Goal - increase as tolerated  Dietary Goal - limit carb intake, maximize proteins and non-starchy veggies    - Preventative management:  REC DM Score: 4  Care gaps addressed:   A1c is  above 8%: Optimized DM medications/management. A1c checked today and is now < 8%  Lipid panel is due: Lipid panel ordered previously, reminded pt to complete labs  Microalbumin:creatine is due: UACR ordered previously, reminded pt to complete labs  Retinal exam due: Reminded patient to complete retinal exam  Care gaps updated in Health Maintenance    Follow Up:  8 weeks for possible Mounjaro titration (per pt availability)    Yasmin Kurtz, ArtiD    CC:   MELLO Rosas MD

## 2025-07-26 DIAGNOSIS — E78.2 MIXED HYPERLIPIDEMIA: ICD-10-CM

## 2025-07-26 DIAGNOSIS — E11.65 TYPE 2 DIABETES MELLITUS WITH HYPERGLYCEMIA, WITH LONG-TERM CURRENT USE OF INSULIN (HCC): ICD-10-CM

## 2025-07-26 DIAGNOSIS — Z79.4 TYPE 2 DIABETES MELLITUS WITH HYPERGLYCEMIA, WITH LONG-TERM CURRENT USE OF INSULIN (HCC): ICD-10-CM

## 2025-07-27 RX ORDER — ATORVASTATIN CALCIUM 40 MG/1
40 TABLET, FILM COATED ORAL DAILY
Qty: 90 TABLET | Refills: 0 | Status: SHIPPED | OUTPATIENT
Start: 2025-07-27

## 2025-08-12 ENCOUNTER — SPECIALTY PHARMACY (OUTPATIENT)
Dept: ENDOCRINOLOGY | Facility: MEDICAL CENTER | Age: 55
End: 2025-08-12
Payer: COMMERCIAL

## 2025-08-12 DIAGNOSIS — Z79.4 TYPE 2 DIABETES MELLITUS WITH HYPERGLYCEMIA, WITH LONG-TERM CURRENT USE OF INSULIN (HCC): ICD-10-CM

## 2025-08-12 DIAGNOSIS — E11.65 TYPE 2 DIABETES MELLITUS WITH HYPERGLYCEMIA, WITH LONG-TERM CURRENT USE OF INSULIN (HCC): ICD-10-CM

## 2025-08-12 PROCEDURE — RXMED WILLOW AMBULATORY MEDICATION CHARGE: Performed by: INTERNAL MEDICINE

## 2025-08-12 RX ORDER — TIRZEPATIDE 5 MG/.5ML
5 INJECTION, SOLUTION SUBCUTANEOUS
Qty: 6 ML | Refills: 1 | Status: SHIPPED | OUTPATIENT
Start: 2025-08-12

## 2025-08-14 ENCOUNTER — PHARMACY VISIT (OUTPATIENT)
Dept: PHARMACY | Facility: MEDICAL CENTER | Age: 55
End: 2025-08-14
Payer: COMMERCIAL

## 2025-08-19 DIAGNOSIS — Z79.4 TYPE 2 DIABETES MELLITUS WITH HYPERGLYCEMIA, WITH LONG-TERM CURRENT USE OF INSULIN (HCC): ICD-10-CM

## 2025-08-19 DIAGNOSIS — E11.65 TYPE 2 DIABETES MELLITUS WITH HYPERGLYCEMIA, WITH LONG-TERM CURRENT USE OF INSULIN (HCC): ICD-10-CM

## 2025-08-20 ENCOUNTER — SPECIALTY PHARMACY (OUTPATIENT)
Dept: ENDOCRINOLOGY | Facility: MEDICAL CENTER | Age: 55
End: 2025-08-20
Payer: COMMERCIAL

## 2025-08-20 PROCEDURE — RXMED WILLOW AMBULATORY MEDICATION CHARGE: Performed by: INTERNAL MEDICINE

## 2025-08-20 RX ORDER — KETOROLAC TROMETHAMINE 30 MG/ML
1 INJECTION, SOLUTION INTRAMUSCULAR; INTRAVENOUS DAILY
Qty: 1 EACH | Refills: 0 | Status: SHIPPED | OUTPATIENT
Start: 2025-08-20

## 2025-08-21 ENCOUNTER — PHARMACY VISIT (OUTPATIENT)
Dept: PHARMACY | Facility: MEDICAL CENTER | Age: 55
End: 2025-08-21
Payer: COMMERCIAL